# Patient Record
Sex: MALE | Race: WHITE | NOT HISPANIC OR LATINO | Employment: OTHER | URBAN - METROPOLITAN AREA
[De-identification: names, ages, dates, MRNs, and addresses within clinical notes are randomized per-mention and may not be internally consistent; named-entity substitution may affect disease eponyms.]

---

## 2017-01-03 ENCOUNTER — HOSPITAL ENCOUNTER (OUTPATIENT)
Dept: SLEEP MEDICINE | Facility: HOSPITAL | Age: 67
Discharge: HOME | End: 2017-01-03
Attending: PSYCHIATRY & NEUROLOGY | Admitting: PSYCHIATRY & NEUROLOGY

## 2017-01-03 PROCEDURE — G0463 HOSPITAL OUTPT CLINIC VISIT: HCPCS

## 2017-01-03 NOTE — PROGRESS NOTES
DATE OF SERVICE:  01/03/2017    HISTORY:  The patient is a 66-year-old  male who has moderately severe obstructive sleep apnea syndrome. His polysomnography in the past has revealed apnea-hypopnea index of 27 per sleep hour and minimum SpO2 of 90%. During supine position, he had severe obstructive sleep apnea with AHI of 49 per sleep hour. He was treated with CPAP at 10 cmH20. Compliance data indicates 100% compliance with average usage of 8 hours. AHI is 0.7 with CPAP therapy. [place] Sleepiness Scale score is 3. The patient is compliant and benefiting from it.     PAST MEDICAL HISTORY:  1.  Hypertension.   2.  Hypercholesterolemia.     CURRENT MEDICATIONS:  1.  Atorvastatin.    2.  Metoprolol.  3.  Lisinopril.     ALLERGIES: No known drug allergies.     PHYSICAL EXAMINATION:  VITAL SIGNS: Weight 206 pounds, height 69 inches, body mass index 31, blood pressure 130/65, heart rate 50.   HEENT: Normal.   NECK: Supple. No bruits.   CARDIAC: Normal.   LUNGS: Clear to auscultation.   EXTREMITIES: No edema.     IMPRESSION: Patient with obstructive sleep apnea syndrome successfully treated with CPAP therapy and is compliant and benefiting from it.     RECOMMENDATIONS: Continue present CPAP. Followup 1 year.       BUSHRA Bee:last  D:   01/03/2017 08:41:44  T:   01/03/2017 09:20:53  Job ID:   [id number]  Document ID:   [id number]  cc:

## 2017-01-06 RX ORDER — METOPROLOL SUCCINATE 50 MG/1
TABLET, EXTENDED RELEASE ORAL
Qty: 90 TABLET | Refills: 0 | Status: SHIPPED | OUTPATIENT
Start: 2017-01-06 | End: 2017-03-07 | Stop reason: SDUPTHER

## 2017-03-03 RX ORDER — ATORVASTATIN CALCIUM 40 MG/1
TABLET, FILM COATED ORAL
Qty: 30 TABLET | Refills: 4 | Status: SHIPPED | OUTPATIENT
Start: 2017-03-03 | End: 2017-09-20 | Stop reason: SDUPTHER

## 2017-03-07 ENCOUNTER — OFFICE VISIT (OUTPATIENT)
Dept: FAMILY MEDICINE CLINIC | Facility: CLINIC | Age: 67
End: 2017-03-07

## 2017-03-07 VITALS
SYSTOLIC BLOOD PRESSURE: 150 MMHG | TEMPERATURE: 97.8 F | WEIGHT: 211 LBS | DIASTOLIC BLOOD PRESSURE: 86 MMHG | HEIGHT: 69 IN | HEART RATE: 52 BPM | BODY MASS INDEX: 31.25 KG/M2 | OXYGEN SATURATION: 96 %

## 2017-03-07 DIAGNOSIS — R00.1 SINUS BRADYCARDIA: Primary | ICD-10-CM

## 2017-03-07 PROCEDURE — 99213 OFFICE O/P EST LOW 20 MIN: CPT | Performed by: FAMILY MEDICINE

## 2017-03-07 PROCEDURE — 93000 ELECTROCARDIOGRAM COMPLETE: CPT | Performed by: FAMILY MEDICINE

## 2017-03-07 RX ORDER — METOPROLOL SUCCINATE 25 MG/1
25 TABLET, EXTENDED RELEASE ORAL DAILY
Start: 2017-03-07 | End: 2018-02-27

## 2017-03-07 NOTE — PROGRESS NOTES
Chief Complaint   Patient presents with   • Hypertension     follow up ,pt had 1 a low blood pressure episode yesterday 100/60 ,after gym .feel fatighe and has not energy       Subjective.../HPI  Patient present today with htn. Was at gym and became weak and went home and took nap. Drank sports drinks. Daughter listened to his heart and may have had irregular beat.     I have reviewed the patient's medical history in detail and updated the computerized patient record.    Family History   Problem Relation Age of Onset   • Dementia Mother    • Migraines Mother    • Osteoporosis Mother    • Stroke Mother 85     early 80s   • Heart disease Mother      tachycardia and had ppm   • Alzheimer's disease Mother    • Alcohol abuse Father    • Depression Father    • Migraines Father    • Tuberculosis Father    • Cancer Paternal Uncle      mets but unsure of type of ca  BACK CANCER       Social History     Social History   • Marital status:      Spouse name: N/A   • Number of children: 2   • Years of education: N/A     Occupational History   • teacher2      Social History Main Topics   • Smoking status: Never Smoker   • Smokeless tobacco: Never Used   • Alcohol use No      Comment: none in 5 yrs   • Drug use: No   • Sexual activity: Yes     Partners: Female     Other Topics Concern   • Not on file     Social History Narrative       Review of Systems:   Review of Systems   Constitutional: Positive for fatigue.   HENT: Negative.    Eyes: Negative.    Respiratory: Negative.    Cardiovascular: Positive for palpitations.   Gastrointestinal: Negative.    Endocrine: Negative.    Genitourinary: Negative.    Musculoskeletal: Negative.    Skin: Negative.    Allergic/Immunologic: Negative.    Hematological: Negative.        Objective:  Vital Signs     Vitals:    03/07/17 1212   BP: 150/86   BP Location: Right arm   Patient Position: Sitting   Pulse: 52   Temp: 97.8 °F (36.6 °C)   TempSrc: Oral   SpO2: 96%   Weight: 211 lb (95.7  "kg)   Height: 69\" (175.3 cm)     Physical Exam   Constitutional: He is oriented to person, place, and time. He appears well-developed and well-nourished.   HENT:   Head: Normocephalic.   Eyes: Pupils are equal, round, and reactive to light.   Neck: Normal range of motion.   Cardiovascular: Normal rate, regular rhythm and normal heart sounds.    Pulmonary/Chest: Effort normal.   Abdominal: Soft.   Musculoskeletal: Normal range of motion.   Neurological: He is alert and oriented to person, place, and time.   Skin: Skin is warm and dry.        Results Review:      REVIEWED AND DISCUSSED CLINICAL RESULTS WITH PATIENT                          Current Outpatient Prescriptions:   •  aspirin 81 MG tablet, Take  by mouth daily., Disp: , Rfl:   •  atorvastatin (LIPITOR) 40 MG tablet, TAKE 1 TABLET BY MOUTH ONE TIME A DAY , Disp: 30 tablet, Rfl: 4  •  esomeprazole (NEXIUM) 40 MG capsule, Take  by mouth., Disp: , Rfl:   •  ibuprofen (ADVIL,MOTRIN) 800 MG tablet, Take 800 mg by mouth Daily., Disp: , Rfl:   •  lisinopril (PRINIVIL,ZESTRIL) 10 MG tablet, Take 1 tablet by mouth Daily., Disp: 90 tablet, Rfl: 1  •  metoprolol succinate XL (TOPROL-XL) 25 MG 24 hr tablet, Take 1 tablet by mouth Daily., Disp: , Rfl:   •  Multiple Vitamin tablet, Take  by mouth., Disp: , Rfl:       ECG 12 Lead  Date/Time: 3/7/2017 12:39 PM  Performed by: CHONG LI JR.  Authorized by: CHONG LI JR.   Comparison: compared with previous ECG from 11/12/2016  Rhythm: sinus bradycardia  Rate: bradycardic  QRS axis: left  Clinical impression: non-specific ECG            Assessment/Plan     Diagnoses and all orders for this visit:    Sinus bradycardia    Other orders  -     ECG 12 Lead  -     metoprolol succinate XL (TOPROL-XL) 25 MG 24 hr tablet; Take 1 tablet by mouth Daily.       decrease to metoprolol succinate er 25 mg daily but skip all together tomorrow    Chong Li Jr., MD  03/07/17  1:27 PM          "

## 2017-04-04 ENCOUNTER — OFFICE VISIT (OUTPATIENT)
Dept: FAMILY MEDICINE CLINIC | Facility: CLINIC | Age: 67
End: 2017-04-04

## 2017-04-04 VITALS
BODY MASS INDEX: 31.22 KG/M2 | HEIGHT: 69 IN | TEMPERATURE: 98.1 F | HEART RATE: 56 BPM | SYSTOLIC BLOOD PRESSURE: 140 MMHG | WEIGHT: 210.8 LBS | OXYGEN SATURATION: 98 % | DIASTOLIC BLOOD PRESSURE: 90 MMHG

## 2017-04-04 DIAGNOSIS — I10 ESSENTIAL HYPERTENSION: ICD-10-CM

## 2017-04-04 DIAGNOSIS — E78.01 FAMILIAL HYPERCHOLESTEROLEMIA: Primary | ICD-10-CM

## 2017-04-04 DIAGNOSIS — K21.00 GASTROESOPHAGEAL REFLUX DISEASE WITH ESOPHAGITIS: ICD-10-CM

## 2017-04-04 PROCEDURE — 99213 OFFICE O/P EST LOW 20 MIN: CPT | Performed by: FAMILY MEDICINE

## 2017-04-04 RX ORDER — LISINOPRIL 20 MG/1
20 TABLET ORAL DAILY
Qty: 90 TABLET | Refills: 1 | Status: SHIPPED | OUTPATIENT
Start: 2017-04-04 | End: 2017-08-30 | Stop reason: SDUPTHER

## 2017-04-04 NOTE — PROGRESS NOTES
"  Chief Complaint   Patient presents with   • Hypertension     follow up ,is doing quite well ,meds working nice       Subjective.../HPI  Patient present today with htn. doing well on meds.bp 157/87 range max  at home    I have reviewed the patient's medical history in detail and updated the computerized patient record.    Family History   Problem Relation Age of Onset   • Dementia Mother    • Migraines Mother    • Osteoporosis Mother    • Stroke Mother 85     early 80s   • Heart disease Mother      tachycardia and had ppm   • Alzheimer's disease Mother    • Alcohol abuse Father    • Depression Father    • Migraines Father    • Tuberculosis Father    • Cancer Paternal Uncle      mets but unsure of type of ca  BACK CANCER       Social History     Social History   • Marital status:      Spouse name: N/A   • Number of children: 2   • Years of education: N/A     Occupational History   • teacher2      Social History Main Topics   • Smoking status: Never Smoker   • Smokeless tobacco: Never Used   • Alcohol use No      Comment: none in 5 yrs   • Drug use: No   • Sexual activity: Yes     Partners: Female     Other Topics Concern   • Not on file     Social History Narrative       Review of Systems:   Review of Systems   Constitutional: Negative.    HENT: Negative.    Gastrointestinal: Negative.    Endocrine: Negative.    Genitourinary: Negative.    Musculoskeletal: Negative.    Skin: Negative.    Allergic/Immunologic: Negative.    Neurological: Negative.    Hematological: Negative.    Psychiatric/Behavioral: Negative.        Objective:  Vital Signs     Vitals:    04/04/17 1348 04/04/17 1406   BP: 136/82 140/90   BP Location: Left arm Left arm   Patient Position: Sitting Sitting   Cuff Size:  Adult   Pulse: 56    Temp: 98.1 °F (36.7 °C)    TempSrc: Oral    SpO2: 98%    Weight: 210 lb 12.8 oz (95.6 kg)    Height: 69\" (175.3 cm)      Physical Exam   Constitutional: He is oriented to person, place, and time. He appears " well-developed and well-nourished.   HENT:   Head: Normocephalic.   Eyes: Pupils are equal, round, and reactive to light.   Neck: Normal range of motion.   Cardiovascular: Normal rate, regular rhythm and normal heart sounds.    Pulmonary/Chest: Effort normal.   Abdominal: Soft.   Musculoskeletal: Normal range of motion.   Neurological: He is alert and oriented to person, place, and time.   Skin: Skin is warm and dry.        Results Review:      REVIEWED AND DISCUSSED CLINICAL RESULTS WITH PATIENT                          Current Outpatient Prescriptions:   •  aspirin 81 MG tablet, Take  by mouth daily., Disp: , Rfl:   •  atorvastatin (LIPITOR) 40 MG tablet, TAKE 1 TABLET BY MOUTH ONE TIME A DAY , Disp: 30 tablet, Rfl: 4  •  esomeprazole (NEXIUM) 40 MG capsule, Take  by mouth., Disp: , Rfl:   •  ibuprofen (ADVIL,MOTRIN) 800 MG tablet, Take 800 mg by mouth Daily., Disp: , Rfl:   •  lisinopril (PRINIVIL,ZESTRIL) 20 MG tablet, Take 1 tablet by mouth Daily., Disp: 90 tablet, Rfl: 1  •  metoprolol succinate XL (TOPROL-XL) 25 MG 24 hr tablet, Take 1 tablet by mouth Daily., Disp: , Rfl:   •  Multiple Vitamin tablet, Take  by mouth., Disp: , Rfl:     Procedures    Assessment/Plan     Diagnoses and all orders for this visit:    Familial hypercholesterolemia    Essential hypertension    Gastroesophageal reflux disease with esophagitis    Other orders  -     lisinopril (PRINIVIL,ZESTRIL) 20 MG tablet; Take 1 tablet by mouth Daily.         Chong Beckman Jr., MD  04/04/17  2:14 PM

## 2017-07-11 ENCOUNTER — OFFICE VISIT (OUTPATIENT)
Dept: FAMILY MEDICINE CLINIC | Facility: CLINIC | Age: 67
End: 2017-07-11

## 2017-07-11 VITALS
OXYGEN SATURATION: 98 % | WEIGHT: 203.2 LBS | HEART RATE: 57 BPM | HEIGHT: 69 IN | TEMPERATURE: 98.1 F | BODY MASS INDEX: 30.1 KG/M2 | SYSTOLIC BLOOD PRESSURE: 114 MMHG | DIASTOLIC BLOOD PRESSURE: 70 MMHG

## 2017-07-11 DIAGNOSIS — N40.0 ENLARGED PROSTATE: ICD-10-CM

## 2017-07-11 DIAGNOSIS — Z12.5 SCREENING PSA (PROSTATE SPECIFIC ANTIGEN): ICD-10-CM

## 2017-07-11 DIAGNOSIS — Z13.1 SCREENING FOR DIABETES MELLITUS (DM): ICD-10-CM

## 2017-07-11 DIAGNOSIS — Z00.00 ANNUAL PHYSICAL EXAM: Primary | ICD-10-CM

## 2017-07-11 DIAGNOSIS — K21.00 GASTROESOPHAGEAL REFLUX DISEASE WITH ESOPHAGITIS: ICD-10-CM

## 2017-07-11 DIAGNOSIS — Z79.899 ENCOUNTER FOR LONG-TERM (CURRENT) USE OF MEDICATIONS: ICD-10-CM

## 2017-07-11 DIAGNOSIS — I10 ESSENTIAL HYPERTENSION: Primary | ICD-10-CM

## 2017-07-11 DIAGNOSIS — E78.01 FAMILIAL HYPERCHOLESTEROLEMIA: ICD-10-CM

## 2017-07-11 PROCEDURE — G0439 PPPS, SUBSEQ VISIT: HCPCS | Performed by: FAMILY MEDICINE

## 2017-07-11 NOTE — PROGRESS NOTES
QUICK REFERENCE INFORMATION:  The ABCs of the Annual Wellness Visit    Subsequent Medicare Wellness Visit    HEALTH RISK ASSESSMENT    1950    Recent Hospitalizations:  No hospitalization(s) within the last year..        Current Medical Providers:  Patient Care Team:  Chong Beckman Jr., MD as PCP - General        Smoking Status:  History   Smoking Status   • Never Smoker   Smokeless Tobacco   • Never Used       Alcohol Consumption:  History   Alcohol Use No     Comment: none in 5 yrs       Depression Screen:   PHQ-9 Depression Screening 7/11/2017   Little interest or pleasure in doing things 0   Feeling down, depressed, or hopeless 0   Trouble falling or staying asleep, or sleeping too much -   Feeling tired or having little energy -   Poor appetite or overeating -   Feeling bad about yourself - or that you are a failure or have let yourself or your family down -   Trouble concentrating on things, such as reading the newspaper or watching television -   Moving or speaking so slowly that other people could have noticed. Or the opposite - being so fidgety or restless that you have been moving around a lot more than usual -   Thoughts that you would be better off dead, or of hurting yourself in some way -   PHQ-9 Total Score 0   If you checked off any problems, how difficult have these problems made it for you to do your work, take care of things at home, or get along with other people? -       Health Habits and Functional and Cognitive Screening:  Functional & Cognitive Status 7/11/2017   Do you have difficulty preparing food and eating? No   Do you have difficulty bathing yourself? No   Do you have difficulty getting dressed? No   Do you have difficulty using the toilet? No   Do you have difficulty moving around from place to place? No   In the past year have you fallen or experienced a near fall? No   Do you need help using the phone?  No   Are you deaf or do you have serious difficulty hearing?  No   Do  you need help with transportation? No   Do you need help shopping? No   Do you need help preparing meals?  No   Do you need help with housework?  No   Do you need help with laundry? No   Do you need help taking your medications? No   Do you need help managing money? No   Do you have difficulty concentrating, remembering or making decisions? No       Health Habits  Current Diet: Well Balanced Diet  Dental Exam: Up to date  Eye Exam: Up to date  Exercise (times per week): 5 times per week  Current Exercise Activities Include: Cardiovasular Workout on Exercise Equipment      Does the patient have evidence of cognitive impairment? No    Aspirin use counseling: Taking ASA appropriately as indicated      Recent Lab Results:  CMP:  Lab Results   Component Value Date    GLU 99 10/26/2016    BUN 18 10/26/2016    CREATININE 1.05 10/26/2016    EGFRIFNONA 70 10/26/2016    EGFRIFAFRI 85 10/26/2016    BCR 17.1 10/26/2016     10/26/2016    K 4.8 10/26/2016    CO2 26.6 10/26/2016    CALCIUM 9.6 10/26/2016    PROTENTOTREF 7.0 10/26/2016    ALBUMIN 4.50 10/26/2016    LABGLOBREF 2.5 10/26/2016    LABIL2 1.8 10/26/2016    BILITOT 0.6 10/26/2016    ALKPHOS 74 10/26/2016    AST 25 10/26/2016    ALT 27 10/26/2016     Lipid Panel:  Lab Results   Component Value Date    TRIG 76 10/26/2016    HDL 59 10/26/2016    VLDL 15.2 10/26/2016    LDLHDL 1.20 10/26/2016     HbA1c:  Lab Results   Component Value Date    HGBA1C 5.50 10/26/2016       Visual Acuity:  No exam data present    Age-appropriate Screening Schedule:  Refer to the list below for future screening recommendations based on patient's age, sex and/or medical conditions. Orders for these recommended tests are listed in the plan section. The patient has been provided with a written plan.    Health Maintenance   Topic Date Due   • PNEUMOCOCCAL VACCINES (65+ LOW/MEDIUM RISK) (2 of 2 - PPSV23) 10/30/2016   • INFLUENZA VACCINE  08/01/2017   • LIPID PANEL  10/26/2017   • TDAP/TD  VACCINES (3 - Td) 05/04/2019   • COLONOSCOPY  04/16/2025   • ZOSTER VACCINE  Completed        Subjective   History of Present Illness    Zheng Lopez is a 66 y.o. male who presents for an Subsequent Wellness Visit.    The following portions of the patient's history were reviewed and updated as appropriate: allergies, current medications, past family history, past medical history, past social history, past surgical history and problem list.    Outpatient Medications Prior to Visit   Medication Sig Dispense Refill   • aspirin 81 MG tablet Take  by mouth daily.     • atorvastatin (LIPITOR) 40 MG tablet TAKE 1 TABLET BY MOUTH ONE TIME A DAY  30 tablet 4   • esomeprazole (NEXIUM) 40 MG capsule Take  by mouth.     • lisinopril (PRINIVIL,ZESTRIL) 20 MG tablet Take 1 tablet by mouth Daily. 90 tablet 1   • metoprolol succinate XL (TOPROL-XL) 25 MG 24 hr tablet Take 1 tablet by mouth Daily.     • Multiple Vitamin tablet Take  by mouth.     • ibuprofen (ADVIL,MOTRIN) 800 MG tablet Take 800 mg by mouth Daily.       No facility-administered medications prior to visit.        Patient Active Problem List   Diagnosis   • Hyperlipidemia   • Essential hypertension   • Sleep apnea   • Osteoarthritis involving multiple joints on both sides of body   • GERD (gastroesophageal reflux disease)   • Cholecystitis   • Impingement of right ulnar nerve   • Cataract of both eyes   • Diverticula of colon       Advance Care Planning:  has an advance directive - a copy has been provided and is in file    Identification of Risk Factors:  Risk factors include: na.    Review of Systems    Compared to one year ago, the patient feels his physical health is better.  Compared to one year ago, the patient feels his mental health is the same.    Objective     Physical Exam    Vitals:    07/11/17 1345 07/11/17 1419   BP: 130/76 114/70   BP Location: Left arm Left arm   Patient Position: Sitting Sitting   Cuff Size: Adult Adult   Pulse: 57    Temp: 98.1  "°F (36.7 °C)    TempSrc: Oral    SpO2: 98%    Weight: 203 lb 3.2 oz (92.2 kg)    Height: 69\" (175.3 cm)    PainSc: 0-No pain        Body mass index is 30.01 kg/(m^2).  Discussed the patient's BMI with him. The BMI is in the acceptable range.    Assessment/Plan   Patient Self-Management and Personalized Health Advice  The patient has been provided with information about: diet, exercise and weight management and preventive services including:   · Exercise counseling provided.    Visit Diagnoses:    ICD-10-CM ICD-9-CM   1. Essential hypertension I10 401.9   2. Gastroesophageal reflux disease with esophagitis K21.0 530.11   3. Familial hypercholesterolemia E78.01 272.0       Orders Placed This Encounter   Procedures   • Lipid Panel     Standing Status:   Future     Number of Occurrences:   1     Standing Expiration Date:   7/11/2018   • Comprehensive Metabolic Panel     Standing Status:   Future     Number of Occurrences:   1     Standing Expiration Date:   7/11/2018       Outpatient Encounter Prescriptions as of 7/11/2017   Medication Sig Dispense Refill   • aspirin 81 MG tablet Take  by mouth daily.     • atorvastatin (LIPITOR) 40 MG tablet TAKE 1 TABLET BY MOUTH ONE TIME A DAY  30 tablet 4   • esomeprazole (NEXIUM) 40 MG capsule Take  by mouth.     • lisinopril (PRINIVIL,ZESTRIL) 20 MG tablet Take 1 tablet by mouth Daily. 90 tablet 1   • metoprolol succinate XL (TOPROL-XL) 25 MG 24 hr tablet Take 1 tablet by mouth Daily.     • Multiple Vitamin tablet Take  by mouth.     • ibuprofen (ADVIL,MOTRIN) 800 MG tablet Take 800 mg by mouth Daily.       No facility-administered encounter medications on file as of 7/11/2017.        Reviewed use of high risk medication in the elderly: yes  Reviewed for potential of harmful drug interactions in the elderly: yes    Follow Up:  Return in about 6 months (around 1/11/2018).     An After Visit Summary and PPPS with all of these plans were given to the patient.        "

## 2017-07-11 NOTE — PROGRESS NOTES
Chief Complaint   Patient presents with   • Hyperlipidemia     follow up pt doing well ,no complains   • Hypertension       Subjective.../HPI  Patient present today with bp check after increasing lisinopril from 10 mg to 20 mg  -hyperlipidemia  - forehead keratosis  -gerd and doing well    I have reviewed the patient's medical history in detail and updated the computerized patient record.    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    HENT: Negative.    Eyes: Negative.    Cardiovascular: Negative.    Gastrointestinal: Negative.    Endocrine: Negative.    Genitourinary: Negative.    Musculoskeletal: Negative.    Skin: Negative.    Allergic/Immunologic: Negative.    Neurological: Negative.    Hematological: Negative.    Psychiatric/Behavioral: Negative.        Objective:  Vital Signs     Vitals:    07/11/17 1345 07/11/17 1419   BP: 130/76 114/70   BP Location: Left arm Left arm   Patient Position: Sitting Sitting   Cuff Size: Adult Adult   Pulse: 57    Temp: 98.1 °F (36.7 °C)    TempSrc: Oral    SpO2: 98%   "  Weight: 203 lb 3.2 oz (92.2 kg)    Height: 69\" (175.3 cm)      Physical Exam   Constitutional: He is oriented to person, place, and time. He appears well-developed and well-nourished.   HENT:   Head: Normocephalic.   Eyes: Pupils are equal, round, and reactive to light.   Neck: Normal range of motion.   Cardiovascular: Normal rate, regular rhythm and normal heart sounds.    Pulmonary/Chest: Effort normal.   Abdominal: Soft.   Musculoskeletal: Normal range of motion.   Neurological: He is alert and oriented to person, place, and time.   Skin: Skin is warm and dry.   Forehead with scaling lesion on forehead- seb keratosis        Results Review:      REVIEWED AND DISCUSSED CLINICAL RESULTS WITH PATIENT                          Current Outpatient Prescriptions:   •  aspirin 81 MG tablet, Take  by mouth daily., Disp: , Rfl:   •  atorvastatin (LIPITOR) 40 MG tablet, TAKE 1 TABLET BY MOUTH ONE TIME A DAY , Disp: 30 tablet, Rfl: 4  •  esomeprazole (NEXIUM) 40 MG capsule, Take  by mouth., Disp: , Rfl:   •  lisinopril (PRINIVIL,ZESTRIL) 20 MG tablet, Take 1 tablet by mouth Daily., Disp: 90 tablet, Rfl: 1  •  metoprolol succinate XL (TOPROL-XL) 25 MG 24 hr tablet, Take 1 tablet by mouth Daily., Disp: , Rfl:   •  Multiple Vitamin tablet, Take  by mouth., Disp: , Rfl:   •  ibuprofen (ADVIL,MOTRIN) 800 MG tablet, Take 800 mg by mouth Daily., Disp: , Rfl:     Procedures    Assessment/Plan     Diagnoses and all orders for this visit:    Essential hypertension    Gastroesophageal reflux disease with esophagitis    Familial hypercholesterolemia  -     Lipid Panel; Future  -     Comprehensive Metabolic Panel; Future        Liq n2 to forehead lesion    Chong Beckman Jr., MD  07/14/17  2:14 PM          "

## 2017-07-16 ENCOUNTER — RESULTS ENCOUNTER (OUTPATIENT)
Dept: FAMILY MEDICINE CLINIC | Facility: CLINIC | Age: 67
End: 2017-07-16

## 2017-07-16 DIAGNOSIS — E78.01 FAMILIAL HYPERCHOLESTEROLEMIA: ICD-10-CM

## 2017-07-18 LAB
ALBUMIN SERPL-MCNC: 4.4 G/DL (ref 3.6–4.8)
ALBUMIN/GLOB SERPL: 1.6 {RATIO} (ref 1.2–2.2)
ALP SERPL-CCNC: 85 IU/L (ref 39–117)
ALT SERPL-CCNC: 24 IU/L (ref 0–44)
AST SERPL-CCNC: 24 IU/L (ref 0–40)
BILIRUB SERPL-MCNC: 0.6 MG/DL (ref 0–1.2)
BUN SERPL-MCNC: 14 MG/DL (ref 8–27)
BUN/CREAT SERPL: 12 (ref 10–24)
CALCIUM SERPL-MCNC: 9.3 MG/DL (ref 8.6–10.2)
CHLORIDE SERPL-SCNC: 100 MMOL/L (ref 96–106)
CHOLEST SERPL-MCNC: 150 MG/DL (ref 100–199)
CO2 SERPL-SCNC: 23 MMOL/L (ref 18–29)
CREAT SERPL-MCNC: 1.14 MG/DL (ref 0.76–1.27)
GLOBULIN SER CALC-MCNC: 2.8 G/DL (ref 1.5–4.5)
GLUCOSE SERPL-MCNC: 98 MG/DL (ref 65–99)
HDLC SERPL-MCNC: 63 MG/DL
LDLC SERPL CALC-MCNC: 66 MG/DL (ref 0–99)
POTASSIUM SERPL-SCNC: 4.9 MMOL/L (ref 3.5–5.2)
PROT SERPL-MCNC: 7.2 G/DL (ref 6–8.5)
SODIUM SERPL-SCNC: 138 MMOL/L (ref 134–144)
TRIGL SERPL-MCNC: 106 MG/DL (ref 0–149)
VLDLC SERPL CALC-MCNC: 21 MG/DL (ref 5–40)

## 2017-08-30 RX ORDER — LISINOPRIL 20 MG/1
TABLET ORAL
Qty: 90 TABLET | Refills: 1 | Status: SHIPPED | OUTPATIENT
Start: 2017-08-30 | End: 2018-02-27 | Stop reason: SDUPTHER

## 2017-09-05 RX ORDER — METOPROLOL SUCCINATE 50 MG/1
TABLET, EXTENDED RELEASE ORAL
Qty: 90 TABLET | Refills: 0 | Status: SHIPPED | OUTPATIENT
Start: 2017-09-05 | End: 2018-02-27 | Stop reason: SDUPTHER

## 2017-09-20 RX ORDER — ATORVASTATIN CALCIUM 40 MG/1
40 TABLET, FILM COATED ORAL DAILY
Qty: 30 TABLET | Refills: 4 | Status: SHIPPED | OUTPATIENT
Start: 2017-09-20 | End: 2018-11-20 | Stop reason: SDUPTHER

## 2017-11-06 ENCOUNTER — RESULTS ENCOUNTER (OUTPATIENT)
Dept: FAMILY MEDICINE CLINIC | Facility: CLINIC | Age: 67
End: 2017-11-06

## 2017-11-06 DIAGNOSIS — Z12.5 SCREENING PSA (PROSTATE SPECIFIC ANTIGEN): ICD-10-CM

## 2017-11-06 DIAGNOSIS — N40.0 ENLARGED PROSTATE: ICD-10-CM

## 2017-11-06 DIAGNOSIS — Z13.1 SCREENING FOR DIABETES MELLITUS (DM): ICD-10-CM

## 2017-11-06 DIAGNOSIS — Z00.00 ANNUAL PHYSICAL EXAM: ICD-10-CM

## 2017-11-06 DIAGNOSIS — Z79.899 ENCOUNTER FOR LONG-TERM (CURRENT) USE OF MEDICATIONS: ICD-10-CM

## 2017-11-13 ENCOUNTER — OFFICE VISIT (OUTPATIENT)
Dept: FAMILY MEDICINE CLINIC | Facility: CLINIC | Age: 67
End: 2017-11-13

## 2017-11-13 VITALS
HEART RATE: 53 BPM | WEIGHT: 207 LBS | DIASTOLIC BLOOD PRESSURE: 80 MMHG | BODY MASS INDEX: 30.66 KG/M2 | TEMPERATURE: 98.3 F | OXYGEN SATURATION: 98 % | HEIGHT: 69 IN | SYSTOLIC BLOOD PRESSURE: 132 MMHG

## 2017-11-13 DIAGNOSIS — Z23 NEED FOR PNEUMOCOCCAL VACCINATION: ICD-10-CM

## 2017-11-13 DIAGNOSIS — N52.9 ERECTILE DYSFUNCTION, UNSPECIFIED ERECTILE DYSFUNCTION TYPE: Primary | ICD-10-CM

## 2017-11-13 DIAGNOSIS — Z11.59 ENCOUNTER FOR HEPATITIS C SCREENING TEST FOR LOW RISK PATIENT: ICD-10-CM

## 2017-11-13 DIAGNOSIS — E66.9 OBESITY (BMI 30-39.9): ICD-10-CM

## 2017-11-13 PROBLEM — I47.29 NON-SUSTAINED VENTRICULAR TACHYCARDIA: Status: ACTIVE | Noted: 2017-11-13

## 2017-11-13 PROBLEM — I42.2 ASYMMETRIC SEPTAL HYPERTROPHY (HCC): Status: ACTIVE | Noted: 2017-11-13

## 2017-11-13 PROBLEM — I51.7 CARDIAC ENLARGEMENT: Status: ACTIVE | Noted: 2017-11-13

## 2017-11-13 PROBLEM — I51.7 ASYMMETRIC SEPTAL HYPERTROPHY: Status: ACTIVE | Noted: 2017-11-13

## 2017-11-13 PROCEDURE — G0009 ADMIN PNEUMOCOCCAL VACCINE: HCPCS | Performed by: FAMILY MEDICINE

## 2017-11-13 PROCEDURE — 99214 OFFICE O/P EST MOD 30 MIN: CPT | Performed by: FAMILY MEDICINE

## 2017-11-13 PROCEDURE — 90732 PPSV23 VACC 2 YRS+ SUBQ/IM: CPT | Performed by: FAMILY MEDICINE

## 2017-11-13 RX ORDER — SILDENAFIL 100 MG/1
100 TABLET, FILM COATED ORAL DAILY PRN
Qty: 10 TABLET | Refills: 2 | Status: SHIPPED | OUTPATIENT
Start: 2017-11-13 | End: 2018-02-27 | Stop reason: SDUPTHER

## 2017-11-13 NOTE — PROGRESS NOTES
Subjective   Zheng Lopez is a 67 y.o. male.     Chief Complaint   Patient presents with   • Nasal Congestion     pt is been almost 4 weeks with cough ,congestions and no fever       HPI     The following portions of the patient's history were reviewed:  [] Allergies   [] Current Medications  [] Past Family History   [] Past Medical History  [] Past Social History   [] Past Surgical History  [] Problem List    Review of Systems   Constitutional: Negative.    HENT: Positive for congestion.    Eyes: Negative.    Respiratory: Positive for cough.    Cardiovascular: Negative.    Gastrointestinal: Negative.    Endocrine: Negative.    Genitourinary: Negative.    Musculoskeletal: Negative.    Skin: Negative.    Allergic/Immunologic: Negative.    Neurological: Negative.    Psychiatric/Behavioral: Negative.        Objective  Vitals:    11/13/17 0825   BP: 132/80   Pulse: 53   Temp: 98.3 °F (36.8 °C)   SpO2: 98%        Physical Exam      Current Outpatient Prescriptions:   •  aspirin 81 MG tablet, Take  by mouth daily., Disp: , Rfl:   •  atorvastatin (LIPITOR) 40 MG tablet, Take 1 tablet by mouth Daily., Disp: 30 tablet, Rfl: 4  •  esomeprazole (NEXIUM) 40 MG capsule, Take  by mouth., Disp: , Rfl:   •  lisinopril (PRINIVIL,ZESTRIL) 20 MG tablet, TAKE 1 TABLET DAILY, Disp: 90 tablet, Rfl: 1  •  metoprolol succinate XL (TOPROL-XL) 25 MG 24 hr tablet, Take 1 tablet by mouth Daily., Disp: , Rfl:   •  metoprolol succinate XL (TOPROL-XL) 50 MG 24 hr tablet, TAKE 1 TABLET DAILY, Disp: 90 tablet, Rfl: 0  •  Multiple Vitamin tablet, Take  by mouth., Disp: , Rfl:   •  ibuprofen (ADVIL,MOTRIN) 800 MG tablet, Take 800 mg by mouth Daily., Disp: , Rfl:     Procedures    Lab Results (most recent)     None          Assessment/Plan   There are no diagnoses linked to this encounter.      No Follow-up on file.      Samantha Viera MA

## 2017-11-13 NOTE — PROGRESS NOTES
Subjective   Zheng Lopez is a 67 y.o. male.     Chief Complaint   Patient presents with   • Nasal Congestion     pt is been almost 4 weeks with cough ,congestions and no fever   CC: Annual wellness exam    HPI   Patient is a pleasant 67-year-old male here to establish care.  He is here for nasal congestion for 4 weeks, with cough, congestion, no fever.  He states that the symptoms are not improving, stable.  He has no shortness of breath, the cough is dry.    Past medical history of hypertension, hyperlipidemia, GERD, SVT controlled on metoprolol, fatigue, sleep apnea on CPAP, and osteoarthritis.  He is currently stable, no chest pain, sugars of breath, arrhythmia.  He typically exercises daily.      Obesity BMI 30: Decreased exercise as he has been feeling under the weather for the past 4 weeks.  He otherwise is doing cardio and weight training 4 days a week.  He has cut out alcohol, he used to have issues with not being able to stop drinking bourbon.  He would pass on the floor.  He has not had a drink in 4 years.    Erectile dysfunction: The patient takes Viagra as needed for ED, does not have issues with palpitations, CP, SOA.     The following portions of the patient's history were reviewed:  [x] Allergies   [x] Current Medications  [x] Past Family History   [x] Past Medical History  [x] Past Social History   [x] Past Surgical History  [x] Problem List    Review of Systems   Constitutional: Negative for fever and unexpected weight change.   HENT: Positive for congestion. Negative for dental problem.    Respiratory: Positive for cough. Negative for shortness of breath.    Cardiovascular: Negative for chest pain.   Gastrointestinal: Negative for blood in stool.   Genitourinary: Negative for dysuria.   Skin: Negative for rash.   Allergic/Immunologic: Negative for environmental allergies.   Neurological: Negative for syncope.   Psychiatric/Behavioral: The patient is not nervous/anxious.         Objective  Vitals:    11/13/17 0825   BP: 132/80   Pulse: 53   Temp: 98.3 °F (36.8 °C)   SpO2: 98%        Physical Exam   Constitutional: He is oriented to person, place, and time. He appears well-developed and well-nourished. No distress.   HENT:   Head: Normocephalic.   Right Ear: External ear normal.   Nose: Nose normal.   Eyes: EOM are normal.   Cardiovascular: Normal rate, regular rhythm, normal heart sounds and intact distal pulses.    No murmur heard.  Pulmonary/Chest: Effort normal and breath sounds normal. No respiratory distress. He has no wheezes. He has no rales.   Abdominal: Soft. There is no tenderness.   Musculoskeletal: Normal range of motion.   Neurological: He is alert and oriented to person, place, and time.   Skin: Skin is warm and dry. No rash noted.   Psychiatric: He has a normal mood and affect. His behavior is normal. Judgment and thought content normal.   Nursing note and vitals reviewed.        Current Outpatient Prescriptions:   •  aspirin 81 MG tablet, Take  by mouth daily., Disp: , Rfl:   •  atorvastatin (LIPITOR) 40 MG tablet, Take 1 tablet by mouth Daily., Disp: 30 tablet, Rfl: 4  •  esomeprazole (NEXIUM) 40 MG capsule, Take  by mouth., Disp: , Rfl:   •  lisinopril (PRINIVIL,ZESTRIL) 20 MG tablet, TAKE 1 TABLET DAILY, Disp: 90 tablet, Rfl: 1  •  metoprolol succinate XL (TOPROL-XL) 25 MG 24 hr tablet, Take 1 tablet by mouth Daily., Disp: , Rfl:   •  metoprolol succinate XL (TOPROL-XL) 50 MG 24 hr tablet, TAKE 1 TABLET DAILY, Disp: 90 tablet, Rfl: 0  •  Multiple Vitamin tablet, Take  by mouth., Disp: , Rfl:   •  ibuprofen (ADVIL,MOTRIN) 800 MG tablet, Take 800 mg by mouth Daily., Disp: , Rfl:   •  sildenafil (VIAGRA) 100 MG tablet, Take 1 tablet by mouth Daily As Needed for erectile dysfunction., Disp: 10 tablet, Rfl: 2    Procedures    Lab Results (most recent)     None          Assessment/Plan   Zheng was seen today for nasal congestion.    Diagnoses and all orders for this  visit:    Erectile dysfunction, unspecified erectile dysfunction type  -     sildenafil (VIAGRA) 100 MG tablet; Take 1 tablet by mouth Daily As Needed for erectile dysfunction.    Obesity (BMI 30-39.9)  -     Comprehensive Metabolic Panel  -     HCV Antibody Rfx To Qnt PCR  -     Lipid Panel    Encounter for hepatitis C screening test for low risk patient  -     HCV Antibody Rfx To Qnt PCR    Need for pneumococcal vaccination  -     Pneumococcal Polysaccharide Vaccine 23-Valent Greater Than or Equal To 1yo Subcutaneous / IM          Return for Medicare Wellness in July .      Zoya Newman MD

## 2017-11-14 LAB
ALBUMIN SERPL-MCNC: 4.4 G/DL (ref 3.5–5.2)
ALBUMIN/GLOB SERPL: 1.4 G/DL
ALP SERPL-CCNC: 86 U/L (ref 39–117)
ALT SERPL-CCNC: 35 U/L (ref 1–41)
AST SERPL-CCNC: 31 U/L (ref 1–40)
BILIRUB SERPL-MCNC: 0.3 MG/DL (ref 0.1–1.2)
BUN SERPL-MCNC: 19 MG/DL (ref 8–23)
BUN/CREAT SERPL: 15.6 (ref 7–25)
CALCIUM SERPL-MCNC: 9.3 MG/DL (ref 8.6–10.5)
CHLORIDE SERPL-SCNC: 97 MMOL/L (ref 98–107)
CHOLEST SERPL-MCNC: 159 MG/DL (ref 0–200)
CO2 SERPL-SCNC: 25.3 MMOL/L (ref 22–29)
CREAT SERPL-MCNC: 1.22 MG/DL (ref 0.76–1.27)
GFR SERPLBLD CREATININE-BSD FMLA CKD-EPI: 59 ML/MIN/1.73
GFR SERPLBLD CREATININE-BSD FMLA CKD-EPI: 71 ML/MIN/1.73
GLOBULIN SER CALC-MCNC: 3.1 GM/DL
GLUCOSE SERPL-MCNC: 104 MG/DL (ref 65–99)
HCV AB S/CO SERPL IA: <0.1 S/CO RATIO (ref 0–0.9)
HCV AB SERPL QL IA: NORMAL
HDLC SERPL-MCNC: 60 MG/DL (ref 40–60)
LDLC SERPL CALC-MCNC: 82 MG/DL (ref 0–100)
POTASSIUM SERPL-SCNC: 5 MMOL/L (ref 3.5–5.2)
PROT SERPL-MCNC: 7.5 G/DL (ref 6–8.5)
SODIUM SERPL-SCNC: 136 MMOL/L (ref 136–145)
TRIGL SERPL-MCNC: 84 MG/DL (ref 0–150)
VLDLC SERPL CALC-MCNC: 16.8 MG/DL (ref 5–40)

## 2017-11-21 ENCOUNTER — OFFICE VISIT (OUTPATIENT)
Dept: ORTHOPEDIC SURGERY | Facility: CLINIC | Age: 67
End: 2017-11-21

## 2017-11-21 VITALS — TEMPERATURE: 98 F | BODY MASS INDEX: 31.25 KG/M2 | HEIGHT: 69 IN | WEIGHT: 211 LBS

## 2017-11-21 DIAGNOSIS — Z96.651 HISTORY OF TOTAL KNEE ARTHROPLASTY, RIGHT: Primary | ICD-10-CM

## 2017-11-21 PROCEDURE — 73562 X-RAY EXAM OF KNEE 3: CPT | Performed by: ORTHOPAEDIC SURGERY

## 2017-11-21 PROCEDURE — 99212 OFFICE O/P EST SF 10 MIN: CPT | Performed by: ORTHOPAEDIC SURGERY

## 2017-11-21 NOTE — PROGRESS NOTES
"Patient:  Zheng Lopez is a 67 y.o. male    Chief Complaint/ Reason for Visit:    Chief Complaint   Patient presents with   • Right Knee - Follow-up       HPI:  Mr. Lopez is here for a scheduled 6 year postop check on his right total knee.  He says it is doing \"excellent\".  He has no trouble.  He still goes to the gym regularly to maintain health and fitness.  Occasionally, when he \"overdoes it\", he does have a mild brief anterior knee pain.  However, that can also occur in his left knee on occasion.  I have reassured him that this is normal, and urged him to work on hamstring stretching and to avoid \"overdoing it.\"          PMH:    Past Medical History:   Diagnosis Date   • Bronchitis     Bronchitis / URI   • Cataract of both eyes    • Chicken pox    • Cholecystitis    • Diverticula of colon    • Encounter for annual health examination 03/10/2015    Annual Health Assessment   • Fatigue    • Gallbladder disease    • GERD (gastroesophageal reflux disease)    • High blood pressure    • History of EKG     10/30/15, 09/19/13, 09/14/12, 10/18/10, 05/04/09   • History of TB skin testing     TB Skin Test: 01/09/14 Completed, 09/14/12 Patient Declines, 10/18/10 Completed, 03/05/08 Completed   • Hyperlipidemia    • Hypertension     still taking meds   • Impingement of right ulnar nerve    • Measles    • Osteoarthritis involving multiple joints on both sides of body    • Osteoarthritis involving multiple joints on both sides of body    • Persistent cough    • Pulse irregularity     Irregular pulse PER CARDIO   • Sexual problems     Sexual problems / enjoyment ED   • Sleep apnea     has c-pap   • Wellness examination 03/10/2015    Annual Wellness Visit       PSH:    Past Surgical History:   Procedure Laterality Date   • CATARACT EXTRACTION Bilateral    • CATARACT EXTRACTION Right 2013   • CATARACT EXTRACTION Left 2014   • CHOLECYSTECTOMY     • COLONOSCOPY  04/16/2015    Dr. Ritchie   • COLONOSCOPY  03/07/2007   • ELBOW " PROCEDURE Right     ulnar nerve impingement release   • FOOT SURGERY Right     right foot (twice)   • GALLBLADDER SURGERY  2009   • KNEE MENISCAL REPAIR Right 2010    torn right meniscus   • LASIK  2006   • RHINOPLASTY  1986   • ROTATOR CUFF REPAIR Left 2010   • SHOULDER ARTHROSCOPY W/ LABRAL REPAIR Right 2015    Dr. Sahil lay repair right shoulder   • SHOULDER LIGAMENT REPAIR Right    • TOTAL KNEE ARTHROPLASTY Right 2011       Social Hx:    Social History     Social History   • Marital status:      Spouse name: N/A   • Number of children: 2   • Years of education: N/A     Occupational History   • teacher2      Social History Main Topics   • Smoking status: Never Smoker   • Smokeless tobacco: Never Used   • Alcohol use No      Comment: none in 5 yrs, Nov 2011 - last drink   • Drug use: No   • Sexual activity: Yes     Partners: Female     Other Topics Concern   • Not on file     Social History Narrative       Family Hx:    Family History   Problem Relation Age of Onset   • Dementia Mother    • Migraines Mother    • Osteoporosis Mother    • Stroke Mother 85     early 80s   • Heart disease Mother      tachycardia and had ppm   • Alzheimer's disease Mother    • Supraventricular tachycardia Mother    • Alcohol abuse Father    • Depression Father    • Migraines Father    • Tuberculosis Father    • Cirrhosis Father      43 YO   • Cancer Paternal Uncle 67     mets but unsure of type of ca  BACK CANCER   • Stroke Brother    • Colon cancer Neg Hx    • Prostate cancer Neg Hx    • Heart attack Neg Hx        Meds:    Current Outpatient Prescriptions:   •  aspirin 81 MG tablet, Take  by mouth daily., Disp: , Rfl:   •  atorvastatin (LIPITOR) 40 MG tablet, Take 1 tablet by mouth Daily., Disp: 30 tablet, Rfl: 4  •  esomeprazole (NEXIUM) 40 MG capsule, Take  by mouth., Disp: , Rfl:   •  ibuprofen (ADVIL,MOTRIN) 800 MG tablet, Take 800 mg by mouth Daily., Disp: , Rfl:   •  lisinopril (PRINIVIL,ZESTRIL) 20 MG tablet, TAKE  "1 TABLET DAILY, Disp: 90 tablet, Rfl: 1  •  metoprolol succinate XL (TOPROL-XL) 25 MG 24 hr tablet, Take 1 tablet by mouth Daily., Disp: , Rfl:   •  metoprolol succinate XL (TOPROL-XL) 50 MG 24 hr tablet, TAKE 1 TABLET DAILY, Disp: 90 tablet, Rfl: 0  •  Multiple Vitamin tablet, Take  by mouth., Disp: , Rfl:   •  sildenafil (VIAGRA) 100 MG tablet, Take 1 tablet by mouth Daily As Needed for erectile dysfunction., Disp: 10 tablet, Rfl: 2    Allergies:  No Known Allergies    ROS:  Review of Systems    Vitals:    11/21/17 0840   Temp: 98 °F (36.7 °C)   Weight: 211 lb (95.7 kg)   Height: 69\" (175.3 cm)       Physical Exam    The patient is awake, alert, and oriented ×3.  The patient is in no acute distress.  Breathing is regular and unlabored with a respiratory rate of 12/m.  Extraocular movements and pupillary responses are symmetrically intact. Sclerae are anicteric.   Hearing is within normal limits.  Speech is within normal limits.  There is no jugular venous distention.    His gait is normal.  Range of motion of his right total knee is 0-127° of flexion.  He is stable in all planes.  His incision is beautifully healed and there is no sign of infection.  Neurovascular examination in the right lower extremity distally is normal.    Radiology: X-rays: AP, lateral, and sunrise views of the right total knee were ordered and reviewed today to assess the patient's status now 6 years postop.  We reviewed these and compare them to images going all the way back to the initial postop films.  In the interval, there is really been no concerning change.  There is no evidence of loosening, excessive wear, or any other concerning findings.        Assessment:  1. History of total knee arthroplasty, right, now 6 years postop with an excellent outcome    - XR Knee 3 View Right          Plan:  I discussed everything with the patient length.  I emphasized the importance of stretching and flexibility long-term.  I explained that annual " follow-up was no longer recommended for well functioning total joints.  He knows that I will be happy to see him back for any future orthopedic concerns.      Orders Placed This Encounter   Procedures   • XR Knee 3 View Right     Order Specific Question:   Reason for Exam:     Answer:   f/u rt. tka

## 2018-01-09 ENCOUNTER — OFFICE VISIT (OUTPATIENT)
Dept: SLEEP MEDICINE | Facility: HOSPITAL | Age: 68
End: 2018-01-09
Attending: PSYCHIATRY & NEUROLOGY

## 2018-01-09 VITALS
DIASTOLIC BLOOD PRESSURE: 74 MMHG | HEART RATE: 57 BPM | SYSTOLIC BLOOD PRESSURE: 142 MMHG | WEIGHT: 210 LBS | HEIGHT: 69 IN | BODY MASS INDEX: 31.1 KG/M2

## 2018-01-09 DIAGNOSIS — G47.33 OSA (OBSTRUCTIVE SLEEP APNEA): Primary | ICD-10-CM

## 2018-01-09 PROCEDURE — G0463 HOSPITAL OUTPT CLINIC VISIT: HCPCS

## 2018-02-27 ENCOUNTER — OFFICE VISIT (OUTPATIENT)
Dept: FAMILY MEDICINE CLINIC | Facility: CLINIC | Age: 68
End: 2018-02-27

## 2018-02-27 VITALS
DIASTOLIC BLOOD PRESSURE: 78 MMHG | OXYGEN SATURATION: 96 % | TEMPERATURE: 98.2 F | HEART RATE: 52 BPM | HEIGHT: 69 IN | WEIGHT: 205 LBS | BODY MASS INDEX: 30.36 KG/M2 | SYSTOLIC BLOOD PRESSURE: 112 MMHG

## 2018-02-27 DIAGNOSIS — N52.9 ERECTILE DYSFUNCTION, UNSPECIFIED ERECTILE DYSFUNCTION TYPE: ICD-10-CM

## 2018-02-27 DIAGNOSIS — R73.9 HYPERGLYCEMIA: Primary | ICD-10-CM

## 2018-02-27 DIAGNOSIS — I10 ESSENTIAL HYPERTENSION: ICD-10-CM

## 2018-02-27 LAB — HBA1C MFR BLD: 5.7 %

## 2018-02-27 PROCEDURE — 83036 HEMOGLOBIN GLYCOSYLATED A1C: CPT | Performed by: FAMILY MEDICINE

## 2018-02-27 PROCEDURE — 99214 OFFICE O/P EST MOD 30 MIN: CPT | Performed by: FAMILY MEDICINE

## 2018-02-27 RX ORDER — METOPROLOL SUCCINATE 50 MG/1
26 TABLET, EXTENDED RELEASE ORAL DAILY
Qty: 90 TABLET | Refills: 0 | Status: SHIPPED | OUTPATIENT
Start: 2018-02-27 | End: 2018-07-17 | Stop reason: SDUPTHER

## 2018-02-27 RX ORDER — LISINOPRIL 20 MG/1
20 TABLET ORAL DAILY
Qty: 90 TABLET | Refills: 1 | Status: SHIPPED | OUTPATIENT
Start: 2018-02-27 | End: 2018-07-17 | Stop reason: SDUPTHER

## 2018-02-27 RX ORDER — SILDENAFIL 100 MG/1
100 TABLET, FILM COATED ORAL DAILY PRN
Qty: 4 TABLET | Refills: 0 | COMMUNITY
Start: 2018-02-27 | End: 2020-06-05

## 2018-02-27 NOTE — PROGRESS NOTES
Subjective   Zheng Lopez is a 67 y.o. male.     Chief Complaint   Patient presents with   • Hypertension     3 month follow up pt is doing well no complains   • Hyperglycemia     pt last blood work sugar levels were higher than normal    Elevated blood sugar    HPI     Pt is a pleasant 67 YOM with a PMH of hypertension, hyperlipidemia, GERD, SVT controlled on metoprolol, fatigue, sleep apnea on CPAP, and osteoarthritis.  He is currently stable, no chest pain, sugars of breath, arrhythmia.  He typically exercises daily.      He presents today to follow-up hyperglycemia.  He was found to have elevated blood sugar in his fasting labs on November 2017.  He has not had a diagnosis of diabetes previously. Last A1c performed was 10/2016 at 5.5.  No polyuria or polydipsia.     HTN: well controlled, asymptomatic, no CP, SOA, palpitations. Controlled on Lisinopril and metoprolol 25mg daily.  He does get palpitations without BB.     ED: Improves with Viagra 50 mg, never had issues with prolonged erection.     The following portions of the patient's history were reviewed and updated as appropriate: allergies, current medications, past family history, past medical history, past social history, past surgical history and problem list.    Review of Systems   Constitutional: Negative for fever and unexpected weight change.   HENT: Negative for dental problem.    Respiratory: Negative for shortness of breath.    Cardiovascular: Negative for chest pain.   Gastrointestinal: Negative for blood in stool.   Genitourinary: Negative for dysuria.   Skin: Negative for rash.   Allergic/Immunologic: Negative for environmental allergies.   Neurological: Negative for syncope.   Psychiatric/Behavioral: The patient is not nervous/anxious.    All other systems reviewed and are negative.      Objective  Vitals:    02/27/18 0752   BP: 112/78   Pulse: 52   Temp: 98.2 °F (36.8 °C)   SpO2: 96%        Physical Exam   Constitutional: He is oriented to  person, place, and time. He appears well-developed and well-nourished. No distress.   HENT:   Head: Normocephalic.   Nose: Nose normal.   Eyes: EOM are normal.   Cardiovascular: Normal rate, regular rhythm, normal heart sounds and intact distal pulses.    No murmur heard.  Pulmonary/Chest: Effort normal and breath sounds normal. No respiratory distress.   Musculoskeletal: Normal range of motion.   Neurological: He is alert and oriented to person, place, and time.   Skin: Skin is warm and dry. No rash noted.   Psychiatric: He has a normal mood and affect. His behavior is normal. Judgment and thought content normal.   Nursing note and vitals reviewed.        Current Outpatient Prescriptions:   •  aspirin 81 MG tablet, Take  by mouth daily., Disp: , Rfl:   •  atorvastatin (LIPITOR) 40 MG tablet, Take 1 tablet by mouth Daily., Disp: 30 tablet, Rfl: 4  •  esomeprazole (NEXIUM) 40 MG capsule, Take  by mouth., Disp: , Rfl:   •  ibuprofen (ADVIL,MOTRIN) 800 MG tablet, Take 800 mg by mouth Daily., Disp: , Rfl:   •  sildenafil (VIAGRA) 100 MG tablet, Take 1 tablet by mouth Daily As Needed for erectile dysfunction., Disp: 4 tablet, Rfl: 0  •  lisinopril (PRINIVIL,ZESTRIL) 20 MG tablet, Take 1 tablet by mouth Daily., Disp: 90 tablet, Rfl: 1  •  metoprolol succinate XL (TOPROL-XL) 50 MG 24 hr tablet, Take 0.5 tablets by mouth Daily., Disp: 90 tablet, Rfl: 0  •  Multiple Vitamin tablet, Take  by mouth., Disp: , Rfl:     Procedures    Lab Results (most recent)     None        Office Visit on 02/27/2018   Component Date Value Ref Range Status   • Hemoglobin A1C 02/27/2018 5.7  % Final         Assessment/Plan   Zheng was seen today for hypertension and hyperglycemia.    Diagnoses and all orders for this visit:    Hyperglycemia  -     POC Glycosylated Hemoglobin (Hb A1C)    Erectile dysfunction, unspecified erectile dysfunction type  -     sildenafil (VIAGRA) 100 MG tablet; Take 1 tablet by mouth Daily As Needed for erectile  dysfunction.    Essential hypertension  -     metoprolol succinate XL (TOPROL-XL) 50 MG 24 hr tablet; Take 0.5 tablets by mouth Daily.  -     lisinopril (PRINIVIL,ZESTRIL) 20 MG tablet; Take 1 tablet by mouth Daily.      Patient with hyperglycemia, hemoglobin A1c at 5.7, discussed exercise and dietary recommendations for a lower glucose levels.  Overall okay.  Viagra for ED, he takes half a tablet, 50 mg.  Warnings TD given.  Central hypertension well controlled currently, continue metoprolol and lisinopril.  Patient has wrist cuff at home, he'll continue checking at home, if it starts to go above 140/90 he will notify the office.  Otherwise follow-up in November for annual maintenance.    Return in about 6 months (around 8/27/2018) for Medicare Wellness.      Zoya Newman MD

## 2018-03-22 ENCOUNTER — OFFICE VISIT (OUTPATIENT)
Dept: ORTHOPEDIC SURGERY | Facility: CLINIC | Age: 68
End: 2018-03-22

## 2018-03-22 VITALS — HEIGHT: 69 IN | BODY MASS INDEX: 29.77 KG/M2 | WEIGHT: 201 LBS | TEMPERATURE: 98.3 F

## 2018-03-22 DIAGNOSIS — G89.29 CHRONIC RIGHT SHOULDER PAIN: Primary | ICD-10-CM

## 2018-03-22 DIAGNOSIS — M25.511 CHRONIC RIGHT SHOULDER PAIN: Primary | ICD-10-CM

## 2018-03-22 PROCEDURE — 99213 OFFICE O/P EST LOW 20 MIN: CPT | Performed by: ORTHOPAEDIC SURGERY

## 2018-03-22 PROCEDURE — 20610 DRAIN/INJ JOINT/BURSA W/O US: CPT | Performed by: ORTHOPAEDIC SURGERY

## 2018-03-22 PROCEDURE — 73030 X-RAY EXAM OF SHOULDER: CPT | Performed by: ORTHOPAEDIC SURGERY

## 2018-03-22 RX ORDER — METHYLPREDNISOLONE ACETATE 80 MG/ML
80 INJECTION, SUSPENSION INTRA-ARTICULAR; INTRALESIONAL; INTRAMUSCULAR; SOFT TISSUE
Status: COMPLETED | OUTPATIENT
Start: 2018-03-22 | End: 2018-03-22

## 2018-03-22 RX ORDER — LIDOCAINE HYDROCHLORIDE 20 MG/ML
4 INJECTION, SOLUTION EPIDURAL; INFILTRATION; INTRACAUDAL; PERINEURAL
Status: COMPLETED | OUTPATIENT
Start: 2018-03-22 | End: 2018-03-22

## 2018-03-22 RX ADMIN — METHYLPREDNISOLONE ACETATE 80 MG: 80 INJECTION, SUSPENSION INTRA-ARTICULAR; INTRALESIONAL; INTRAMUSCULAR; SOFT TISSUE at 08:43

## 2018-03-22 RX ADMIN — LIDOCAINE HYDROCHLORIDE 4 ML: 20 INJECTION, SOLUTION EPIDURAL; INFILTRATION; INTRACAUDAL; PERINEURAL at 08:43

## 2018-03-22 NOTE — PROGRESS NOTES
New Right Shoulder      Patient: Zheng Lopez        YOB: 1950    Medical Record Number: [id number]        Chief Complaints: right shoulder pain  Chief Complaint   Patient presents with   • Right Shoulder - Establish Care           History of Present Illness: This is a  67 y.o. male who presents with Complaints of right shoulder pain states his been ongoing for about 3-4 months no history injury change in activity he is still able to do everything he still works out he had his shoulder arthroscopy in in 2015 where he had a biceps tenotomy and debridement of labrum.  He states he can take a certain he has no night pain symptoms are described as mild intermittent burning he cannot do any one thing to reproduce his symptoms he is retired his past medical history marked for sleep apnea depression anxiety          Allergies: No Known Allergies    Medications:   Home Medications:  Current Outpatient Prescriptions on File Prior to Visit   Medication Sig   • aspirin 81 MG tablet Take  by mouth daily.   • atorvastatin (LIPITOR) 40 MG tablet Take 1 tablet by mouth Daily.   • esomeprazole (NEXIUM) 40 MG capsule Take  by mouth.   • ibuprofen (ADVIL,MOTRIN) 800 MG tablet Take 800 mg by mouth Daily.   • lisinopril (PRINIVIL,ZESTRIL) 20 MG tablet Take 1 tablet by mouth Daily.   • metoprolol succinate XL (TOPROL-XL) 50 MG 24 hr tablet Take 0.5 tablets by mouth Daily.   • Multiple Vitamin tablet Take  by mouth.   • sildenafil (VIAGRA) 100 MG tablet Take 1 tablet by mouth Daily As Needed for erectile dysfunction.     No current facility-administered medications on file prior to visit.      Current Medications:  Scheduled Meds:  Continuous Infusions:  No current facility-administered medications for this visit.   PRN Meds:.    Past Medical History:   Diagnosis Date   • Bronchitis     Bronchitis / URI   • Cataract of both eyes    • Chicken pox    • Cholecystitis    • Diverticula of colon    • Encounter for annual  health examination 03/10/2015    Annual Health Assessment   • Fatigue    • Gallbladder disease    • GERD (gastroesophageal reflux disease)    • High blood pressure    • History of EKG     10/30/15, 09/19/13, 09/14/12, 10/18/10, 05/04/09   • History of TB skin testing     TB Skin Test: 01/09/14 Completed, 09/14/12 Patient Declines, 10/18/10 Completed, 03/05/08 Completed   • Hyperlipidemia    • Hypertension     still taking meds   • Impingement of right ulnar nerve    • Measles    • Osteoarthritis involving multiple joints on both sides of body    • Osteoarthritis involving multiple joints on both sides of body    • Persistent cough    • Pulse irregularity     Irregular pulse PER CARDIO   • Sexual problems     Sexual problems / enjoyment ED   • Sleep apnea     has c-pap   • Wellness examination 03/10/2015    Annual Wellness Visit        Past Surgical History:   Procedure Laterality Date   • CATARACT EXTRACTION Bilateral    • CATARACT EXTRACTION Right 2013   • CATARACT EXTRACTION Left 2014   • CHOLECYSTECTOMY     • COLONOSCOPY  04/16/2015    Dr. Ritchie   • COLONOSCOPY  03/07/2007   • ELBOW PROCEDURE Right     ulnar nerve impingement release   • FOOT SURGERY Right     right foot (twice)   • GALLBLADDER SURGERY  2009   • KNEE MENISCAL REPAIR Right 2010    torn right meniscus   • LASIK  2006   • RHINOPLASTY  1986   • ROTATOR CUFF REPAIR Left 2010   • SHOULDER ARTHROSCOPY W/ LABRAL REPAIR Right 2015    Dr. Sahil lay repair right shoulder   • SHOULDER LIGAMENT REPAIR Right    • TOTAL KNEE ARTHROPLASTY Right 2011        Social History     Occupational History   • teacher2      Social History Main Topics   • Smoking status: Never Smoker   • Smokeless tobacco: Never Used   • Alcohol use No      Comment: none in 5 yrs, Nov 2011 - last drink   • Drug use: No   • Sexual activity: Yes     Partners: Female    Social History     Social History Narrative   • No narrative on file        Family History   Problem Relation Age of  "Onset   • Dementia Mother    • Migraines Mother    • Osteoporosis Mother    • Stroke Mother 85     early 80s   • Heart disease Mother      tachycardia and had ppm   • Alzheimer's disease Mother    • Supraventricular tachycardia Mother    • Alcohol abuse Father    • Depression Father    • Migraines Father    • Tuberculosis Father    • Cirrhosis Father      41 YO   • Cancer Paternal Uncle 67     mets but unsure of type of ca  BACK CANCER   • Stroke Brother    • Colon cancer Neg Hx    • Prostate cancer Neg Hx    • Heart attack Neg Hx              Review of Systems: 14 point review of systems are remarkable for the pertinent positives listed in the chart by the patient the remainder are negative    Review of Systems      Physical Exam: 67 y.o. male  General Appearance:    Alert, cooperative, in no acute distress                 Vitals:    03/22/18 0817   Temp: 98.3 °F (36.8 °C)   Weight: 91.2 kg (201 lb)   Height: 175.3 cm (69\")      Patient is alert and read ×3 no acute distress appears her above-listed at height weight and age.  Affect is normal respiratory rate is normal unlabored. Heart rate regular rate rhythm, sclera, dentition and hearing are normal for the purpose of this exam.    Ortho Exam Physical exam of the right shoulder reveals no overlying skin changes no lymphedema no lymphadenopathy.  Patient has active flexion 180 with mild symptoms abduction is similar external rotation is to 50 and internal rotation to the upper lumbar spine with mild symptoms.  Patient has good rotator cuff strength 4+ over 5 with isometric strength testing with pain.  Patient has a positive impingement and a positive Henry sign.  Patient has good cervical range of motion which is full and asymptomatic no radicular symptoms.  Patient has a normal elbow exam.  Good distal pulses are present  Patient has pain with overhead activity and a positive Neer sign and a positive empty can sign , a positive drop arm and a definitive " painful arc    Large Joint Arthrocentesis  Date/Time: 3/22/2018 8:43 AM  Consent given by: patient  Site marked: site marked  Timeout: Immediately prior to procedure a time out was called to verify the correct patient, procedure, equipment, support staff and site/side marked as required   Supporting Documentation  Indications: pain   Procedure Details  Location: shoulder - R glenohumeral  Preparation: Patient was prepped and draped in the usual sterile fashion  Needle size: 25 G  Approach: posterior  Medications administered: 80 mg methylPREDNISolone acetate 80 MG/ML; 4 mL lidocaine PF 2% 2 %  Patient tolerance: patient tolerated the procedure well with no immediate complications              I injected half glenohumeral have subacromial  Radiology:   AP, Scapular Y and Axillary Lateral of the right shoulder were ordered/reviewed to evauate shoulder pain.  There are no films readily available for comparison these are normal I see no evidence of any acute bony pathology  No Radiology Exams Resulted Within Past 24 Hours    Assessment/Plan: Right shoulder pain think he has an element of impingement perhaps a little element of capsulitis plan is to proceed with an injection I'll do half glenohumeral have subacromial he will watch his symptoms if he fails improvement we will consider other means of testing  Cortisone Injection. See procedure note.  Cortisone Injection for DIAGNOSTIC and THERAPUTIC purposes.

## 2018-05-29 ENCOUNTER — OFFICE VISIT (OUTPATIENT)
Dept: ORTHOPEDIC SURGERY | Facility: CLINIC | Age: 68
End: 2018-05-29

## 2018-05-29 VITALS — BODY MASS INDEX: 30.6 KG/M2 | TEMPERATURE: 97.5 F | WEIGHT: 206.6 LBS | HEIGHT: 69 IN

## 2018-05-29 DIAGNOSIS — G89.29 CHRONIC PAIN OF LEFT KNEE: Primary | ICD-10-CM

## 2018-05-29 DIAGNOSIS — M17.12 PRIMARY OSTEOARTHRITIS OF LEFT KNEE: ICD-10-CM

## 2018-05-29 DIAGNOSIS — M25.562 CHRONIC PAIN OF LEFT KNEE: Primary | ICD-10-CM

## 2018-05-29 PROCEDURE — 73562 X-RAY EXAM OF KNEE 3: CPT | Performed by: ORTHOPAEDIC SURGERY

## 2018-05-29 PROCEDURE — 99213 OFFICE O/P EST LOW 20 MIN: CPT | Performed by: ORTHOPAEDIC SURGERY

## 2018-05-29 NOTE — PROGRESS NOTES
Patient:  Zheng Lopez is a 67 y.o. male    Chief Complaint/ Reason for Visit:    Chief Complaint   Patient presents with   • Left Knee - Establish Care, Pain       HPI:  This patient, who is well known to me, presents today complaining of a new problem involving his left knee.  He is status post a right total knee replacement from which he continues to enjoy an excellent result.  He says the pain in his left knee is moderate and intermittent, aching in character, exacerbated by walking and standing, and alleviated by episodic use of over-the-counter anti-inflammatories.  When he rests his knee pain gets better.  He says that he does not have pain on a daily basis.  He says that usually 2-3 days per week and that the pain definitely increases with increased weightbearing activities.  He has not used a brace nor has he had any physical therapy on his left knee.  He says that he does go to a gym on a regular basis for fitness.  He is not had any giving way.  He occasionally has a grinding sensation in the knee.      PMH:    Past Medical History:   Diagnosis Date   • Bronchitis     Bronchitis / URI   • Cataract of both eyes    • Chicken pox    • Cholecystitis    • Diverticula of colon    • Encounter for annual health examination 03/10/2015    Annual Health Assessment   • Fatigue    • Gallbladder disease    • GERD (gastroesophageal reflux disease)    • High blood pressure    • History of EKG     10/30/15, 09/19/13, 09/14/12, 10/18/10, 05/04/09   • History of TB skin testing     TB Skin Test: 01/09/14 Completed, 09/14/12 Patient Declines, 10/18/10 Completed, 03/05/08 Completed   • Hyperlipidemia    • Hypertension     still taking meds   • Impingement of right ulnar nerve    • Measles    • Osteoarthritis involving multiple joints on both sides of body    • Osteoarthritis involving multiple joints on both sides of body    • Persistent cough    • Pulse irregularity     Irregular pulse PER CARDIO   • Sexual problems      Sexual problems / enjoyment ED   • Sleep apnea     has c-pap   • Wellness examination 03/10/2015    Annual Wellness Visit       PSH:    Past Surgical History:   Procedure Laterality Date   • CATARACT EXTRACTION Bilateral    • CATARACT EXTRACTION Right 2013   • CATARACT EXTRACTION Left 2014   • CHOLECYSTECTOMY     • COLONOSCOPY  04/16/2015    Dr. Ritchie   • COLONOSCOPY  03/07/2007   • ELBOW PROCEDURE Right     ulnar nerve impingement release   • FOOT SURGERY Right     right foot (twice)   • GALLBLADDER SURGERY  2009   • KNEE MENISCAL REPAIR Right 2010    torn right meniscus   • LASIK  2006   • RHINOPLASTY  1986   • ROTATOR CUFF REPAIR Left 2010   • SHOULDER ARTHROSCOPY W/ LABRAL REPAIR Right 2015    Dr. Sahil lay repair right shoulder   • SHOULDER LIGAMENT REPAIR Right    • TOTAL KNEE ARTHROPLASTY Right 2011       Social Hx:    Social History     Social History   • Marital status:      Spouse name: N/A   • Number of children: 2   • Years of education: N/A     Occupational History   • teacher2      Social History Main Topics   • Smoking status: Never Smoker   • Smokeless tobacco: Never Used   • Alcohol use No      Comment: none in 5 yrs, Nov 2011 - last drink   • Drug use: No   • Sexual activity: Yes     Partners: Female     Other Topics Concern   • Not on file     Social History Narrative   • No narrative on file       Family Hx:    Family History   Problem Relation Age of Onset   • Dementia Mother    • Migraines Mother    • Osteoporosis Mother    • Stroke Mother 85        early 80s   • Heart disease Mother         tachycardia and had ppm   • Alzheimer's disease Mother    • Supraventricular tachycardia Mother    • Alcohol abuse Father    • Depression Father    • Migraines Father    • Tuberculosis Father    • Cirrhosis Father         43 YO   • Cancer Paternal Uncle 67        mets but unsure of type of ca  BACK CANCER   • Stroke Brother    • Colon cancer Neg Hx    • Prostate cancer Neg Hx    • Heart  "attack Neg Hx        Meds:    Current Outpatient Prescriptions:   •  atorvastatin (LIPITOR) 40 MG tablet, Take 1 tablet by mouth Daily., Disp: 30 tablet, Rfl: 4  •  lisinopril (PRINIVIL,ZESTRIL) 20 MG tablet, Take 1 tablet by mouth Daily., Disp: 90 tablet, Rfl: 1  •  metoprolol succinate XL (TOPROL-XL) 50 MG 24 hr tablet, Take 0.5 tablets by mouth Daily., Disp: 90 tablet, Rfl: 0  •  aspirin 81 MG tablet, Take  by mouth daily., Disp: , Rfl:   •  esomeprazole (NEXIUM) 40 MG capsule, Take  by mouth., Disp: , Rfl:   •  ibuprofen (ADVIL,MOTRIN) 800 MG tablet, Take 800 mg by mouth Daily., Disp: , Rfl:   •  Multiple Vitamin tablet, Take  by mouth., Disp: , Rfl:   •  sildenafil (VIAGRA) 100 MG tablet, Take 1 tablet by mouth Daily As Needed for erectile dysfunction., Disp: 4 tablet, Rfl: 0    Allergies:  No Known Allergies    ROS:  Review of Systems   Musculoskeletal: Positive for arthralgias and myalgias.   All other systems reviewed and are negative.      Vitals:    05/29/18 1013   Temp: 97.5 °F (36.4 °C)   TempSrc: Temporal Artery    Weight: 93.7 kg (206 lb 9.6 oz)   Height: 175.3 cm (69\")     Body mass index is 30.51 kg/m².    Physical Exam    The patient is awake, alert, and oriented ×3.  The patient is in no acute distress.  Breathing is regular and unlabored with a respiratory rate of 12/m.  Extraocular movements and pupillary responses are symmetrically intact. Sclerae are anicteric.   Hearing is within normal limits.  Speech is within normal limits.  There is no jugular venous distention.    He is presently walking very well with no limp.  His lower extremities are fairly symmetrical in appearance.    Left lower extremity: Left knee range of motion is 0-135° of flexion.  The knee feels fairly stable in all planes.  He has mild tenderness along the medial joint line.  There is mild crepitus on patella grind.  Bounce and Prakash's test are negative.  Apprehension test today.  He has a mild effusion but no redness, " bruising, or abnormal warmth.    His left calf is soft and nontender with no venous cord.  Pulses and sensory exam are intact and within normal limits distally in the left lower extremity otherwise.        Radiology:X-rays: 3 views the patient's left knee, with incidental views the right knee, were ordered and reviewed today to assess patient's complaints of aching pain in his left knee.  These images show some mild degenerative change perhaps moderate degenerative change in the patellofemoral joint in particular in the form of some joint line narrowing and minor osteophyte formation primarily around the medial and patellofemoral compartments.  I do not see any evidence of fracture or stress fracture.  In comparison to images of the patient's knees we have had from November 2014, the patient has had some advancement of the degenerative change in the left knee on the AP and sunrise views that were available from that time when compared care to today's images.  I don't see any precipitous decline, however.          Assessment:     Diagnosis Plan   1. Chronic pain of left knee  XR Knee 3 View Left    Ambulatory Referral to Physical Therapy Evaluate and treat, Ortho   2. Primary osteoarthritis of left knee  Ambulatory Referral to Physical Therapy Evaluate and treat, Ortho           Plan:  I discussed everything with the patient at length.  He has a soft brace and he's been a began to use it on his left knee as discussed.  I recommended physical therapy and he agreed.  I advised him to discuss his gym activities with the therapist such that he could incorporate the ongoing therapy for his left knee arthritis with his ongoing workout activities.  He voiced understanding and may follow-up as needed.      Orders Placed This Encounter   Procedures   • XR Knee 3 View Left     Order Specific Question:   Reason for Exam:     Answer:   opnc, left knee pain   • Ambulatory Referral to Physical Therapy Evaluate and treat, Ortho      Referral Priority:   Routine     Referral Type:   Therapy     Referral Reason:   Specialty Services Required     Requested Specialty:   Physical Therapy     Number of Visits Requested:   1

## 2018-06-01 ENCOUNTER — HOSPITAL ENCOUNTER (OUTPATIENT)
Dept: PHYSICAL THERAPY | Facility: HOSPITAL | Age: 68
Setting detail: THERAPIES SERIES
Discharge: HOME | End: 2018-06-01
Attending: ORTHOPAEDIC SURGERY

## 2018-06-01 DIAGNOSIS — M25.562 CHRONIC PAIN OF LEFT KNEE: Primary | ICD-10-CM

## 2018-06-01 DIAGNOSIS — G89.29 CHRONIC PAIN OF LEFT KNEE: Primary | ICD-10-CM

## 2018-06-01 PROCEDURE — 97140 MANUAL THERAPY 1/> REGIONS: CPT | Performed by: PHYSICAL THERAPIST

## 2018-06-01 PROCEDURE — 97161 PT EVAL LOW COMPLEX 20 MIN: CPT | Performed by: PHYSICAL THERAPIST

## 2018-06-01 PROCEDURE — G8980 MOBILITY D/C STATUS: HCPCS | Performed by: PHYSICAL THERAPIST

## 2018-06-01 PROCEDURE — G8979 MOBILITY GOAL STATUS: HCPCS | Performed by: PHYSICAL THERAPIST

## 2018-06-01 PROCEDURE — 97110 THERAPEUTIC EXERCISES: CPT | Performed by: PHYSICAL THERAPIST

## 2018-06-01 NOTE — THERAPY DISCHARGE NOTE
Outpatient Physical Therapy Ortho Initial Evaluation/Discharge   [facility]     Patient Name: Zheng Lopez  : 1950  MRN: [id number]  Today's Date: 2018      Visit Date: 2018    Patient Active Problem List   Diagnosis   • Hyperlipidemia   • Essential hypertension   • Sleep apnea   • Osteoarthritis involving multiple joints on both sides of body   • GERD (gastroesophageal reflux disease)   • Diverticula of colon   • Cardiac enlargement   • ED (erectile dysfunction) of organic origin   • Asymmetric septal hypertrophy   • Non-sustained ventricular tachycardia   • Chronic pain of left knee   • Primary osteoarthritis of left knee        Past Medical History:   Diagnosis Date   • Bronchitis     Bronchitis / URI   • Cataract of both eyes    • Chicken pox    • Cholecystitis    • Diverticula of colon    • Encounter for annual health examination 03/10/2015    Annual Health Assessment   • Fatigue    • Gallbladder disease    • GERD (gastroesophageal reflux disease)    • High blood pressure    • History of EKG     10/30/15, 13, 12, 10/18/10, 09   • History of TB skin testing     TB Skin Test: 14 Completed, 12 Patient Declines, 10/18/10 Completed, 08 Completed   • Hyperlipidemia    • Hypertension     still taking meds   • Impingement of right ulnar nerve    • Measles    • Osteoarthritis involving multiple joints on both sides of body    • Osteoarthritis involving multiple joints on both sides of body    • Persistent cough    • Pulse irregularity     Irregular pulse PER CARDIO   • Sexual problems     Sexual problems / enjoyment ED   • Sleep apnea     has c-pap   • Wellness examination 03/10/2015    Annual Wellness Visit        Past Surgical History:   Procedure Laterality Date   • CATARACT EXTRACTION Bilateral    • CATARACT EXTRACTION Right    • CATARACT EXTRACTION Left    • CHOLECYSTECTOMY     • COLONOSCOPY  2015    Dr. Ritchie   • COLONOSCOPY   03/07/2007   • ELBOW PROCEDURE Right     ulnar nerve impingement release   • FOOT SURGERY Right     right foot (twice)   • GALLBLADDER SURGERY  2009   • KNEE MENISCAL REPAIR Right 2010    torn right meniscus   • LASIK  2006   • RHINOPLASTY  1986   • ROTATOR CUFF REPAIR Left 2010   • SHOULDER ARTHROSCOPY W/ LABRAL REPAIR Right 2015    Dr. Sahil lay repair right shoulder   • SHOULDER LIGAMENT REPAIR Right    • TOTAL KNEE ARTHROPLASTY Right 2011         Visit Dx:     ICD-10-CM ICD-9-CM   1. Chronic pain of left knee M25.562 719.46    G89.29 338.29             Patient History     Row Name 06/01/18 0700             History    Chief Complaint Difficulty Walking;Difficulty with daily activities;Pain;Muscle weakness;Muscle tenderness;Joint swelling;Joint stiffness  -      Type of Pain Knee pain   LEFT  -      Date Current Problem(s) Began 03/01/18  -      Brief Description of Current Complaint Pt is a 67 y.o. male who presents to PT with insidious onset of LEFT knee pain originating 3 months ago. He has diagnosis of LEFT knee OA. He has previously had R TKR 7 years ago. Pt is very active and reports knee pain typically occurs when he has been sitting in one position for a long period of time. He is using compression sleeve during golf and other activities to alleviate pain. He reports no pain while working out at the gym.  -      Patient/Caregiver Goals Relieve pain;Return to prior level of function;Improve mobility;Improve strength;Know what to do to help the symptoms  -LC      Patient's Rating of General Health Very good  -LC      Occupation/sports/leisure activities golf, working out at gym  -      How has patient tried to help current problem? working out  -         Pain     Pain Location Knee   LEFT  -LC      Pain at Present 0  -LC      Pain at Best 0  -LC      Pain at Worst 4  -LC      Pain Frequency Intermittent  -LC      Pain Description Aching;Sore  -      What Performance Factors Make the  Current Problem(s) WORSE? prolonged sitting or keeping knee in same position  -LC      Is your sleep disturbed? No  -LC      Difficulties with ADL's? keeping knee in one position for a long period of time.  -LC         Fall Risk Assessment    Any falls in the past year: No  -LC         Daily Activities    Primary Language English  -LC      Pt Participated in POC and Goals Yes  -LC         Safety    Are you being hurt, hit, or frightened by anyone at home or in your life? No  -LC      Are you being neglected by a caregiver No  -LC        User Key  (r) = Recorded By, (t) = Taken By, (c) = Cosigned By    Initials Name Provider Type    LC Wili Serrano, PT DPT Physical Therapist                PT Ortho     Row Name 06/01/18 0700       Knee Special Tests    Bounce home test (meniscal lesion) Left:;Negative  -LC    Patellar grind test (chondromalacia patella) Left:;Negative  -LC       General ROM    RT Lower Ext Rt Knee Extension/Flexion  -LC    LT Lower Ext Lt Knee Extension/Flexion  -LC       Right Lower Ext    Rt Knee Extension/Flexion AROM 0 to 125  -LC       Left Lower Ext    Lt Knee Extension/Flexion AROM 0 to 125  -LC       Left Hip (Manual Muscle Testing)    Left Hip Manual Muscle Testing (MMT) flexion;abduction;adduction  -LC    MMT: Flexion, Left Hip flexion  -LC    MMT, Gross Movement: Left Hip Flexion (4/5) good  -LC    MMT: ABduction, Left Hip abduction  -LC    MMT, Gross Movement: Left Hip ABduction (4/5) good  -LC    MMT, Gross Movement: Left Hip ADduction (4/5) good  -LC       Right Hip (Manual Muscle Testing)    Right Hip Manual Muscle Testing (MMT) flexion;abduction;adduction  -LC    MMT: Flexion, Right Hip flexion  -LC    MMT, Gross Movement: Right Hip Flexion (4/5) good  -LC    MMT: ABduction, Right Hip abduction  -LC    MMT, Gross Movement: Right Hip ABduction (4/5) good  -LC    MMT, Gross Movement: Right Hip ADduction (4/5) good  -LC       Left Knee (Manual Muscle Testing)    Left Knee Manual Muscle  Testing (MMT) extension;flexion  -LC    MMT: Extension, Left Knee extension  -LC    MMT, Gross Movement: Left Knee Extension (4/5) good  -LC    MMT: Flexion, Left Knee flexion  -LC    MMT, Gross Movement: Left Knee Flexion (4/5) good  -LC       Right Knee (Manual Muscle Testing)    Right Knee Manual Muscle Testing (MMT) extension;flexion  -LC    MMT: Extension, Right Knee extension  -LC    MMT, Gross Movement: Right Knee Extension (4/5) good  -LC    MMT: Flexion, Right Knee flexion  -LC    MMT, Gross Movement: Right Knee Flexion (4/5) good  -LC      User Key  (r) = Recorded By, (t) = Taken By, (c) = Cosigned By    Initials Name Provider Type    MONSTER Serrano, PT DPT Physical Therapist                       Therapy Education  Given: HEP, Symptoms/condition management, Pain management  Program: New  How Provided: Verbal, Demonstration, Written  Provided to: Patient  Level of Understanding: Teach back education performed, Verbalized, Demonstrated          PT OP Goals     Row Name 06/01/18 0700          PT Short Term Goals    STG 1 Pt will be independent with HEP to improve L knee strength and stability to decrease pain and allow performance of ADL's with no limitations.  -     STG 1 Progress New;Met  -        Time Calculation    PT Goal Re-Cert Due Date 07/02/18  -       User Key  (r) = Recorded By, (t) = Taken By, (c) = Cosigned By    Initials Name Provider Type    MONSTER Serrano, PT DPT Physical Therapist                PT Assessment/Plan     Row Name 06/01/18 0842          PT Assessment    Functional Limitations Limitations in functional capacity and performance;Performance in leisure activities  -     Impairments Pain;Impaired flexibility  -     Assessment Comments Pt is a 67 y.o. male who presents to PT with insidious onset of LEFT knee pain originating 3 months ago. He has diagnosis of LEFT knee OA. He has previously had R TKR 7 years ago. Pt is very active and reports knee pain typically occurs  when he has been sitting in one position for a long period of time. He is using compression sleeve during golf and other activities to alleviate pain. He reports no pain while working out at the gym. Pt has L knee ARoM 0 to 125. He is pain free today 0/10. He has L knee MMT 4/5. Pt was educated on HEP and issued written copy for performance at gym. He reported no pain with therex.  -     Please refer to paper survey for additional self-reported information Yes  -     Rehab Potential Excellent  -     Patient would benefit from skilled therapy intervention Yes  -LC        PT Plan    PT Frequency One time visit  -     Planned CPT's? PT EVAL LOW COMPLEXITY: 43074;PT THER ACT EA 15 MIN: 16289;PT THER PROC EA 15 MIN: 64443;PT GAIT TRAINING EA 15 MIN: 08551;PT NEUROMUSC RE-EDUCATION EA 15 MIN: 49122;PT RE-EVAL: 43728;PT MANUAL THERAPY EA 15 MIN: 67717  -     Physical Therapy Interventions (Optional Details) gait training;strengthening;stretching;ROM (Range of Motion);manual therapy techniques;patient/family education;home exercise program  -     PT Plan Comments Pt will independently perform HEP at home.  -       User Key  (r) = Recorded By, (t) = Taken By, (c) = Cosigned By    Initials Name Provider Type    MONSTER Serrano, PT DPT Physical Therapist                Exercises     Row Name 06/01/18 0800             Exercise 1    Exercise Name 1 SLR  -LC         Exercise 2    Exercise Name 2 sidelying hip ABD  -LC         Exercise 3    Exercise Name 3 sidelying hip ADD  -LC         Exercise 4    Exercise Name 4 bridge on ball  -LC         Exercise 5    Exercise Name 5 HS curl on ball  -LC         Exercise 6    Exercise Name 6 HS stretch  -LC        User Key  (r) = Recorded By, (t) = Taken By, (c) = Cosigned By    Initials Name Provider Type    MONSTER Serrano, PT DPT Physical Therapist           Manual Rx (last 36 hours)      Manual Treatments     Row Name 06/01/18 0700             Manual Rx 1    Manual Rx 1  Location L knee  -LC      Manual Rx 1 Type HS stretch  -LC      Manual Rx 1 Duration 10 min  -LC        User Key  (r) = Recorded By, (t) = Taken By, (c) = Cosigned By    Initials Name Provider Type    MONSTER Serrano, PT DPT Physical Therapist                     Outcome Measure Options: Knee Outcome Score- ADL  Knee Outcome Score  Knee Outcome Score Comments: 70%      Time Calculation:   Start Time: 0750  Stop Time: 0828  Time Calculation (min): 38 min  Total Timed Code Minutes- PT: 38 minute(s)    Therapy Charges for Today     Code Description Service Date Service Provider Modifiers Josey     [place] MOBILITY CURRENT STATUS 6/1/2018 Wili Serrano, PT DPT GP, CJ 1    55161301351 HC PT MOBILITY DISCHARGE 6/1/2018 Wili ANA ROSA Zach, PT DPT GP,  1    94520664672 HC PT EVAL LOW COMPLEXITY 1 6/1/2018 Wili Serrano, PT DPT GP 1    66480906583 HC PT THER PROC EA 15 MIN 6/1/2018 Wili Serrano, PT DPT GP 1    81075626497 HC PT MANUAL THERAPY EA 15 MIN 6/1/2018 Wili Serrano, PT DPT GP 1    07839138077 HC PT MOBILITY PROJECTED 6/1/2018 Wili ANA ROSA Zach, PT DPT GP, CJ 1          PT G-Codes  PT Professional Judgement Used?: Yes  Outcome Measure Options: Knee Outcome Score- ADL  Score: 70%  Functional Limitation: Mobility: Walking and moving around  Mobility: Walking and Moving Around Current Status (): At least 20 percent but less than 40 percent impaired, limited or restricted  Mobility: Walking and Moving Around Goal Status (): At least 20 percent but less than 40 percent impaired, limited or restricted  Mobility: Walking and Moving Around Discharge Status (): At least 20 percent but less than 40 percent impaired, limited or restricted     OP PT Discharge Summary  Date of Discharge: 06/01/18  Reason for Discharge: All goals achieved, Maximum functional potential achieved, Independent  Outcomes Achieved: Able to achieve all goals within established timeline, Refer to plan of care for updates on goals  achieved  Discharge Destination: Home with home program  Discharge Instructions/Additional Comments: Pt instructed to contact PT if any change in status or questions regarding HEP.        Wili Serrano, PT DPT  6/1/2018

## 2018-07-17 ENCOUNTER — OFFICE VISIT (OUTPATIENT)
Dept: FAMILY MEDICINE CLINIC | Facility: CLINIC | Age: 68
End: 2018-07-17

## 2018-07-17 VITALS
HEIGHT: 69 IN | TEMPERATURE: 98.6 F | HEART RATE: 56 BPM | SYSTOLIC BLOOD PRESSURE: 114 MMHG | BODY MASS INDEX: 30.21 KG/M2 | WEIGHT: 204 LBS | DIASTOLIC BLOOD PRESSURE: 72 MMHG | OXYGEN SATURATION: 97 %

## 2018-07-17 DIAGNOSIS — I10 ESSENTIAL HYPERTENSION: ICD-10-CM

## 2018-07-17 DIAGNOSIS — R73.9 HYPERGLYCEMIA: ICD-10-CM

## 2018-07-17 DIAGNOSIS — R59.0 ANTERIOR CERVICAL LYMPHADENOPATHY: Primary | ICD-10-CM

## 2018-07-17 PROCEDURE — 99214 OFFICE O/P EST MOD 30 MIN: CPT | Performed by: FAMILY MEDICINE

## 2018-07-17 RX ORDER — LISINOPRIL 20 MG/1
20 TABLET ORAL DAILY
Qty: 90 TABLET | Refills: 4 | Status: SHIPPED | OUTPATIENT
Start: 2018-07-17 | End: 2019-06-07 | Stop reason: SDUPTHER

## 2018-07-17 RX ORDER — METOPROLOL SUCCINATE 25 MG/1
25 TABLET, EXTENDED RELEASE ORAL DAILY
Qty: 90 TABLET | Refills: 4 | Status: SHIPPED | OUTPATIENT
Start: 2018-07-17 | End: 2019-06-07 | Stop reason: SDUPTHER

## 2018-07-17 NOTE — PROGRESS NOTES
Zheng Lopez is a 67 y.o. male.     Chief Complaint   Patient presents with   • Hypertension     follow up no complains   • Neck Mass     right side of neck like a bump not painful can od hard when touch       HPI     Pt is a pleasant 67 y.o. YO male here for neck mass, hyperglycemia and htn.  PMH includes hypertension, hyperlipidemia, GERD, SVT controlled on metoprolol, fatigue, sleep apnea on CPAP, and osteoarthritis.    New problem of neck mass: She noticed it 2-3 weeks ago after his massage therapist pointed it out.  It is present along the right jawline and has been growing.  It is not tender, no ear pain, jaw pain or sore throat.  Otherwise feeling well, no diaphoresis, nausea, fatigue, B symptoms.  He has not had a lymph node enlarged in that area previously.    Hypertension well controlled, he is adherent with medication, he is only taking 25 mg of the metoprolol rather than 50, wondering if he can have a decreased dose.  He has no chest pain, palpitations shortness of breath or headaches.  He is working stay active and exercise and [employer].    For glycemia, stable.  His last hemoglobin A1c was 5.7, working to stay active.  He has not had a previous problem of Diabetes.    The following portions of the patient's history were reviewed and updated as appropriate: allergies, current medications, past family history, past medical history, past social history, past surgical history and problem list.    Review of Systems   Constitutional: Negative for fatigue.   HENT: Negative for ear discharge and postnasal drip.    Respiratory: Negative for chest tightness and shortness of breath.    Skin:        Pt has cyst in right side jaw   All other systems reviewed and are negative.      Objective  Vitals:    07/17/18 0757   BP: 114/72   Pulse: 56   Temp: 98.6 °F (37 °C)   SpO2: 97%        Physical Exam   Constitutional: He is oriented to person, place, and time. He appears well-developed and well-nourished. No  distress.   HENT:   Head: Normocephalic.   Nose: Nose normal.   Firm annular mass at the posterior right mandible near the angle of the jaw.  Difficult to assess if it is part of the tail of the parotid or a enlarged lymph node.  It does seem to be mobile, nontender.   Eyes: EOM are normal.   Cardiovascular: Normal rate, regular rhythm, normal heart sounds and intact distal pulses.    No murmur heard.  Pulmonary/Chest: Effort normal and breath sounds normal. No respiratory distress.   Musculoskeletal: Normal range of motion.   Neurological: He is alert and oriented to person, place, and time.   Skin: Skin is warm and dry. No rash noted.   Psychiatric: He has a normal mood and affect. His behavior is normal. Judgment and thought content normal.   Nursing note and vitals reviewed.        Current Outpatient Prescriptions:   •  aspirin 81 MG tablet, Take  by mouth daily., Disp: , Rfl:   •  atorvastatin (LIPITOR) 40 MG tablet, Take 1 tablet by mouth Daily., Disp: 30 tablet, Rfl: 4  •  esomeprazole (NEXIUM) 40 MG capsule, Take  by mouth., Disp: , Rfl:   •  ibuprofen (ADVIL,MOTRIN) 800 MG tablet, Take 800 mg by mouth Daily., Disp: , Rfl:   •  lisinopril (PRINIVIL,ZESTRIL) 20 MG tablet, Take 1 tablet by mouth Daily., Disp: 90 tablet, Rfl: 4  •  metoprolol succinate XL (TOPROL-XL) 25 MG 24 hr tablet, Take 1 tablet by mouth Daily., Disp: 90 tablet, Rfl: 4  •  Multiple Vitamin tablet, Take  by mouth., Disp: , Rfl:   •  sildenafil (VIAGRA) 100 MG tablet, Take 1 tablet by mouth Daily As Needed for erectile dysfunction., Disp: 4 tablet, Rfl: 0    Procedures    Lab Results (most recent)     None              Zheng was seen today for hypertension and neck mass.    Diagnoses and all orders for this visit:    Anterior cervical lymphadenopathy  -     Basic Metabolic Panel  -     CBC Auto Differential  -     Ambulatory Referral to ENT (Otolaryngology)    Hyperglycemia  -     Hemoglobin A1c    Essential hypertension  -     lisinopril  (PRINIVIL,ZESTRIL) 20 MG tablet; Take 1 tablet by mouth Daily.  -     metoprolol succinate XL (TOPROL-XL) 25 MG 24 hr tablet; Take 1 tablet by mouth Daily.    History with likely anterior cervical lymphadenopathy although possiblel for it to be a parotid mass. It is at the angle of the right mandible.  He is otherwise no symptoms/ B Symptoms.  Ordered blood counts today, follow-up with ENT for ultrasound and possible biopsy if indicated.    HTN well controlled, decreased dose of metoprolol to 25 mg tablet.    Hyperglycemia well-controlled.  His last A1c increased from prior 5.5  To 5.7, recheck today.      Return if symptoms worsen or fail to improve.      Zoya Newman MD

## 2018-07-18 LAB
BASOPHILS # BLD AUTO: 0.03 10*3/MM3 (ref 0–0.2)
BASOPHILS NFR BLD AUTO: 0.6 % (ref 0–1.5)
BUN SERPL-MCNC: 15 MG/DL (ref 8–23)
BUN/CREAT SERPL: 12.1 (ref 7–25)
CALCIUM SERPL-MCNC: 8.7 MG/DL (ref 8.6–10.5)
CHLORIDE SERPL-SCNC: 97 MMOL/L (ref 98–107)
CO2 SERPL-SCNC: 24.5 MMOL/L (ref 22–29)
CREAT SERPL-MCNC: 1.24 MG/DL (ref 0.76–1.27)
EOSINOPHIL # BLD AUTO: 0.33 10*3/MM3 (ref 0–0.7)
EOSINOPHIL NFR BLD AUTO: 6.7 % (ref 0.3–6.2)
ERYTHROCYTE [DISTWIDTH] IN BLOOD BY AUTOMATED COUNT: 12.6 % (ref 11.5–14.5)
GLUCOSE SERPL-MCNC: 104 MG/DL (ref 65–99)
HBA1C MFR BLD: 5.5 % (ref 4.8–5.6)
HCT VFR BLD AUTO: 44.5 % (ref 40.4–52.2)
HGB BLD-MCNC: 14.3 G/DL (ref 13.7–17.6)
IMM GRANULOCYTES # BLD: 0 10*3/MM3 (ref 0–0.03)
IMM GRANULOCYTES NFR BLD: 0 % (ref 0–0.5)
LYMPHOCYTES # BLD AUTO: 1.75 10*3/MM3 (ref 0.9–4.8)
LYMPHOCYTES NFR BLD AUTO: 35.4 % (ref 19.6–45.3)
MCH RBC QN AUTO: 33.3 PG (ref 27–32.7)
MCHC RBC AUTO-ENTMCNC: 32.1 G/DL (ref 32.6–36.4)
MCV RBC AUTO: 103.5 FL (ref 79.8–96.2)
MONOCYTES # BLD AUTO: 0.41 10*3/MM3 (ref 0.2–1.2)
MONOCYTES NFR BLD AUTO: 8.3 % (ref 5–12)
NEUTROPHILS # BLD AUTO: 2.43 10*3/MM3 (ref 1.9–8.1)
NEUTROPHILS NFR BLD AUTO: 49 % (ref 42.7–76)
PLATELET # BLD AUTO: 191 10*3/MM3 (ref 140–500)
POTASSIUM SERPL-SCNC: 5 MMOL/L (ref 3.5–5.2)
RBC # BLD AUTO: 4.3 10*6/MM3 (ref 4.6–6)
SODIUM SERPL-SCNC: 134 MMOL/L (ref 136–145)
WBC # BLD AUTO: 4.95 10*3/MM3 (ref 4.5–10.7)

## 2018-08-22 ENCOUNTER — LAB (OUTPATIENT)
Dept: LAB | Facility: HOSPITAL | Age: 68
End: 2018-08-22
Attending: OTOLARYNGOLOGY

## 2018-08-22 ENCOUNTER — HOSPITAL ENCOUNTER (OUTPATIENT)
Dept: GENERAL RADIOLOGY | Facility: HOSPITAL | Age: 68
Discharge: HOME | End: 2018-08-22
Attending: OTOLARYNGOLOGY

## 2018-08-22 ENCOUNTER — TRANSCRIBE ORDERS (OUTPATIENT)
Dept: LAB | Facility: HOSPITAL | Age: 68
End: 2018-08-22

## 2018-08-22 ENCOUNTER — HOSPITAL ENCOUNTER (OUTPATIENT)
Dept: CARDIOLOGY | Facility: HOSPITAL | Age: 68
Discharge: HOME | End: 2018-08-22
Attending: OTOLARYNGOLOGY | Admitting: OTOLARYNGOLOGY

## 2018-08-22 DIAGNOSIS — Z01.811 PRE-OP CHEST EXAM: ICD-10-CM

## 2018-08-22 DIAGNOSIS — D11.0 BENIGN TUMOR OF PAROTID GLAND: ICD-10-CM

## 2018-08-22 DIAGNOSIS — K11.8 BENIGN LYMPHOEPITHELIAL LESION OF SALIVARY GLAND: ICD-10-CM

## 2018-08-22 DIAGNOSIS — R68.89 MECHANICAL AND MOTOR PROBLEMS WITH INTERNAL ORGANS: ICD-10-CM

## 2018-08-22 DIAGNOSIS — R68.89 MECHANICAL AND MOTOR PROBLEMS WITH INTERNAL ORGANS: Primary | ICD-10-CM

## 2018-08-22 LAB
ANION GAP SERPL CALCULATED.3IONS-SCNC: 9.5 MMOL/L
BASOPHILS # BLD AUTO: 0.03 10*3/MM3 (ref 0–0.2)
BASOPHILS NFR BLD AUTO: 0.4 % (ref 0–1.5)
BUN BLD-MCNC: 14 MG/DL (ref 8–23)
BUN/CREAT SERPL: 11.4 (ref 7–25)
CALCIUM SPEC-SCNC: 8.6 MG/DL (ref 8.6–10.5)
CHLORIDE SERPL-SCNC: 102 MMOL/L (ref 98–107)
CO2 SERPL-SCNC: 25.5 MMOL/L (ref 22–29)
CREAT BLD-MCNC: 1.23 MG/DL (ref 0.76–1.27)
DEPRECATED RDW RBC AUTO: 42.4 FL (ref 37–54)
EOSINOPHIL # BLD AUTO: 0.29 10*3/MM3 (ref 0–0.7)
EOSINOPHIL NFR BLD AUTO: 4.3 % (ref 0.3–6.2)
ERYTHROCYTE [DISTWIDTH] IN BLOOD BY AUTOMATED COUNT: 12 % (ref 11.5–14.5)
GFR SERPL CREATININE-BSD FRML MDRD: 58 ML/MIN/1.73
GLUCOSE BLD-MCNC: 101 MG/DL (ref 65–99)
HCT VFR BLD AUTO: 41.7 % (ref 40.4–52.2)
HGB BLD-MCNC: 14.7 G/DL (ref 13.7–17.6)
IMM GRANULOCYTES # BLD: 0.01 10*3/MM3 (ref 0–0.03)
IMM GRANULOCYTES NFR BLD: 0.1 % (ref 0–0.5)
LYMPHOCYTES # BLD AUTO: 1.9 10*3/MM3 (ref 0.9–4.8)
LYMPHOCYTES NFR BLD AUTO: 28.1 % (ref 19.6–45.3)
MCH RBC QN AUTO: 34.2 PG (ref 27–32.7)
MCHC RBC AUTO-ENTMCNC: 35.3 G/DL (ref 32.6–36.4)
MCV RBC AUTO: 97 FL (ref 79.8–96.2)
MONOCYTES # BLD AUTO: 0.33 10*3/MM3 (ref 0.2–1.2)
MONOCYTES NFR BLD AUTO: 4.9 % (ref 5–12)
NEUTROPHILS # BLD AUTO: 4.22 10*3/MM3 (ref 1.9–8.1)
NEUTROPHILS NFR BLD AUTO: 62.3 % (ref 42.7–76)
PLATELET # BLD AUTO: 203 10*3/MM3 (ref 140–500)
PMV BLD AUTO: 10.1 FL (ref 6–12)
POTASSIUM BLD-SCNC: 4.4 MMOL/L (ref 3.5–5.2)
PTH-INTACT SERPL-MCNC: 66.1 PG/ML (ref 15–65)
RBC # BLD AUTO: 4.3 10*6/MM3 (ref 4.6–6)
SODIUM BLD-SCNC: 137 MMOL/L (ref 136–145)
WBC NRBC COR # BLD: 6.77 10*3/MM3 (ref 4.5–10.7)

## 2018-08-22 PROCEDURE — 36415 COLL VENOUS BLD VENIPUNCTURE: CPT

## 2018-08-22 PROCEDURE — 82652 VIT D 1 25-DIHYDROXY: CPT

## 2018-08-22 PROCEDURE — 80048 BASIC METABOLIC PNL TOTAL CA: CPT

## 2018-08-22 PROCEDURE — 85025 COMPLETE CBC W/AUTO DIFF WBC: CPT

## 2018-08-22 PROCEDURE — 93005 ELECTROCARDIOGRAM TRACING: CPT | Performed by: OTOLARYNGOLOGY

## 2018-08-22 PROCEDURE — 83970 ASSAY OF PARATHORMONE: CPT

## 2018-08-22 PROCEDURE — 71046 X-RAY EXAM CHEST 2 VIEWS: CPT

## 2018-08-22 PROCEDURE — 93010 ELECTROCARDIOGRAM REPORT: CPT | Performed by: INTERNAL MEDICINE

## 2018-08-27 LAB — 1,25(OH)2D3 SERPL-MCNC: 53.4 PG/ML (ref 19.9–79.3)

## 2018-11-08 ENCOUNTER — OFFICE VISIT (OUTPATIENT)
Dept: ORTHOPEDIC SURGERY | Facility: CLINIC | Age: 68
End: 2018-11-08

## 2018-11-08 VITALS — TEMPERATURE: 98.2 F | WEIGHT: 206.6 LBS | HEIGHT: 69 IN | BODY MASS INDEX: 30.6 KG/M2

## 2018-11-08 DIAGNOSIS — M25.562 CHRONIC PAIN OF LEFT KNEE: Primary | ICD-10-CM

## 2018-11-08 DIAGNOSIS — M79.652 LEFT THIGH PAIN: ICD-10-CM

## 2018-11-08 DIAGNOSIS — G89.29 CHRONIC PAIN OF LEFT KNEE: Primary | ICD-10-CM

## 2018-11-08 DIAGNOSIS — M54.16 LEFT LUMBAR RADICULOPATHY: ICD-10-CM

## 2018-11-08 PROCEDURE — 99213 OFFICE O/P EST LOW 20 MIN: CPT | Performed by: ORTHOPAEDIC SURGERY

## 2018-11-08 PROCEDURE — 73564 X-RAY EXAM KNEE 4 OR MORE: CPT | Performed by: ORTHOPAEDIC SURGERY

## 2018-11-08 NOTE — PROGRESS NOTES
Patient:  Zheng Lopez is a 68 y.o. male    Chief Complaint/ Reason for Visit:    Chief Complaint   Patient presents with   • Left Knee - Pain       HPI:  This pleasant gentleman for whom I have cared over the years presents today with a new problem involving his left knee.  I last saw him in May of this year for some left knee pain that I felt was due to early arthritis.  We treated him with bracing and physical therapy and he seemed to get better for a few months.  However, over the past couple of months, he is developed intermittent episodically severe apparently activity related pain in the anterior and medial aspects of his left knee.  He also has pain in the anterior aspect of his left thigh associated with this anterior knee pain.  He says that he has no pain now but this past weekend he was on a youth group activity and that a a lot of walking in the woods and began to develop severe pain in his left thigh and left knee.  He had no swelling nor does he have swelling associated with the symptoms.  He said the pain was so bad he just had to find a tree stump to sit down on for a while.  He says that after a day of vigorous activity, he will have aching and burning left thigh pain when he lays down to go to sleep.  It usually resolves by the next morning.  He does not have any weakness.  He does not have any numbness or tingling.  He is not having any problems with his right total knee.    He also notes that these days, when he plays golf, he always uses a cart as he does not feel like he would be able to walk the golf course without developing the severe pain in his left thigh and left knee.      PMH:    Past Medical History:   Diagnosis Date   • Bronchitis     Bronchitis / URI   • Cataract of both eyes    • Chicken pox    • Cholecystitis    • Diverticula of colon    • Encounter for annual health examination 03/10/2015    Annual Health Assessment   • Fatigue    • Gallbladder disease    • GERD  (gastroesophageal reflux disease)    • High blood pressure    • History of EKG     10/30/15, 09/19/13, 09/14/12, 10/18/10, 05/04/09   • History of TB skin testing     TB Skin Test: 01/09/14 Completed, 09/14/12 Patient Declines, 10/18/10 Completed, 03/05/08 Completed   • Hyperlipidemia    • Hypertension     still taking meds   • Impingement of right ulnar nerve    • Measles    • Osteoarthritis involving multiple joints on both sides of body    • Osteoarthritis involving multiple joints on both sides of body    • Persistent cough    • Pulse irregularity     Irregular pulse PER CARDIO   • Sexual problems     Sexual problems / enjoyment ED   • Sleep apnea     has c-pap   • Wellness examination 03/10/2015    Annual Wellness Visit       PSH:    Past Surgical History:   Procedure Laterality Date   • CATARACT EXTRACTION Bilateral    • CATARACT EXTRACTION Right 2013   • CATARACT EXTRACTION Left 2014   • CHOLECYSTECTOMY     • COLONOSCOPY  04/16/2015    Dr. Ritchie   • COLONOSCOPY  03/07/2007   • ELBOW PROCEDURE Right     ulnar nerve impingement release   • FOOT SURGERY Right     right foot (twice)   • GALLBLADDER SURGERY  2009   • KNEE MENISCAL REPAIR Right 2010    torn right meniscus   • LASIK  2006   • RHINOPLASTY  1986   • ROTATOR CUFF REPAIR Left 2010   • SHOULDER ARTHROSCOPY W/ LABRAL REPAIR Right 2015    Dr. Sahil lay repair right shoulder   • SHOULDER LIGAMENT REPAIR Right    • TOTAL KNEE ARTHROPLASTY Right 2011       Social Hx:    Social History     Social History   • Marital status:      Spouse name: N/A   • Number of children: 2   • Years of education: N/A     Occupational History   • teacher2      Social History Main Topics   • Smoking status: Never Smoker   • Smokeless tobacco: Never Used   • Alcohol use No      Comment: none in 5 yrs, Nov 2011 - last drink   • Drug use: No   • Sexual activity: Yes     Partners: Female     Other Topics Concern   • Not on file     Social History Narrative   • No  "narrative on file       Family Hx:    Family History   Problem Relation Age of Onset   • Dementia Mother    • Migraines Mother    • Osteoporosis Mother    • Stroke Mother 85        early 80s   • Heart disease Mother         tachycardia and had ppm   • Alzheimer's disease Mother    • Supraventricular tachycardia Mother    • Alcohol abuse Father    • Depression Father    • Migraines Father    • Tuberculosis Father    • Cirrhosis Father         41 YO   • Cancer Paternal Uncle 67        mets but unsure of type of ca  BACK CANCER   • Stroke Brother    • Colon cancer Neg Hx    • Prostate cancer Neg Hx    • Heart attack Neg Hx        Meds:    Current Outpatient Prescriptions:   •  aspirin 81 MG tablet, Take  by mouth daily., Disp: , Rfl:   •  atorvastatin (LIPITOR) 40 MG tablet, Take 1 tablet by mouth Daily., Disp: 30 tablet, Rfl: 4  •  esomeprazole (NEXIUM) 40 MG capsule, Take  by mouth., Disp: , Rfl:   •  ibuprofen (ADVIL,MOTRIN) 800 MG tablet, Take 800 mg by mouth Daily., Disp: , Rfl:   •  lisinopril (PRINIVIL,ZESTRIL) 20 MG tablet, Take 1 tablet by mouth Daily., Disp: 90 tablet, Rfl: 4  •  Magnesium 400 MG capsule, Take  by mouth., Disp: , Rfl:   •  metoprolol succinate XL (TOPROL-XL) 25 MG 24 hr tablet, Take 1 tablet by mouth Daily., Disp: 90 tablet, Rfl: 4  •  Multiple Vitamin tablet, Take  by mouth., Disp: , Rfl:   •  sildenafil (VIAGRA) 100 MG tablet, Take 1 tablet by mouth Daily As Needed for erectile dysfunction., Disp: 4 tablet, Rfl: 0    Allergies:  No Known Allergies    ROS:  Review of Systems   Musculoskeletal: Positive for arthralgias and myalgias.   All other systems reviewed and are negative.      Vitals:    11/08/18 0848   Temp: 98.2 °F (36.8 °C)   TempSrc: Temporal Artery    Weight: 93.7 kg (206 lb 9.6 oz)   Height: 175.3 cm (69\")     Body mass index is 30.51 kg/m².    Physical Exam    The patient is awake, alert, and oriented ×3.  The patient is in no acute distress.  Breathing is regular and unlabored " "with a respiratory rate of 12/m.  Extraocular movements and pupillary responses are symmetrically intact. Sclerae are anicteric.   Hearing is within normal limits.  Speech is within normal limits.  There is no jugular venous distention.    He is presently walking well with no limp.  He has no atrophy or asymmetry of the musculature lower extremities.    Left lower extremity: Range of motion left knee is 0-130° of flexion.  Bounce and Prakash's test are negative.  Patella grind is negative.  Apprehension test is negative.  Left knee is stable in all planes at this time.  There is no tenderness, no redness, no effusion or abnormal warmth.  He has no masses or nodules or other palpable abnormalities in the left thigh.  I could not elicit deep tendon reflexes in either the patellar or Achilles tendons on either side.  He had no spasticity, cogwheeling, or clonus in either lower extremity.  Straight leg raising was negative bilaterally.  Motor strength is 5 over 5 in both lower extremities symmetrically.    Radiology: X-rays: A 4 view arthritis series of the left knee, with incidental views the right knee, was ordered and reviewed today to assess the patient's presenting complain of \"left knee pain.\"  I did review these, and I did compare them to images we had here in the office from May.  These images reveal the patient has mild degenerative change in the left knee.  There is no evidence of acute fracture or stress fracture.  In comparison to the previous images from May, there is been no interval change.          Assessment:     Diagnosis Plan   1. Chronic pain of left knee  XR Knee 4+ View Left    Ambulatory Referral to Orthopedic Surgery    Ambulatory Referral to Physical Therapy Evaluate and treat, Ortho   2. Left thigh pain  Ambulatory Referral to Orthopedic Surgery    Ambulatory Referral to Physical Therapy Evaluate and treat, Ortho   3. Left lumbar radiculopathy  Ambulatory Referral to Orthopedic Surgery    " Ambulatory Referral to Physical Therapy Evaluate and treat, Ortho           Plan:  I discussed everything with the patient length.  I suspect that at least some of his pain is due to a lumbar radiculopathy based on his history.  He certainly does not have a history that is consistent with significant knee arthritis being the source of this episodically severe pain.  Given its occurrence and infrequency, as well as the activities with which she associates the symptoms, I think it's likely radicular.    For this reason, appropriate physical therapy was discussed and the patient agreed.  Activity recommendations and precautions reviewed.  We will send him for physical therapy and have him follow-up with Dr. Chadwick for an opinion regarding his left lower extremity pain in a few weeks.      Orders Placed This Encounter   Procedures   • XR Knee 4+ View Left     Order Specific Question:   Reason for Exam:     Answer:   OPSE LT. KNEE   • Ambulatory Referral to Orthopedic Surgery     Referral Priority:   Routine     Referral Type:   Consultation     Referral Reason:   Specialty Services Required     Referred to Provider:   Ibrahima Thornton MD     Requested Specialty:   Orthopedic Surgery     Number of Visits Requested:   1   • Ambulatory Referral to Physical Therapy Evaluate and treat, Ortho     Referral Priority:   Routine     Referral Type:   Therapy     Referral Reason:   Specialty Services Required     Requested Specialty:   Physical Therapy     Number of Visits Requested:   1

## 2018-11-13 ENCOUNTER — OFFICE VISIT (OUTPATIENT)
Dept: FAMILY MEDICINE CLINIC | Facility: CLINIC | Age: 68
End: 2018-11-13

## 2018-11-13 VITALS
OXYGEN SATURATION: 98 % | TEMPERATURE: 98.1 F | SYSTOLIC BLOOD PRESSURE: 118 MMHG | WEIGHT: 203 LBS | HEIGHT: 69 IN | BODY MASS INDEX: 30.07 KG/M2 | DIASTOLIC BLOOD PRESSURE: 82 MMHG | HEART RATE: 78 BPM

## 2018-11-13 DIAGNOSIS — I47.29 NON-SUSTAINED VENTRICULAR TACHYCARDIA (HCC): ICD-10-CM

## 2018-11-13 DIAGNOSIS — E55.9 HYPOVITAMINOSIS D: ICD-10-CM

## 2018-11-13 DIAGNOSIS — E78.01 FAMILIAL HYPERCHOLESTEROLEMIA: ICD-10-CM

## 2018-11-13 DIAGNOSIS — Z00.00 MEDICARE ANNUAL WELLNESS VISIT, SUBSEQUENT: Primary | ICD-10-CM

## 2018-11-13 DIAGNOSIS — Z12.5 SCREENING FOR PROSTATE CANCER: ICD-10-CM

## 2018-11-13 DIAGNOSIS — I10 ESSENTIAL HYPERTENSION: ICD-10-CM

## 2018-11-13 LAB
25(OH)D3+25(OH)D2 SERPL-MCNC: 34.8 NG/ML (ref 30–100)
ALBUMIN SERPL-MCNC: 4.4 G/DL (ref 3.5–5.2)
ALBUMIN/GLOB SERPL: 1.6 G/DL
ALP SERPL-CCNC: 85 U/L (ref 39–117)
ALT SERPL-CCNC: 28 U/L (ref 1–41)
AST SERPL-CCNC: 28 U/L (ref 1–40)
BILIRUB SERPL-MCNC: 0.6 MG/DL (ref 0.1–1.2)
BUN SERPL-MCNC: 15 MG/DL (ref 8–23)
BUN/CREAT SERPL: 13.3 (ref 7–25)
CALCIUM SERPL-MCNC: 9.4 MG/DL (ref 8.6–10.5)
CHLORIDE SERPL-SCNC: 99 MMOL/L (ref 98–107)
CHOLEST SERPL-MCNC: 137 MG/DL (ref 0–200)
CO2 SERPL-SCNC: 26.6 MMOL/L (ref 22–29)
CREAT SERPL-MCNC: 1.13 MG/DL (ref 0.76–1.27)
GLOBULIN SER CALC-MCNC: 2.8 GM/DL
GLUCOSE SERPL-MCNC: 103 MG/DL (ref 65–99)
HDLC SERPL-MCNC: 61 MG/DL (ref 40–60)
LDLC SERPL CALC-MCNC: 60 MG/DL (ref 0–100)
POTASSIUM SERPL-SCNC: 4.8 MMOL/L (ref 3.5–5.2)
PROT SERPL-MCNC: 7.2 G/DL (ref 6–8.5)
PSA SERPL-MCNC: 1.5 NG/ML (ref 0–4)
SODIUM SERPL-SCNC: 137 MMOL/L (ref 136–145)
TRIGL SERPL-MCNC: 82 MG/DL (ref 0–150)
VLDLC SERPL CALC-MCNC: 16.4 MG/DL (ref 5–40)

## 2018-11-13 PROCEDURE — G0439 PPPS, SUBSEQ VISIT: HCPCS | Performed by: FAMILY MEDICINE

## 2018-11-13 NOTE — PROGRESS NOTES
QUICK REFERENCE INFORMATION:  The ABCs of the Annual Wellness Visit    Subsequent Medicare Wellness Visit    HEALTH RISK ASSESSMENT    1950    Recent Hospitalizations:  No hospitalization(s) within the last year..        Current Medical Providers:  Patient Care Team:  Zoya Newman MD as PCP - General (Family Medicine)        Smoking Status:  Social History     Tobacco Use   Smoking Status Never Smoker   Smokeless Tobacco Never Used       Alcohol Consumption:  Social History     Substance and Sexual Activity   Alcohol Use No    Comment: none in 5 yrs, Nov 2011 - last drink       Depression Screen:   PHQ-2/PHQ-9 Depression Screening 11/13/2018   Little interest or pleasure in doing things 0   Feeling down, depressed, or hopeless 0   Trouble falling or staying asleep, or sleeping too much -   Feeling tired or having little energy -   Poor appetite or overeating -   Feeling bad about yourself - or that you are a failure or have let yourself or your family down -   Trouble concentrating on things, such as reading the newspaper or watching television -   Moving or speaking so slowly that other people could have noticed. Or the opposite - being so fidgety or restless that you have been moving around a lot more than usual -   Thoughts that you would be better off dead, or of hurting yourself in some way -   Total Score 0   If you checked off any problems, how difficult have these problems made it for you to do your work, take care of things at home, or get along with other people? -       Health Habits and Functional and Cognitive Screening:  Functional & Cognitive Status 11/13/2018   Do you have difficulty preparing food and eating? No   Do you have difficulty bathing yourself, getting dressed or grooming yourself? No   Do you have difficulty using the toilet? No   Do you have difficulty moving around from place to place? No   Do you have trouble with steps or getting out of a bed or a chair? No   In the past year  have you fallen or experienced a near fall? No   Current Diet Well Balanced Diet   Dental Exam Up to date   Eye Exam Up to date   Exercise (times per week) 5 times per week   Current Exercise Activities Include Cardiovasular Workout on Exercise Equipment   Do you need help using the phone?  No   Are you deaf or do you have serious difficulty hearing?  No   Do you need help with transportation? No   Do you need help shopping? No   Do you need help preparing meals?  No   Do you need help with housework?  No   Do you need help with laundry? No   Do you need help taking your medications? No   Do you need help managing money? No   Do you ever drive or ride in a car without wearing a seat belt? No   Have you felt unusual stress, anger or loneliness in the last month? No   Who do you live with? Spouse   If you need help, do you have trouble finding someone available to you? No   Have you been bothered in the last four weeks by sexual problems? No   Do you have difficulty concentrating, remembering or making decisions? No           Does the patient have evidence of cognitive impairment? No    Aspirin use counseling: Taking ASA appropriately as indicated      Recent Lab Results:  CMP:  Lab Results   Component Value Date     (H) 07/17/2018    BUN 14 08/22/2018    CREATININE 1.23 08/22/2018    EGFRIFNONA 58 (L) 08/22/2018    EGFRIFAFRI 70 07/17/2018    BCR 11.4 08/22/2018     08/22/2018    K 4.4 08/22/2018    CO2 25.5 08/22/2018    CALCIUM 8.6 08/22/2018    PROTENTOTREF 7.5 11/13/2017    ALBUMIN 4.40 11/13/2017    LABGLOBREF 3.1 11/13/2017    LABIL2 1.4 11/13/2017    BILITOT 0.3 11/13/2017    ALKPHOS 86 11/13/2017    AST 31 11/13/2017    ALT 35 11/13/2017     Lipid Panel:  Lab Results   Component Value Date    TRIG 84 11/13/2017    HDL 60 11/13/2017    VLDL 16.8 11/13/2017    LDLHDL 1.20 10/26/2016     HbA1c:  Lab Results   Component Value Date    HGBA1C 5.50 07/17/2018       Visual Acuity:  No exam data  present    Age-appropriate Screening Schedule:  Refer to the list below for future screening recommendations based on patient's age, sex and/or medical conditions. Orders for these recommended tests are listed in the plan section. The patient has been provided with a written plan.    Health Maintenance   Topic Date Due   • ZOSTER VACCINE (2 of 3) 09/09/2011   • LIPID PANEL  11/13/2018   • TDAP/TD VACCINES (3 - Td) 05/04/2019   • COLONOSCOPY  04/16/2025   • INFLUENZA VACCINE  Completed   • PNEUMOCOCCAL VACCINES (65+ LOW/MEDIUM RISK)  Completed        Subjective   History of Present Illness    Zheng Lopez is a 68 y.o. male who presents for an Subsequent Wellness Visit.    The following portions of the patient's history were reviewed and updated as appropriate: allergies, current medications, past family history, past medical history, past social history, past surgical history and problem list.    Outpatient Medications Prior to Visit   Medication Sig Dispense Refill   • aspirin 81 MG tablet Take  by mouth daily.     • atorvastatin (LIPITOR) 40 MG tablet Take 1 tablet by mouth Daily. 30 tablet 4   • esomeprazole (NEXIUM) 40 MG capsule Take  by mouth.     • ibuprofen (ADVIL,MOTRIN) 800 MG tablet Take 800 mg by mouth 2 (Two) Times a Day.     • lisinopril (PRINIVIL,ZESTRIL) 20 MG tablet Take 1 tablet by mouth Daily. 90 tablet 4   • Magnesium 400 MG capsule Take  by mouth.     • metoprolol succinate XL (TOPROL-XL) 25 MG 24 hr tablet Take 1 tablet by mouth Daily. 90 tablet 4   • Multiple Vitamin tablet Take  by mouth.     • sildenafil (VIAGRA) 100 MG tablet Take 1 tablet by mouth Daily As Needed for erectile dysfunction. 4 tablet 0     No facility-administered medications prior to visit.        Patient Active Problem List   Diagnosis   • Hyperlipidemia   • Essential hypertension   • Sleep apnea   • Osteoarthritis involving multiple joints on both sides of body   • GERD (gastroesophageal reflux disease)   • Diverticula  of colon   • Cardiac enlargement   • ED (erectile dysfunction) of organic origin   • Asymmetric septal hypertrophy (CMS/HCC)   • Non-sustained ventricular tachycardia (CMS/HCC)   • Chronic pain of left knee   • Primary osteoarthritis of left knee   • Left lumbar radiculopathy   • Left thigh pain       Advance Care Planning:  has an advance directive - a copy HAS NOT been provided. Have asked the patient to send this to us to add to record.    Identification of Risk Factors:  Risk factors include: none.    Review of Systems   Constitutional: Negative for fever and unexpected weight change.   HENT: Negative for dental problem.    Respiratory: Negative for shortness of breath.    Cardiovascular: Negative for chest pain, palpitations and leg swelling.   Gastrointestinal: Negative for blood in stool, constipation and diarrhea.   Genitourinary: Negative for dysuria.   Musculoskeletal: Positive for arthralgias and myalgias.        Left knee and upper leg pain   Skin: Negative for rash.   Allergic/Immunologic: Negative for environmental allergies.   Neurological: Negative for syncope.   Psychiatric/Behavioral: The patient is not nervous/anxious.        Compared to one year ago, the patient feels his physical health is the same.  Compared to one year ago, the patient feels his mental health is the same.    Objective     Physical Exam   Constitutional: He is oriented to person, place, and time. He appears well-developed and well-nourished. No distress.   HENT:   Head: Normocephalic.   Nose: Nose normal.   Eyes: EOM are normal.   Cardiovascular: Normal rate, regular rhythm, normal heart sounds and intact distal pulses.   No murmur heard.  Pulmonary/Chest: Effort normal and breath sounds normal. No respiratory distress.   Musculoskeletal: Normal range of motion.   Neurological: He is alert and oriented to person, place, and time.   Skin: Skin is warm and dry. No rash noted.   Psychiatric: He has a normal mood and affect. His  "behavior is normal. Judgment and thought content normal.   Nursing note and vitals reviewed.      Vitals:    11/13/18 0746   BP: 118/82   Pulse: 78   Temp: 98.1 °F (36.7 °C)   TempSrc: Oral   SpO2: 98%   Weight: 92.1 kg (203 lb)   Height: 175.3 cm (69\")   PainSc: 0-No pain       Patient's Body mass index is 29.98 kg/m². BMI is above normal parameters. Recommendations include: exercise counseling and nutrition counseling.      Assessment/Plan   Patient Self-Management and Personalized Health Advice  The patient has been provided with information about: diet and exercise and preventive services including:   · Exercise counseling provided, Nutrition counseling provided.    Visit Diagnoses:    ICD-10-CM ICD-9-CM   1. Medicare annual wellness visit, subsequent Z00.00 V70.0   2. Familial hypercholesterolemia E78.01 272.0   3. Essential hypertension I10 401.9   4. Non-sustained ventricular tachycardia (CMS/HCC) I47.2 427.1   5. Screening for prostate cancer Z12.5 V76.44   6. Hypovitaminosis D E55.9 268.9       Orders Placed This Encounter   Procedures   • Comprehensive Metabolic Panel   • Lipid Panel   • Vitamin D 25 Hydroxy   • PSA Screen       Outpatient Encounter Medications as of 11/13/2018   Medication Sig Dispense Refill   • aspirin 81 MG tablet Take  by mouth daily.     • atorvastatin (LIPITOR) 40 MG tablet Take 1 tablet by mouth Daily. 30 tablet 4   • esomeprazole (NEXIUM) 40 MG capsule Take  by mouth.     • ibuprofen (ADVIL,MOTRIN) 800 MG tablet Take 800 mg by mouth 2 (Two) Times a Day.     • lisinopril (PRINIVIL,ZESTRIL) 20 MG tablet Take 1 tablet by mouth Daily. 90 tablet 4   • Magnesium 400 MG capsule Take  by mouth.     • metoprolol succinate XL (TOPROL-XL) 25 MG 24 hr tablet Take 1 tablet by mouth Daily. 90 tablet 4   • Multiple Vitamin tablet Take  by mouth.     • sildenafil (VIAGRA) 100 MG tablet Take 1 tablet by mouth Daily As Needed for erectile dysfunction. 4 tablet 0     No facility-administered " encounter medications on file as of 11/13/2018.      Orthopedist for left knee pain and weakness of the left quadriceps.  Getting physical therapy and follow up with a spine doctor.  Discussed decrease quantity of food, decreased dessert.  Goal weight 195 pounds.  Exercise similar to current.    Reviewed use of high risk medication in the elderly: yes  Reviewed for potential of harmful drug interactions in the elderly: yes    Follow Up:  Return in about 6 months (around 5/13/2019), or if symptoms worsen or fail to improve, for Recheck HTN .     An After Visit Summary and PPPS with all of these plans were given to the patient.

## 2018-11-15 ENCOUNTER — HOSPITAL ENCOUNTER (OUTPATIENT)
Dept: PHYSICAL THERAPY | Facility: HOSPITAL | Age: 68
Setting detail: THERAPIES SERIES
Discharge: HOME | End: 2018-11-15
Attending: ORTHOPAEDIC SURGERY

## 2018-11-15 DIAGNOSIS — G89.29 CHRONIC PAIN OF LEFT KNEE: Primary | ICD-10-CM

## 2018-11-15 DIAGNOSIS — M25.562 CHRONIC PAIN OF LEFT KNEE: Primary | ICD-10-CM

## 2018-11-15 DIAGNOSIS — R68.89 DECREASED FUNCTIONAL ACTIVITY TOLERANCE: ICD-10-CM

## 2018-11-15 DIAGNOSIS — M79.652 LEFT THIGH PAIN: ICD-10-CM

## 2018-11-15 DIAGNOSIS — M17.12 OSTEOARTHRITIS OF LEFT KNEE, UNSPECIFIED OSTEOARTHRITIS TYPE: ICD-10-CM

## 2018-11-15 DIAGNOSIS — R29.898 WEAKNESS OF LEFT LEG: ICD-10-CM

## 2018-11-15 PROCEDURE — G8979 MOBILITY GOAL STATUS: HCPCS | Performed by: PHYSICAL THERAPIST

## 2018-11-15 PROCEDURE — 97140 MANUAL THERAPY 1/> REGIONS: CPT | Performed by: PHYSICAL THERAPIST

## 2018-11-15 PROCEDURE — 97162 PT EVAL MOD COMPLEX 30 MIN: CPT | Performed by: PHYSICAL THERAPIST

## 2018-11-15 PROCEDURE — 97110 THERAPEUTIC EXERCISES: CPT | Performed by: PHYSICAL THERAPIST

## 2018-11-15 PROCEDURE — G8978 MOBILITY CURRENT STATUS: HCPCS | Performed by: PHYSICAL THERAPIST

## 2018-11-15 NOTE — THERAPY EVALUATION
Outpatient Physical Therapy Ortho Initial Evaluation  [facility]     Patient Name: Zheng Lopez  : 1950  MRN: [id number]  Today's Date: 11/15/2018      Visit Date: 11/15/2018    Patient Active Problem List   Diagnosis   • Hyperlipidemia   • Essential hypertension   • Sleep apnea   • Osteoarthritis involving multiple joints on both sides of body   • GERD (gastroesophageal reflux disease)   • Diverticula of colon   • Cardiac enlargement   • ED (erectile dysfunction) of organic origin   • Asymmetric septal hypertrophy (CMS/HCC)   • Non-sustained ventricular tachycardia (CMS/HCC)   • Chronic pain of left knee   • Primary osteoarthritis of left knee   • Left lumbar radiculopathy   • Left thigh pain        Past Medical History:   Diagnosis Date   • Bronchitis     Bronchitis / URI   • Cataract of both eyes    • Chicken pox    • Cholecystitis    • Diverticula of colon    • Encounter for annual health examination 03/10/2015    Annual Health Assessment   • Fatigue    • Gallbladder disease    • GERD (gastroesophageal reflux disease)    • High blood pressure    • History of EKG     10/30/15, 13, 12, 10/18/10, 09   • History of TB skin testing     TB Skin Test: 14 Completed, 12 Patient Declines, 10/18/10 Completed, 08 Completed   • Hyperlipidemia    • Hypertension     still taking meds   • Impingement of right ulnar nerve    • Measles    • Osteoarthritis involving multiple joints on both sides of body    • Osteoarthritis involving multiple joints on both sides of body    • Persistent cough    • Pulse irregularity     Irregular pulse PER CARDIO   • Sexual problems     Sexual problems / enjoyment ED   • Sleep apnea     has c-pap   • Wellness examination 03/10/2015    Annual Wellness Visit        Past Surgical History:   Procedure Laterality Date   • CATARACT EXTRACTION Bilateral    • CATARACT EXTRACTION Right    • CATARACT EXTRACTION Left    • CHOLECYSTECTOMY     •  COLONOSCOPY  04/16/2015    Dr. Ritchie   • COLONOSCOPY  03/07/2007   • ELBOW PROCEDURE Right     ulnar nerve impingement release   • FOOT SURGERY Right     right foot (twice)   • GALLBLADDER SURGERY  2009   • KNEE MENISCAL REPAIR Right 2010    torn right meniscus   • LASIK  2006   • RHINOPLASTY  1986   • ROTATOR CUFF REPAIR Left 2010   • SHOULDER ARTHROSCOPY W/ LABRAL REPAIR Right 2015    Dr. Baxter  labhannah repair right shoulder   • SHOULDER LIGAMENT REPAIR Right    • TOTAL KNEE ARTHROPLASTY Right 2011       Visit Dx:     ICD-10-CM ICD-9-CM   1. Chronic pain of left knee M25.562 719.46    G89.29 338.29   2. Weakness of left leg R29.898 729.89   3. Decreased functional activity tolerance R68.89 780.99   4. Osteoarthritis of left knee, unspecified osteoarthritis type M17.12 715.96   5. Left thigh pain M79.652 729.5       Patient History     Row Name 11/15/18 1500             History    Chief Complaint  Difficulty Walking;Difficulty with daily activities;Pain;Muscle weakness;Muscle tenderness;Joint swelling;Joint stiffness  -LC      Type of Pain  Knee pain quad LEFT  -LC      Date Current Problem(s) Began  10/25/18  -      Brief Description of Current Complaint  Pt is familiar to clinic and was seen this past year for same condition. He has L knee OA with chronic L knee pain. He reports increased pain with prolonged sitting or remaining in one position for a long period of time. He reports that pain is in R quad more pronounced at this time. He states that the longer that he is up and moving around he has increased R quad pain. 3 weeks ago he was at [employer] climbing stairs and more active and had increased L quad pain that has persisted and remains the same. He reports L quad pain is intermittent, but very limiting when it does hurt. Pt's left knee is painful, but he reports that HEP has alleviated some of his symptoms. Pt reports that since onset of quad pain he has experienced a few episodes of L knee  buckling but has not experienced fall although he has had to catch himself.  -LC      Patient/Caregiver Goals  Relieve pain;Return to prior level of function;Improve mobility;Improve strength;Know what to do to help the symptoms  -LC      Patient's Rating of General Health  Very good  -LC      Occupation/sports/leisure activities  golf, working out at gym  -      How has patient tried to help current problem?  rest  -LC      What clinical tests have you had for this problem?  X-ray  -      Results of Clinical Tests  L knee  OA  -LC         Pain     Pain Location  Arm;Knee L quad  -LC      Pain at Present  1  -LC      Pain at Best  0  -LC      Pain at Worst  7  -LC      Pain Frequency  Intermittent  -LC      Pain Description  Sharp;Cramping;Sore  -LC      What Performance Factors Make the Current Problem(s) WORSE?  increased activity, ascending/descending stairs  -LC      Is your sleep disturbed?  Yes  -LC      Difficulties with ADL's?  ascending/descending stairs, prolonged standing and walking  -         Fall Risk Assessment    Any falls in the past year:  No  -LC         Daily Activities    Primary Language  English  -LC      Pt Participated in POC and Goals  Yes  -LC         Safety    Are you being hurt, hit, or frightened by anyone at home or in your life?  No  -LC      Are you being neglected by a caregiver  No  -LC        User Key  (r) = Recorded By, (t) = Taken By, (c) = Cosigned By    Initials Name Provider Type    Wili De Paz, PT DPT Physical Therapist          PT Ortho     Row Name 11/15/18 1600       Lumbar/SI Special Tests    Seated Flexion Test (SI Dysfunction)  Left:;Negative  -LC    Slump Test (Neural Tension)  Left:;Negative  -LC    SLR (Neural Tension)  Left:;Negative  -LC    Thigh Thrust/Posterior Shear (SI Dysfunction)  Left:;Negative  -LC    FAIR Test (Piriformis Syndrome)  Left:;Negative  -LC       Knee Special Tests    Bounce home test (meniscal lesion)  Left:;Negative  -LC     Patellar grind test (chondromalacia patella)  Left:;Negative  -LC    Prone knee flexion test (tight quads vs femoral neural tension)  Left:;Positive  -LC       Right Lower Ext    Rt Knee Extension/Flexion AROM  0 to 125  -LC       Left Lower Ext    Lt Knee Extension/Flexion AROM  0 to 125  -LC    LT Lower Extremity Comments  pain in quadriceps at EROM  -LC       MMT Right Lower Ext    Rt Hip Flexion MMT, Gross Movement  (4/5) good  -LC    Rt Hip Extension MMT, Gross Movement  (4/5) good  -LC    Rt Hip ABduction MMT, Gross Movement  (4/5) good  -LC    Rt Hip ADduction MMT, Gross Movement  (4/5) good  -LC    Rt Knee Extension MMT, Gross Movement  (4/5) good  -LC    Rt Knee Flexion MMT, Gross Movement  (4/5) good  -LC       MMT Left Lower Ext    Lt Hip Flexion MMT, Gross Movement  (3+/5) fair plus  -LC    Lt Hip Extension MMT, Gross Movement  (4/5) good  -LC    Lt Hip ABduction MMT, Gross Movement  (4/5) good  -LC    Lt Hip ADduction MMT, Gross Movement  (4/5) good  -LC    Lt Knee Extension MMT, Gross Movement  (3+/5) fair plus  -LC    Lt Knee Flexion MMT, Gross Movement  (4/5) good  -LC    Lt Lower Extremity Comments   hip flex and knee ext painful in quadriceps and at patellar tendon origin.  -LC       Lower Extremity Flexibility    Hamstrings  Left:;Moderately limited  -LC    Hip Flexors  Left:;Moderately limited  -LC    Quadriceps  Left:;Moderately limited  -LC    Hip External Rotators  Left:;Mildly limited  -LC    Hip Internal Rotators  Left:;Mildly limited  -LC    Row Name 11/15/18 1500       MMT (Manual Muscle Testing)    Rt Lower Ext  Rt Hip Flexion;Rt Hip Extension;Rt Hip ABduction;Rt Hip ADduction;Rt Knee Extension;Rt Knee Flexion  -LC    Lt Lower Ext  Lt Hip Flexion;Lt Hip Extension;Lt Hip ABduction;Lt Hip ADduction;Lt Knee Extension;Lt Knee Flexion  -LC      User Key  (r) = Recorded By, (t) = Taken By, (c) = Cosigned By    Initials Name Provider Type    Wili De Paz M, PT DPT Physical Therapist                       Therapy Education  Given: HEP, Symptoms/condition management, Pain management  How Provided: Verbal, Demonstration, Written  Provided to: Patient  Level of Understanding: Teach back education performed, Verbalized, Demonstrated     PT OP Goals     Row Name 11/15/18 1600          PT Short Term Goals    STG 1  Pt will be independent with HEP to reduce L quad pain and improve L knee strength and stability to decrease pain and allow performance of ADL's with no limitations.  -     STG 1 Progress  New  -     STG 2  Pt will report R quad pain 2/10 with ADL's.  -     STG 2 Progress  New  -     STG 3  Pt will have KOS >80%.  -     STG 3 Progress  New  -        Time Calculation    PT Goal Re-Cert Due Date  12/15/18  -       User Key  (r) = Recorded By, (t) = Taken By, (c) = Cosigned By    Initials Name Provider Type    Wili De Paz, PT DPT Physical Therapist          PT Assessment/Plan     Row Name 11/15/18 1632          PT Assessment    Functional Limitations  Limitations in functional capacity and performance;Performance in leisure activities  -     Impairments  Pain;Impaired flexibility;Endurance;Gait;Muscle strength  -     Assessment Comments  Pt is 67 y.o. male who reports to PT with L thigh and knee pain onset 3 weeks ago. 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 He has positive prone knee flexion for L knee indicating quadriceps strain. Pt reports intermittent pain in L quadriceps up to 7/10 radiating from top of anterior thigh to just above patellar tendon origin. Pt educated on changing equipment at gym to reduce quad strain, quad stretches, light quad strengthening through pain free ROM, and need to ice. Pt had pain with L knee ext and hip flexion MMT 3+/5. Pt issued written HEP.  He will return in 2 weeks for followup.  -     Please refer to paper survey for additional self-reported information  Yes  -LC     Rehab Potential  Good  -LC     Patient/caregiver participated in establishment of treatment plan and goals  Yes  -LC     Patient would benefit from skilled therapy intervention  Yes  -LC        PT Plan    PT Frequency  1x/week  -     Predicted Duration of Therapy Intervention (Therapy Eval)  4 weeks  -     Planned CPT's?  PT EVAL MOD COMPLELITY: 27405;PT THER PROC EA 15 MIN: 03675;PT THER ACT EA 15 MIN: 11601;PT MANUAL THERAPY EA 15 MIN: 68431;PT RE-EVAL: 12678;PT NEUROMUSC RE-EDUCATION EA 15 MIN: 60791;PT GAIT TRAINING EA 15 MIN: 44798;PT ULTRASOUND EA 15 MIN: 63636;PT ELECTRICAL STIM UNATTEND:   -     Physical Therapy Interventions (Optional Details)  gait training;modalities;manual therapy techniques;joint mobilization;home exercise program;stretching;strengthening;ROM (Range of Motion)  -     PT Plan Comments  Pt requires skilled therapy to improve L quad flexibility and alleviate pain symptoms in knee to allow performance of ADL's.  -       User Key  (r) = Recorded By, (t) = Taken By, (c) = Cosigned By    Initials Name Provider Type    LC Wili Serrano, PT DPT Physical Therapist            Exercises     Row Name 11/15/18 1600 11/15/18 1500          Total Minutes    69435 - PT Therapeutic Exercise Minutes  15  -LC  --     58705 - PT Manual Therapy Minutes  --  10  -LC        Exercise 1    Exercise Name 1  SKTC for quad stretch  -LC  --     Sets 1  2  -LC  --     Reps 1  5  -LC  --     Time 1  10  -LC  --        Exercise 2    Exercise Name 2  piriformis stretch figure 4   -LC  --     Sets 2  2  -LC  --     Reps 2  5  -LC  --     Time 2  10  -LC  --        Exercise 3    Exercise Name 3  HS stretch with strap  -LC  --     Sets 3  2  -LC  --     Reps 3  5  -LC  --     Time 3  10  -LC  --        Exercise 4    Exercise Name 4  seated LAQ  -LC  --     Sets 4  2  -LC  --      Reps 4  10  -LC  --       User Key  (r) = Recorded By, (t) = Taken By, (c) = Cosigned By    Initials Name Provider Type    MONSTER Serrano Wili OSEGUERA, PT DPT Physical Therapist           Manual Rx (last 36 hours)      Manual Treatments     Row Name 11/15/18 1500             Total Minutes    32307 - PT Manual Therapy Minutes  10  -LC         Manual Rx 1    Manual Rx 1 Location  L knee  -LC      Manual Rx 1 Type  quad stretch, long axis traction, hip ER/IR stretch, piriformis  -      Manual Rx 1 Duration  10 min  -        User Key  (r) = Recorded By, (t) = Taken By, (c) = Cosigned By    Initials Name Provider Type    MONSTER Zach Wili OSEGUERA, PT DPT Physical Therapist                      Outcome Measure Options: Knee Outcome Score- ADL  Knee Outcome Score  Knee Outcome Score Comments: 68%      Time Calculation:     Therapy Suggested Charges     Code   Minutes Charges    17923 (CPT®) Hc Pt Neuromusc Re Education Ea 15 Min      33544 (CPT®) Hc Pt Ther Proc Ea 15 Min 15 1    53493 (CPT®) Hc Gait Training Ea 15 Min      95652 (CPT®) Hc Pt Therapeutic Act Ea 15 Min      63822 (CPT®) Hc Pt Manual Therapy Ea 15 Min 10 1    28486 (CPT®) Hc Pt Ther Massage- Per 15 Min      33599 (CPT®) Hc Pt Iontophoresis Ea 15 Min      68292 (CPT®) Hc Pt Elec Stim Ea-Per 15 Min      53552 (CPT®) Hc Pt Ultrasound Ea 15 Min      96325 (CPT®) Hc Pt Self Care/Mgmt/Train Ea 15 Min      23138 (CPT®) Hc Pt Prosthetic (S) Train Initial Encounter, Each 15 Min      47442 (CPT®) Hc Orthotic(S) Mgmt/Train Initial Encounter, Each 15min      33757 (CPT®) Hc Pt Aquatic Therapy Ea 15 Min      12299 (CPT®) Hc Pt Orthotic(S)/Prosthetic(S) Encounter, Each 15 Min       (CPT®) Hc Pt Electrical Stim Unattended      Total  25 2          Start Time: 1550  Stop Time: 1630  Time Calculation (min): 40 min  Total Timed Code Minutes- PT: 40 minute(s)     Therapy Charges for Today     Code Description Service Date Service Provider Modifiers Qty    61896560338 HC PT  MOBILITY CURRENT 11/15/2018 Wili Serrano, PT DPT GP, CJ 1    73297855107 HC PT MOBILITY PROJECTED 11/15/2018 Wili Serrano, PT DPT GP, CI 1    01879186950 HC PT THER PROC EA 15 MIN 11/15/2018 Wili Serrano, PT DPT GP 1    61644751330 HC PT MANUAL THERAPY EA 15 MIN 11/15/2018 Wili Serrano, PT DPT GP 1    23366731021 HC PT EVAL MOD COMPLEXITY 1 11/15/2018 Wili Serrano, PT DPT GP 1          PT G-Codes  PT Professional Judgement Used?: Yes  Outcome Measure Options: Knee Outcome Score- ADL  Functional Limitation: Mobility: Walking and moving around  Mobility: Walking and Moving Around Current Status (): At least 20 percent but less than 40 percent impaired, limited or restricted  Mobility: Walking and Moving Around Goal Status (): At least 1 percent but less than 20 percent impaired, limited or restricted         Wili Serrano PT DPT  11/15/2018

## 2018-11-16 NOTE — PROGRESS NOTES
Please call patient with results. Kidney and liver functions look normal. Diabetes screen is negative. Cholesterol is normal on current meds. Remaining labs are normal.  I would recommend taking a Vit D supplement - 1000 units daily (normal but low limit of normal.  Remaining labs are normal.   We can repeat these next year.     Thank You,    Zoya Newman M.D.

## 2018-11-20 RX ORDER — ATORVASTATIN CALCIUM 40 MG/1
40 TABLET, FILM COATED ORAL DAILY
Qty: 90 TABLET | Refills: 4 | Status: SHIPPED | OUTPATIENT
Start: 2018-11-20 | End: 2020-01-13

## 2018-11-27 ENCOUNTER — HOSPITAL ENCOUNTER (OUTPATIENT)
Dept: PHYSICAL THERAPY | Facility: HOSPITAL | Age: 68
Setting detail: THERAPIES SERIES
Discharge: HOME | End: 2018-11-27
Attending: ORTHOPAEDIC SURGERY

## 2018-11-27 DIAGNOSIS — M17.12 OSTEOARTHRITIS OF LEFT KNEE, UNSPECIFIED OSTEOARTHRITIS TYPE: ICD-10-CM

## 2018-11-27 DIAGNOSIS — R29.898 WEAKNESS OF LEFT LEG: ICD-10-CM

## 2018-11-27 DIAGNOSIS — M79.652 LEFT THIGH PAIN: ICD-10-CM

## 2018-11-27 DIAGNOSIS — R68.89 DECREASED FUNCTIONAL ACTIVITY TOLERANCE: ICD-10-CM

## 2018-11-27 DIAGNOSIS — G89.29 CHRONIC PAIN OF LEFT KNEE: Primary | ICD-10-CM

## 2018-11-27 DIAGNOSIS — M25.562 CHRONIC PAIN OF LEFT KNEE: Primary | ICD-10-CM

## 2018-11-27 PROCEDURE — 97140 MANUAL THERAPY 1/> REGIONS: CPT | Performed by: PHYSICAL THERAPIST

## 2018-11-27 PROCEDURE — 97110 THERAPEUTIC EXERCISES: CPT | Performed by: PHYSICAL THERAPIST

## 2018-11-27 NOTE — THERAPY TREATMENT NOTE
Outpatient Physical Therapy Ortho Treatment Note  [facility]     Patient Name: Zheng Lopez  : 1950  MRN: [id number]  Today's Date: 2018      Visit Date: 2018    Visit Dx:    ICD-10-CM ICD-9-CM   1. Chronic pain of left knee M25.562 719.46    G89.29 338.29   2. Weakness of left leg R29.898 729.89   3. Decreased functional activity tolerance R68.89 780.99   4. Osteoarthritis of left knee, unspecified osteoarthritis type M17.12 715.96   5. Left thigh pain M79.652 729.5       Patient Active Problem List   Diagnosis   • Hyperlipidemia   • Essential hypertension   • Sleep apnea   • Osteoarthritis involving multiple joints on both sides of body   • GERD (gastroesophageal reflux disease)   • Diverticula of colon   • Cardiac enlargement   • ED (erectile dysfunction) of organic origin   • Asymmetric septal hypertrophy (CMS/HCC)   • Non-sustained ventricular tachycardia (CMS/HCC)   • Chronic pain of left knee   • Primary osteoarthritis of left knee   • Left lumbar radiculopathy   • Left thigh pain        Past Medical History:   Diagnosis Date   • Bronchitis     Bronchitis / URI   • Cataract of both eyes    • Chicken pox    • Cholecystitis    • Diverticula of colon    • Encounter for annual health examination 03/10/2015    Annual Health Assessment   • Fatigue    • Gallbladder disease    • GERD (gastroesophageal reflux disease)    • High blood pressure    • History of EKG     10/30/15, 13, 12, 10/18/10, 09   • History of TB skin testing     TB Skin Test: 14 Completed, 12 Patient Declines, 10/18/10 Completed, 08 Completed   • Hyperlipidemia    • Hypertension     still taking meds   • Impingement of right ulnar nerve    • Measles    • Osteoarthritis involving multiple joints on both sides of body    • Osteoarthritis involving multiple joints on both sides of body    • Persistent cough    • Pulse irregularity     Irregular pulse PER CARDIO   • Sexual problems      Sexual problems / enjoyment ED   • Sleep apnea     has c-pap   • Wellness examination 03/10/2015    Annual Wellness Visit        Past Surgical History:   Procedure Laterality Date   • CATARACT EXTRACTION Bilateral    • CATARACT EXTRACTION Right 2013   • CATARACT EXTRACTION Left 2014   • CHOLECYSTECTOMY     • COLONOSCOPY  04/16/2015    Dr. Ritchie   • COLONOSCOPY  03/07/2007   • ELBOW PROCEDURE Right     ulnar nerve impingement release   • FOOT SURGERY Right     right foot (twice)   • GALLBLADDER SURGERY  2009   • KNEE MENISCAL REPAIR Right 2010    torn right meniscus   • LASIK  2006   • RHINOPLASTY  1986   • ROTATOR CUFF REPAIR Left 2010   • SHOULDER ARTHROSCOPY W/ LABRAL REPAIR Right 2015    Dr. Baxter  labram repair right shoulder   • SHOULDER LIGAMENT REPAIR Right    • TOTAL KNEE ARTHROPLASTY Right 2011       PT Ortho     Row Name 11/27/18 1600       Subjective Comments    Subjective Comments  Pt reports that pain has largely resolved since last visit. He has some mild residual pain with quad set and hip flexion, but this is continuing to improve. He reports pain is largely just sore. Sleeping has also improved reporting no pain at night.  -LC       Subjective Pain    Able to rate subjective pain?  yes  -LC    Pre-Treatment Pain Level  2  -LC       Knee Special Tests    Prone knee flexion test (tight quads vs femoral neural tension)  Left:;Negative  -LC       Left Lower Ext    Lt Knee Extension/Flexion AROM  0 to 125  -LC    LT Lower Extremity Comments  very mild pain  -LC       MMT Left Lower Ext    Lt Hip Flexion MMT, Gross Movement  (4/5) good  -LC    Lt Knee Flexion MMT, Gross Movement  (4/5) good  -LC    Lt Lower Extremity Comments   very mild soreness  -LC      User Key  (r) = Recorded By, (t) = Taken By, (c) = Cosigned By    Initials Name Provider Type    LC Wili Serrano, PT DPT Physical Therapist                      PT Assessment/Plan     Row Name 11/27/18 8361          PT Assessment    Functional  Limitations  Limitations in functional capacity and performance;Performance in leisure activities  -     Impairments  Pain;Impaired flexibility;Endurance;Gait;Muscle strength  -     Assessment Comments  Pt reports no back pain and L quad pain at worst 2/10. He has L quad and hip flexor MMT 4/5. He has pain 0/10 on average. Reports no pain with ADL's and ascending/descending stairs. He will continue HEP and contact PT if he has any questions or any change in status.  -LC        PT Plan    PT Plan Comments  Pt will contact PT if he feels that he requires further skilled intervention.  -       User Key  (r) = Recorded By, (t) = Taken By, (c) = Cosigned By    Initials Name Provider Type    LC Wili Serrano, PT DPT Physical Therapist              Exercises     Row Name 11/27/18 1600 11/27/18 1500          Subjective Comments    Subjective Comments  Pt reports that pain has largely resolved since last visit. He has some mild residual pain with quad set and hip flexion, but this is continuing to improve. He reports pain is largely just sore. Sleeping has also improved reporting no pain at night.  -LC  --        Subjective Pain    Able to rate subjective pain?  yes  -LC  --     Pre-Treatment Pain Level  2  -LC  --        Total Minutes    10258 - PT Therapeutic Exercise Minutes  15  -LC  --     85163 - PT Manual Therapy Minutes  --  15  -LC        Exercise 1    Exercise Name 1  SKTC for quad stretch  -LC  --     Sets 1  2  -LC  --     Reps 1  5  -LC  --     Time 1  10  -LC  --        Exercise 2    Exercise Name 2  piriformis stretch figure 4   -LC  --     Sets 2  2  -LC  --     Reps 2  5  -LC  --     Time 2  10  -LC  --        Exercise 3    Exercise Name 3  HS stretch with strap  -LC  --     Sets 3  2  -LC  --     Reps 3  5  -LC  --     Time 3  10  -LC  --        Exercise 4    Exercise Name 4  seated LAQ  -LC  --     Sets 4  2  -LC  --     Reps 4  10  -LC  --        Exercise 5    Exercise Name 5  Nu STEp  -LC  --      Time 5  5 min  -  --       User Key  (r) = Recorded By, (t) = Taken By, (c) = Cosigned By    Initials Name Provider Type    Wili De Paz, PT DPT Physical Therapist                        Manual Rx (last 36 hours)      Manual Treatments     Row Name 11/27/18 1500             Total Minutes    29825 - PT Manual Therapy Minutes  15  -         Manual Rx 1    Manual Rx 1 Location  L knee  -      Manual Rx 1 Type  quad stretch, long axis traction, hip ER/IR stretch, piriformis  -      Manual Rx 1 Duration  15 min  -        User Key  (r) = Recorded By, (t) = Taken By, (c) = Cosigned By    Initials Name Provider Type    Wili De Paz, PT DPT Physical Therapist          PT OP Goals     Row Name 11/27/18 1600          PT Short Term Goals    STG 1  Pt will be independent with HEP to reduce L quad pain and improve L knee strength and stability to decrease pain and allow performance of ADL's with no limitations.  -     STG 1 Progress  Met  -     STG 2  Pt will report R quad pain 2/10 with ADL's.  -     STG 2 Progress  Met  -     STG 3  Pt will have KOS >80%.  -     STG 3 Progress  Progressing  -       User Key  (r) = Recorded By, (t) = Taken By, (c) = Cosigned By    Initials Name Provider Type    Wili De Paz, PT DPT Physical Therapist          Therapy Education  Given: HEP, Symptoms/condition management, Pain management  Program: Reinforced  How Provided: Verbal, Demonstration  Provided to: Patient  Level of Understanding: Teach back education performed, Verbalized, Demonstrated              Time Calculation:   Start Time: 1600  Stop Time: 1630  Time Calculation (min): 30 min  Total Timed Code Minutes- PT: 30 minute(s)  Therapy Suggested Charges     Code   Minutes Charges    41586 (CPT®)  Pt Neuromusc Re Education Ea 15 Min      76587 (CPT®) Hc Pt Ther Proc Ea 15 Min 15 1    69157 (CPT®) Hc Gait Training Ea 15 Min      84102 (CPT®) Hc Pt Therapeutic Act Ea 15 Min      73979 (CPT®) Hc Pt  Manual Therapy Ea 15 Min 15 1    13595 (CPT®) Hc Pt Ther Massage- Per 15 Min      85809 (CPT®) Hc Pt Iontophoresis Ea 15 Min      39877 (CPT®) Hc Pt Elec Stim Ea-Per 15 Min      32533 (CPT®) Hc Pt Ultrasound Ea 15 Min      97349 (CPT®) Hc Pt Self Care/Mgmt/Train Ea 15 Min      77826 (CPT®) Hc Pt Prosthetic (S) Train Initial Encounter, Each 15 Min      62553 (CPT®) Hc Orthotic(S) Mgmt/Train Initial Encounter, Each 15min      84826 (CPT®) Hc Pt Aquatic Therapy Ea 15 Min      77552 (CPT®) Hc Pt Orthotic(S)/Prosthetic(S) Encounter, Each 15 Min       (CPT®) Hc Pt Electrical Stim Unattended      Total  30 2        Therapy Charges for Today     Code Description Service Date Service Provider Modifiers Qty    56815726331 HC PT THER PROC EA 15 MIN 11/27/2018 Wili Serrano, PT DPT GP 1    56091786051 HC PT MANUAL THERAPY EA 15 MIN 11/27/2018 Wili Serrano, PT DPT GP 1                    Wili Serrano, PT DPT  11/27/2018

## 2019-01-08 ENCOUNTER — DOCUMENTATION (OUTPATIENT)
Dept: PHYSICAL THERAPY | Facility: HOSPITAL | Age: 69
End: 2019-01-08

## 2019-01-08 DIAGNOSIS — R68.89 DECREASED FUNCTIONAL ACTIVITY TOLERANCE: ICD-10-CM

## 2019-01-08 DIAGNOSIS — M79.652 LEFT THIGH PAIN: ICD-10-CM

## 2019-01-08 DIAGNOSIS — G89.29 CHRONIC PAIN OF LEFT KNEE: Primary | ICD-10-CM

## 2019-01-08 DIAGNOSIS — M25.562 CHRONIC PAIN OF LEFT KNEE: Primary | ICD-10-CM

## 2019-01-08 DIAGNOSIS — R29.898 WEAKNESS OF LEFT LEG: ICD-10-CM

## 2019-01-08 DIAGNOSIS — M17.12 OSTEOARTHRITIS OF LEFT KNEE, UNSPECIFIED OSTEOARTHRITIS TYPE: ICD-10-CM

## 2019-01-08 NOTE — THERAPY DISCHARGE NOTE
Outpatient Physical Therapy Ortho Progress Note/Discharge Summary       Patient Name: Zheng Lopez  : 1950  MRN: [id number]  Today's Date: 2019      Visit Date: 2019    Visit Dx:    ICD-10-CM ICD-9-CM   1. Chronic pain of left knee M25.562 719.46    G89.29 338.29   2. Weakness of left leg R29.898 729.89   3. Decreased functional activity tolerance R68.89 780.99   4. Osteoarthritis of left knee, unspecified osteoarthritis type M17.12 715.96   5. Left thigh pain M79.652 729.5       Patient Active Problem List   Diagnosis   • Hyperlipidemia   • Essential hypertension   • Sleep apnea   • Osteoarthritis involving multiple joints on both sides of body   • GERD (gastroesophageal reflux disease)   • Diverticula of colon   • Cardiac enlargement   • ED (erectile dysfunction) of organic origin   • Asymmetric septal hypertrophy (CMS/HCC)   • Non-sustained ventricular tachycardia (CMS/HCC)   • Chronic pain of left knee   • Primary osteoarthritis of left knee   • Left lumbar radiculopathy   • Left thigh pain        Past Medical History:   Diagnosis Date   • Bronchitis     Bronchitis / URI   • Cataract of both eyes    • Chicken pox    • Cholecystitis    • Diverticula of colon    • Encounter for annual health examination 03/10/2015    Annual Health Assessment   • Fatigue    • Gallbladder disease    • GERD (gastroesophageal reflux disease)    • High blood pressure    • History of EKG     10/30/15, 13, 12, 10/18/10, 09   • History of TB skin testing     TB Skin Test: 14 Completed, 12 Patient Declines, 10/18/10 Completed, 08 Completed   • Hyperlipidemia    • Hypertension     still taking meds   • Impingement of right ulnar nerve    • Measles    • Osteoarthritis involving multiple joints on both sides of body    • Osteoarthritis involving multiple joints on both sides of body    • Persistent cough    • Pulse irregularity     Irregular pulse PER CARDIO   • Sexual problems      Sexual problems / enjoyment ED   • Sleep apnea     has c-pap   • Wellness examination 03/10/2015    Annual Wellness Visit        Past Surgical History:   Procedure Laterality Date   • CATARACT EXTRACTION Bilateral    • CATARACT EXTRACTION Right 2013   • CATARACT EXTRACTION Left 2014   • CHOLECYSTECTOMY     • COLONOSCOPY  04/16/2015    Dr. Ritchie   • COLONOSCOPY  03/07/2007   • ELBOW PROCEDURE Right     ulnar nerve impingement release   • FOOT SURGERY Right     right foot (twice)   • GALLBLADDER SURGERY  2009   • KNEE MENISCAL REPAIR Right 2010    torn right meniscus   • LASIK  2006   • RHINOPLASTY  1986   • ROTATOR CUFF REPAIR Left 2010   • SHOULDER ARTHROSCOPY W/ LABRAL REPAIR Right 2015    Dr. Baxter  labram repair right shoulder   • SHOULDER LIGAMENT REPAIR Right    • TOTAL KNEE ARTHROPLASTY Right 2011                       PT Assessment/Plan     Row Name 01/08/19 1056          PT Assessment    Functional Limitations  Limitations in functional capacity and performance;Performance in leisure activities  -     Impairments  Pain;Impaired flexibility;Endurance;Gait;Muscle strength  -     Assessment Comments  Pt had met most of his goals and was performing HEP independently at home. He did not contact PT to schedule further skilled intervention. He was pleased with his progress and was ascending/descending stairs pain free.  -        PT Plan    PT Plan Comments  D/C to HEP.  -       User Key  (r) = Recorded By, (t) = Taken By, (c) = Cosigned By    Initials Name Provider Type    Wili De Paz, PT DPT Physical Therapist                                 PT OP Goals     Row Name 01/08/19 1000          PT Short Term Goals    STG 1  Pt will be independent with HEP to reduce L quad pain and improve L knee strength and stability to decrease pain and allow performance of ADL's with no limitations.  -     STG 1 Progress  Met  -     STG 2  Pt will report R quad pain 2/10 with ADL's.  -     STG 2 Progress   Met  -LC     STG 3  Pt will have KOS >80%.  -LC     STG 3 Progress  Not Met  -LC       User Key  (r) = Recorded By, (t) = Taken By, (c) = Cosigned By    Initials Name Provider Type    Wili De Paz, PT DPT Physical Therapist                         Time Calculation:      Therapy Suggested Charges     Code   Minutes Charges    None                     OP PT Discharge Summary  Date of Discharge: 01/08/19  Reason for Discharge: All goals achieved, Maximum functional potential achieved, Independent  Outcomes Achieved: Able to achieve all goals within established timeline, Unable to make functional progress toward goals at this time  Discharge Destination: Home with home program      Wili Serrano, PT DPT  1/8/2019

## 2019-01-15 ENCOUNTER — OFFICE VISIT (OUTPATIENT)
Dept: SLEEP MEDICINE | Facility: HOSPITAL | Age: 69
End: 2019-01-15
Attending: PSYCHIATRY & NEUROLOGY

## 2019-01-15 VITALS
DIASTOLIC BLOOD PRESSURE: 75 MMHG | BODY MASS INDEX: 30.63 KG/M2 | SYSTOLIC BLOOD PRESSURE: 142 MMHG | WEIGHT: 206.8 LBS | HEIGHT: 69 IN | HEART RATE: 99 BPM

## 2019-01-15 DIAGNOSIS — G47.33 OSA (OBSTRUCTIVE SLEEP APNEA): Primary | ICD-10-CM

## 2019-01-15 PROCEDURE — G0463 HOSPITAL OUTPT CLINIC VISIT: HCPCS

## 2019-01-15 NOTE — PROGRESS NOTES
"Zheng VALENTINO Lopez                                    Sleep medicine follow-up visit  1950  [id number]     DATE OF SERVICE: 1/15/2019     HISTORY:  The patient is a 68 y.o.  male has history of hypertension, hyperlipidemia, asymmetric septal hypertrophy and nonsustained ventilator tachycardia in the past, chronic lumbar blood clot in the left side and osteoarthritis of the left knee and obstructive sleep apnea syndrome.     The patient has moderately severe obstructive sleep apnea syndrome. His polysomnography in the past has revealed apnea-hypopnea index of 27 per sleep hour and minimum SpO2 of 90%. During supine position, he had severe obstructive sleep apnea with Supine AHI of 49 per sleep hour.     He is on CPAP at 12 cmH20. Compliance data indicates 100% compliance with average usage of 7 hours 43 minutes.  Residual AHI is 0.4 with CPAP therapy.  His sleep is sound and restorative with CPAP therapy.  Echo Sleepiness Scale score is 2. The patient is compliant and benefiting from it.     Sleep schedule: Bedtime 10 PM and gets out of bed at 6:30 AM without an alarm.  Sleep latency 15 minutes and occasionally wakes up once or twice to urinate.    Review of Systems - 10 system review negative.     PMH, PSH, Medications, allergies, FH, SH are reviewed and updated in the chart.     PHYSICAL EXAMINATION:  Vitals:    01/15/19 0805   BP: 142/75   Pulse: 99   Weight: 93.8 kg (206 lb 12.8 oz)   Height: 175.3 cm (69\")   BMI 30.79  HEENT: Normal.   NECK: Supple. No bruits.   CARDIAC: Normal.   LUNGS: Clear to auscultation.   EXTREMITIES: No edema.     IMPRESSION: Patient with obstructive sleep apnea syndrome successfully treated with CPAP therapy and is compliant and benefiting from it.     RECOMMENDATIONS: Continue present CPAP. Followup 1 year.     Jackeline Aguiar M.D.  1/15/2019            "

## 2019-03-14 ENCOUNTER — TELEPHONE (OUTPATIENT)
Dept: ORTHOPEDIC SURGERY | Facility: CLINIC | Age: 69
End: 2019-03-14

## 2019-03-22 ENCOUNTER — OFFICE VISIT (OUTPATIENT)
Dept: ORTHOPEDIC SURGERY | Facility: CLINIC | Age: 69
End: 2019-03-22

## 2019-03-22 VITALS — HEIGHT: 69 IN | TEMPERATURE: 98.3 F | WEIGHT: 206 LBS | BODY MASS INDEX: 30.51 KG/M2

## 2019-03-22 DIAGNOSIS — Z96.651 HISTORY OF TOTAL KNEE ARTHROPLASTY, RIGHT: ICD-10-CM

## 2019-03-22 DIAGNOSIS — M25.531 RIGHT WRIST PAIN: Primary | ICD-10-CM

## 2019-03-22 PROCEDURE — 73110 X-RAY EXAM OF WRIST: CPT | Performed by: ORTHOPAEDIC SURGERY

## 2019-03-22 PROCEDURE — 99214 OFFICE O/P EST MOD 30 MIN: CPT | Performed by: ORTHOPAEDIC SURGERY

## 2019-03-22 PROCEDURE — 73562 X-RAY EXAM OF KNEE 3: CPT | Performed by: ORTHOPAEDIC SURGERY

## 2019-03-22 RX ORDER — MELATONIN
1000 DAILY
COMMUNITY
End: 2021-06-15

## 2019-03-22 NOTE — PROGRESS NOTES
New right Wrist      Patient: Zhneg Lopez        YOB: 1950        Chief Complaints: right wrist pain, right knee pain  Chief Complaint   Patient presents with   • Right Wrist - Pain, Establish Care           History of Present Illness: This is a 68-year-old right-hand-dominant male who presents complaining of right wrist pain he states is been ongoing about 3 months but he fell recently injuring his right total knee and the right wrist.  His symptoms are right at the radial carpal joint no prior history of injury to his recollection his current symptoms are moderate burning his wrist is getting a little better knee is getting a little better to but is quite worried about his knee.  He has an appointment Dr. henderson next week to evaluate this.  The knee was done by Dr. Baker several years ago.  He has had no issues with the knee current symptoms are burning he is worse with activity somewhat better with rest as far as his wrist goes is worse with range of motion he is retired past medical history is more for sleep apnea high cholesterol hyperlipidemia hypertension irregular heartbeat    HPI      Allergies: No Known Allergies    Medications:   Home Medications:  Current Outpatient Medications on File Prior to Visit   Medication Sig   • aspirin 81 MG tablet Take  by mouth daily.   • atorvastatin (LIPITOR) 40 MG tablet Take 1 tablet by mouth Daily.   • cholecalciferol (VITAMIN D3) 1000 units tablet Take 1,000 Units by mouth Daily.   • esomeprazole (NEXIUM) 40 MG capsule Take  by mouth.   • ibuprofen (ADVIL,MOTRIN) 800 MG tablet Take 800 mg by mouth 2 (Two) Times a Day.   • lisinopril (PRINIVIL,ZESTRIL) 20 MG tablet Take 1 tablet by mouth Daily.   • Magnesium 400 MG capsule Take  by mouth.   • metoprolol succinate XL (TOPROL-XL) 25 MG 24 hr tablet Take 1 tablet by mouth Daily.   • Multiple Vitamin tablet Take  by mouth.   • sildenafil (VIAGRA) 100 MG tablet Take 1 tablet by mouth Daily As Needed for  erectile dysfunction.     No current facility-administered medications on file prior to visit.      Current Medications:  Scheduled Meds:  Continuous Infusions:  No current facility-administered medications for this visit.   PRN Meds:.    Past Medical History:   Diagnosis Date   • Asymmetric septal hypertrophy (CMS/HCC)    • Bronchitis     Bronchitis / URI   • Cataract of both eyes    • Chicken pox    • Cholecystitis    • Diverticula of colon    • Encounter for annual health examination 03/10/2015    Annual Health Assessment   • Fatigue    • Gallbladder disease    • GERD (gastroesophageal reflux disease)    • High blood pressure    • History of EKG     10/30/15, 09/19/13, 09/14/12, 10/18/10, 05/04/09   • History of TB skin testing     TB Skin Test: 01/09/14 Completed, 09/14/12 Patient Declines, 10/18/10 Completed, 03/05/08 Completed   • Hyperlipidemia    • Hypertension     still taking meds   • Impingement of right ulnar nerve    • Measles    • Osteoarthritis involving multiple joints on both sides of body    • Osteoarthritis involving multiple joints on both sides of body    • Persistent cough    • Pulse irregularity     Irregular pulse PER CARDIO   • Sexual problems     Sexual problems / enjoyment ED   • Sleep apnea     has c-pap   • Wellness examination 03/10/2015    Annual Wellness Visit        Past Surgical History:   Procedure Laterality Date   • CATARACT EXTRACTION Bilateral    • CATARACT EXTRACTION Right 2013   • CATARACT EXTRACTION Left 2014   • CHOLECYSTECTOMY     • COLONOSCOPY  04/16/2015    Dr. Ritchie   • COLONOSCOPY  03/07/2007   • ELBOW PROCEDURE Right     ulnar nerve impingement release   • FOOT SURGERY Right     right foot (twice)   • GALLBLADDER SURGERY  2009   • KNEE MENISCAL REPAIR Right 2010    torn right meniscus   • LASIK  2006   • RHINOPLASTY  1986   • ROTATOR CUFF REPAIR Left 2010   • SHOULDER ARTHROSCOPY W/ LABRAL REPAIR Right 2015    Dr. Sahil lay repair right shoulder   • SHOULDER  "LIGAMENT REPAIR Right    • TOTAL KNEE ARTHROPLASTY Right 2011        Social History     Occupational History   • Occupation: teacher2   Tobacco Use   • Smoking status: Never Smoker   • Smokeless tobacco: Never Used   Substance and Sexual Activity   • Alcohol use: No     Comment: none in 5 yrs, Nov 2011 - last drink   • Drug use: No   • Sexual activity: Yes     Partners: Female    Social History     Social History Narrative   • Not on file        Family History   Problem Relation Age of Onset   • Dementia Mother    • Migraines Mother    • Osteoporosis Mother    • Stroke Mother 85        early 80s   • Heart disease Mother         tachycardia and had ppm   • Alzheimer's disease Mother    • Supraventricular tachycardia Mother    • Alcohol abuse Father    • Depression Father    • Migraines Father    • Tuberculosis Father    • Cirrhosis Father         43 YO   • Cancer Paternal Uncle 67        mets but unsure of type of ca  BACK CANCER   • Stroke Brother    • Colon cancer Neg Hx    • Prostate cancer Neg Hx    • Heart attack Neg Hx              Review of Systems: 14 point review of systems are remarkable for the wrist and knee pain only the remainder negative per the patient    Review of Systems      Physical Exam: 68 y.o. male  General Appearance:    Alert, cooperative, in no acute distress                 Vitals:    03/22/19 0816   Temp: 98.3 °F (36.8 °C)   Weight: 93.4 kg (206 lb)   Height: 175.3 cm (69\")      Patient is alert and read ×3 no acute distress appears her above-listed at height weight and age.  Affect is normal respiratory rate is normal unlabored. Heart rate regular rate rhythm, sclera, dentition and hearing are normal for the purpose of this exam.        Ortho Exam this exam his right wrist he has some tenderness at the radial carpal joint is still some limitation in extension to about 40 degrees flexion to 40 no tenderness really over the scaphoid itself negative Tinel's reasonable thenar muscle " belly    Physical exam the right knee is healed surgical incision he has full extension full flexion is stable exam no swelling no redness extensor mechanism is intact           Radiology:   AP, Lateral of the right wrist were ordered/reviewed to evaluate pa I have no compared to films he does have a radial scaphoid advance collapse with near complete loss of the radius scaphoid joint he also has some widening of the scapholunate joint which I feel is probably old.  AP lateral merchant view of the right knee were also taken to evaluate his knee I have compared to previous films his total knee replacement looks great no acute pathology is seen        Assessment/Plan:      Right wrist pain I think this is a wristSLAC plan is to put him in a cock-up splint to let this rest for the next couple weeks he can then wean out of it if he still having symptoms I will have him see the  hand carter.

## 2019-05-28 ENCOUNTER — TELEPHONE (OUTPATIENT)
Dept: ORTHOPEDIC SURGERY | Facility: CLINIC | Age: 69
End: 2019-05-28

## 2019-05-28 DIAGNOSIS — M25.531 WRIST PAIN, RIGHT: Primary | ICD-10-CM

## 2019-05-28 NOTE — TELEPHONE ENCOUNTER
Patient saw JAZMYN for his right wrist in March and was told to call back if did not improve and JAZMYN would send to another MD out at . He is not better. Please advise.

## 2019-06-07 ENCOUNTER — OFFICE VISIT (OUTPATIENT)
Dept: FAMILY MEDICINE CLINIC | Facility: CLINIC | Age: 69
End: 2019-06-07

## 2019-06-07 VITALS
BODY MASS INDEX: 28.38 KG/M2 | TEMPERATURE: 97.9 F | HEART RATE: 53 BPM | RESPIRATION RATE: 14 BRPM | HEIGHT: 69 IN | SYSTOLIC BLOOD PRESSURE: 124 MMHG | OXYGEN SATURATION: 98 % | WEIGHT: 191.6 LBS | DIASTOLIC BLOOD PRESSURE: 60 MMHG

## 2019-06-07 DIAGNOSIS — I51.7 CARDIAC ENLARGEMENT: ICD-10-CM

## 2019-06-07 DIAGNOSIS — I10 ESSENTIAL HYPERTENSION: Primary | ICD-10-CM

## 2019-06-07 DIAGNOSIS — E78.01 FAMILIAL HYPERCHOLESTEROLEMIA: ICD-10-CM

## 2019-06-07 DIAGNOSIS — E55.9 HYPOVITAMINOSIS D: ICD-10-CM

## 2019-06-07 PROCEDURE — 99214 OFFICE O/P EST MOD 30 MIN: CPT | Performed by: FAMILY MEDICINE

## 2019-06-07 RX ORDER — LISINOPRIL 20 MG/1
20 TABLET ORAL DAILY
Qty: 90 TABLET | Refills: 4 | Status: SHIPPED | OUTPATIENT
Start: 2019-06-07 | End: 2020-06-05

## 2019-06-07 RX ORDER — METOPROLOL SUCCINATE 25 MG/1
25 TABLET, EXTENDED RELEASE ORAL DAILY
Qty: 90 TABLET | Refills: 4 | Status: SHIPPED | OUTPATIENT
Start: 2019-06-07 | End: 2020-07-30

## 2019-06-07 NOTE — PROGRESS NOTES
Zheng Lopez is a 68 y.o. male.     Chief Complaint   Patient presents with   • Hypertension     patient has 6 months follow up on HTN.        HPI     Pt is a pleasant 68 y.o. YO male here for HTN, lipidemia, and cardiac enlargement.      HTN: Well-controlled, asymptomatic, adherent with both metoprolol and lisinopril.    Obesity: decreased portion sizes, he has lost about 15 pounds since his last visit 6 months ago.  Feeling much better.    Hyperlipidemia, well controlled, adherent with statin    Patient with nonischemic cardiomyopathy without coronary artery disease.  We discussed use of aspirin 81.  He is wondering if he still needs to be on this medication.  He did have some stress test in 2015.  Records were reviewed including echocardiogram with normal ejection fraction and stress test showing some questionable ischemia but no CAD established.  As he does have cardiomyopathy with a questionable stress test I think it would be best to continue with aspirin 81.  He has had no complications of GI bleeds.    The following portions of the patient's history were reviewed and updated as appropriate: allergies, current medications, past family history, past medical history, past social history, past surgical history and problem list.    Review of Systems   Constitutional: Negative for activity change.   All other systems reviewed and are negative.      Objective  Vitals:    06/07/19 0745   BP: 124/60   Pulse: 53   Resp: 14   Temp: 97.9 °F (36.6 °C)   SpO2: 98%        Physical Exam   Constitutional: He is oriented to person, place, and time. He appears well-developed and well-nourished. No distress.   HENT:   Head: Normocephalic.   Nose: Nose normal.   Eyes: EOM are normal.   Cardiovascular: Normal rate, regular rhythm, normal heart sounds and intact distal pulses.   No murmur heard.  Pulmonary/Chest: Effort normal and breath sounds normal. No respiratory distress.   Musculoskeletal: Normal range of motion.    Neurological: He is alert and oriented to person, place, and time.   Skin: Skin is warm and dry. No rash noted.   Psychiatric: He has a normal mood and affect. His behavior is normal. Judgment and thought content normal.   Nursing note and vitals reviewed.        Current Outpatient Medications:   •  aspirin 81 MG tablet, Take  by mouth daily., Disp: , Rfl:   •  atorvastatin (LIPITOR) 40 MG tablet, Take 1 tablet by mouth Daily., Disp: 90 tablet, Rfl: 4  •  cholecalciferol (VITAMIN D3) 1000 units tablet, Take 1,000 Units by mouth Daily., Disp: , Rfl:   •  esomeprazole (NEXIUM) 40 MG capsule, Take  by mouth., Disp: , Rfl:   •  ibuprofen (ADVIL,MOTRIN) 800 MG tablet, Take 800 mg by mouth 2 (Two) Times a Day., Disp: , Rfl:   •  lisinopril (PRINIVIL,ZESTRIL) 20 MG tablet, Take 1 tablet by mouth Daily., Disp: 90 tablet, Rfl: 4  •  Magnesium 400 MG capsule, Take  by mouth., Disp: , Rfl:   •  metoprolol succinate XL (TOPROL-XL) 25 MG 24 hr tablet, Take 1 tablet by mouth Daily., Disp: 90 tablet, Rfl: 4  •  Multiple Vitamin tablet, Take  by mouth., Disp: , Rfl:   •  sildenafil (VIAGRA) 100 MG tablet, Take 1 tablet by mouth Daily As Needed for erectile dysfunction., Disp: 4 tablet, Rfl: 0    Procedures    Lab Results (most recent)     None              Zheng was seen today for hypertension.    Diagnoses and all orders for this visit:    Essential hypertension  -     Comprehensive Metabolic Panel  -     metoprolol succinate XL (TOPROL-XL) 25 MG 24 hr tablet; Take 1 tablet by mouth Daily.  -     lisinopril (PRINIVIL,ZESTRIL) 20 MG tablet; Take 1 tablet by mouth Daily.    Familial hypercholesterolemia  -     Comprehensive Metabolic Panel  -     Lipid Panel    Hypovitaminosis D  -     Vitamin D 25 Hydroxy    Cardiac enlargement  -     metoprolol succinate XL (TOPROL-XL) 25 MG 24 hr tablet; Take 1 tablet by mouth Daily.  -     lisinopril (PRINIVIL,ZESTRIL) 20 MG tablet; Take 1 tablet by mouth Daily.      Hypertension  well-controlled on current meds, refill today.    Hyperlipidemia well-controlled on atorvastatin 40 mg daily, continue high-dose therapy.    Patient with intraseptal hypertrophy and cardiomyopathy with ejection fraction preserved, last echocardiogram and stress test were performed in 2015, records reviewed as above.  He is high risk for cardiac event with his age and prior cardiac history.  I think it would be best to continue with aspirin 81 for now.  Okay to review with cardiologist.    Hypovitaminosis D, recheck levels today.    Return in about 6 months (around 12/7/2019), or if symptoms worsen or fail to improve, for Medicare Wellness.      Zoya Newman MD

## 2019-06-08 LAB
25(OH)D3+25(OH)D2 SERPL-MCNC: 77.4 NG/ML (ref 30–100)
ALBUMIN SERPL-MCNC: 4.7 G/DL (ref 3.5–5.2)
ALBUMIN/GLOB SERPL: 1.9 G/DL
ALP SERPL-CCNC: 80 U/L (ref 39–117)
ALT SERPL-CCNC: 29 U/L (ref 1–41)
AST SERPL-CCNC: 31 U/L (ref 1–40)
BILIRUB SERPL-MCNC: 0.7 MG/DL (ref 0.2–1.2)
BUN SERPL-MCNC: 10 MG/DL (ref 8–23)
BUN/CREAT SERPL: 9.2 (ref 7–25)
CALCIUM SERPL-MCNC: 9.8 MG/DL (ref 8.6–10.5)
CHLORIDE SERPL-SCNC: 98 MMOL/L (ref 98–107)
CHOLEST SERPL-MCNC: 148 MG/DL (ref 0–200)
CO2 SERPL-SCNC: 25.5 MMOL/L (ref 22–29)
CREAT SERPL-MCNC: 1.09 MG/DL (ref 0.76–1.27)
GLOBULIN SER CALC-MCNC: 2.5 GM/DL
GLUCOSE SERPL-MCNC: 99 MG/DL (ref 65–99)
HDLC SERPL-MCNC: 60 MG/DL (ref 40–60)
LDLC SERPL CALC-MCNC: 70 MG/DL (ref 0–100)
POTASSIUM SERPL-SCNC: 4.7 MMOL/L (ref 3.5–5.2)
PROT SERPL-MCNC: 7.2 G/DL (ref 6–8.5)
SODIUM SERPL-SCNC: 134 MMOL/L (ref 136–145)
TRIGL SERPL-MCNC: 90 MG/DL (ref 0–150)
VLDLC SERPL CALC-MCNC: 18 MG/DL

## 2019-06-11 NOTE — PROGRESS NOTES
Please call patient back with results.  The labs has resulted as normal.  Fu in 6 months     Thank you

## 2019-09-13 ENCOUNTER — OFFICE VISIT (OUTPATIENT)
Dept: ORTHOPEDIC SURGERY | Facility: CLINIC | Age: 69
End: 2019-09-13

## 2019-09-13 VITALS — WEIGHT: 200 LBS | HEIGHT: 69 IN | BODY MASS INDEX: 29.62 KG/M2 | TEMPERATURE: 98.3 F

## 2019-09-13 DIAGNOSIS — M17.12 ARTHRITIS OF LEFT KNEE: ICD-10-CM

## 2019-09-13 DIAGNOSIS — Z96.651 HISTORY OF TOTAL KNEE ARTHROPLASTY, RIGHT: Primary | ICD-10-CM

## 2019-09-13 DIAGNOSIS — G89.29 CHRONIC PAIN OF LEFT KNEE: ICD-10-CM

## 2019-09-13 DIAGNOSIS — M25.562 CHRONIC PAIN OF LEFT KNEE: ICD-10-CM

## 2019-09-13 PROCEDURE — 73562 X-RAY EXAM OF KNEE 3: CPT | Performed by: ORTHOPAEDIC SURGERY

## 2019-09-13 PROCEDURE — 99213 OFFICE O/P EST LOW 20 MIN: CPT | Performed by: ORTHOPAEDIC SURGERY

## 2019-09-13 NOTE — PROGRESS NOTES
Patient Name: Zheng Lopez   YOB: 1950  Referring Primary Care Physician: Zyoa Newman MD  BMI: Body mass index is 29.53 kg/m².    Chief Complaint:    Chief Complaint   Patient presents with   • Left Knee - Establish Care, Pain   • Right Knee - Establish Care, Pain        HPI:     Zheng Lopez is a 69 y.o. male who presents today for evaluation of   Chief Complaint   Patient presents with   • Left Knee - Establish Care, Pain   • Right Knee - Establish Care, Pain   .  Patient is status post a right total knee replacement by Dr. Baker in the remote past.  He had some aches and pains in his left knee intermittently.  He said that in March 2019 he fell hard on the right knee.  He had some pain laterally that was more at first and is gradually faded now bothers him a little bit but he want to get the knee checked to make sure it is all right.  He says he is able to walk without a limp but just a little sore    This problem is new to this examiner.     Subjective   Medications:   Home Medications:  Current Outpatient Medications on File Prior to Visit   Medication Sig   • aspirin 81 MG tablet Take  by mouth daily.   • atorvastatin (LIPITOR) 40 MG tablet Take 1 tablet by mouth Daily.   • cholecalciferol (VITAMIN D3) 1000 units tablet Take 1,000 Units by mouth Daily.   • esomeprazole (NEXIUM) 40 MG capsule Take  by mouth.   • ibuprofen (ADVIL,MOTRIN) 800 MG tablet Take 800 mg by mouth 2 (Two) Times a Day.   • lisinopril (PRINIVIL,ZESTRIL) 20 MG tablet Take 1 tablet by mouth Daily.   • Magnesium 400 MG capsule Take  by mouth.   • metoprolol succinate XL (TOPROL-XL) 25 MG 24 hr tablet Take 1 tablet by mouth Daily.   • Multiple Vitamin tablet Take  by mouth.   • sildenafil (VIAGRA) 100 MG tablet Take 1 tablet by mouth Daily As Needed for erectile dysfunction.     No current facility-administered medications on file prior to visit.      Current Medications:  Scheduled Meds:  Continuous Infusions:  No  current facility-administered medications for this visit.   PRN Meds:.    I have reviewed the patient's medical history in detail and updated the computerized patient record.  Review and summarization of old records includes:    Past Medical History:   Diagnosis Date   • Asymmetric septal hypertrophy (CMS/HCC)    • Bronchitis     Bronchitis / URI   • Cataract of both eyes    • Chicken pox    • Cholecystitis    • Diverticula of colon    • Encounter for annual health examination 03/10/2015    Annual Health Assessment   • Fatigue    • Gallbladder disease    • GERD (gastroesophageal reflux disease)    • High blood pressure    • History of EKG     10/30/15, 09/19/13, 09/14/12, 10/18/10, 05/04/09   • History of TB skin testing     TB Skin Test: 01/09/14 Completed, 09/14/12 Patient Declines, 10/18/10 Completed, 03/05/08 Completed   • Hyperlipidemia    • Hypertension     still taking meds   • Impingement of right ulnar nerve    • Measles    • Osteoarthritis involving multiple joints on both sides of body    • Osteoarthritis involving multiple joints on both sides of body    • Persistent cough    • Pulse irregularity     Irregular pulse PER CARDIO   • Sexual problems     Sexual problems / enjoyment ED   • Sleep apnea     has c-pap   • Wellness examination 03/10/2015    Annual Wellness Visit        Past Surgical History:   Procedure Laterality Date   • CATARACT EXTRACTION Bilateral    • CATARACT EXTRACTION Right 2013   • CATARACT EXTRACTION Left 2014   • CHOLECYSTECTOMY     • COLONOSCOPY  04/16/2015    Dr. Ritchie   • COLONOSCOPY  03/07/2007   • ELBOW PROCEDURE Right     ulnar nerve impingement release   • FOOT SURGERY Right     right foot (twice)   • GALLBLADDER SURGERY  2009   • KNEE MENISCAL REPAIR Right 2010    torn right meniscus   • LASIK  2006   • RHINOPLASTY  1986   • ROTATOR CUFF REPAIR Left 2010   • SHOULDER ARTHROSCOPY W/ LABRAL REPAIR Right 2015    Dr. Sahil lay repair right shoulder   • SHOULDER LIGAMENT  REPAIR Right    • TOTAL KNEE ARTHROPLASTY Right 2011        Social History     Occupational History   • Occupation: teacher2   Tobacco Use   • Smoking status: Never Smoker   • Smokeless tobacco: Never Used   Substance and Sexual Activity   • Alcohol use: No     Comment: none in 5 yrs, Nov 2011 - last drink   • Drug use: No   • Sexual activity: Yes     Partners: Female      Social History     Social History Narrative   • Not on file        Family History   Problem Relation Age of Onset   • Dementia Mother    • Migraines Mother    • Osteoporosis Mother    • Stroke Mother 85        early 80s   • Heart disease Mother         tachycardia and had ppm   • Alzheimer's disease Mother    • Supraventricular tachycardia Mother    • Alcohol abuse Father    • Depression Father    • Migraines Father    • Tuberculosis Father    • Cirrhosis Father         41 YO   • Cancer Paternal Uncle 67        mets but unsure of type of ca  BACK CANCER   • Stroke Brother    • Colon cancer Neg Hx    • Prostate cancer Neg Hx    • Heart attack Neg Hx        ROS: 14 point review of systems was performed and all other systems were reviewed and are negative except for documented findings in HPI and today's encounter.     Allergies: No Known Allergies  Constitutional:  Denies fever, shaking or chills   Eyes:  Denies change in visual acuity   HENT:  Denies nasal congestion or sore throat   Respiratory:  Denies cough or shortness of breath   Cardiovascular:  Denies chest pain or severe LE edema   GI:  Denies abdominal pain, nausea, vomiting, bloody stools or diarrhea   Musculoskeletal:  Numbness, tingling, pain, or loss of motor function only as noted above in history of present illness.  : Denies painful urination or hematuria  Integument:  Denies rash, lesion or ulceration   Neurologic:  Denies headache or focal weakness  Endocrine:  Denies lymphadenopathy  Psych:  Denies confusion or change in mental status   Hem:  Denies active  "bleeding    OBJECTIVE:  Physical Exam: 69 y.o. male  Wt Readings from Last 3 Encounters:   09/13/19 90.7 kg (200 lb)   06/07/19 86.9 kg (191 lb 9.6 oz)   03/22/19 93.4 kg (206 lb)     Ht Readings from Last 1 Encounters:   09/13/19 175.3 cm (69\")     Body mass index is 29.53 kg/m².  Vitals:    09/13/19 0834   Temp: 98.3 °F (36.8 °C)     Vital signs reviewed.     General Appearance:    Alert, cooperative, in no acute distress                  Eyes: conjunctiva clear  ENT: external ears and nose atraumatic  CV: no peripheral edema  Resp: normal respiratory effort  Skin: no rashes or wounds; normal turgor  Psych: mood and affect appropriate  Lymph: no nodes appreciated  Neuro: gross sensation intact  Vascular:  Palpable peripheral pulse in noted extremity  Musculoskeletal Extremities: Exam today shows he goes 0 to about 120 degrees he has good stability throughout range of motion he is very minimally tender about 10:00 on the right patella lateral condyle but the patella appears to be tracking well and he walks without limp left knee has little bit of crepitation synovitis since Prakash's is negative ligamentous exams within normal limits and is not terribly tender he walks without a limp.  Him to old x-rays    Radiology:   AP lateral right knee compared to old x-rays show well aligned well fixed total joint arthroplasty.  On the AP there is a suggestion of may be a small avulsion type fracture or \"chip fracture\" at about 10:00 on the patella.  Probably had a contusion.  This is compared to old x-rays    Assessment:     ICD-10-CM ICD-9-CM   1. History of total knee arthroplasty, right Z96.651 V43.65   2. Chronic pain of left knee M25.562 719.46    G89.29 338.29   3. Arthritis of left knee M17.12 716.96        Procedures       Plan: Biomechanics of pertinent body area discussed.  Risks, benefits, alternatives, comparisons, and complications of accepted medicines, injections, recommendations, surgical procedures, and " therapies explained and education provided in laymen's terms. Natural history and expected course of this patient's diagnosis discussed along with evaluation of therapies. Questions answered. When appropriate I also discussed proper use of cane, walker, trekking poles.   RICE: Rest, ice, compression, and elevation therapy, Cryotherapy/brachy therapy, and or OTC linaments as indicated with instructions.       9/13/2019    Much of this encounter note is an electronic transcription/translation of spoken language to printed text. The electronic translation of spoken language may permit erroneous, or at times, nonsensical words or phrases to be inadvertently transcribed; Although I have reviewed the note for such errors, some may still exist

## 2019-11-13 ENCOUNTER — OFFICE VISIT (OUTPATIENT)
Dept: FAMILY MEDICINE CLINIC | Facility: CLINIC | Age: 69
End: 2019-11-13

## 2019-11-13 VITALS
HEART RATE: 57 BPM | OXYGEN SATURATION: 98 % | DIASTOLIC BLOOD PRESSURE: 78 MMHG | WEIGHT: 202 LBS | HEIGHT: 69 IN | TEMPERATURE: 98 F | BODY MASS INDEX: 29.92 KG/M2 | SYSTOLIC BLOOD PRESSURE: 122 MMHG

## 2019-11-13 DIAGNOSIS — Z12.5 ENCOUNTER FOR SCREENING FOR MALIGNANT NEOPLASM OF PROSTATE: ICD-10-CM

## 2019-11-13 DIAGNOSIS — G47.33 OSA (OBSTRUCTIVE SLEEP APNEA): ICD-10-CM

## 2019-11-13 DIAGNOSIS — I42.2 ASYMMETRIC SEPTAL HYPERTROPHY (HCC): ICD-10-CM

## 2019-11-13 DIAGNOSIS — R30.0 DYSURIA: Primary | ICD-10-CM

## 2019-11-13 DIAGNOSIS — N40.1 BENIGN PROSTATIC HYPERPLASIA WITH URINARY HESITANCY: ICD-10-CM

## 2019-11-13 DIAGNOSIS — E78.01 FAMILIAL HYPERCHOLESTEROLEMIA: ICD-10-CM

## 2019-11-13 DIAGNOSIS — I47.29 NON-SUSTAINED VENTRICULAR TACHYCARDIA (HCC): ICD-10-CM

## 2019-11-13 DIAGNOSIS — I10 ESSENTIAL HYPERTENSION: ICD-10-CM

## 2019-11-13 DIAGNOSIS — J01.00 ACUTE NON-RECURRENT MAXILLARY SINUSITIS: ICD-10-CM

## 2019-11-13 DIAGNOSIS — R39.11 BENIGN PROSTATIC HYPERPLASIA WITH URINARY HESITANCY: ICD-10-CM

## 2019-11-13 LAB
BILIRUB BLD-MCNC: NEGATIVE MG/DL
CLARITY, POC: CLEAR
COLOR UR: YELLOW
GLUCOSE UR STRIP-MCNC: NEGATIVE MG/DL
KETONES UR QL: NEGATIVE
LEUKOCYTE EST, POC: NEGATIVE
NITRITE UR-MCNC: NEGATIVE MG/ML
PH UR: 6.5 [PH] (ref 5–8)
PROT UR STRIP-MCNC: NEGATIVE MG/DL
RBC # UR STRIP: NEGATIVE /UL
SP GR UR: 1.01 (ref 1–1.03)
UROBILINOGEN UR QL: NORMAL

## 2019-11-13 PROCEDURE — 99214 OFFICE O/P EST MOD 30 MIN: CPT | Performed by: FAMILY MEDICINE

## 2019-11-13 PROCEDURE — 81003 URINALYSIS AUTO W/O SCOPE: CPT | Performed by: FAMILY MEDICINE

## 2019-11-13 RX ORDER — AMOXICILLIN AND CLAVULANATE POTASSIUM 875; 125 MG/1; MG/1
1 TABLET, FILM COATED ORAL 2 TIMES DAILY
Qty: 20 TABLET | Refills: 0 | Status: SHIPPED | OUTPATIENT
Start: 2019-11-13 | End: 2019-11-23

## 2019-11-13 RX ORDER — TAMSULOSIN HYDROCHLORIDE 0.4 MG/1
1 CAPSULE ORAL DAILY
Qty: 90 CAPSULE | Refills: 3 | Status: SHIPPED | OUTPATIENT
Start: 2019-11-13 | End: 2020-08-31

## 2019-11-13 NOTE — PROGRESS NOTES
Zheng Lopez is a 69 y.o. male.     Chief Complaint   Patient presents with   • Difficulty Urinating     pt has problems when urinate getting worse for the past 3 weeks        HPI     Pt is a pleasant 69 y.o. YO male here for leg urination with delayed onset of urination, some post void dribbling and increased frequency with needing to urinate after he voids.  He has not had no dysuria, no blood, no history of stones.  PSAs in the past have all been normal.  He is not a smoker.  The symptoms have been present for the past 6 months to 1 year and have been gradually worsening with more hesitancy.    Pt with 2 weeks of maxillary congestion, sore throat and cough.  He has had significant postnasal drainage.  It was improving initially and now has worsened.  No fevers, no shortness of breath.  Some cough.    The following portions of the patient's history were reviewed and updated as appropriate: allergies, current medications, past family history, past medical history, past social history, past surgical history and problem list.    Review of Systems   Constitutional: Negative.    HENT: Negative.    Eyes: Negative.    Respiratory: Negative.    Cardiovascular: Negative.    Gastrointestinal: Negative.    Genitourinary: Positive for difficulty urinating, enuresis and frequency.   Musculoskeletal: Negative.    Neurological: Negative.    Hematological: Negative for adenopathy. Does not bruise/bleed easily.   Psychiatric/Behavioral: Negative.        Objective  Vitals:    11/13/19 1418   BP: 122/78   Pulse: 57   Temp: 98 °F (36.7 °C)   SpO2: 98%        Physical Exam   Constitutional: He is oriented to person, place, and time. He appears well-developed and well-nourished. No distress.   HENT:   Head: Normocephalic.   Nose: Nose normal.   No sinus pressure    Eyes: EOM are normal.   Cardiovascular: Normal rate, regular rhythm, normal heart sounds and intact distal pulses.   No murmur heard.  Pulmonary/Chest: Effort normal  and breath sounds normal. No respiratory distress.   Musculoskeletal: Normal range of motion.   Neurological: He is alert and oriented to person, place, and time.   Skin: Skin is warm and dry. No rash noted.   Psychiatric: He has a normal mood and affect. His behavior is normal. Judgment and thought content normal.   Nursing note and vitals reviewed.        Current Outpatient Medications:   •  aspirin 81 MG tablet, Take  by mouth daily., Disp: , Rfl:   •  atorvastatin (LIPITOR) 40 MG tablet, Take 1 tablet by mouth Daily., Disp: 90 tablet, Rfl: 4  •  cholecalciferol (VITAMIN D3) 1000 units tablet, Take 1,000 Units by mouth Daily., Disp: , Rfl:   •  esomeprazole (NEXIUM) 40 MG capsule, Take  by mouth., Disp: , Rfl:   •  ibuprofen (ADVIL,MOTRIN) 800 MG tablet, Take 800 mg by mouth 2 (Two) Times a Day., Disp: , Rfl:   •  lisinopril (PRINIVIL,ZESTRIL) 20 MG tablet, Take 1 tablet by mouth Daily., Disp: 90 tablet, Rfl: 4  •  Magnesium 400 MG capsule, Take  by mouth., Disp: , Rfl:   •  metoprolol succinate XL (TOPROL-XL) 25 MG 24 hr tablet, Take 1 tablet by mouth Daily., Disp: 90 tablet, Rfl: 4  •  Multiple Vitamin tablet, Take  by mouth., Disp: , Rfl:   •  sildenafil (VIAGRA) 100 MG tablet, Take 1 tablet by mouth Daily As Needed for erectile dysfunction., Disp: 4 tablet, Rfl: 0  •  amoxicillin-clavulanate (AUGMENTIN) 875-125 MG per tablet, Take 1 tablet by mouth 2 (Two) Times a Day for 10 days., Disp: 20 tablet, Rfl: 0  •  tamsulosin (FLOMAX) 0.4 MG capsule 24 hr capsule, Take 1 capsule by mouth Daily., Disp: 90 capsule, Rfl: 3    Procedures    Lab Results (most recent)     None        Office Visit on 11/13/2019   Component Date Value Ref Range Status   • Color 11/13/2019 Yellow  Yellow, Straw, Dark Yellow, Nafisa Final   • Clarity, UA 11/13/2019 Clear  Clear Final   • Specific Gravity  11/13/2019 1.010  1.005 - 1.030 Final   • pH, Urine 11/13/2019 6.5  5.0 - 8.0 Final   • Leukocytes 11/13/2019 Negative  Negative Final   •  Nitrite, UA 11/13/2019 Negative  Negative Final   • Protein, POC 11/13/2019 Negative  Negative mg/dL Final   • Glucose, UA 11/13/2019 Negative  Negative, 1000 mg/dL (3+) mg/dL Final   • Ketones, UA 11/13/2019 Negative  Negative Final   • Urobilinogen, UA 11/13/2019 Normal  Normal Final   • Bilirubin 11/13/2019 Negative  Negative Final   • Blood, UA 11/13/2019 Negative  Negative Final           Zheng was seen today for difficulty urinating.    Diagnoses and all orders for this visit:    Dysuria  -     POC Urinalysis Dipstick, Automated    Benign prostatic hyperplasia with urinary hesitancy  -     tamsulosin (FLOMAX) 0.4 MG capsule 24 hr capsule; Take 1 capsule by mouth Daily.  -     PSA Screen    Familial hypercholesterolemia  -     Lipid Panel    Essential hypertension  -     Comprehensive Metabolic Panel    SYLWIA (obstructive sleep apnea)  -     CBC Auto Differential    Encounter for screening for malignant neoplasm of prostate   -     PSA Screen    Acute non-recurrent maxillary sinusitis  -     amoxicillin-clavulanate (AUGMENTIN) 875-125 MG per tablet; Take 1 tablet by mouth 2 (Two) Times a Day for 10 days.    Asymmetric septal hypertrophy (CMS/HCC)    Non-sustained ventricular tachycardia (CMS/HCC)      Pleasant 69-year-old male here for new problem of increased hesitancy with urination, signs of BPH.  PSAs have been normal before, urine normal, not a smoker.  Initial prescription for tamsulosin prescribed to pharmacy.  Follow-up if not improving.      Patient with 2 weeks of sinusitis not improving and now worsening.  Augmentin prescribed as above.  Continue with Mucinex and Flonase.    Return if symptoms worsen or fail to improve, for Medicare Wellness.      Zoya Newman MD

## 2019-11-20 LAB
ALBUMIN SERPL-MCNC: 4.5 G/DL (ref 3.5–5.2)
ALBUMIN/GLOB SERPL: 1.8 G/DL
ALP SERPL-CCNC: 80 U/L (ref 39–117)
ALT SERPL-CCNC: 22 U/L (ref 1–41)
AST SERPL-CCNC: 27 U/L (ref 1–40)
BASOPHILS # BLD AUTO: 0.04 10*3/MM3 (ref 0–0.2)
BASOPHILS NFR BLD AUTO: 0.9 % (ref 0–1.5)
BILIRUB SERPL-MCNC: 0.5 MG/DL (ref 0.2–1.2)
BUN SERPL-MCNC: 13 MG/DL (ref 8–23)
BUN/CREAT SERPL: 13.5 (ref 7–25)
CALCIUM SERPL-MCNC: 9 MG/DL (ref 8.6–10.5)
CHLORIDE SERPL-SCNC: 98 MMOL/L (ref 98–107)
CHOLEST SERPL-MCNC: 128 MG/DL (ref 0–200)
CO2 SERPL-SCNC: 27.9 MMOL/L (ref 22–29)
CREAT SERPL-MCNC: 0.96 MG/DL (ref 0.76–1.27)
EOSINOPHIL # BLD AUTO: 0.28 10*3/MM3 (ref 0–0.4)
EOSINOPHIL NFR BLD AUTO: 6 % (ref 0.3–6.2)
ERYTHROCYTE [DISTWIDTH] IN BLOOD BY AUTOMATED COUNT: 11.9 % (ref 12.3–15.4)
GLOBULIN SER CALC-MCNC: 2.5 GM/DL
GLUCOSE SERPL-MCNC: 99 MG/DL (ref 65–99)
HCT VFR BLD AUTO: 40.6 % (ref 37.5–51)
HDLC SERPL-MCNC: 52 MG/DL (ref 40–60)
HGB BLD-MCNC: 13.9 G/DL (ref 13–17.7)
IMM GRANULOCYTES # BLD AUTO: 0.01 10*3/MM3 (ref 0–0.05)
IMM GRANULOCYTES NFR BLD AUTO: 0.2 % (ref 0–0.5)
LDLC SERPL CALC-MCNC: 62 MG/DL (ref 0–100)
LYMPHOCYTES # BLD AUTO: 1.7 10*3/MM3 (ref 0.7–3.1)
LYMPHOCYTES NFR BLD AUTO: 36.6 % (ref 19.6–45.3)
MCH RBC QN AUTO: 33.8 PG (ref 26.6–33)
MCHC RBC AUTO-ENTMCNC: 34.2 G/DL (ref 31.5–35.7)
MCV RBC AUTO: 98.8 FL (ref 79–97)
MONOCYTES # BLD AUTO: 0.45 10*3/MM3 (ref 0.1–0.9)
MONOCYTES NFR BLD AUTO: 9.7 % (ref 5–12)
NEUTROPHILS # BLD AUTO: 2.16 10*3/MM3 (ref 1.7–7)
NEUTROPHILS NFR BLD AUTO: 46.6 % (ref 42.7–76)
NRBC BLD AUTO-RTO: 0 /100 WBC (ref 0–0.2)
PLATELET # BLD AUTO: 113 10*3/MM3 (ref 140–450)
POTASSIUM SERPL-SCNC: 4.7 MMOL/L (ref 3.5–5.2)
PROT SERPL-MCNC: 7 G/DL (ref 6–8.5)
PSA SERPL-MCNC: 2.04 NG/ML (ref 0–4)
RBC # BLD AUTO: 4.11 10*6/MM3 (ref 4.14–5.8)
SODIUM SERPL-SCNC: 137 MMOL/L (ref 136–145)
TRIGL SERPL-MCNC: 69 MG/DL (ref 0–150)
VLDLC SERPL CALC-MCNC: 13.8 MG/DL
WBC # BLD AUTO: 4.64 10*3/MM3 (ref 3.4–10.8)

## 2019-11-27 NOTE — PROGRESS NOTES
Please call patient back with results.  The labs has resulted as over all normal, okay to continue with current plan.  Your platelet counts are slightly lower than previous but similar to what they were 3 years ago.  I would advise avoiding ibuprofen or other anti-inflammatories or aspirin at the moment.  We can repeat these in 3 months.  Thank you

## 2019-12-05 ENCOUNTER — TELEPHONE (OUTPATIENT)
Dept: FAMILY MEDICINE CLINIC | Facility: CLINIC | Age: 69
End: 2019-12-05

## 2019-12-05 ENCOUNTER — OFFICE VISIT (OUTPATIENT)
Dept: FAMILY MEDICINE CLINIC | Facility: CLINIC | Age: 69
End: 2019-12-05

## 2019-12-05 VITALS
OXYGEN SATURATION: 98 % | HEART RATE: 68 BPM | WEIGHT: 198 LBS | TEMPERATURE: 98.2 F | SYSTOLIC BLOOD PRESSURE: 130 MMHG | DIASTOLIC BLOOD PRESSURE: 78 MMHG | BODY MASS INDEX: 29.33 KG/M2 | HEIGHT: 69 IN

## 2019-12-05 DIAGNOSIS — D69.6 THROMBOCYTOPENIA (HCC): ICD-10-CM

## 2019-12-05 DIAGNOSIS — Z00.00 MEDICARE ANNUAL WELLNESS VISIT, SUBSEQUENT: Primary | ICD-10-CM

## 2019-12-05 PROCEDURE — G0439 PPPS, SUBSEQ VISIT: HCPCS | Performed by: FAMILY MEDICINE

## 2019-12-05 RX ORDER — ZOSTER VACCINE RECOMBINANT, ADJUVANTED 50 MCG/0.5
KIT INTRAMUSCULAR
Refills: 0 | COMMUNITY
Start: 2019-11-29 | End: 2020-05-01

## 2019-12-05 NOTE — TELEPHONE ENCOUNTER
Patient was seen today and discussed possibly being prescribed a cream (couldn't remember name). He has decided to move forward with this saying you could prescribe and the cream would be mailed to him. Will you prescribe?    Please advise.

## 2019-12-05 NOTE — PROGRESS NOTES
The ABCs of the Annual Wellness Visit  Subsequent Medicare Wellness Visit    Chief Complaint   Patient presents with   • Medicare Wellness-subsequent     no complains        Subjective   History of Present Illness:  Zheng Lopez is a 69 y.o. male who presents for a Subsequent Medicare Wellness Visit.    HEALTH RISK ASSESSMENT    Recent Hospitalizations:  No hospitalization(s) within the last year.    Current Medical Providers:  Patient Care Team:  Zoya Newman MD as PCP - General (Family Medicine)    Smoking Status:  Social History     Tobacco Use   Smoking Status Never Smoker   Smokeless Tobacco Never Used       Alcohol Consumption:  Social History     Substance and Sexual Activity   Alcohol Use No    Comment: none in 5 yrs, Nov 2011 - last drink       Depression Screen:   PHQ-2/PHQ-9 Depression Screening 12/5/2019   Little interest or pleasure in doing things 0   Feeling down, depressed, or hopeless 0   Trouble falling or staying asleep, or sleeping too much -   Feeling tired or having little energy -   Poor appetite or overeating -   Feeling bad about yourself - or that you are a failure or have let yourself or your family down -   Trouble concentrating on things, such as reading the newspaper or watching television -   Moving or speaking so slowly that other people could have noticed. Or the opposite - being so fidgety or restless that you have been moving around a lot more than usual -   Thoughts that you would be better off dead, or of hurting yourself in some way -   Total Score 0   If you checked off any problems, how difficult have these problems made it for you to do your work, take care of things at home, or get along with other people? -       Fall Risk Screen:  STEADI Fall Risk Assessment was completed, and patient is at LOW risk for falls.Assessment completed on:11/13/2019    Health Habits and Functional and Cognitive Screening:  Functional & Cognitive Status 12/5/2019   Do you have difficulty  preparing food and eating? No   Do you have difficulty bathing yourself, getting dressed or grooming yourself? No   Do you have difficulty using the toilet? No   Do you have difficulty moving around from place to place? No   Do you have trouble with steps or getting out of a bed or a chair? No   Current Diet Well Balanced Diet   Dental Exam Up to date   Eye Exam Up to date   Exercise (times per week) 3 times per week   Current Exercise Activities Include Cardiovasular Workout on Exercise Equipment   Do you need help using the phone?  No   Are you deaf or do you have serious difficulty hearing?  No   Do you need help with transportation? No   Do you need help shopping? No   Do you need help preparing meals?  No   Do you need help with housework?  No   Do you need help with laundry? No   Do you need help taking your medications? No   Do you need help managing money? No   Do you ever drive or ride in a car without wearing a seat belt? No   Have you felt unusual stress, anger or loneliness in the last month? No   Who do you live with? Spouse   If you need help, do you have trouble finding someone available to you? No   Have you been bothered in the last four weeks by sexual problems? No   Do you have difficulty concentrating, remembering or making decisions? No         Does the patient have evidence of cognitive impairment? No    Asprin use counseling:Taking ASA appropriately as indicated    Age-appropriate Screening Schedule:  Refer to the list below for future screening recommendations based on patient's age, sex and/or medical conditions. Orders for these recommended tests are listed in the plan section. The patient has been provided with a written plan.    Health Maintenance   Topic Date Due   • TDAP/TD VACCINES (3 - Td) 05/04/2019   • ZOSTER VACCINE (3 of 3) 11/25/2019   • LIPID PANEL  11/19/2020   • COLONOSCOPY  04/16/2025   • INFLUENZA VACCINE  Completed   • PNEUMOCOCCAL VACCINES (65+ LOW/MEDIUM RISK)  Completed           The following portions of the patient's history were reviewed and updated as appropriate: allergies, current medications, past family history, past medical history, past social history, past surgical history and problem list.    Outpatient Medications Prior to Visit   Medication Sig Dispense Refill   • atorvastatin (LIPITOR) 40 MG tablet Take 1 tablet by mouth Daily. 90 tablet 4   • cholecalciferol (VITAMIN D3) 1000 units tablet Take 1,000 Units by mouth Daily.     • esomeprazole (NEXIUM) 40 MG capsule Take  by mouth.     • lisinopril (PRINIVIL,ZESTRIL) 20 MG tablet Take 1 tablet by mouth Daily. 90 tablet 4   • Magnesium 400 MG capsule Take  by mouth.     • metoprolol succinate XL (TOPROL-XL) 25 MG 24 hr tablet Take 1 tablet by mouth Daily. 90 tablet 4   • Multiple Vitamin tablet Take  by mouth.     • sildenafil (VIAGRA) 100 MG tablet Take 1 tablet by mouth Daily As Needed for erectile dysfunction. 4 tablet 0   • tamsulosin (FLOMAX) 0.4 MG capsule 24 hr capsule Take 1 capsule by mouth Daily. 90 capsule 3   • aspirin 81 MG tablet Take  by mouth daily.     • SHINGRIX 50 MCG/0.5ML reconstituted suspension ADM 0.5ML IM UTD  0   • ibuprofen (ADVIL,MOTRIN) 800 MG tablet Take 800 mg by mouth 2 (Two) Times a Day.       No facility-administered medications prior to visit.        Patient Active Problem List   Diagnosis   • Hyperlipidemia   • Essential hypertension   • SYLWIA (obstructive sleep apnea)   • Osteoarthritis involving multiple joints on both sides of body   • GERD (gastroesophageal reflux disease)   • Diverticula of colon   • Cardiac enlargement   • ED (erectile dysfunction) of organic origin   • Asymmetric septal hypertrophy (CMS/HCC)   • Non-sustained ventricular tachycardia (CMS/HCC)   • Chronic pain of left knee   • Primary osteoarthritis of left knee   • Left lumbar radiculopathy   • Left thigh pain   • Benign prostatic hyperplasia with urinary hesitancy       Advanced Care Planning:  Patient has an  "advance directive - a copy has been provided and is visible in patient header    Review of Systems   Constitutional: Negative.    HENT: Negative.    Eyes: Negative.    Respiratory: Negative.    Cardiovascular: Negative.    Gastrointestinal: Negative.    Genitourinary: Negative.    Musculoskeletal: Positive for myalgias.   Skin: Negative.    Hematological: Negative.    Psychiatric/Behavioral: Negative.        Compared to one year ago, the patient feels his physical health is the same.  Compared to one year ago, the patient feels his mental health is the same.    Reviewed chart for potential of high risk medication in the elderly: yes  Reviewed chart for potential of harmful drug interactions in the elderly:yes    Objective         Vitals:    12/05/19 0929   BP: 130/78   Pulse: 68   Temp: 98.2 °F (36.8 °C)   TempSrc: Oral   SpO2: 98%   Weight: 89.8 kg (198 lb)   Height: 175.3 cm (69\")   PainSc:   2   PainLoc: Leg       Body mass index is 29.24 kg/m².  Discussed the patient's BMI with him. The BMI is above average; BMI management plan is completed.    Physical Exam   Constitutional: He is oriented to person, place, and time. He appears well-developed and well-nourished. No distress.   HENT:   Head: Normocephalic.   Nose: Nose normal.   Eyes: EOM are normal.   Cardiovascular: Normal rate, regular rhythm, normal heart sounds and intact distal pulses.   No murmur heard.  Pulmonary/Chest: Effort normal and breath sounds normal. No respiratory distress.   Musculoskeletal: Normal range of motion.   Neurological: He is alert and oriented to person, place, and time.   Skin: Skin is warm and dry. No rash noted.   Psychiatric: He has a normal mood and affect. His behavior is normal. Judgment and thought content normal.   Nursing note and vitals reviewed.      Lab Results   Component Value Date    GLU 99 11/19/2019    CHLPL 128 11/19/2019    TRIG 69 11/19/2019    HDL 52 11/19/2019    LDL 62 11/19/2019    VLDL 13.8 11/19/2019    "     Assessment/Plan   Medicare Risks and Personalized Health Plan  CMS Preventative Services Quick Reference  Cardiovascular risk  Dementia/Memory   Depression/Dysphoria  Immunizations Discussed/Encouraged (specific immunizations; Td )  Obesity/Overweight     The above risks/problems have been discussed with the patient.  Pertinent information has been shared with the patient in the After Visit Summary.  Follow up plans and orders are seen below in the Assessment/Plan Section.    Eats some veggies, exercises 3 times a week, mostly cardio.     Diagnoses and all orders for this visit:    1. Medicare annual wellness visit, subsequent (Primary)    2. Thrombocytopenia (CMS/[facility])  -     CBC Auto Differential    Medicare wellness visit today, reviewed [employer] screening.  Discussed labs, patient has discontinued using ibuprofen with low platelets.  Having some residual joint pain, declined topical NSAID currently but if he changes his mind okay to call for prescription to Rx alternatives.  Reviewed remaining fasting labs, encouraged him to increase vegetable intake.  Repeat CBC in 3 months and follow-up with me in 6 months.    Follow Up:  Return in about 6 months (around 6/5/2020), or if symptoms worsen or fail to improve, for Recheck HTN.     An After Visit Summary and PPPS were given to the patient.

## 2020-01-07 ENCOUNTER — TELEPHONE (OUTPATIENT)
Dept: FAMILY MEDICINE CLINIC | Facility: CLINIC | Age: 70
End: 2020-01-07

## 2020-01-07 NOTE — TELEPHONE ENCOUNTER
Non urgent question:    Pt was informed by his insurance that genetic testing is approved, to determine if the current prescriptions patient is taking, is necessary. Is this something  would like to order? Please advise.

## 2020-01-13 RX ORDER — ATORVASTATIN CALCIUM 40 MG/1
TABLET, FILM COATED ORAL
Qty: 90 TABLET | Refills: 3 | Status: SHIPPED | OUTPATIENT
Start: 2020-01-13 | End: 2020-04-07 | Stop reason: SDUPTHER

## 2020-01-21 ENCOUNTER — OFFICE VISIT (OUTPATIENT)
Dept: SLEEP MEDICINE | Facility: HOSPITAL | Age: 70
End: 2020-01-21
Attending: PSYCHIATRY & NEUROLOGY

## 2020-01-21 ENCOUNTER — TELEPHONE (OUTPATIENT)
Dept: SLEEP MEDICINE | Facility: HOSPITAL | Age: 70
End: 2020-01-21

## 2020-01-21 VITALS
HEIGHT: 69 IN | SYSTOLIC BLOOD PRESSURE: 134 MMHG | WEIGHT: 202 LBS | OXYGEN SATURATION: 97 % | BODY MASS INDEX: 29.92 KG/M2 | DIASTOLIC BLOOD PRESSURE: 68 MMHG | HEART RATE: 74 BPM

## 2020-01-21 DIAGNOSIS — G47.33 OSA (OBSTRUCTIVE SLEEP APNEA): Primary | ICD-10-CM

## 2020-01-21 NOTE — PROGRESS NOTES
"Zheng VALENTINO Lopez                                    Sleep medicine follow-up visit  1950  [id number]     DATE OF SERVICE: 1/21/2020     HISTORY:  The patient is a 69 y.o.  male has history of hypertension, hyperlipidemia, asymmetric septal hypertrophy and nonsustained ventilator tachycardia in the past, chronic lumbar blood clot in the left side and osteoarthritis of the left knee and obstructive sleep apnea syndrome.     The patient has moderately severe obstructive sleep apnea syndrome. His polysomnography in the past has revealed apnea-hypopnea index of 27 per sleep hour and minimum SpO2 of 90%. During supine position, he had severe obstructive sleep apnea with Supine AHI of 49 per sleep hour.     He is on CPAP at 12 cmH20. Compliance data indicates 100% compliance with average usage of 7 hours 43 minutes.  Residual AHI is 0.4 with CPAP therapy.  His sleep is sound and restorative with CPAP therapy.  [place] Sleepiness Scale score is 3. The patient is compliant and benefiting from it.     Sleep schedule: Bedtime 10 PM and gets out of bed at 6:30 AM without an alarm.  Sleep latency 15 minutes and occasionally wakes up once or twice to urinate.    Review of Systems - 10 system review negative.     PMH, PSH, Medications, allergies, FH, SH are reviewed and updated in the chart.     PHYSICAL EXAMINATION:  Vitals:    01/21/20 0754   BP: 134/68   Pulse: 74   SpO2: 97%   Weight: 91.6 kg (202 lb)   Height: 175.3 cm (69\")   BMI 30.79  HEENT: Normal.   NECK: Supple. No bruits.   CARDIAC: Normal.   LUNGS: Clear to auscultation.   EXTREMITIES: No edema.     IMPRESSION: Patient with obstructive sleep apnea syndrome successfully treated with CPAP therapy and is compliant and benefiting from it.     RECOMMENDATIONS: Continue present CPAP. Followup 1 year.     Jackeline Aguiar M.D.  1/21/2020            "

## 2020-01-27 ENCOUNTER — OFFICE VISIT (OUTPATIENT)
Dept: ORTHOPEDIC SURGERY | Facility: CLINIC | Age: 70
End: 2020-01-27

## 2020-01-27 VITALS — WEIGHT: 202 LBS | TEMPERATURE: 97.2 F | HEIGHT: 69 IN | BODY MASS INDEX: 29.92 KG/M2

## 2020-01-27 DIAGNOSIS — M25.552 HIP PAIN, LEFT: Primary | ICD-10-CM

## 2020-01-27 DIAGNOSIS — Z96.651 STATUS POST TOTAL RIGHT KNEE REPLACEMENT: ICD-10-CM

## 2020-01-27 DIAGNOSIS — M16.12 ARTHRITIS OF LEFT HIP: ICD-10-CM

## 2020-01-27 DIAGNOSIS — M17.12 ARTHRITIS OF LEFT KNEE: ICD-10-CM

## 2020-01-27 PROCEDURE — 73502 X-RAY EXAM HIP UNI 2-3 VIEWS: CPT | Performed by: ORTHOPAEDIC SURGERY

## 2020-01-27 PROCEDURE — 99213 OFFICE O/P EST LOW 20 MIN: CPT | Performed by: ORTHOPAEDIC SURGERY

## 2020-01-27 NOTE — PROGRESS NOTES
"Patient Name: Zheng Lopez   YOB: 1950  Referring Primary Care Physician: Zoya Newman MD  BMI: Body mass index is 29.83 kg/m².    Chief Complaint:    Chief Complaint   Patient presents with   • Left Knee - Follow-up, Pain        HPI:     Zheng Lopez is a 69 y.o. male who presents today for evaluation of   Chief Complaint   Patient presents with   • Left Knee - Follow-up, Pain   .  Patient is seen today he is a very active 69-year-old who exercises regularly.  Says that he is developed \"left knee\" pain.  Is atraumatic.  It is fine as long as he is on something straight line like a treadmill but if he tries to walk and even ground get out of bed to get out of a car he has pain around the medial aspect of his right knee.  Does take some Tylenol does not seem to help much.  This been slowly getting worse over the last several months.  Previous [facility] [employer] right total knee by Dr. Baker in the remote past    This problem is new to this examiner.     Subjective   Medications:   Home Medications:  Current Outpatient Medications on File Prior to Visit   Medication Sig   • aspirin 81 MG tablet Take  by mouth daily.   • atorvastatin (LIPITOR) 40 MG tablet TAKE 1 TABLET BY MOUTH ONE TIME A DAY    • cholecalciferol (VITAMIN D3) 1000 units tablet Take 1,000 Units by mouth Daily.   • esomeprazole (NEXIUM) 40 MG capsule Take  by mouth.   • lisinopril (PRINIVIL,ZESTRIL) 20 MG tablet Take 1 tablet by mouth Daily.   • Magnesium 400 MG capsule Take  by mouth.   • metoprolol succinate XL (TOPROL-XL) 25 MG 24 hr tablet Take 1 tablet by mouth Daily.   • Multiple Vitamin tablet Take  by mouth.   • SHINGRIX 50 MCG/0.5ML reconstituted suspension ADM 0.5ML IM UTD   • sildenafil (VIAGRA) 100 MG tablet Take 1 tablet by mouth Daily As Needed for erectile dysfunction.   • tamsulosin (FLOMAX) 0.4 MG capsule 24 hr capsule Take 1 capsule by mouth Daily.     No current facility-administered medications on file prior to " visit.      Current Medications:  Scheduled Meds:  Continuous Infusions:  No current facility-administered medications for this visit.   PRN Meds:.    I have reviewed the patient's medical history in detail and updated the computerized patient record.  Review and summarization of old records includes:    Past Medical History:   Diagnosis Date   • Asymmetric septal hypertrophy (CMS/HCC)    • Bronchitis     Bronchitis / URI   • Cataract of both eyes    • Chicken pox    • Cholecystitis    • Diverticula of colon    • Encounter for annual health examination 03/10/2015    Annual Health Assessment   • Fatigue    • Gallbladder disease    • GERD (gastroesophageal reflux disease)    • High blood pressure    • History of EKG     10/30/15, 09/19/13, 09/14/12, 10/18/10, 05/04/09   • History of TB skin testing     TB Skin Test: 01/09/14 Completed, 09/14/12 Patient Declines, 10/18/10 Completed, 03/05/08 Completed   • Hyperlipidemia    • Hypertension     still taking meds   • Impingement of right ulnar nerve    • Measles    • Osteoarthritis involving multiple joints on both sides of body    • Osteoarthritis involving multiple joints on both sides of body    • Persistent cough    • Pulse irregularity     Irregular pulse PER CARDIO   • Sexual problems     Sexual problems / enjoyment ED   • Sleep apnea     has c-pap   • Wellness examination 03/10/2015    Annual Wellness Visit        Past Surgical History:   Procedure Laterality Date   • CATARACT EXTRACTION Bilateral    • CATARACT EXTRACTION Right 2013   • CATARACT EXTRACTION Left 2014   • CHOLECYSTECTOMY     • COLONOSCOPY  04/16/2015    Dr. Ritchie   • COLONOSCOPY  03/07/2007   • ELBOW PROCEDURE Right     ulnar nerve impingement release   • FOOT SURGERY Right     right foot (twice)   • GALLBLADDER SURGERY  2009   • KNEE MENISCAL REPAIR Right 2010    torn right meniscus   • LASIK  2006   • RHINOPLASTY  1986   • ROTATOR CUFF REPAIR Left 2010   • SHOULDER ARTHROSCOPY W/ LABRAL REPAIR  Right 2015    Dr. Baxter  labram repair right shoulder   • SHOULDER LIGAMENT REPAIR Right    • TOTAL KNEE ARTHROPLASTY Right 2011        Social History     Occupational History   • Occupation: teacher2   Tobacco Use   • Smoking status: Never Smoker   • Smokeless tobacco: Never Used   Substance and Sexual Activity   • Alcohol use: No     Comment: none in 5 yrs, Nov 2011 - last drink   • Drug use: No   • Sexual activity: Yes     Partners: Female    Social History     Social History Narrative   • Not on file        Family History   Problem Relation Age of Onset   • Dementia Mother    • Migraines Mother    • Osteoporosis Mother    • Stroke Mother 85        early 80s   • Heart disease Mother         tachycardia and had ppm   • Alzheimer's disease Mother    • Supraventricular tachycardia Mother    • Alcohol abuse Father    • Depression Father    • Migraines Father    • Tuberculosis Father    • Cirrhosis Father         43 YO   • Cancer Paternal Uncle 67        mets but unsure of type of ca  BACK CANCER   • Stroke Brother    • Colon cancer Neg Hx    • Prostate cancer Neg Hx    • Heart attack Neg Hx        ROS: 14 point review of systems was performed and all other systems were reviewed and are negative except for documented findings in HPI and today's encounter.     Allergies: No Known Allergies  Constitutional:  Denies fever, shaking or chills   Eyes:  Denies change in visual acuity   HENT:  Denies nasal congestion or sore throat   Respiratory:  Denies cough or shortness of breath   Cardiovascular:  Denies chest pain or severe LE edema   GI:  Denies abdominal pain, nausea, vomiting, bloody stools or diarrhea   Musculoskeletal:  Numbness, tingling, pain, or loss of motor function only as noted above in history of present illness.  : Denies painful urination or hematuria  Integument:  Denies rash, lesion or ulceration   Neurologic:  Denies headache or focal weakness  Endocrine:  Denies lymphadenopathy  Psych:  Denies  "confusion or change in mental status   Hem:  Denies active bleeding    OBJECTIVE:  Physical Exam: 69 y.o. male  Wt Readings from Last 3 Encounters:   01/27/20 91.6 kg (202 lb)   01/21/20 91.6 kg (202 lb)   12/05/19 89.8 kg (198 lb)     Ht Readings from Last 1 Encounters:   01/27/20 175.3 cm (69\")     Body mass index is 29.83 kg/m².  Vitals:    01/27/20 0822   Temp: 97.2 °F (36.2 °C)     Vital signs reviewed.     General Appearance:    Alert, cooperative, in no acute distress                  Eyes: conjunctiva clear  ENT: external ears and nose atraumatic  CV: no peripheral edema  Resp: normal respiratory effort  Skin: no rashes or wounds; normal turgor  Psych: mood and affect appropriate  Lymph: no nodes appreciated  Neuro: gross sensation intact  Vascular:  Palpable peripheral pulse in noted extremity  Musculoskeletal Extremities: Exam today shows pleasant gentleman appears to be younger than his stated age his left knee goes about -3 to about 120 slight varus orientation is not a lot of swelling or effusion no optically tender to the touch nor does he have a Baker's cyst noticed.  Any attempt to internally rotate his hip re-create his pain and cause severe pain.    Radiology:   Initially with a complaint of knee pain he had AP lateral 4 degree PA left knee which does show at least moderate arthritic change perhaps little more.  Sent him back for AP of the hips lateral left hip and he has advanced end-stage \"bone-on-bone\" arthritis left hip    Assessment:     ICD-10-CM ICD-9-CM   1. Hip pain, left M25.552 719.45   2. Arthritis of left knee M17.12 716.96   3. Arthritis of left hip M16.12 716.95   4. Status post total right knee replacement Z96.651 V43.65        Procedures       Plan: Biomechanics of pertinent body area discussed.  Risks, benefits, alternatives, comparisons, and complications of accepted medicines, injections, recommendations, surgical procedures, and therapies explained and education provided in " "laymen's terms. Natural history and expected course of this patient's diagnosis discussed along with evaluation of therapies. Questions answered. When appropriate I also discussed proper use of cane, walker, trekking poles.   EXERCISES:  Advice on benefits of, and types of regular/moderate exercise including biomechanical forces involved as it pertains to this complaint.  MEDICATIONS:  Prescription, OTC and Monitoring of Medications per orders to address ortho complaints; Evaluation and discussion of safety, precautions, side effects, and warnings given especially of long term NSAID or steroid therapy.    REFER for fluoroscopy guided injection in radiology. Suggest we try a injection in the hip and went over the principles of diagnostic and therapeutic implications.  Let us have him do that and see us back a month or so after to see if the injection in his arthritic hip helped his \"knee pain\" he voiced a good understanding of the alternatives and his \"assignment\"      1/27/2020    Much of this encounter note is an electronic transcription/translation of spoken language to printed text. The electronic translation of spoken language may permit erroneous, or at times, nonsensical words or phrases to be inadvertently transcribed; Although I have reviewed the note for such errors, some may still exist    "

## 2020-02-04 ENCOUNTER — HOSPITAL ENCOUNTER (OUTPATIENT)
Dept: GENERAL RADIOLOGY | Facility: HOSPITAL | Age: 70
Discharge: HOME | End: 2020-02-04
Admitting: ORTHOPAEDIC SURGERY

## 2020-02-04 DIAGNOSIS — M16.12 ARTHRITIS OF LEFT HIP: ICD-10-CM

## 2020-02-04 PROCEDURE — 25010000003 LIDOCAINE 1 % SOLUTION: Performed by: RADIOLOGY

## 2020-02-04 PROCEDURE — 25010000002 METHYLPREDNISOLONE PER 125 MG: Performed by: RADIOLOGY

## 2020-02-04 PROCEDURE — 25010000002 IOPAMIDOL 61 % SOLUTION: Performed by: RADIOLOGY

## 2020-02-04 PROCEDURE — 77002 NEEDLE LOCALIZATION BY XRAY: CPT

## 2020-02-04 RX ORDER — LIDOCAINE HYDROCHLORIDE 10 MG/ML
10 INJECTION, SOLUTION INFILTRATION; PERINEURAL ONCE
Status: COMPLETED | OUTPATIENT
Start: 2020-02-04 | End: 2020-02-04

## 2020-02-04 RX ORDER — BUPIVACAINE HYDROCHLORIDE 2.5 MG/ML
10 INJECTION, SOLUTION EPIDURAL; INFILTRATION; INTRACAUDAL ONCE
Status: COMPLETED | OUTPATIENT
Start: 2020-02-04 | End: 2020-02-04

## 2020-02-04 RX ORDER — METHYLPREDNISOLONE SODIUM SUCCINATE 125 MG/2ML
80 INJECTION, POWDER, LYOPHILIZED, FOR SOLUTION INTRAMUSCULAR; INTRAVENOUS
Status: COMPLETED | OUTPATIENT
Start: 2020-02-04 | End: 2020-02-04

## 2020-02-04 RX ADMIN — METHYLPREDNISOLONE SODIUM SUCCINATE 80 MG: 125 INJECTION, POWDER, LYOPHILIZED, FOR SOLUTION INTRAMUSCULAR; INTRAVENOUS at 07:44

## 2020-02-04 RX ADMIN — LIDOCAINE HYDROCHLORIDE 4 ML: 10 INJECTION, SOLUTION INFILTRATION; PERINEURAL at 07:44

## 2020-02-04 RX ADMIN — BUPIVACAINE HYDROCHLORIDE 5 ML: 2.5 INJECTION, SOLUTION EPIDURAL; INFILTRATION; INTRACAUDAL; PERINEURAL at 07:44

## 2020-02-04 RX ADMIN — IOPAMIDOL 1 ML: 612 INJECTION, SOLUTION INTRAVENOUS at 07:44

## 2020-03-02 ENCOUNTER — OFFICE VISIT (OUTPATIENT)
Dept: ORTHOPEDIC SURGERY | Facility: CLINIC | Age: 70
End: 2020-03-02

## 2020-03-02 VITALS — TEMPERATURE: 98.1 F | WEIGHT: 205 LBS | BODY MASS INDEX: 30.36 KG/M2 | HEIGHT: 69 IN

## 2020-03-02 DIAGNOSIS — M16.12 ARTHRITIS OF LEFT HIP: Primary | ICD-10-CM

## 2020-03-02 PROCEDURE — 99214 OFFICE O/P EST MOD 30 MIN: CPT | Performed by: ORTHOPAEDIC SURGERY

## 2020-03-02 RX ORDER — CEFAZOLIN SODIUM 2 G/100ML
2 INJECTION, SOLUTION INTRAVENOUS ONCE
Status: CANCELLED | OUTPATIENT
Start: 2020-05-06 | End: 2020-03-02

## 2020-03-02 NOTE — PROGRESS NOTES
Patient Name: Zheng Lopez   YOB: 1950  Referring Primary Care Physician: Zoya Newman MD  BMI: Body mass index is 30.27 kg/m².    Chief Complaint:    Chief Complaint   Patient presents with   • Left Hip - Follow-up, Pain   • Left Knee - Follow-up, Pain        HPI:     Zheng Lopez is a 69 y.o. male who presents today for evaluation of   Chief Complaint   Patient presents with   • Left Hip - Follow-up, Pain   • Left Knee - Follow-up, Pain   .  Patient is seen back today on his left hip.  He tried an injection which helped maybe for 2 weeks and gave him pretty good relief but it came back does bother him as much is ever it is interfering with his activities of daily living is been trying to do therapy as well.  Reviewing his chart he has multiple cardiac diagnoses found in his chart however he says he was told by Dr. romero years ago that he did need to really see him back and that was about 5 years ago.  Said he is been following with his primary care physician.    This problem is not new to this examiner.     Subjective   Medications:   Home Medications:  Current Outpatient Medications on File Prior to Visit   Medication Sig   • aspirin 81 MG tablet Take  by mouth daily.   • atorvastatin (LIPITOR) 40 MG tablet TAKE 1 TABLET BY MOUTH ONE TIME A DAY    • cholecalciferol (VITAMIN D3) 1000 units tablet Take 1,000 Units by mouth Daily.   • esomeprazole (NEXIUM) 40 MG capsule Take  by mouth.   • lisinopril (PRINIVIL,ZESTRIL) 20 MG tablet Take 1 tablet by mouth Daily.   • Magnesium 400 MG capsule Take  by mouth.   • metoprolol succinate XL (TOPROL-XL) 25 MG 24 hr tablet Take 1 tablet by mouth Daily.   • Multiple Vitamin tablet Take  by mouth.   • SHINGRIX 50 MCG/0.5ML reconstituted suspension ADM 0.5ML IM UTD   • sildenafil (VIAGRA) 100 MG tablet Take 1 tablet by mouth Daily As Needed for erectile dysfunction.   • tamsulosin (FLOMAX) 0.4 MG capsule 24 hr capsule Take 1 capsule by mouth Daily.      No current facility-administered medications on file prior to visit.      Current Medications:  Scheduled Meds:  Continuous Infusions:  No current facility-administered medications for this visit.   PRN Meds:.    I have reviewed the patient's medical history in detail and updated the computerized patient record.  Review and summarization of old records includes:    Past Medical History:   Diagnosis Date   • Asymmetric septal hypertrophy (CMS/HCC)    • Bronchitis     Bronchitis / URI   • Cataract of both eyes    • Chicken pox    • Cholecystitis    • Diverticula of colon    • Encounter for annual health examination 03/10/2015    Annual Health Assessment   • Fatigue    • Gallbladder disease    • GERD (gastroesophageal reflux disease)    • High blood pressure    • History of EKG     10/30/15, 09/19/13, 09/14/12, 10/18/10, 05/04/09   • History of TB skin testing     TB Skin Test: 01/09/14 Completed, 09/14/12 Patient Declines, 10/18/10 Completed, 03/05/08 Completed   • Hyperlipidemia    • Hypertension     still taking meds   • Impingement of right ulnar nerve    • Measles    • Osteoarthritis involving multiple joints on both sides of body    • Osteoarthritis involving multiple joints on both sides of body    • Persistent cough    • Pulse irregularity     Irregular pulse PER CARDIO   • Sexual problems     Sexual problems / enjoyment ED   • Sleep apnea     has c-pap   • Wellness examination 03/10/2015    Annual Wellness Visit        Past Surgical History:   Procedure Laterality Date   • CATARACT EXTRACTION Bilateral    • CATARACT EXTRACTION Right 2013   • CATARACT EXTRACTION Left 2014   • CHOLECYSTECTOMY     • COLONOSCOPY  04/16/2015    Dr. Ritchie   • COLONOSCOPY  03/07/2007   • ELBOW PROCEDURE Right     ulnar nerve impingement release   • FOOT SURGERY Right     right foot (twice)   • GALLBLADDER SURGERY  2009   • KNEE MENISCAL REPAIR Right 2010    torn right meniscus   • LASIK  2006   • RHINOPLASTY  1986   • ROTATOR  CUFF REPAIR Left 2010   • SHOULDER ARTHROSCOPY W/ LABRAL REPAIR Right 2015    Dr. Sahil lay repair right shoulder   • SHOULDER LIGAMENT REPAIR Right    • TOTAL KNEE ARTHROPLASTY Right 2011        Social History     Occupational History   • Occupation: teacher2   Tobacco Use   • Smoking status: Never Smoker   • Smokeless tobacco: Never Used   Substance and Sexual Activity   • Alcohol use: No     Comment: none in 5 yrs, Nov 2011 - last drink   • Drug use: No   • Sexual activity: Yes     Partners: Female    Social History     Social History Narrative   • Not on file        Family History   Problem Relation Age of Onset   • Dementia Mother    • Migraines Mother    • Osteoporosis Mother    • Stroke Mother 85        early 80s   • Heart disease Mother         tachycardia and had ppm   • Alzheimer's disease Mother    • Supraventricular tachycardia Mother    • Alcohol abuse Father    • Depression Father    • Migraines Father    • Tuberculosis Father    • Cirrhosis Father         43 YO   • Cancer Paternal Uncle 67        mets but unsure of type of ca  BACK CANCER   • Stroke Brother    • Colon cancer Neg Hx    • Prostate cancer Neg Hx    • Heart attack Neg Hx        ROS: 14 point review of systems was performed and all other systems were reviewed and are negative except for documented findings in HPI and today's encounter.     Allergies: No Known Allergies  Constitutional:  Denies fever, shaking or chills   Eyes:  Denies change in visual acuity   HENT:  Denies nasal congestion or sore throat   Respiratory:  Denies cough or shortness of breath   Cardiovascular:  Denies chest pain or severe LE edema   GI:  Denies abdominal pain, nausea, vomiting, bloody stools or diarrhea   Musculoskeletal:  Numbness, tingling, pain, or loss of motor function only as noted above in history of present illness.  : Denies painful urination or hematuria  Integument:  Denies rash, lesion or ulceration   Neurologic:  Denies headache or focal  "weakness  Endocrine:  Denies lymphadenopathy  Psych:  Denies confusion or change in mental status   Hem:  Denies active bleeding    OBJECTIVE:  Physical Exam: 69 y.o. male  Wt Readings from Last 3 Encounters:   03/02/20 93 kg (205 lb)   01/27/20 91.6 kg (202 lb)   01/21/20 91.6 kg (202 lb)     Ht Readings from Last 1 Encounters:   03/02/20 175.3 cm (69\")     Body mass index is 30.27 kg/m².  Vitals:    03/02/20 0753   Temp: 98.1 °F (36.7 °C)     Vital signs reviewed.     General Appearance:    Alert, cooperative, in no acute distress                  Eyes: conjunctiva clear  ENT: external ears and nose atraumatic  CV: no peripheral edema  Resp: normal respiratory effort  Skin: no rashes or wounds; normal turgor  Psych: mood and affect appropriate  Lymph: no nodes appreciated  Neuro: gross sensation intact  Vascular:  Palpable peripheral pulse in noted extremity  Musculoskeletal Extremities: Exam today shows Stinchfield positive tender with internal rotation of the hip and he limps    Radiology:   AP of the hip lateral left hip taken 127 of 20 shows end-stage osteoarthritis of the hip    Assessment:     ICD-10-CM ICD-9-CM   1. Arthritis of left hip M16.12 716.95        Procedures       Plan: Biomechanics of pertinent body area discussed.  Risks, benefits, alternatives, comparisons, and complications of accepted medicines, injections, recommendations, surgical procedures, and therapies explained and education provided in laymen's terms. Natural history and expected course of this patient's diagnosis discussed along with evaluation of therapies. Questions answered. When appropriate I also discussed proper use of cane, walker, trekking poles.   LILLY: Obtain medical clearance for surgery. Continuation of conservative management vs. LILLY discussed.  I reviewed anatomy of a total hip arthroplasty in laymen's terms, as well as typical postoperative recovery and possibly 6-12 months for maximal recovery, and possible need for " rehabilitation stay after hospitalization. We also discussed risks, benefits, alternatives, and limitations of procedure with risks including but not limited to neurovascular damage, bleeding, infection, malalignment, chronic pian, failure of implants, periprosthetic fracture, osteolysis, loosening of implants, loss of motion, weakness, stiffness, instability, DVT, pulmonary embolus, death, stroke, complex regional pain syndrome, myocardial infarction, and need for additional procedures. Concept of substitution vs. replacement discussed.  No guarantees were given regarding results of surgery.  Patient verbalized understanding, and was given the opportunity to ask and have all questions answered today.       3/2/2020    Much of this encounter note is an electronic transcription/translation of spoken language to printed text. The electronic translation of spoken language may permit erroneous, or at times, nonsensical words or phrases to be inadvertently transcribed; Although I have reviewed the note for such errors, some may still exist

## 2020-03-06 LAB
BASOPHILS # BLD AUTO: 0.03 10*3/MM3 (ref 0–0.2)
BASOPHILS NFR BLD AUTO: 0.7 % (ref 0–1.5)
EOSINOPHIL # BLD AUTO: 0.21 10*3/MM3 (ref 0–0.4)
EOSINOPHIL NFR BLD AUTO: 5.2 % (ref 0.3–6.2)
ERYTHROCYTE [DISTWIDTH] IN BLOOD BY AUTOMATED COUNT: 12.7 % (ref 12.3–15.4)
HCT VFR BLD AUTO: 41.2 % (ref 37.5–51)
HGB BLD-MCNC: 14.4 G/DL (ref 13–17.7)
IMM GRANULOCYTES # BLD AUTO: 0 10*3/MM3 (ref 0–0.05)
IMM GRANULOCYTES NFR BLD AUTO: 0 % (ref 0–0.5)
LYMPHOCYTES # BLD AUTO: 1.45 10*3/MM3 (ref 0.7–3.1)
LYMPHOCYTES NFR BLD AUTO: 36.2 % (ref 19.6–45.3)
MCH RBC QN AUTO: 34.2 PG (ref 26.6–33)
MCHC RBC AUTO-ENTMCNC: 35 G/DL (ref 31.5–35.7)
MCV RBC AUTO: 97.9 FL (ref 79–97)
MONOCYTES # BLD AUTO: 0.43 10*3/MM3 (ref 0.1–0.9)
MONOCYTES NFR BLD AUTO: 10.7 % (ref 5–12)
NEUTROPHILS # BLD AUTO: 1.89 10*3/MM3 (ref 1.7–7)
NEUTROPHILS NFR BLD AUTO: 47.2 % (ref 42.7–76)
NRBC BLD AUTO-RTO: 0 /100 WBC (ref 0–0.2)
PLATELET # BLD AUTO: 164 10*3/MM3 (ref 140–450)
RBC # BLD AUTO: 4.21 10*6/MM3 (ref 4.14–5.8)
WBC # BLD AUTO: 4.01 10*3/MM3 (ref 3.4–10.8)

## 2020-04-03 ENCOUNTER — TELEPHONE (OUTPATIENT)
Dept: FAMILY MEDICINE CLINIC | Facility: CLINIC | Age: 70
End: 2020-04-03

## 2020-04-07 RX ORDER — ATORVASTATIN CALCIUM 40 MG/1
40 TABLET, FILM COATED ORAL DAILY
Qty: 90 TABLET | Refills: 3 | Status: SHIPPED | OUTPATIENT
Start: 2020-04-07 | End: 2021-02-08

## 2020-04-09 RX ORDER — ESOMEPRAZOLE MAGNESIUM 40 MG/1
40 CAPSULE, DELAYED RELEASE ORAL
Qty: 90 CAPSULE | Refills: 1 | Status: SHIPPED | OUTPATIENT
Start: 2020-04-09 | End: 2020-09-01

## 2020-04-24 ENCOUNTER — TELEPHONE (OUTPATIENT)
Dept: FAMILY MEDICINE CLINIC | Facility: CLINIC | Age: 70
End: 2020-04-24

## 2020-04-24 NOTE — TELEPHONE ENCOUNTER
Spoke with pt, he has up coming appointment on may 1st it is Pre OP exam he is going to have left hip replacement surgery on May 11th. Pt needs to know if he can have EKG done at Dr Newman office prior to his surgery, he said that he is going to [facility] some time next week to get blood work, advised pt to have EKG done at the same time to have less exposure this way and then have results fax to Dr Newman and have her review lab and EKG results all at once and asked pt to keep his appointment as May  1st but change it to either V or Tel visit, pt voiced understanding.

## 2020-04-24 NOTE — TELEPHONE ENCOUNTER
Pt going to [facility] for labs and EKG and they will fax results. Pt had appt changed to a video visit next Fri.

## 2020-04-24 NOTE — TELEPHONE ENCOUNTER
PATIENT CALLED IN REGARDS TO A APPOINTMENT HE IS SUPPOSE TO HAVE ON 05/01/2020 AND WOULD LIKE TO GET A CALL BACK IN REGARDS TO THIS MATTER AS HE STATES THAT HE HAS SURGERY ON 05/11/2020. PLEASE ADVISE AND CALL PT BACK [phone].

## 2020-05-01 ENCOUNTER — TELEMEDICINE (OUTPATIENT)
Dept: FAMILY MEDICINE CLINIC | Facility: CLINIC | Age: 70
End: 2020-05-01

## 2020-05-01 VITALS — HEIGHT: 69 IN | WEIGHT: 200 LBS | BODY MASS INDEX: 29.62 KG/M2

## 2020-05-01 DIAGNOSIS — R94.31 ABNORMAL EKG: ICD-10-CM

## 2020-05-01 DIAGNOSIS — D69.6 THROMBOCYTOPENIA (HCC): ICD-10-CM

## 2020-05-01 DIAGNOSIS — Z01.810 PREOPERATIVE CARDIOVASCULAR EXAMINATION: Primary | ICD-10-CM

## 2020-05-01 PROCEDURE — 99213 OFFICE O/P EST LOW 20 MIN: CPT | Performed by: FAMILY MEDICINE

## 2020-05-01 RX ORDER — DIMENHYDRINATE 50 MG
1 TABLET ORAL 2 TIMES DAILY
COMMUNITY

## 2020-05-01 NOTE — PROGRESS NOTES
Zheng Lopez is a 69 y.o. male.     Chief Complaint   Patient presents with   • Pre-op Exam     Patient is needing a surgery clearance for his left hip to replaced on May 11th        HPI     Pt is a pleasant 69 y.o. YO male here for Pre-operative evaluation.  He had an intial EKG and labs for preop eval for L total hip replacement.  Per review of records, EKG report shows L axis deviation and PACs.  Will compare to prior EKGs when I can look at the tracing of the EKG and compare to priors done in 2017.  He denies palpitations, SOA, CP, nausea or any cardiac events or spontaneous bleeding.  He is feeling well.       The following portions of the patient's history were reviewed and updated as appropriate: allergies, current medications, past family history, past medical history, past social history, past surgical history and problem list.    Review of Systems   Constitutional: Negative for activity change and appetite change.   HENT: Negative for congestion and dental problem.    Eyes: Negative for discharge and itching.   Respiratory: Negative for apnea and chest tightness.    Cardiovascular: Negative for chest pain and leg swelling.   Gastrointestinal: Negative for abdominal distention and abdominal pain.   Endocrine: Negative for cold intolerance and heat intolerance.   Genitourinary: Negative for difficulty urinating and dysuria.   Musculoskeletal:        Left hip pain    Skin: Negative for color change and pallor.   Allergic/Immunologic: Negative for environmental allergies and food allergies.   Neurological: Negative for dizziness and facial asymmetry.   Hematological: Negative for adenopathy. Does not bruise/bleed easily.   Psychiatric/Behavioral: Negative for agitation and behavioral problems.       Objective  There were no vitals filed for this visit.     Physical Exam   Constitutional: He appears well-developed and well-nourished. No distress.   Pulmonary/Chest: Effort normal. No respiratory  distress.   Psychiatric: He has a normal mood and affect. His behavior is normal. Judgment and thought content normal.         Current Outpatient Medications:   •  aspirin 81 MG tablet, Take  by mouth daily., Disp: , Rfl:   •  atorvastatin (LIPITOR) 40 MG tablet, Take 1 tablet by mouth Daily. 200001, Disp: 90 tablet, Rfl: 3  •  cholecalciferol (VITAMIN D3) 1000 units tablet, Take 1,000 Units by mouth Daily., Disp: , Rfl:   •  esomeprazole (nexIUM) 40 MG capsule, Take 1 capsule by mouth Every Morning Before Breakfast., Disp: 90 capsule, Rfl: 1  •  Flaxseed, Linseed, (FLAX SEED OIL) 1000 MG capsule, Take 1 capsule by mouth., Disp: , Rfl:   •  lisinopril (PRINIVIL,ZESTRIL) 20 MG tablet, Take 1 tablet by mouth Daily., Disp: 90 tablet, Rfl: 4  •  Magnesium 400 MG capsule, Take  by mouth., Disp: , Rfl:   •  metoprolol succinate XL (TOPROL-XL) 25 MG 24 hr tablet, Take 1 tablet by mouth Daily., Disp: 90 tablet, Rfl: 4  •  Multiple Vitamin tablet, Take  by mouth., Disp: , Rfl:   •  mupirocin (BACTROBAN) 2 % ointment, , Disp: , Rfl:   •  sildenafil (VIAGRA) 100 MG tablet, Take 1 tablet by mouth Daily As Needed for erectile dysfunction., Disp: 4 tablet, Rfl: 0  •  tamsulosin (FLOMAX) 0.4 MG capsule 24 hr capsule, Take 1 capsule by mouth Daily., Disp: 90 capsule, Rfl: 3    Procedures    Lab Results (most recent)     None              Zheng was seen today for pre-op exam.    Diagnoses and all orders for this visit:    Preoperative cardiovascular examination    Abnormal EKG    Thrombocytopenia (CMS/HCC)      Pt here for virtual preop eval.  I am working remotely today but will review his EKG next week and compare to prior for any new findings.  No sx.  Also platelets are at 119, they were normal at the last check 3/5 at 165 and low in November prior to that around 114.  If there is FFP and other measures for hemostasis I think it would be OK to proceed with surgery assuming the EKG is unchanged from prior.     Return if  symptoms worsen or fail to improve.      Zoya Newman MD    This was an audio and video enabled telemedicine encounter secondary to CDC recommendations and patient safety with COVID19 Pandemic.

## 2020-05-04 ENCOUNTER — TELEPHONE (OUTPATIENT)
Dept: FAMILY MEDICINE CLINIC | Facility: CLINIC | Age: 70
End: 2020-05-04

## 2020-05-04 NOTE — TELEPHONE ENCOUNTER
EKG compared to prior and is stable, additionally platelet count is above the requirement of 75.  I think that his risk is optimized prior to surgery.  If he develops any symptoms of chest pain, palpitations or shortness of breath please notify his surgeon immediately and follow-up with me.  Otherwise, patient is cleared for surgery.  I was looking through his chart, is there a specific document that he needs me to sign?  Thank you

## 2020-06-05 ENCOUNTER — OFFICE VISIT (OUTPATIENT)
Dept: FAMILY MEDICINE CLINIC | Facility: CLINIC | Age: 70
End: 2020-06-05

## 2020-06-05 VITALS
HEART RATE: 78 BPM | SYSTOLIC BLOOD PRESSURE: 110 MMHG | WEIGHT: 202 LBS | OXYGEN SATURATION: 98 % | DIASTOLIC BLOOD PRESSURE: 70 MMHG | TEMPERATURE: 98 F | HEIGHT: 69 IN | BODY MASS INDEX: 29.92 KG/M2

## 2020-06-05 DIAGNOSIS — N52.9 ED (ERECTILE DYSFUNCTION) OF ORGANIC ORIGIN: ICD-10-CM

## 2020-06-05 DIAGNOSIS — E78.01 FAMILIAL HYPERCHOLESTEROLEMIA: Primary | ICD-10-CM

## 2020-06-05 DIAGNOSIS — I10 ESSENTIAL HYPERTENSION: ICD-10-CM

## 2020-06-05 PROCEDURE — 99214 OFFICE O/P EST MOD 30 MIN: CPT | Performed by: FAMILY MEDICINE

## 2020-06-05 RX ORDER — SILDENAFIL CITRATE 20 MG/1
40 TABLET ORAL AS NEEDED
Qty: 10 TABLET | Refills: 3 | Status: SHIPPED | OUTPATIENT
Start: 2020-06-05 | End: 2021-06-15 | Stop reason: SDUPTHER

## 2020-06-05 RX ORDER — LISINOPRIL 20 MG/1
10 TABLET ORAL DAILY
Qty: 90 TABLET | Refills: 4 | Status: SHIPPED | OUTPATIENT
Start: 2020-06-05 | End: 2021-06-15 | Stop reason: SDUPTHER

## 2020-06-05 RX ORDER — SILDENAFIL CITRATE 20 MG/1
40 TABLET ORAL AS NEEDED
Qty: 10 TABLET | Refills: 3 | Status: SHIPPED | OUTPATIENT
Start: 2020-06-05 | End: 2020-06-05 | Stop reason: SDUPTHER

## 2020-06-05 RX ORDER — LISINOPRIL 20 MG/1
20 TABLET ORAL DAILY
Qty: 90 TABLET | Refills: 4 | Status: CANCELLED | OUTPATIENT
Start: 2020-06-05

## 2020-06-05 NOTE — PROGRESS NOTES
Zheng Lopez is a 69 y.o. male.     Chief Complaint   Patient presents with   • Hypertension     no complains    • Hyperlipidemia     no complains        HPI     Pt is a pleasant 69 y.o. YO male here for hypertension that is well controlled on lisinopril 20 mg and metoprolol 25 mg daily without symptoms, hyperlipidemia that is well controlled on atorvastatin 40, erectile dysfunction that improved with Viagra 50 mg-he takes 100 mg tablet and cuts it in half.    The following portions of the patient's history were reviewed and updated as appropriate: allergies, current medications, past family history, past medical history, past social history, past surgical history and problem list.    Review of Systems   Constitutional: Negative.    HENT: Negative.    Eyes: Negative.    Respiratory: Negative.    Cardiovascular: Negative.  Negative for chest pain, palpitations and leg swelling.   Gastrointestinal: Negative.    Endocrine: Negative.    Genitourinary: Negative.    Musculoskeletal: Positive for arthralgias and myalgias.   Skin: Negative.    Allergic/Immunologic: Negative.  Negative for environmental allergies, food allergies and immunocompromised state.   Neurological: Negative.    Hematological: Negative.  Negative for adenopathy. Does not bruise/bleed easily.   Psychiatric/Behavioral: Negative.        Objective  Vitals:    06/05/20 0813   BP: 110/70   Pulse: 78   Temp: 98 °F (36.7 °C)   SpO2: 98%        Physical Exam   Constitutional: He is oriented to person, place, and time. He appears well-developed and well-nourished. No distress.   HENT:   Head: Normocephalic.   Nose: Nose normal.   Eyes: EOM are normal.   Cardiovascular: Normal rate, regular rhythm, normal heart sounds and intact distal pulses.   No murmur heard.  Pulmonary/Chest: Effort normal and breath sounds normal. No respiratory distress.   Musculoskeletal: Normal range of motion.   Neurological: He is alert and oriented to person, place, and time.    Skin: Skin is warm and dry. No rash noted.   Psychiatric: He has a normal mood and affect. His behavior is normal. Judgment and thought content normal.   Nursing note and vitals reviewed.        Current Outpatient Medications:   •  aspirin 81 MG tablet, Take  by mouth daily., Disp: , Rfl:   •  atorvastatin (LIPITOR) 40 MG tablet, Take 1 tablet by mouth Daily. 200001, Disp: 90 tablet, Rfl: 3  •  cholecalciferol (VITAMIN D3) 1000 units tablet, Take 1,000 Units by mouth Daily., Disp: , Rfl:   •  esomeprazole (nexIUM) 40 MG capsule, Take 1 capsule by mouth Every Morning Before Breakfast., Disp: 90 capsule, Rfl: 1  •  Flaxseed, Linseed, (FLAX SEED OIL) 1000 MG capsule, Take 1 capsule by mouth., Disp: , Rfl:   •  lisinopril (PRINIVIL,ZESTRIL) 20 MG tablet, Take 0.5 tablets by mouth Daily., Disp: 90 tablet, Rfl: 4  •  Magnesium 400 MG capsule, Take  by mouth 2 (Two) Times a Day., Disp: , Rfl:   •  metoprolol succinate XL (TOPROL-XL) 25 MG 24 hr tablet, Take 1 tablet by mouth Daily., Disp: 90 tablet, Rfl: 4  •  Multiple Vitamin tablet, Take  by mouth., Disp: , Rfl:   •  tamsulosin (FLOMAX) 0.4 MG capsule 24 hr capsule, Take 1 capsule by mouth Daily., Disp: 90 capsule, Rfl: 3  •  mupirocin (BACTROBAN) 2 % ointment, , Disp: , Rfl:   •  sildenafil (REVATIO) 20 MG tablet, Take 2 tablets by mouth As Needed (erectile dysfunction)., Disp: 10 tablet, Rfl: 3    Procedures    Lab Results (most recent)     None              Zheng was seen today for hypertension and hyperlipidemia.    Diagnoses and all orders for this visit:    Familial hypercholesterolemia    Essential hypertension  -     lisinopril (PRINIVIL,ZESTRIL) 20 MG tablet; Take 0.5 tablets by mouth Daily.    ED (erectile dysfunction) of organic origin  -     Discontinue: sildenafil (REVATIO) 20 MG tablet; Take 2 tablets by mouth As Needed (erectile dysfunction).  -     sildenafil (REVATIO) 20 MG tablet; Take 2 tablets by mouth As Needed (erectile  dysfunction).      Hypertension well controlled, blood pressures on the lower end of normal.  Decrease lisinopril from 20 to 10 mg daily.  Continue metoprolol 25 mg daily.    Hyperlipidemia well-controlled on atorvastatin 40    Patient with erectile dysfunction, rarely uses Viagra but has improvement with 50 mg.  Issue with cost.  He does have a few tablets left.  Paper prescription for 20 mg tablets to take to as needed.  He has had no cardiac symptoms and is aware to go to the emergency room if these arise while using this medication.    Return in about 6 months (around 12/5/2020), or if symptoms worsen or fail to improve, for Medicare Wellness.      Zoya Newman MD

## 2020-07-30 DIAGNOSIS — I10 ESSENTIAL HYPERTENSION: ICD-10-CM

## 2020-07-30 DIAGNOSIS — I51.7 CARDIAC ENLARGEMENT: ICD-10-CM

## 2020-07-30 RX ORDER — METOPROLOL SUCCINATE 25 MG/1
TABLET, EXTENDED RELEASE ORAL
Qty: 90 TABLET | Refills: 3 | Status: SHIPPED | OUTPATIENT
Start: 2020-07-30 | End: 2021-06-15 | Stop reason: SDUPTHER

## 2020-08-19 ENCOUNTER — TRANSCRIBE ORDERS (OUTPATIENT)
Dept: ADMINISTRATIVE | Facility: HOSPITAL | Age: 70
End: 2020-08-19

## 2020-08-19 ENCOUNTER — LAB (OUTPATIENT)
Dept: LAB | Facility: HOSPITAL | Age: 70
End: 2020-08-19

## 2020-08-19 DIAGNOSIS — Z01.818 PRE-OP TESTING: Primary | ICD-10-CM

## 2020-08-19 DIAGNOSIS — Z01.818 PRE-OP TESTING: ICD-10-CM

## 2020-08-19 LAB
DEPRECATED RDW RBC AUTO: 40.4 FL (ref 37–54)
ERYTHROCYTE [DISTWIDTH] IN BLOOD BY AUTOMATED COUNT: 11.6 % (ref 12.3–15.4)
HCT VFR BLD AUTO: 41.1 % (ref 37.5–51)
HGB BLD-MCNC: 14.4 G/DL (ref 13–17.7)
MCH RBC QN AUTO: 33 PG (ref 26.6–33)
MCHC RBC AUTO-ENTMCNC: 35 G/DL (ref 31.5–35.7)
MCV RBC AUTO: 94.1 FL (ref 79–97)
PLATELET # BLD AUTO: 250 10*3/MM3 (ref 140–450)
PMV BLD AUTO: 9.2 FL (ref 6–12)
RBC # BLD AUTO: 4.37 10*6/MM3 (ref 4.14–5.8)
WBC # BLD AUTO: 6.07 10*3/MM3 (ref 3.4–10.8)

## 2020-08-19 PROCEDURE — 85027 COMPLETE CBC AUTOMATED: CPT

## 2020-08-19 PROCEDURE — 36415 COLL VENOUS BLD VENIPUNCTURE: CPT

## 2020-08-20 ENCOUNTER — LAB REQUISITION (OUTPATIENT)
Dept: LAB | Facility: HOSPITAL | Age: 70
End: 2020-08-20

## 2020-08-20 DIAGNOSIS — Z00.00 ENCOUNTER FOR GENERAL ADULT MEDICAL EXAMINATION WITHOUT ABNORMAL FINDINGS: ICD-10-CM

## 2020-08-22 PROCEDURE — U0004 COV-19 TEST NON-CDC HGH THRU: HCPCS | Performed by: PLASTIC SURGERY

## 2020-08-24 LAB
REF LAB TEST METHOD: NORMAL
SARS-COV-2 RNA RESP QL NAA+PROBE: NOT DETECTED

## 2020-08-25 ENCOUNTER — LAB REQUISITION (OUTPATIENT)
Dept: LAB | Facility: HOSPITAL | Age: 70
End: 2020-08-25

## 2020-08-25 DIAGNOSIS — M19.131 POST-TRAUMATIC OSTEOARTHRITIS, RIGHT WRIST: ICD-10-CM

## 2020-08-25 PROCEDURE — 88305 TISSUE EXAM BY PATHOLOGIST: CPT | Performed by: PLASTIC SURGERY

## 2020-08-26 LAB
CYTO UR: NORMAL
LAB AP CASE REPORT: NORMAL
LAB AP CLINICAL INFORMATION: NORMAL
LAB AP DIAGNOSIS COMMENT: NORMAL
PATH REPORT.FINAL DX SPEC: NORMAL
PATH REPORT.GROSS SPEC: NORMAL

## 2020-08-29 DIAGNOSIS — R39.11 BENIGN PROSTATIC HYPERPLASIA WITH URINARY HESITANCY: ICD-10-CM

## 2020-08-29 DIAGNOSIS — N40.1 BENIGN PROSTATIC HYPERPLASIA WITH URINARY HESITANCY: ICD-10-CM

## 2020-08-31 RX ORDER — TAMSULOSIN HYDROCHLORIDE 0.4 MG/1
CAPSULE ORAL
Qty: 90 CAPSULE | Refills: 3 | Status: SHIPPED | OUTPATIENT
Start: 2020-08-31 | End: 2021-06-15 | Stop reason: SDUPTHER

## 2020-09-01 RX ORDER — ESOMEPRAZOLE MAGNESIUM 40 MG/1
CAPSULE, DELAYED RELEASE ORAL
Qty: 90 CAPSULE | Refills: 3 | Status: SHIPPED | OUTPATIENT
Start: 2020-09-01 | End: 2021-06-15 | Stop reason: SDUPTHER

## 2020-12-08 ENCOUNTER — OFFICE VISIT (OUTPATIENT)
Dept: FAMILY MEDICINE CLINIC | Facility: CLINIC | Age: 70
End: 2020-12-08

## 2020-12-08 VITALS
BODY MASS INDEX: 30.07 KG/M2 | TEMPERATURE: 97.4 F | OXYGEN SATURATION: 99 % | DIASTOLIC BLOOD PRESSURE: 76 MMHG | HEIGHT: 69 IN | WEIGHT: 203 LBS | HEART RATE: 65 BPM | SYSTOLIC BLOOD PRESSURE: 110 MMHG

## 2020-12-08 DIAGNOSIS — Z12.5 PROSTATE CANCER SCREENING: ICD-10-CM

## 2020-12-08 DIAGNOSIS — I10 ESSENTIAL HYPERTENSION: ICD-10-CM

## 2020-12-08 DIAGNOSIS — Z00.00 MEDICARE ANNUAL WELLNESS VISIT, SUBSEQUENT: Primary | ICD-10-CM

## 2020-12-08 DIAGNOSIS — R73.9 HYPERGLYCEMIA: ICD-10-CM

## 2020-12-08 DIAGNOSIS — D69.6 THROMBOCYTOPENIA (HCC): ICD-10-CM

## 2020-12-08 DIAGNOSIS — E55.9 HYPOVITAMINOSIS D: ICD-10-CM

## 2020-12-08 DIAGNOSIS — E78.01 FAMILIAL HYPERCHOLESTEROLEMIA: ICD-10-CM

## 2020-12-08 PROBLEM — M16.12 PRIMARY LOCALIZED OSTEOARTHROSIS OF LEFT HIP: Status: ACTIVE | Noted: 2020-05-11

## 2020-12-08 PROCEDURE — G0439 PPPS, SUBSEQ VISIT: HCPCS | Performed by: FAMILY MEDICINE

## 2020-12-08 RX ORDER — TRIAMCINOLONE ACETONIDE 1 MG/G
CREAM TOPICAL
COMMUNITY
Start: 2020-10-27 | End: 2020-12-08

## 2020-12-08 NOTE — PROGRESS NOTES
+The ABCs of the Annual Wellness Visit  Subsequent Medicare Wellness Visit    Chief Complaint   Patient presents with   • Medicare Wellness-subsequent     no complains        Subjective   History of Present Illness:  Zheng Lopez is a 70 y.o. male who presents for a Subsequent Medicare Wellness Visit.    HEALTH RISK ASSESSMENT    Recent Hospitalizations:  Recently treated at the following:  [facility]    Current Medical Providers:  Patient Care Team:  Zoya Newman MD as PCP - General (Family Medicine)    Smoking Status:  Social History     Tobacco Use   Smoking Status Never Smoker   Smokeless Tobacco Never Used       Alcohol Consumption:  Social History     Substance and Sexual Activity   Alcohol Use No    Comment: none in 5 yrs, Nov 2011 - last drink       Depression Screen:   PHQ-2/PHQ-9 Depression Screening 12/8/2020   Little interest or pleasure in doing things 0   Feeling down, depressed, or hopeless 0   Trouble falling or staying asleep, or sleeping too much -   Feeling tired or having little energy -   Poor appetite or overeating -   Feeling bad about yourself - or that you are a failure or have let yourself or your family down -   Trouble concentrating on things, such as reading the newspaper or watching television -   Moving or speaking so slowly that other people could have noticed. Or the opposite - being so fidgety or restless that you have been moving around a lot more than usual -   Thoughts that you would be better off dead, or of hurting yourself in some way -   Total Score 0   If you checked off any problems, how difficult have these problems made it for you to do your work, take care of things at home, or get along with other people? -       Fall Risk Screen:  STEADI Fall Risk Assessment was completed, and patient is at LOW risk for falls.Assessment completed on:12/8/2020    Health Habits and Functional and Cognitive Screening:  Functional & Cognitive Status 12/8/2020   Do you  have difficulty preparing food and eating? No   Do you have difficulty bathing yourself, getting dressed or grooming yourself? No   Do you have difficulty using the toilet? No   Do you have difficulty moving around from place to place? No   Do you have trouble with steps or getting out of a bed or a chair? No   Current Diet Well Balanced Diet   Dental Exam Up to date   Eye Exam Up to date   Exercise (times per week) 5 times per week   Current Exercise Activities Include Walking   Do you need help using the phone?  No   Are you deaf or do you have serious difficulty hearing?  No   Do you need help with transportation? No   Do you need help shopping? No   Do you need help preparing meals?  -   Do you need help with housework?  No   Do you need help with laundry? No   Do you need help taking your medications? No   Do you need help managing money? No   Do you ever drive or ride in a car without wearing a seat belt? No   Have you felt unusual stress, anger or loneliness in the last month? No   Who do you live with? Spouse   If you need help, do you have trouble finding someone available to you? No   Have you been bothered in the last four weeks by sexual problems? No   Do you have difficulty concentrating, remembering or making decisions? No         Does the patient have evidence of cognitive impairment? No    Asprin use counseling:Taking ASA appropriately as indicated    Age-appropriate Screening Schedule:  Refer to the list below for future screening recommendations based on patient's age, sex and/or medical conditions. Orders for these recommended tests are listed in the plan section. The patient has been provided with a written plan.    Health Maintenance   Topic Date Due   • TDAP/TD VACCINES (3 - Td) 05/04/2019   • LIPID PANEL  11/19/2020   • COLONOSCOPY  04/16/2025   • INFLUENZA VACCINE  Completed   • ZOSTER VACCINE  Completed          The following portions of the patient's history were reviewed and updated as  appropriate: allergies, current medications, past family history, past medical history, past social history, past surgical history and problem list.    Outpatient Medications Prior to Visit   Medication Sig Dispense Refill   • aspirin 81 MG tablet Take  by mouth daily.     • atorvastatin (LIPITOR) 40 MG tablet Take 1 tablet by mouth Daily. 963268 90 tablet 3   • cholecalciferol (VITAMIN D3) 1000 units tablet Take 1,000 Units by mouth Daily.     • esomeprazole (nexIUM) 40 MG capsule TAKE 1 CAPSULE EVERY MORNING BEFORE BREAKFAST 90 capsule 3   • Flaxseed, Linseed, (FLAX SEED OIL) 1000 MG capsule Take 1 capsule by mouth.     • lisinopril (PRINIVIL,ZESTRIL) 20 MG tablet Take 0.5 tablets by mouth Daily. 90 tablet 4   • Magnesium 400 MG capsule Take  by mouth 2 (Two) Times a Day.     • metoprolol succinate XL (TOPROL-XL) 25 MG 24 hr tablet TAKE 1 TABLET DAILY 90 tablet 3   • Multiple Vitamin tablet Take  by mouth.     • tamsulosin (FLOMAX) 0.4 MG capsule 24 hr capsule TAKE 1 CAPSULE DAILY 90 capsule 3   • mupirocin (BACTROBAN) 2 % ointment      • sildenafil (REVATIO) 20 MG tablet Take 2 tablets by mouth As Needed (erectile dysfunction). 10 tablet 3   • triamcinolone (KENALOG) 0.1 % cream APPLY TO AFFECTED AREAS BID FOR 10-14 DAYS AS DIRECTED       No facility-administered medications prior to visit.        Patient Active Problem List   Diagnosis   • Hyperlipidemia   • Essential hypertension   • SYLWIA (obstructive sleep apnea)   • Osteoarthritis involving multiple joints on both sides of body   • GERD (gastroesophageal reflux disease)   • Diverticula of colon   • Cardiac enlargement   • ED (erectile dysfunction) of organic origin   • Asymmetric septal hypertrophy (CMS/HCC)   • Non-sustained ventricular tachycardia (CMS/HCC)   • Chronic pain of left knee   • Primary osteoarthritis of left knee   • Left lumbar radiculopathy   • Left thigh pain   • Benign prostatic hyperplasia with urinary hesitancy   • Arthritis of left hip  "  • Primary localized osteoarthrosis of left hip       Advanced Care Planning:  ACP discussion was held with the patient during this visit. Patient has an advance directive in EMR which is still valid.     Review of Systems   Constitutional: Negative for chills, fatigue, fever and unexpected weight change.   HENT: Negative for ear pain, hearing loss, sinus pressure, sore throat and tinnitus.    Eyes: Negative for pain, discharge and redness.   Respiratory: Negative for cough, shortness of breath and wheezing.    Cardiovascular: Negative for chest pain, palpitations and leg swelling.   Gastrointestinal: Negative for abdominal pain, constipation, diarrhea and nausea.   Endocrine: Negative for cold intolerance and heat intolerance.   Genitourinary: Negative for difficulty urinating, flank pain and urgency.   Musculoskeletal: Positive for arthralgias. Negative for back pain, joint swelling and myalgias.        Wrist    Skin: Negative for rash and wound.   Allergic/Immunologic: Negative for environmental allergies and food allergies.   Neurological: Negative for dizziness, seizures, numbness and headaches.   Hematological: Negative for adenopathy. Does not bruise/bleed easily.   Psychiatric/Behavioral: Negative for decreased concentration, dysphoric mood and sleep disturbance. The patient is not nervous/anxious.    All other systems reviewed and are negative.      Compared to one year ago, the patient feels his physical health is worse.  Compared to one year ago, the patient feels his mental health is the same.    Reviewed chart for potential of high risk medication in the elderly: yes  Reviewed chart for potential of harmful drug interactions in the elderly:yes    Objective         Vitals:    12/08/20 0929   BP: 110/76   Pulse: 65   Temp: 97.4 °F (36.3 °C)   SpO2: 99%   Weight: 92.1 kg (203 lb)   Height: 175.3 cm (69\")   PainSc:   2   PainLoc: Wrist       Body mass index is 29.98 kg/m².  Discussed the patient's BMI with " him. The BMI is above average; BMI management plan is completed.    Physical Exam  Vitals signs and nursing note reviewed.   Constitutional:       General: He is not in acute distress.     Appearance: He is well-developed.   HENT:      Head: Normocephalic.      Nose: Nose normal.   Cardiovascular:      Rate and Rhythm: Normal rate and regular rhythm.      Heart sounds: Normal heart sounds. No murmur.   Pulmonary:      Effort: Pulmonary effort is normal. No respiratory distress.      Breath sounds: Normal breath sounds.   Musculoskeletal: Normal range of motion.   Skin:     General: Skin is warm and dry.      Findings: No rash.   Neurological:      Mental Status: He is alert and oriented to person, place, and time.   Psychiatric:         Behavior: Behavior normal.         Thought Content: Thought content normal.         Judgment: Judgment normal.               Assessment/Plan   Medicare Risks and Personalized Health Plan  CMS Preventative Services Quick Reference  Advance Directive Discussion  Cardiovascular risk  Depression/Dysphoria  Inadequate Social Support, Isolation, Loneliness, Lack of Transportation, Financial Difficulties, or Caregiver Stress   Inactivity/Sedentary  Obesity/Overweight   Prostate Cancer Screening     The above risks/problems have been discussed with the patient.  Pertinent information has been shared with the patient in the After Visit Summary.  Follow up plans and orders are seen below in the Assessment/Plan Section.    Diagnoses and all orders for this visit:    1. Medicare annual wellness visit, subsequent (Primary)    2. Essential hypertension  -     Comprehensive Metabolic Panel  -     CBC Auto Differential  -     TSH    3. Thrombocytopenia (CMS/[facility])  -     CBC Auto Differential    4. Hypovitaminosis D  -     Vitamin D 25 Hydroxy    5. Familial hypercholesterolemia  -     Comprehensive Metabolic Panel  -     Lipid Panel    6. Hyperglycemia  -     Comprehensive Metabolic Panel  -      Hemoglobin A1c    7. Prostate cancer screening  -     PSA SCREENING    Medicare wellness visit today.  Fasting labs as above.  Immunizations up-to-date.  Colon cancer screening up-to-date.  He recently had a hip replacement on the left that has done well, right wrist surgery with significant decrease mobilization and subsequent atrophy of the forearm.  He is doing physical therapy now.  Recommended he continue with maintaining exercise, 150 minutes minimum weekly with his PT exercises.    Follow Up:  Return in about 6 months (around 6/8/2021), or if symptoms worsen or fail to improve, for Recheck hypertension with fasting labs prior.  .     An After Visit Summary and PPPS were given to the patient.     Zoya Newman MD    Mask and eyewear protection worn during entire encounter by myself and medical assistant.  Patient wore mask.

## 2020-12-09 LAB
25(OH)D3+25(OH)D2 SERPL-MCNC: 86.5 NG/ML (ref 30–100)
ALBUMIN SERPL-MCNC: 4.5 G/DL (ref 3.5–5.2)
ALBUMIN/GLOB SERPL: 1.7 G/DL
ALP SERPL-CCNC: 95 U/L (ref 39–117)
ALT SERPL-CCNC: 27 U/L (ref 1–41)
AST SERPL-CCNC: 28 U/L (ref 1–40)
BASOPHILS # BLD AUTO: NORMAL 10*3/UL
BASOPHILS # BLD MANUAL: 0.04 10*3/MM3 (ref 0–0.2)
BASOPHILS NFR BLD MANUAL: 1 % (ref 0–1.5)
BILIRUB SERPL-MCNC: 0.6 MG/DL (ref 0–1.2)
BUN SERPL-MCNC: 13 MG/DL (ref 8–23)
BUN/CREAT SERPL: 12.4 (ref 7–25)
CALCIUM SERPL-MCNC: 9.4 MG/DL (ref 8.6–10.5)
CHLORIDE SERPL-SCNC: 99 MMOL/L (ref 98–107)
CHOLEST SERPL-MCNC: 164 MG/DL (ref 0–200)
CO2 SERPL-SCNC: 27.4 MMOL/L (ref 22–29)
CREAT SERPL-MCNC: 1.05 MG/DL (ref 0.76–1.27)
DIFFERENTIAL COMMENT: ABNORMAL
EOSINOPHIL # BLD AUTO: NORMAL 10*3/UL
EOSINOPHIL # BLD MANUAL: 0.14 10*3/MM3 (ref 0–0.4)
EOSINOPHIL NFR BLD AUTO: NORMAL %
EOSINOPHIL NFR BLD MANUAL: 3.1 % (ref 0.3–6.2)
ERYTHROCYTE [DISTWIDTH] IN BLOOD BY AUTOMATED COUNT: 12.7 % (ref 12.3–15.4)
GLOBULIN SER CALC-MCNC: 2.6 GM/DL
GLUCOSE SERPL-MCNC: 104 MG/DL (ref 65–99)
HBA1C MFR BLD: 5.5 % (ref 4.8–5.6)
HCT VFR BLD AUTO: 44.3 % (ref 37.5–51)
HDLC SERPL-MCNC: 58 MG/DL (ref 40–60)
HGB BLD-MCNC: 15.4 G/DL (ref 13–17.7)
LDLC SERPL CALC-MCNC: 87 MG/DL (ref 0–100)
LYMPHOCYTES # BLD AUTO: NORMAL 10*3/UL
LYMPHOCYTES # BLD MANUAL: 2.2 10*3/MM3 (ref 0.7–3.1)
LYMPHOCYTES NFR BLD AUTO: NORMAL %
LYMPHOCYTES NFR BLD MANUAL: 49 % (ref 19.6–45.3)
MCH RBC QN AUTO: 32.6 PG (ref 26.6–33)
MCHC RBC AUTO-ENTMCNC: 34.8 G/DL (ref 31.5–35.7)
MCV RBC AUTO: 93.7 FL (ref 79–97)
MONOCYTES # BLD MANUAL: 0.37 10*3/MM3 (ref 0.1–0.9)
MONOCYTES NFR BLD AUTO: NORMAL %
MONOCYTES NFR BLD MANUAL: 8.2 % (ref 5–12)
NEUTROPHILS # BLD MANUAL: 1.74 10*3/MM3 (ref 1.7–7)
NEUTROPHILS NFR BLD AUTO: NORMAL %
NEUTROPHILS NFR BLD MANUAL: 38.8 % (ref 42.7–76)
PLATELET # BLD AUTO: NORMAL 10*3/MM3
PLATELET BLD QL SMEAR: ABNORMAL
POTASSIUM SERPL-SCNC: 5.2 MMOL/L (ref 3.5–5.2)
PROT SERPL-MCNC: 7.1 G/DL (ref 6–8.5)
PSA SERPL-MCNC: 1.5 NG/ML (ref 0–4)
RBC # BLD AUTO: 4.73 10*6/MM3 (ref 4.14–5.8)
RBC MORPH BLD: ABNORMAL
SODIUM SERPL-SCNC: 136 MMOL/L (ref 136–145)
TRIGL SERPL-MCNC: 106 MG/DL (ref 0–150)
TSH SERPL DL<=0.005 MIU/L-ACNC: 1.29 UIU/ML (ref 0.27–4.2)
VLDLC SERPL CALC-MCNC: 19 MG/DL (ref 5–40)
WBC # BLD AUTO: 4.49 10*3/MM3 (ref 3.4–10.8)

## 2020-12-17 ENCOUNTER — TELEPHONE (OUTPATIENT)
Dept: FAMILY MEDICINE CLINIC | Facility: CLINIC | Age: 70
End: 2020-12-17

## 2020-12-17 DIAGNOSIS — D69.6 THROMBOCYTOPENIA (HCC): Primary | ICD-10-CM

## 2020-12-17 NOTE — TELEPHONE ENCOUNTER
PATIENT IS CALLING NEEDING TO HAVE A  CALL BACK WITH THE RESULTS FROM HIS LABS    PLEASE CONTACT PATIENT @[phone]

## 2020-12-18 NOTE — PROGRESS NOTES
Please call patient with results. Kidney and liver functions look normal. Diabetes screen is negative with hemoglobin a1C of 5.5. Cholesterol is normal.  Vitamin D is borderline elevated.  I would stop vitamin D supplementation for right now.  Thyroid function and prostate cancer screen are negative.  The blood count was otherwise normal except that the platelets were canceled.  I am not sure as to why.  We can repeat this CBC at his availability.    Thank You,    Zoya Newman M.D.

## 2020-12-22 LAB
BASOPHILS # BLD AUTO: ABNORMAL 10*3/UL
DIFFERENTIAL COMMENT: NORMAL
EOSINOPHIL # BLD AUTO: ABNORMAL 10*3/UL
EOSINOPHIL # BLD MANUAL: 0.21 10*3/MM3 (ref 0–0.4)
EOSINOPHIL NFR BLD AUTO: ABNORMAL %
EOSINOPHIL NFR BLD MANUAL: 3.1 % (ref 0.3–6.2)
ERYTHROCYTE [DISTWIDTH] IN BLOOD BY AUTOMATED COUNT: 12.8 % (ref 12.3–15.4)
HCT VFR BLD AUTO: 42.5 % (ref 37.5–51)
HGB BLD-MCNC: 15 G/DL (ref 13–17.7)
LYMPHOCYTES # BLD AUTO: ABNORMAL 10*3/UL
LYMPHOCYTES # BLD MANUAL: 1.97 10*3/MM3 (ref 0.7–3.1)
LYMPHOCYTES NFR BLD AUTO: ABNORMAL %
LYMPHOCYTES NFR BLD MANUAL: 28.6 % (ref 19.6–45.3)
MCH RBC QN AUTO: 33.9 PG (ref 26.6–33)
MCHC RBC AUTO-ENTMCNC: 35.3 G/DL (ref 31.5–35.7)
MCV RBC AUTO: 96.2 FL (ref 79–97)
MONOCYTES # BLD MANUAL: 0.49 10*3/MM3 (ref 0.1–0.9)
MONOCYTES NFR BLD AUTO: ABNORMAL %
MONOCYTES NFR BLD MANUAL: 7.1 % (ref 5–12)
NEUTROPHILS # BLD MANUAL: 4.22 10*3/MM3 (ref 1.7–7)
NEUTROPHILS NFR BLD AUTO: ABNORMAL %
NEUTROPHILS NFR BLD MANUAL: 61.2 % (ref 42.7–76)
PLATELET # BLD AUTO: 218 10*3/MM3 (ref 140–450)
PLATELET BLD QL SMEAR: NORMAL
RBC # BLD AUTO: 4.42 10*6/MM3 (ref 4.14–5.8)
RBC MORPH BLD: NORMAL
WBC # BLD AUTO: 6.89 10*3/MM3 (ref 3.4–10.8)

## 2020-12-24 NOTE — TELEPHONE ENCOUNTER
Please call patient back with results.  The blood counts has resulted as have all resulted as normal.  No further follow-up needed at this time.    Thank you

## 2021-01-19 ENCOUNTER — OFFICE VISIT (OUTPATIENT)
Dept: SLEEP MEDICINE | Facility: HOSPITAL | Age: 71
End: 2021-01-19

## 2021-01-19 VITALS
OXYGEN SATURATION: 96 % | SYSTOLIC BLOOD PRESSURE: 153 MMHG | BODY MASS INDEX: 30.87 KG/M2 | HEIGHT: 69 IN | HEART RATE: 65 BPM | WEIGHT: 208.4 LBS | DIASTOLIC BLOOD PRESSURE: 82 MMHG

## 2021-01-19 DIAGNOSIS — G47.33 OSA (OBSTRUCTIVE SLEEP APNEA): Primary | ICD-10-CM

## 2021-01-19 PROCEDURE — G0463 HOSPITAL OUTPT CLINIC VISIT: HCPCS

## 2021-01-19 NOTE — PROGRESS NOTES
"Zheng VALENTINO Lopez                                    Sleep medicine follow-up visit  1950  [id number]     DATE OF SERVICE: 1/19/2021     HISTORY:  The patient is a 70 y.o.  male has history of hypertension, hyperlipidemia, asymmetric septal hypertrophy and nonsustained ventilator tachycardia in the past, chronic lumbar blood clot in the left side and osteoarthritis of the left knee and obstructive sleep apnea syndrome.     The patient has moderately severe obstructive sleep apnea syndrome. His polysomnography in the past has revealed apnea-hypopnea index of 27 per sleep hour and minimum SpO2 of 90%. During supine position, he had severe obstructive sleep apnea with Supine AHI of 49 per sleep hour.     He is on CPAP at 12 cmH20. Compliance data indicates 100% compliance with average usage of 7 hours 45 minutes.  Residual AHI is 0.5 with CPAP therapy.  His sleep is sound and restorative with CPAP therapy.  [place] Sleepiness Scale score is 3. The patient is compliant and benefiting from it.     Sleep schedule: Bedtime 10 PM and gets out of bed at 6:30 AM without an alarm.  Sleep latency 15 minutes and occasionally wakes up once or twice to urinate.    Review of Systems - 10 system review negative.     PMH, PSH, Medications, allergies, FH, SH are reviewed and updated in the chart.     PHYSICAL EXAMINATION:  Vitals:    01/19/21 0834   BP: 153/82   Pulse: 65   SpO2: 96%   Weight: 94.5 kg (208 lb 6.4 oz)   Height: 175.3 cm (69\")   BMI 30.79  HEENT: Normal.   NECK: Supple. No bruits.   CARDIAC: Normal.   LUNGS: Clear to auscultation.   EXTREMITIES: No edema.     IMPRESSION: Patient with obstructive sleep apnea syndrome successfully treated with CPAP therapy and is compliant and benefiting from it.     RECOMMENDATIONS: Continue present CPAP. Followup 1 year.     Jackeline Aguiar M.D.  1/19/2021            "

## 2021-02-08 RX ORDER — ATORVASTATIN CALCIUM 40 MG/1
TABLET, FILM COATED ORAL
Qty: 90 TABLET | Refills: 1 | Status: SHIPPED | OUTPATIENT
Start: 2021-02-08 | End: 2022-03-23

## 2021-03-09 DIAGNOSIS — Z23 IMMUNIZATION DUE: ICD-10-CM

## 2021-06-03 DIAGNOSIS — I51.7 CARDIAC ENLARGEMENT: ICD-10-CM

## 2021-06-03 DIAGNOSIS — R73.9 HYPERGLYCEMIA: ICD-10-CM

## 2021-06-03 DIAGNOSIS — I10 ESSENTIAL HYPERTENSION: Primary | ICD-10-CM

## 2021-06-09 LAB
ALBUMIN SERPL-MCNC: 4.6 G/DL (ref 3.5–5.2)
ALBUMIN/GLOB SERPL: 1.8 G/DL
ALP SERPL-CCNC: 95 U/L (ref 39–117)
ALT SERPL-CCNC: 25 U/L (ref 1–41)
AST SERPL-CCNC: 25 U/L (ref 1–40)
BASOPHILS # BLD AUTO: 0.04 10*3/MM3 (ref 0–0.2)
BASOPHILS NFR BLD AUTO: 0.8 % (ref 0–1.5)
BILIRUB SERPL-MCNC: 1 MG/DL (ref 0–1.2)
BUN SERPL-MCNC: 15 MG/DL (ref 8–23)
BUN/CREAT SERPL: 14.7 (ref 7–25)
CALCIUM SERPL-MCNC: 9.7 MG/DL (ref 8.6–10.5)
CHLORIDE SERPL-SCNC: 100 MMOL/L (ref 98–107)
CO2 SERPL-SCNC: 27.7 MMOL/L (ref 22–29)
CREAT SERPL-MCNC: 1.02 MG/DL (ref 0.76–1.27)
EOSINOPHIL # BLD AUTO: 0.2 10*3/MM3 (ref 0–0.4)
EOSINOPHIL NFR BLD AUTO: 4 % (ref 0.3–6.2)
ERYTHROCYTE [DISTWIDTH] IN BLOOD BY AUTOMATED COUNT: 12.1 % (ref 12.3–15.4)
GLOBULIN SER CALC-MCNC: 2.5 GM/DL
GLUCOSE SERPL-MCNC: 95 MG/DL (ref 65–99)
HCT VFR BLD AUTO: 47 % (ref 37.5–51)
HGB BLD-MCNC: 16.1 G/DL (ref 13–17.7)
IMM GRANULOCYTES # BLD AUTO: 0.01 10*3/MM3 (ref 0–0.05)
IMM GRANULOCYTES NFR BLD AUTO: 0.2 % (ref 0–0.5)
LYMPHOCYTES # BLD AUTO: 1.6 10*3/MM3 (ref 0.7–3.1)
LYMPHOCYTES NFR BLD AUTO: 32.3 % (ref 19.6–45.3)
MCH RBC QN AUTO: 33.8 PG (ref 26.6–33)
MCHC RBC AUTO-ENTMCNC: 34.3 G/DL (ref 31.5–35.7)
MCV RBC AUTO: 98.5 FL (ref 79–97)
MONOCYTES # BLD AUTO: 0.42 10*3/MM3 (ref 0.1–0.9)
MONOCYTES NFR BLD AUTO: 8.5 % (ref 5–12)
NEUTROPHILS # BLD AUTO: 2.69 10*3/MM3 (ref 1.7–7)
NEUTROPHILS NFR BLD AUTO: 54.2 % (ref 42.7–76)
NRBC BLD AUTO-RTO: 0 /100 WBC (ref 0–0.2)
PLATELET # BLD AUTO: 106 10*3/MM3 (ref 140–450)
POTASSIUM SERPL-SCNC: 4.8 MMOL/L (ref 3.5–5.2)
PROT SERPL-MCNC: 7.1 G/DL (ref 6–8.5)
RBC # BLD AUTO: 4.77 10*6/MM3 (ref 4.14–5.8)
SODIUM SERPL-SCNC: 138 MMOL/L (ref 136–145)
WBC # BLD AUTO: 4.96 10*3/MM3 (ref 3.4–10.8)

## 2021-06-15 ENCOUNTER — OFFICE VISIT (OUTPATIENT)
Dept: FAMILY MEDICINE CLINIC | Facility: CLINIC | Age: 71
End: 2021-06-15

## 2021-06-15 VITALS
WEIGHT: 200 LBS | DIASTOLIC BLOOD PRESSURE: 76 MMHG | BODY MASS INDEX: 29.62 KG/M2 | OXYGEN SATURATION: 98 % | SYSTOLIC BLOOD PRESSURE: 122 MMHG | HEIGHT: 69 IN | HEART RATE: 54 BPM | TEMPERATURE: 98 F

## 2021-06-15 DIAGNOSIS — N52.9 ED (ERECTILE DYSFUNCTION) OF ORGANIC ORIGIN: ICD-10-CM

## 2021-06-15 DIAGNOSIS — E55.9 HYPOVITAMINOSIS D: ICD-10-CM

## 2021-06-15 DIAGNOSIS — M19.031 ARTHRITIS OF RIGHT WRIST: ICD-10-CM

## 2021-06-15 DIAGNOSIS — D75.839 THROMBOCYTHEMIA: Primary | ICD-10-CM

## 2021-06-15 DIAGNOSIS — N40.1 BENIGN PROSTATIC HYPERPLASIA WITH URINARY HESITANCY: ICD-10-CM

## 2021-06-15 DIAGNOSIS — I10 ESSENTIAL HYPERTENSION: ICD-10-CM

## 2021-06-15 DIAGNOSIS — R73.9 HYPERGLYCEMIA: ICD-10-CM

## 2021-06-15 DIAGNOSIS — R39.11 BENIGN PROSTATIC HYPERPLASIA WITH URINARY HESITANCY: ICD-10-CM

## 2021-06-15 DIAGNOSIS — I51.7 CARDIAC ENLARGEMENT: ICD-10-CM

## 2021-06-15 DIAGNOSIS — Z12.5 SCREENING PSA (PROSTATE SPECIFIC ANTIGEN): ICD-10-CM

## 2021-06-15 DIAGNOSIS — E78.01 FAMILIAL HYPERCHOLESTEROLEMIA: ICD-10-CM

## 2021-06-15 PROCEDURE — 99214 OFFICE O/P EST MOD 30 MIN: CPT | Performed by: FAMILY MEDICINE

## 2021-06-15 RX ORDER — METOPROLOL SUCCINATE 25 MG/1
25 TABLET, EXTENDED RELEASE ORAL DAILY
Qty: 90 TABLET | Refills: 1 | Status: SHIPPED | OUTPATIENT
Start: 2021-06-15 | End: 2021-12-22

## 2021-06-15 RX ORDER — SILDENAFIL CITRATE 20 MG/1
40 TABLET ORAL AS NEEDED
Qty: 20 TABLET | Refills: 3 | Status: SHIPPED | OUTPATIENT
Start: 2021-06-15 | End: 2022-04-26 | Stop reason: SDUPTHER

## 2021-06-15 RX ORDER — ESOMEPRAZOLE MAGNESIUM 40 MG/1
40 CAPSULE, DELAYED RELEASE ORAL
Qty: 90 CAPSULE | Refills: 1 | Status: SHIPPED | OUTPATIENT
Start: 2021-06-15 | End: 2021-12-27 | Stop reason: SDUPTHER

## 2021-06-15 RX ORDER — TAMSULOSIN HYDROCHLORIDE 0.4 MG/1
1 CAPSULE ORAL DAILY
Qty: 90 CAPSULE | Refills: 1 | Status: SHIPPED | OUTPATIENT
Start: 2021-06-15 | End: 2021-12-22

## 2021-06-15 RX ORDER — LISINOPRIL 10 MG/1
10 TABLET ORAL DAILY
Qty: 90 TABLET | Refills: 1 | Status: SHIPPED | OUTPATIENT
Start: 2021-06-15 | End: 2021-12-22

## 2021-06-22 ENCOUNTER — HOSPITAL ENCOUNTER (EMERGENCY)
Facility: HOSPITAL | Age: 71
Discharge: HOME | End: 2021-06-22
Attending: EMERGENCY MEDICINE | Admitting: EMERGENCY MEDICINE

## 2021-06-22 ENCOUNTER — APPOINTMENT (OUTPATIENT)
Dept: CT IMAGING | Facility: HOSPITAL | Age: 71
End: 2021-06-22

## 2021-06-22 ENCOUNTER — TELEPHONE (OUTPATIENT)
Dept: ORTHOPEDIC SURGERY | Facility: CLINIC | Age: 71
End: 2021-06-22

## 2021-06-22 ENCOUNTER — APPOINTMENT (OUTPATIENT)
Dept: GENERAL RADIOLOGY | Facility: HOSPITAL | Age: 71
End: 2021-06-22

## 2021-06-22 VITALS
HEART RATE: 65 BPM | HEIGHT: 69 IN | TEMPERATURE: 96.3 F | OXYGEN SATURATION: 93 % | BODY MASS INDEX: 29.62 KG/M2 | RESPIRATION RATE: 16 BRPM | DIASTOLIC BLOOD PRESSURE: 95 MMHG | WEIGHT: 200 LBS | SYSTOLIC BLOOD PRESSURE: 148 MMHG

## 2021-06-22 DIAGNOSIS — S09.90XA MINOR HEAD INJURY, INITIAL ENCOUNTER: ICD-10-CM

## 2021-06-22 DIAGNOSIS — S60.312A ABRASION OF LEFT THUMB, INITIAL ENCOUNTER: ICD-10-CM

## 2021-06-22 DIAGNOSIS — S01.111A LACERATION OF RIGHT EYEBROW, INITIAL ENCOUNTER: ICD-10-CM

## 2021-06-22 DIAGNOSIS — S42.001A CLOSED DISPLACED FRACTURE OF RIGHT CLAVICLE, UNSPECIFIED PART OF CLAVICLE, INITIAL ENCOUNTER: Primary | ICD-10-CM

## 2021-06-22 PROCEDURE — 73030 X-RAY EXAM OF SHOULDER: CPT

## 2021-06-22 PROCEDURE — 99283 EMERGENCY DEPT VISIT LOW MDM: CPT

## 2021-06-22 PROCEDURE — 70450 CT HEAD/BRAIN W/O DYE: CPT

## 2021-06-22 PROCEDURE — 25010000002 TDAP 5-2.5-18.5 LF-MCG/0.5 SUSPENSION: Performed by: NURSE PRACTITIONER

## 2021-06-22 PROCEDURE — 90715 TDAP VACCINE 7 YRS/> IM: CPT | Performed by: NURSE PRACTITIONER

## 2021-06-22 PROCEDURE — 73000 X-RAY EXAM OF COLLAR BONE: CPT

## 2021-06-22 PROCEDURE — 90471 IMMUNIZATION ADMIN: CPT | Performed by: NURSE PRACTITIONER

## 2021-06-22 RX ORDER — LIDOCAINE HYDROCHLORIDE AND EPINEPHRINE 10; 10 MG/ML; UG/ML
10 INJECTION, SOLUTION INFILTRATION; PERINEURAL ONCE
Status: DISCONTINUED | OUTPATIENT
Start: 2021-06-22 | End: 2021-06-22 | Stop reason: HOSPADM

## 2021-06-22 RX ADMIN — TETANUS TOXOID, REDUCED DIPHTHERIA TOXOID AND ACELLULAR PERTUSSIS VACCINE, ADSORBED 0.5 ML: 5; 2.5; 8; 8; 2.5 SUSPENSION INTRAMUSCULAR at 15:28

## 2021-06-22 NOTE — ED NOTES
Wound on patient right eye brow and left thumb irrigated with normal saline     Chrissie Davis RN  06/22/21 9030

## 2021-06-22 NOTE — DISCHARGE INSTRUCTIONS
Clean abrasion and laceration daily with soap and water, do not apply antibiotic ointment to your right eyebrow  Home to rest, wear sling until follow-up with Dr. Sarah Baxter, call to get an appointment scheduled with her  Ice to painful areas for 20 minutes at a time, 4 times a day  Elevated to help reduce swelling  Tylenol or Motrin as needed for mild-moderate pain-take as instructed on package  Return to er for any worsening or new concerns including increased pain or swelling, headache, change in mental status, visual disturbances, vomiting, signs and symptoms of infection

## 2021-06-22 NOTE — ED PROVIDER NOTES
EMERGENCY DEPARTMENT ENCOUNTER    Room Number:  01/01  Date of encounter:  6/22/2021  PCP: Zoya Newman MD  Historian: Patient      PPE    Patient was placed in face mask in first look. Patient was wearing facemask when I entered the room and throughout our encounter. I wore full protective equipment throughout this patient encounter including a face mask, and gloves. Hand hygiene was performed before donning protective equipment and after removal when leaving the room.        HPI:  Chief Complaint: Fall  A complete HPI/ROS/PMH/PSH/SH/FH are unobtainable due to: Nothing    Context: Zheng Lopez is a 70 y.o. male who arrives to the ED via private vehicle.  Patient presents with c/o moderate, constant, achy right shoulder and clavicle pain status post a fall prior to arrival.  Patient states he was walking in his neighborhood when he tripped over an uneven part of the sidewalk.  He states he did fall and hit his head but denies LOC.  He states he was able to call his wife and she came to pick him up he was able to get up on his own.   Patient also complains of laceration to his right eyebrow, abrasion to his left thumb.  Patient denies headache, vomiting, neck pain, back pain, lower extremity injuries or any symptoms prior to the fall including chest pain, shortness of breath or dizziness.  Patient states that keeping his arm still makes the symptoms better and movement worsens symptoms.          PAST MEDICAL HISTORY  Active Ambulatory Problems     Diagnosis Date Noted   • Hyperlipidemia 06/28/2016   • Essential hypertension 06/28/2016   • SYLWIA (obstructive sleep apnea)    • Osteoarthritis involving multiple joints on both sides of body    • GERD (gastroesophageal reflux disease)    • Diverticula of colon    • Cardiac enlargement 11/13/2017   • ED (erectile dysfunction) of organic origin 11/13/2017   • Asymmetric septal hypertrophy (CMS/HCC) 11/13/2017   • Non-sustained ventricular tachycardia (CMS/HCC)  11/13/2017   • Chronic pain of left knee 05/29/2018   • Primary osteoarthritis of left knee 05/29/2018   • Left lumbar radiculopathy 11/08/2018   • Left thigh pain 11/08/2018   • Benign prostatic hyperplasia with urinary hesitancy 11/13/2019   • Arthritis of left hip 03/02/2020   • Primary localized osteoarthrosis of left hip 05/11/2020   • Arthritis of right wrist 06/15/2021   • Thrombocythemia (CMS/HCC) 06/15/2021     Resolved Ambulatory Problems     Diagnosis Date Noted   • Hypertension    • Cholecystitis    • Impingement of right ulnar nerve    • Cataract of both eyes      Past Medical History:   Diagnosis Date   • Bronchitis    • Chicken pox    • Encounter for annual health examination 03/10/2015   • Fatigue    • Gallbladder disease    • High blood pressure    • History of EKG    • History of TB skin testing    • Measles    • Persistent cough    • Pulse irregularity    • Sexual problems    • Sleep apnea    • Wellness examination 03/10/2015         PAST SURGICAL HISTORY  Past Surgical History:   Procedure Laterality Date   • CATARACT EXTRACTION Bilateral    • CATARACT EXTRACTION Right 2013   • CATARACT EXTRACTION Left 2014   • CHOLECYSTECTOMY     • COLONOSCOPY  04/16/2015    Dr. Ritchie   • COLONOSCOPY  03/07/2007   • ELBOW PROCEDURE Right     ulnar nerve impingement release   • FOOT SURGERY Right     right foot (twice)   • GALLBLADDER SURGERY  2009   • KNEE MENISCAL REPAIR Right 2010    torn right meniscus   • LASIK  2006   • RHINOPLASTY  1986   • ROTATOR CUFF REPAIR Left 2010   • SHOULDER ARTHROSCOPY W/ LABRAL REPAIR Right 2015    Dr. Sahil lay repair right shoulder   • SHOULDER LIGAMENT REPAIR Right    • TOTAL HIP ARTHROPLASTY REVISION Left 05/11/2020   • TOTAL KNEE ARTHROPLASTY Right 2011   • WRIST ARTHROPLASTY Right 08/25/2020         FAMILY HISTORY  Family History   Problem Relation Age of Onset   • Dementia Mother    • Migraines Mother    • Osteoporosis Mother    • Stroke Mother 85        early 80s    • Heart disease Mother         tachycardia and had ppm   • Alzheimer's disease Mother    • Supraventricular tachycardia Mother    • Alcohol abuse Father    • Depression Father    • Migraines Father    • Tuberculosis Father    • Cirrhosis Father         41 YO   • Cancer Paternal Uncle 67        mets but unsure of type of ca  BACK CANCER   • Stroke Brother    • Colon cancer Neg Hx    • Prostate cancer Neg Hx    • Heart attack Neg Hx          SOCIAL HISTORY  Social History     Socioeconomic History   • Marital status:      Spouse name: Not on file   • Number of children: 2   • Years of education: Not on file   • Highest education level: Not on file   Tobacco Use   • Smoking status: Never Smoker   • Smokeless tobacco: Never Used   Substance and Sexual Activity   • Alcohol use: No     Comment: none in 5 yrs, Nov 2011 - last drink   • Drug use: No   • Sexual activity: Yes     Partners: Female         ALLERGIES  Patient has no known allergies.        REVIEW OF SYSTEMS  Review of Systems     All systems reviewed and negative except for those discussed in HPI.        PHYSICAL EXAM    ED Triage Vitals   Temp Heart Rate Resp BP SpO2   06/22/21 1132 06/22/21 1132 06/22/21 1356 06/22/21 1133 06/22/21 1132   96.3 °F (35.7 °C) 65 16 118/72 93 %       Physical Exam  GENERAL: Well appearing, non-toxic appearing, not distressed  HENT: normocephalic, atraumatic  EYES: no scleral icterus, PERRL,EOMI  CV: regular rhythm, regular rate, no murmur  RESPIRATORY: normal effort, CTAB  ABDOMEN: soft   MUSCULOSKELETAL: no deformity  Tenderness to the right clavicle, no tenderness to the remaining right arm, soft compartments to the right forearm, 2+ radial pulse  No cervical, thoracic or lumbar tenderness  NEURO: alert, moves all extremities, follows commands, mental status normal/baseline  SKIN: warm, dry, no rash, 2 cm abrasion to the right eyebrow, abrasion to the left thumb  Psych: Appropriate mood and affect  Nursing notes and  vital signs reviewed      LAB RESULTS  No results found for this or any previous visit (from the past 24 hour(s)).    Ordered the above labs and independently reviewed the results.      RADIOLOGY  XR Shoulder 2+ View Right, XR Clavicle Right    Result Date: 6/22/2021  EXAMINATIONS: RIGHT CLAVICLE AND RIGHT SHOULDER RADIOGRAPHS  HISTORY: 70-year-old male status post fall with right shoulder and clavicle pain.  FINDINGS: AP internal and external rotation radiographs of the right shoulder were obtained in conjunction with AP and AP cephalad radiographs of the right clavicle. There is normal location of the humeral head with respect to the glenoid. There is a fracture of the distal right clavicle with approximately 6 mm of superior displacement. No other abnormalities appreciated.      1. There is a distal right clavicular fracture with superior displacement as described. 2. No abnormality of the right shoulder/glenohumeral joint is otherwise appreciated.  This report was finalized on 6/22/2021 4:04 PM by Dr. Zev Stuart M.D.      CT Head Without Contrast    Result Date: 6/22/2021  EMERGENCY NONCONTRAST HEAD CT 06/22/2021  CLINICAL HISTORY: Patient fell, laceration above right eye.  TECHNIQUE: Spiral CT images were obtained from the base of the skull to the vertex without intravenous contrast. Images were reformatted and submitted in 3 mm thick axial CT sections with brain algorithm and 2 mm thick axial CT sections with high-resolution bone algorithm and 2 mm thick sagittal and coronal reconstructions were performed and submitted in brain algorithm.  COMPARISON: This is correlated to prior noncontrast head CT 12/03/2011.  FINDINGS: There is some minimal patchy low-density in the frontal periventricular white matter, consistent with minimal small vessel disease. The remainder of the brain parenchyma is normal in attenuation. The ventricles are normal in size. I see no focal mass effect. There is no midline shift. No  extra-axial fluid collections are identified and there is no evidence of acute intracranial hemorrhage. No acute skull fracture is identified. The calvarium and skull base are normal in appearance. There is some mild mucosal thickening in the right ethmoid sinus, otherwise paranasal sinuses and mastoid air cells and middle ear cavities are clear. The orbits are unremarkable.      1. There is mild small vessel disease in the frontal periventricular white matter and mild right ethmoid sinus mucosal thickening. 2. There is a scalp hematoma and scalp laceration in the right supraorbital scalp from today's head trauma. The remainder of the head CT is within normal limits. Specifically, no acute skull fracture or intracranial hemorrhage is identified.  Radiation dose reduction techniques were utilized, including automated exposure control and exposure modulation based on body size.  This report was finalized on 6/22/2021 3:17 PM by Dr. Hebert Cochran M.D.        I ordered the above noted radiological studies and viewed the images on the PACS system.       MEDICAL RECORD REVIEW  Medical records reviewed in [facility], patient last saw PMD Miracle 15, 2021.      PROCEDURES    Laceration Repair    Date/Time: 6/22/2021 3:00 PM  Performed by: Ana María Santizo APRN  Authorized by: Juan Carlos Hwang MD     Consent:     Consent obtained:  Verbal    Consent given by:  Patient    Risks discussed:  Infection    Alternatives discussed:  No treatment  Anesthesia (see MAR for exact dosages):     Anesthesia method:  None  Laceration details:     Location:  Face    Face location:  R eyebrow    Length (cm):  2  Repair type:     Repair type:  Simple  Treatment:     Area cleansed with:  Saline and Hibiclens    Amount of cleaning:  Standard    Irrigation solution:  Sterile saline    Irrigation method:  Syringe  Skin repair:     Repair method:  Tissue adhesive  Post-procedure details:     Dressing:  Open (no dressing)    Patient tolerance of  procedure:  Tolerated well, no immediate complications            DIFFERENTIAL DIAGNOSIS  Differential Diagnosis for Upper Extremity Problem/Injury include but are not limited to the following:    Contusion of the shoulder/arm/elbow/forearm/wrist/hand/digits  Dislocation of the shoulder/elbow/wrist/digits  Sprains of the shoulder/elbow/wrist/hand/digits  Fractures (open/closed) of the shoulder, humerus, radius, ulna, hand or digits  Laceration or abrasions        PROGRESS, DATA ANALYSIS, CONSULTS, AND MEDICAL DECISION MAKING        ED Course as of Jun 22 1620   Tue Jun 22, 2021   1250  Discussed with Dr. Cohcran regarding the CT head results which revealed no fracture or hemorrhage seen  See dictation for official radiology interpretation.        [MS]   1512 Patient updated on unremarkable CT head which showed no bleed or fracture, did discuss with patient that he does have a right clavicle fracture.  Discussed with patient that we will clean and repair the laceration to his right eyebrow.  He will be placed in a sling with follow-up with his orthopedic doctor Dr. Sarah Baxter.  Patient will also have his tetanus shot up dated.  Patient denies need for pain medication at this time.    [MS]   1513 Reviewed pt's history and workup with Dr. Hwang.  After a bedside evaluation, they agree with the plan of care.          [MS]      ED Course User Index  [MS] Ana María Santizo APRN     Discussed plan for discharge, as there is no emergent indication for admission. Pt/family is agreeable and understands need for follow up and repeat testing.  Pt is aware that discharge does not mean that nothing is wrong but it indicates no emergency is present that requires admission and they must continue care with follow-up as given below or physician of their choice.   Patient/Family voiced understanding of above instructions.  Patient discharged in stable condition.    DIAGNOSIS  Final diagnoses:   Closed displaced fracture of right  clavicle, unspecified part of clavicle, initial encounter   Laceration of right eyebrow, initial encounter   Abrasion of left thumb, initial encounter   Minor head injury, initial encounter       FOLLOW UP   Latanya Baxter MD  [address] [place] [id number] [phone]    Call today        RX     Medication List      No changes were made to your prescriptions during this visit.           MEDICATIONS GIVEN IN ED    Medications   lidocaine 1% - EPINEPHrine 1:[id number] (XYLOCAINE W/EPI) 1 %-1:178735 injection 10 mL (has no administration in time range)   Tdap (BOOSTRIX) injection 0.5 mL (0.5 mL Intramuscular Given 6/22/21 1528)           COURSE & MEDICAL DECISION MAKING  Any/All labs and Any/All Imaging studies that were ordered were reviewed and are noted above.  Results were reviewed/discussed with the patient and they were also made aware of online access.    Pt also made aware that some labs, such as cultures, will not be resulted during ER visit and follow up with PMD is necessary.        Ana María Santizo, APRN  06/22/21 1620       Ana María Santizo, APRN  06/27/21 1252

## 2021-06-22 NOTE — ED PROVIDER NOTES
Brief history of present illness: 70-year-old male reports mechanical fall while exercising today with injuries to right shoulder and forehead.    Physical exam:   ED Triage Vitals   Temp Heart Rate Resp BP SpO2   06/22/21 1132 06/22/21 1132 06/22/21 1356 06/22/21 1133 06/22/21 1132   96.3 °F (35.7 °C) 65 16 118/72 93 %      Temp src Heart Rate Source Patient Position BP Location FiO2 (%)   -- 06/22/21 1132 06/22/21 1132 06/22/21 1132 --    Monitor Standing Left arm      Alert appropriate.  1.5 cm laceration in his right eyebrow with no other evidence of basilar skull fracture.  Painless range of motion the neck is noted.  Face symmetric.  Some discomfort with palpation around the distal clavicle and right shoulder with painful range of motion.  No tachypnea or increased work of breathing noted.  Pink warm well perfused with strong radial pulse.  Abdomen soft and moves bilateral lower extremities without difficulty.    MDM:  XR Shoulder 2+ View Right, XR Clavicle Right    Result Date: 6/22/2021  Narrative: EXAMINATIONS: RIGHT CLAVICLE AND RIGHT SHOULDER RADIOGRAPHS  HISTORY: 70-year-old male status post fall with right shoulder and clavicle pain.  FINDINGS: AP internal and external rotation radiographs of the right shoulder were obtained in conjunction with AP and AP cephalad radiographs of the right clavicle. There is normal location of the humeral head with respect to the glenoid. There is a fracture of the distal right clavicle with approximately 6 mm of superior displacement. No other abnormalities appreciated.      Impression: 1. There is a distal right clavicular fracture with superior displacement as described. 2. No abnormality of the right shoulder/glenohumeral joint is otherwise appreciated.      CT Head Without Contrast    Result Date: 6/22/2021  Narrative: EMERGENCY NONCONTRAST HEAD CT 06/22/2021  CLINICAL HISTORY: Patient fell, laceration above right eye.  TECHNIQUE: Spiral CT images were obtained from  the base of the skull to the vertex without intravenous contrast. Images were reformatted and submitted in 3 mm thick axial CT sections with brain algorithm and 2 mm thick axial CT sections with high-resolution bone algorithm and 2 mm thick sagittal and coronal reconstructions were performed and submitted in brain algorithm.  COMPARISON: This is correlated to prior noncontrast head CT 12/03/2011.  FINDINGS: There is some minimal patchy low-density in the frontal periventricular white matter, consistent with minimal small vessel disease. The remainder of the brain parenchyma is normal in attenuation. The ventricles are normal in size. I see no focal mass effect. There is no midline shift. No extra-axial fluid collections are identified and there is no evidence of acute intracranial hemorrhage. No acute skull fracture is identified. The calvarium and skull base are normal in appearance. There is some mild mucosal thickening in the right ethmoid sinus, otherwise paranasal sinuses and mastoid air cells and middle ear cavities are clear. The orbits are unremarkable.      Impression: 1. There is mild small vessel disease in the frontal periventricular white matter and mild right ethmoid sinus mucosal thickening. 2. There is a scalp hematoma and scalp laceration in the right supraorbital scalp from today's head trauma. The remainder of the head CT is within normal limits. Specifically, no acute skull fracture or intracranial hemorrhage is identified.  Radiation dose reduction techniques were utilized, including automated exposure control and exposure modulation based on body size.       Agree with plan for sling and outpatient orthopedic follow-up for right shoulder injury as well as wound cleansing and closure for eyebrow laceration.    I have seen and personally evaluated this patient, discussed the case with the treating advanced practice provider, and reviewed their note. I was involved in the medical decision making  during the evaluation, testing and disposition planning for this patient.     Juan Carlos Hwang MD  06/22/21 2940

## 2021-06-22 NOTE — ED TRIAGE NOTES
Pt wearing mask, RN wearing mask.    Pt reports he tripped on the sidewalk while walking. Pt landed on right shoulder and hit head. Pt c/o worsening right shoulder pain and lac on right eyebrow. Bleeding controlled at this time. Pt has minor abrasion to left thumb. Denies neck pain.    Pt on low dose aspirin, denies taking blood thinners.    Pt reports platelet levels low and is following up with hematologist.

## 2021-06-22 NOTE — TELEPHONE ENCOUNTER
I looked at the x-rays we will treat this in a sling I would like him to see Rustam next week in the meantime pennying and ice

## 2021-06-23 ENCOUNTER — TELEPHONE (OUTPATIENT)
Dept: ORTHOPEDIC SURGERY | Facility: CLINIC | Age: 71
End: 2021-06-23

## 2021-06-23 NOTE — TELEPHONE ENCOUNTER
I looked at his x-rays again, I think he can ice sling.  Remind him not to lift push or pull much with that arm and we can see him when he returns

## 2021-06-23 NOTE — TELEPHONE ENCOUNTER
As it turns out, he will be back late next week.  We scheduled him with Rustam and then she can make determination for follow up

## 2021-06-23 NOTE — TELEPHONE ENCOUNTER
PATIENT HAS FALLEN AND BROKEN CLAVICLE. HAS SEEN YOU FOR BOTH SHOULDERS IN THE PAST. I SCHEDULED HIM FOR July 8 AT  BUT PATIENT IS WONDERING IF YOU CAN SEE HIM SOONER.

## 2021-06-23 NOTE — TELEPHONE ENCOUNTER
Spoke with patient.  He will be on vacation all of next week.  So, my question is, he is in a sling and icing, is this something that can wait for when he gets back from vacation?  Or, do you think he needs to be seen prior to his vacation?  He was just in ER yesterday.

## 2021-07-01 ENCOUNTER — OFFICE VISIT (OUTPATIENT)
Dept: ORTHOPEDIC SURGERY | Facility: CLINIC | Age: 71
End: 2021-07-01

## 2021-07-01 VITALS — BODY MASS INDEX: 29.62 KG/M2 | HEIGHT: 69 IN | TEMPERATURE: 96.2 F | WEIGHT: 200 LBS

## 2021-07-01 DIAGNOSIS — S42.031D CLOSED DISPLACED FRACTURE OF ACROMIAL END OF RIGHT CLAVICLE WITH ROUTINE HEALING, SUBSEQUENT ENCOUNTER: Primary | ICD-10-CM

## 2021-07-01 DIAGNOSIS — M89.8X1 PAIN OF RIGHT CLAVICLE: ICD-10-CM

## 2021-07-01 PROCEDURE — 99213 OFFICE O/P EST LOW 20 MIN: CPT | Performed by: NURSE PRACTITIONER

## 2021-07-01 PROCEDURE — 73000 X-RAY EXAM OF COLLAR BONE: CPT | Performed by: NURSE PRACTITIONER

## 2021-07-01 NOTE — PROGRESS NOTES
[facility] [place] [facility]  New Patient      Patient Name: Zheng Lopez  : 1950  Primary Care Physician: Zoya Newman MD        Chief Complaint: Right shoulder pain.    HPI:   Zheng Lopez is a 70 y.o. year old who presents today for evaluation of right shoulder pain following a fall.  Patient was walking down the sidewalk where there was an irregularity in the walkway and he tripped and fell landing directly onto the right shoulder.     This occurred on 2021, patient was seen and evaluated at [facility] emergency department.  He sustained a laceration to his right eyebrow, imaging suggested a distal right clavicle fracture with superior displacement of a fragment.  Patient is doing well today, he is wearing his sling, he is not having any significant amount of pain.  The pain he is having is controlled with Tylenol.  She has seen both Dr. Baxter and Dr. España in the past for various orthopedic complaints.  Here today with new x-rays for further evaluation and treatment.    Pain is rated 2 out of 10, there is some bruising.  Overall he is doing well, patient is retired he is right-hand dominant.      Past Medical/Surgical, Social and Family History:  I have reviewed and/or updated pertinent history as noted in the medical record including:  Past Medical History:   Diagnosis Date   • Asymmetric septal hypertrophy (CMS/HCC)    • Bronchitis     Bronchitis / URI   • Cataract of both eyes    • Chicken pox    • Cholecystitis    • Diverticula of colon    • Encounter for annual health examination 03/10/2015    Annual Health Assessment   • Fatigue    • Gallbladder disease    • GERD (gastroesophageal reflux disease)    • High blood pressure    • History of EKG     10/30/15, 13, 12, 10/18/10, 09   • History of TB skin testing     TB Skin Test: 14 Completed, 12 Patient Declines, 10/18/10 Completed, 08 Completed   • Hyperlipidemia    • Hypertension     still taking meds    • Impingement of right ulnar nerve    • Measles    • Osteoarthritis involving multiple joints on both sides of body    • Osteoarthritis involving multiple joints on both sides of body    • Persistent cough    • Pulse irregularity     Irregular pulse PER CARDIO   • Sexual problems     Sexual problems / enjoyment ED   • Sleep apnea     has c-pap   • Wellness examination 03/10/2015    Annual Wellness Visit     Past Surgical History:   Procedure Laterality Date   • CATARACT EXTRACTION Bilateral    • CATARACT EXTRACTION Right 2013   • CATARACT EXTRACTION Left 2014   • CHOLECYSTECTOMY     • COLONOSCOPY  04/16/2015    Dr. Ritchie   • COLONOSCOPY  03/07/2007   • ELBOW PROCEDURE Right     ulnar nerve impingement release   • FOOT SURGERY Right     right foot (twice)   • GALLBLADDER SURGERY  2009   • KNEE MENISCAL REPAIR Right 2010    torn right meniscus   • LASIK  2006   • RHINOPLASTY  1986   • ROTATOR CUFF REPAIR Left 2010   • SHOULDER ARTHROSCOPY W/ LABRAL REPAIR Right 2015    Dr. Sahil lay repair right shoulder   • SHOULDER LIGAMENT REPAIR Right    • TOTAL HIP ARTHROPLASTY REVISION Left 05/11/2020   • TOTAL KNEE ARTHROPLASTY Right 2011   • WRIST ARTHROPLASTY Right 08/25/2020     Social History     Occupational History   • Occupation: teacher2   Tobacco Use   • Smoking status: Never Smoker   • Smokeless tobacco: Never Used   Vaping Use   • Vaping Use: Never used   Substance and Sexual Activity   • Alcohol use: No     Comment: none in 5 yrs, Nov 2011 - last drink   • Drug use: No   • Sexual activity: Yes     Partners: Female      Social History     Social History Narrative   • Not on file     Family History   Problem Relation Age of Onset   • Dementia Mother    • Migraines Mother    • Osteoporosis Mother    • Stroke Mother 85        early 80s   • Heart disease Mother         tachycardia and had ppm   • Alzheimer's disease Mother    • Supraventricular tachycardia Mother    • Alcohol abuse Father    • Depression Father     • Migraines Father    • Tuberculosis Father    • Cirrhosis Father         43 YO   • Cancer Paternal Uncle 67        mets but unsure of type of ca  BACK CANCER   • Stroke Brother    • Colon cancer Neg Hx    • Prostate cancer Neg Hx    • Heart attack Neg Hx        Allergies: No Known Allergies    Medications:   Home Medications:  Current Outpatient Medications on File Prior to Visit   Medication Sig   • aspirin 81 MG tablet Take  by mouth daily.   • atorvastatin (LIPITOR) 40 MG tablet TAKE 1 TABLET DAILY   • Cyanocobalamin (VITAMIN B-12 PO) Take  by mouth Daily.   • esomeprazole (nexIUM) 40 MG capsule Take 1 capsule by mouth Every Morning Before Breakfast.   • Flaxseed, Linseed, (FLAX SEED OIL) 1000 MG capsule Take 1 capsule by mouth.   • lisinopril (PRINIVIL,ZESTRIL) 10 MG tablet Take 1 tablet by mouth Daily.   • Magnesium 400 MG capsule Take  by mouth 2 (Two) Times a Day.   • metoprolol succinate XL (TOPROL-XL) 25 MG 24 hr tablet Take 1 tablet by mouth Daily.   • Multiple Vitamin tablet Take  by mouth.   • sildenafil (REVATIO) 20 MG tablet Take 2 tablets by mouth As Needed (erectile dysfunction).   • tamsulosin (FLOMAX) 0.4 MG capsule 24 hr capsule Take 1 capsule by mouth Daily.     No current facility-administered medications on file prior to visit.         ROS:  14 point review of systems was negative except as listed in the HPI and major were negative per the patient.    Physical Exam:   70 y.o. male  Body mass index is 29.53 kg/m²., 90.7 kg (200 lb)  Vitals:    07/01/21 0951   Temp: 96.2 °F (35.7 °C)     General: Alert, cooperative, appears well and in no observable distress. Appears stated age and BMI as listed above.  HEENT: Normocephalic, atraumatic on external visual inspection.  CV: No significant peripheral edema.  Respiratory: Normal respiratory effort.  Skin: Warm & well perfused; appropriate skin turgor.  Psych: Appropriate mood & affect.  Neuro: Gross sensation and motor intact in affected  extremity/extremities.  Vascular: Peripheral pulses palpable in affected extremity/extremities.     MSK Exam:  On the right shoulder there is mild bruising over the anterior chest wall correlating to the clavicle area.  No significant swelling, there is mild tenderness to palpation at the distal aspect of the clavicle.  Range of motion in the shoulder was not specifically assessed due to acute fracture, but he does have full range of motion with flexion extension pronation and supination of the elbow, he does have maintained range of motion in the wrist however there is significant history of arthritis in the right wrist.  Does not endorse any new or worsening pain there.     Radiology:    The following X-rays were ordered/reviewed today to evaluate the patient's symptoms: Clavicle: 2 Views of the right clavicle show There is an acute fracture of the distal aspect of the clavicle with superior displacement of a fragment.  On the alternate view there appears to be overall acceptable alignment of the clavicle, no specific trauma appreciated in the visualized portions of the shoulder joint.  Did compare the images today with images from the emergency room and appreciate no significant changes in the fracture pattern.    Imaging Results (Most Recent)     Procedure Component Value Units Date/Time    XR Clavicle Right [[id number]] Resulted: 07/01/21 0911     Updated: 07/01/21 0943    Impression:      Ordering physician's impression is located in the Encounter Note dated 07/01/21. X-ray performed in the DR room.            Procedure: Not applicable.      Misc. Data/Labs: N/A    Assessment:    Right clavicle fracture status post fall.      ICD-10-CM ICD-9-CM   1. Closed displaced fracture of acromial end of right clavicle with routine healing, subsequent encounter  S42.031D V54.11   2. Pain of right clavicle  M89.8X1 733.90        Plan:    Discussed the plan of care with the patient, I do not think there is anything surgical  here.  We have we can manage the fracture with conservative treatments.  Patient was instructed on gentle pendulum swings for the shoulder, he can move the elbow and wrist about full range of motion.  He was instructed not to lift push pull or carry any objects heavier than a cup of coffee at this time.  Can continue with the Tylenol as needed for pain, he can use ice as needed for the joint.  I explicitly explained no overhead lifting or motion and no motions away from the body at this time.  He should use the sling when out and about to protect himself otherwise he can wear it at home as needed.    He was scheduled to see Dr. Baxter in 1 more week but I do not think that we will offer anything additional at that visit so I think it is reasonable to push that out a couple more weeks and get new x-rays at the follow-up with her.    Orders Placed This Encounter   Procedures   • XR Clavicle Right     No orders of the defined types were placed in this encounter.    Return in about 3 weeks (around 7/22/2021).    Patient encouraged to call with questions or concerns prior to follow up. , Patient instructed on use of ICE therapy PRN for pain and swelling. and Will discuss with attending surgeon as needed.       Rustam Trujillo, APRN

## 2021-07-14 ENCOUNTER — CONSULT (OUTPATIENT)
Dept: ONCOLOGY | Facility: CLINIC | Age: 71
End: 2021-07-14

## 2021-07-14 ENCOUNTER — LAB (OUTPATIENT)
Dept: OTHER | Facility: HOSPITAL | Age: 71
End: 2021-07-14

## 2021-07-14 VITALS
RESPIRATION RATE: 16 BRPM | SYSTOLIC BLOOD PRESSURE: 160 MMHG | WEIGHT: 203.6 LBS | HEIGHT: 69 IN | HEART RATE: 57 BPM | OXYGEN SATURATION: 96 % | BODY MASS INDEX: 30.16 KG/M2 | TEMPERATURE: 98 F | DIASTOLIC BLOOD PRESSURE: 94 MMHG

## 2021-07-14 DIAGNOSIS — D69.6 DECREASED PLATELET COUNT (HCC): Primary | ICD-10-CM

## 2021-07-14 DIAGNOSIS — D69.6 THROMBOCYTOPENIA (HCC): Primary | ICD-10-CM

## 2021-07-14 DIAGNOSIS — Z11.59 ENCOUNTER FOR SCREENING FOR OTHER VIRAL DISEASES: ICD-10-CM

## 2021-07-14 DIAGNOSIS — D69.6 THROMBOCYTOPENIA (HCC): ICD-10-CM

## 2021-07-14 LAB
ALBUMIN SERPL-MCNC: 4.3 G/DL (ref 3.5–5.2)
ALBUMIN/GLOB SERPL: 1.5 G/DL
ALP SERPL-CCNC: 92 U/L (ref 39–117)
ALT SERPL W P-5'-P-CCNC: 23 U/L (ref 1–41)
ANION GAP SERPL CALCULATED.3IONS-SCNC: 11.3 MMOL/L (ref 5–15)
AST SERPL-CCNC: 28 U/L (ref 1–40)
BASOPHILS # BLD AUTO: 0.03 10*3/MM3 (ref 0–0.2)
BASOPHILS NFR BLD AUTO: 0.5 % (ref 0–1.5)
BILIRUB SERPL-MCNC: 0.8 MG/DL (ref 0–1.2)
BUN SERPL-MCNC: 19 MG/DL (ref 8–23)
BUN/CREAT SERPL: 18.4 (ref 7–25)
CALCIUM SPEC-SCNC: 9.3 MG/DL (ref 8.6–10.5)
CHLORIDE SERPL-SCNC: 98 MMOL/L (ref 98–107)
CLUMPED PLATELETS: PRESENT
CO2 SERPL-SCNC: 25.7 MMOL/L (ref 22–29)
CREAT SERPL-MCNC: 1.03 MG/DL (ref 0.76–1.27)
DEPRECATED RDW RBC AUTO: 40 FL (ref 37–54)
EOSINOPHIL # BLD AUTO: 0.25 10*3/MM3 (ref 0–0.4)
EOSINOPHIL NFR BLD AUTO: 4.2 % (ref 0.3–6.2)
ERYTHROCYTE [DISTWIDTH] IN BLOOD BY AUTOMATED COUNT: 11.7 % (ref 12.3–15.4)
GFR SERPL CREATININE-BSD FRML MDRD: 71 ML/MIN/1.73
GLOBULIN UR ELPH-MCNC: 2.8 GM/DL
GLUCOSE SERPL-MCNC: 99 MG/DL (ref 65–99)
HBV SURFACE AG SERPL QL IA: NORMAL
HCT VFR BLD AUTO: 43.6 % (ref 37.5–51)
HCV AB SER DONR QL: NORMAL
HGB BLD-MCNC: 15.4 G/DL (ref 13–17.7)
IMM GRANULOCYTES # BLD AUTO: 0.03 10*3/MM3 (ref 0–0.05)
IMM GRANULOCYTES NFR BLD AUTO: 0.5 % (ref 0–0.5)
LYMPHOCYTES # BLD AUTO: 1.89 10*3/MM3 (ref 0.7–3.1)
LYMPHOCYTES NFR BLD AUTO: 31.7 % (ref 19.6–45.3)
MCH RBC QN AUTO: 32.7 PG (ref 26.6–33)
MCHC RBC AUTO-ENTMCNC: 35.3 G/DL (ref 31.5–35.7)
MCV RBC AUTO: 92.6 FL (ref 79–97)
MONOCYTES # BLD AUTO: 0.54 10*3/MM3 (ref 0.1–0.9)
MONOCYTES NFR BLD AUTO: 9.1 % (ref 5–12)
NEUTROPHILS NFR BLD AUTO: 3.22 10*3/MM3 (ref 1.7–7)
NEUTROPHILS NFR BLD AUTO: 54 % (ref 42.7–76)
NRBC BLD AUTO-RTO: 0 /100 WBC (ref 0–0.2)
PLATELET # BLD AUTO: 96 10*3/MM3 (ref 140–450)
PMV BLD AUTO: 11.3 FL (ref 6–12)
POTASSIUM SERPL-SCNC: 4.4 MMOL/L (ref 3.5–5.2)
PROT SERPL-MCNC: 7.1 G/DL (ref 6–8.5)
RBC # BLD AUTO: 4.71 10*6/MM3 (ref 4.14–5.8)
RBC MORPH BLD: NORMAL
SODIUM SERPL-SCNC: 135 MMOL/L (ref 136–145)
WBC # BLD AUTO: 5.96 10*3/MM3 (ref 3.4–10.8)
WBC MORPH BLD: NORMAL

## 2021-07-14 PROCEDURE — 85007 BL SMEAR W/DIFF WBC COUNT: CPT | Performed by: INTERNAL MEDICINE

## 2021-07-14 PROCEDURE — 86704 HEP B CORE ANTIBODY TOTAL: CPT | Performed by: INTERNAL MEDICINE

## 2021-07-14 PROCEDURE — 36415 COLL VENOUS BLD VENIPUNCTURE: CPT

## 2021-07-14 PROCEDURE — 80053 COMPREHEN METABOLIC PANEL: CPT | Performed by: INTERNAL MEDICINE

## 2021-07-14 PROCEDURE — 99205 OFFICE O/P NEW HI 60 MIN: CPT | Performed by: INTERNAL MEDICINE

## 2021-07-14 PROCEDURE — 86038 ANTINUCLEAR ANTIBODIES: CPT | Performed by: INTERNAL MEDICINE

## 2021-07-14 PROCEDURE — 87340 HEPATITIS B SURFACE AG IA: CPT | Performed by: INTERNAL MEDICINE

## 2021-07-14 PROCEDURE — 85025 COMPLETE CBC W/AUTO DIFF WBC: CPT | Performed by: INTERNAL MEDICINE

## 2021-07-14 PROCEDURE — 86803 HEPATITIS C AB TEST: CPT | Performed by: INTERNAL MEDICINE

## 2021-07-14 RX ORDER — PHENOL 1.4 %
10 AEROSOL, SPRAY (ML) MUCOUS MEMBRANE NIGHTLY
COMMUNITY

## 2021-07-14 RX ORDER — PHENOL 1.4 %
600 AEROSOL, SPRAY (ML) MUCOUS MEMBRANE DAILY
COMMUNITY

## 2021-07-14 RX ORDER — DIPHENHYDRAMINE HCL 50 MG
50 CAPSULE ORAL EVERY 6 HOURS PRN
COMMUNITY

## 2021-07-15 LAB
ANA SER QL: NEGATIVE
HBV CORE AB SERPL QL IA: NEGATIVE

## 2021-07-18 NOTE — PROGRESS NOTES
REASON FOR CONSULTATION/CHIEF COMPLAINT:     Evaluation & management for thrombocytopenia                             REQUESTING PHYSICIAN: Zoya Newman MD  RECORDS OBTAINED:  Records of the patients history including those from the electronic medical record were reviewed and summarized in detail.    HISTORY OF PRESENT ILLNESS:    The patient is a 70 y.o. year old male who is here for evaluation for thrombocytopenia. On chart review, patient appears to have chronic thrombocytopenia since 2015 ranging between 90s - 200s.Patient denies any excessive bleeding or bruising.   The CBC from today show platelet count of 96k, Hb/Hct 15.4/43.6, wbc of 5.96 with normal differential. Patient denies any fever, chills, night sweats or weight loss. No lymphadenopathy. Denies any history of autoimmune condition.   Patient reports of significant alcohol abuse for > 15 years, but has not had much drinks in last 5-10 years. Denies ever being diagnosed with liver cirrhosis or any other liver/spleen disease. No family history of blood disorders.     Of note, patient recently tripped & fell injuring his right shoulder & right eyebrow. Right shoulder is still in sling.       Past Medical History:   Diagnosis Date   • Asymmetric septal hypertrophy (CMS/HCC)    • Bronchitis     Bronchitis / URI   • Cataract of both eyes    • Chicken pox    • Cholecystitis    • Diverticula of colon    • Encounter for annual health examination 03/10/2015    Annual Health Assessment   • Fatigue    • Gallbladder disease    • GERD (gastroesophageal reflux disease)    • High blood pressure    • History of EKG     10/30/15, 09/19/13, 09/14/12, 10/18/10, 05/04/09   • History of TB skin testing     TB Skin Test: 01/09/14 Completed, 09/14/12 Patient Declines, 10/18/10 Completed, 03/05/08 Completed   • Hyperlipidemia    • Hypertension     still taking meds   • Impingement of right ulnar nerve    • Measles    • Osteoarthritis involving multiple joints on both sides of  body    • Osteoarthritis involving multiple joints on both sides of body    • Persistent cough    • Pulse irregularity     Irregular pulse PER CARDIO   • Sexual problems     Sexual problems / enjoyment ED   • Sleep apnea     has c-pap   • Wellness examination 03/10/2015    Annual Wellness Visit     Past Surgical History:   Procedure Laterality Date   • CATARACT EXTRACTION Bilateral    • CATARACT EXTRACTION Right 2013   • CATARACT EXTRACTION Left 2014   • CHOLECYSTECTOMY     • COLONOSCOPY  04/16/2015    Dr. Ritchie   • COLONOSCOPY  03/07/2007   • ELBOW PROCEDURE Right     ulnar nerve impingement release   • FOOT SURGERY Right     right foot (twice)   • GALLBLADDER SURGERY  2009   • KNEE MENISCAL REPAIR Right 2010    torn right meniscus   • LASIK  2006   • RHINOPLASTY  1986   • ROTATOR CUFF REPAIR Left 2010   • SHOULDER ARTHROSCOPY W/ LABRAL REPAIR Right 2015    Dr. Baxter  labhannah repair right shoulder   • SHOULDER LIGAMENT REPAIR Right    • TOTAL HIP ARTHROPLASTY REVISION Left 05/11/2020   • TOTAL KNEE ARTHROPLASTY Right 2011   • WRIST ARTHROPLASTY Right 08/25/2020       MEDICATIONS    Current Outpatient Medications:   •  aspirin 81 MG tablet, Take  by mouth daily., Disp: , Rfl:   •  atorvastatin (LIPITOR) 40 MG tablet, TAKE 1 TABLET DAILY, Disp: 90 tablet, Rfl: 1  •  calcium carbonate (OS-TOMAS) 600 MG tablet, Take 600 mg by mouth Daily., Disp: , Rfl:   •  Cyanocobalamin (VITAMIN B-12 PO), Take  by mouth Daily., Disp: , Rfl:   •  diphenhydrAMINE (BENADRYL) 50 MG capsule, Take 50 mg by mouth Every 6 (Six) Hours As Needed for Itching., Disp: , Rfl:   •  esomeprazole (nexIUM) 40 MG capsule, Take 1 capsule by mouth Every Morning Before Breakfast., Disp: 90 capsule, Rfl: 1  •  Flaxseed, Linseed, (FLAX SEED OIL) 1000 MG capsule, Take 1 capsule by mouth., Disp: , Rfl:   •  lisinopril (PRINIVIL,ZESTRIL) 10 MG tablet, Take 1 tablet by mouth Daily., Disp: 90 tablet, Rfl: 1  •  Magnesium 400 MG capsule, Take  by mouth 2 (Two)  Times a Day., Disp: , Rfl:   •  Melatonin 10 MG tablet, Take  by mouth Every Night., Disp: , Rfl:   •  metoprolol succinate XL (TOPROL-XL) 25 MG 24 hr tablet, Take 1 tablet by mouth Daily., Disp: 90 tablet, Rfl: 1  •  Multiple Vitamin tablet, Take  by mouth., Disp: , Rfl:   •  sildenafil (REVATIO) 20 MG tablet, Take 2 tablets by mouth As Needed (erectile dysfunction)., Disp: 20 tablet, Rfl: 3  •  tamsulosin (FLOMAX) 0.4 MG capsule 24 hr capsule, Take 1 capsule by mouth Daily., Disp: 90 capsule, Rfl: 1    ALLERGIES:   No Known Allergies    SOCIAL HISTORY:       Social History     Socioeconomic History   • Marital status:      Spouse name: Not on file   • Number of children: 2   • Years of education: Not on file   • Highest education level: Not on file   Tobacco Use   • Smoking status: Never Smoker   • Smokeless tobacco: Never Used   Vaping Use   • Vaping Use: Never used   Substance and Sexual Activity   • Alcohol use: No     Comment: none in 5 yrs, Nov 2011 - last drink   • Drug use: No   • Sexual activity: Yes     Partners: Female         FAMILY HISTORY:  Family History   Problem Relation Age of Onset   • Dementia Mother    • Migraines Mother    • Osteoporosis Mother    • Stroke Mother 85        early 80s   • Heart disease Mother         tachycardia and had ppm   • Alzheimer's disease Mother    • Supraventricular tachycardia Mother    • Alcohol abuse Father    • Depression Father    • Migraines Father    • Tuberculosis Father    • Cirrhosis Father         41 YO   • Cancer Paternal Uncle 67        mets but unsure of type of ca  BACK CANCER   • Stroke Brother    • Colon cancer Neg Hx    • Prostate cancer Neg Hx    • Heart attack Neg Hx        REVIEW OF SYSTEMS:  Constitutional: [No fevers, chills, sweats]  Eye: [No recent visual problems]  ENMT: [No ear pain, nasal congestion, sore throat]  Respiratory: [No shortness of breath, cough]  Cardiovascular: [No Chest pain, palpitations, syncope]  Gastrointestinal:  "[No nausea, vomiting, diarrhea]  Genitourinary: [No hematuria]  Hema/Lymph: [Negative for bruising tendency, swollen lymph glands]  Endocrine: [Negative for excessive thirst, excessive hunger]  Musculoskeletal: [Right shoulder pain]  Integumentary: [No rash, pruritus, abrasions]  Neurologic: [ No weakness or numbness, Alert & oriented X 4]           Vitals:    07/14/21 1110   BP: 160/94   Pulse: 57   Resp: 16   Temp: 98 °F (36.7 °C)   TempSrc: Temporal   SpO2: 96%   Weight: 92.4 kg (203 lb 9.6 oz)   Height: 175 cm (68.9\")  Comment: New Ht   PainSc:   1   PainLoc: Arm  Comment: Rt clavical fracture     Current Status 7/14/2021   ECOG score 0      PHYSICAL EXAM:    CONSTITUTIONAL:  Vital signs reviewed.  No distress, looks comfortable.  EYES:  Conjunctiva and lids unremarkable.  PERRLA  EARS,NOSE,MOUTH,THROAT:  Ears and nose appear unremarkable.  Lips, teeth, gums appear unremarkable.  RESPIRATORY:  Normal respiratory effort.  Lungs clear to auscultation bilaterally.  CARDIOVASCULAR:  Normal S1, S2.  No murmurs rubs or gallops.  No significant lower extremity edema.  GASTROINTESTINAL: Abdomen appears unremarkable.  Nontender.  No hepatomegaly.  No splenomegaly.  LYMPHATIC:  No cervical, supraclavicular, axillary lymphadenopathy.  NEURO: cranial nerves 2-12 grossly intact.  No focal deficits.  Appears to have equal strength all 4 extremities.  MUSCULOSKELETAL:  Unremarkable digits/nails.  No cyanosis or clubbing. Right shoulder in a sling. Abrasion over right eyebrow.  SKIN:  Warm.  No rashes.  PSYCHIATRIC:  Normal judgment and insight.  Normal mood and affect.     RECENT LABS:        Consult on 07/14/2021   Component Date Value Ref Range Status   • Glucose 07/14/2021 99  65 - 99 mg/dL Final   • BUN 07/14/2021 19  8 - 23 mg/dL Final   • Creatinine 07/14/2021 1.03  0.76 - 1.27 mg/dL Final   • Sodium 07/14/2021 135* 136 - 145 mmol/L Final   • Potassium 07/14/2021 4.4  3.5 - 5.2 mmol/L Final   • Chloride 07/14/2021 98  98 " - 107 mmol/L Final   • CO2 07/14/2021 25.7  22.0 - 29.0 mmol/L Final   • Calcium 07/14/2021 9.3  8.6 - 10.5 mg/dL Final   • Total Protein 07/14/2021 7.1  6.0 - 8.5 g/dL Final   • Albumin 07/14/2021 4.30  3.50 - 5.20 g/dL Final   • ALT (SGPT) 07/14/2021 23  1 - 41 U/L Final   • AST (SGOT) 07/14/2021 28  1 - 40 U/L Final   • Alkaline Phosphatase 07/14/2021 92  39 - 117 U/L Final   • Total Bilirubin 07/14/2021 0.8  0.0 - 1.2 mg/dL Final   • eGFR Non  Amer 07/14/2021 71  >60 mL/min/1.73 Final   • Globulin 07/14/2021 2.8  gm/dL Final   • A/G Ratio 07/14/2021 1.5  g/dL Final   • BUN/Creatinine Ratio 07/14/2021 18.4  7.0 - 25.0 Final   • Anion Gap 07/14/2021 11.3  5.0 - 15.0 mmol/L Final   • Hepatitis B Surface Ag 07/14/2021 Non-Reactive  Non-Reactive Final   • Hep B Core Total Ab 07/14/2021 Negative  Negative Final   Lab on 07/14/2021   Component Date Value Ref Range Status   • WBC 07/14/2021 5.96  3.40 - 10.80 10*3/mm3 Final   • RBC 07/14/2021 4.71  4.14 - 5.80 10*6/mm3 Final   • Hemoglobin 07/14/2021 15.4  13.0 - 17.7 g/dL Final   • Hematocrit 07/14/2021 43.6  37.5 - 51.0 % Final   • MCV 07/14/2021 92.6  79.0 - 97.0 fL Final   • MCH 07/14/2021 32.7  26.6 - 33.0 pg Final   • MCHC 07/14/2021 35.3  31.5 - 35.7 g/dL Final   • RDW 07/14/2021 11.7* 12.3 - 15.4 % Final   • RDW-SD 07/14/2021 40.0  37.0 - 54.0 fl Final   • MPV 07/14/2021 11.3  6.0 - 12.0 fL Final   • Platelets 07/14/2021 96* 140 - 450 10*3/mm3 Final   • Neutrophil % 07/14/2021 54.0  42.7 - 76.0 % Final   • Lymphocyte % 07/14/2021 31.7  19.6 - 45.3 % Final   • Monocyte % 07/14/2021 9.1  5.0 - 12.0 % Final   • Eosinophil % 07/14/2021 4.2  0.3 - 6.2 % Final   • Basophil % 07/14/2021 0.5  0.0 - 1.5 % Final   • Immature Grans % 07/14/2021 0.5  0.0 - 0.5 % Final   • Neutrophils, Absolute 07/14/2021 3.22  1.70 - 7.00 10*3/mm3 Final   • Lymphocytes, Absolute 07/14/2021 1.89  0.70 - 3.10 10*3/mm3 Final   • Monocytes, Absolute 07/14/2021 0.54  0.10 - 0.90 10*3/mm3  Final   • Eosinophils, Absolute 07/14/2021 0.25  0.00 - 0.40 10*3/mm3 Final   • Basophils, Absolute 07/14/2021 0.03  0.00 - 0.20 10*3/mm3 Final   • Immature Grans, Absolute 07/14/2021 0.03  0.00 - 0.05 10*3/mm3 Final   • nRBC 07/14/2021 0.0  0.0 - 0.2 /100 WBC Final   • RBC Morphology 07/14/2021 Normal  Normal Final   • WBC Morphology 07/14/2021 Normal  Normal Final   • Clumped Platelets 07/14/2021 Present  None Seen Final   • Hepatitis C Ab 07/14/2021 Non-Reactive  Non-Reactive Final   • ANDRES Direct 07/14/2021 Negative  Negative Final         ASSESSMENT:   is a pleasant 71 y/o Wm who comes to this clinic for evaluation & management for thrombocytopenia.     # Thrombocytopenia:  I reviewed clinical & lab findings and discussed the natural history of the disease with the patient. Patient appears to have chronic thrombocytopenia, fluctuating between 90s-200s, at least since 2015. Hb/Hct & WBC count within normal limits. Smear review shows decreased but normal appearing platelets. No current medications suspected to cause thrombocytopenia.   Given his history of alcohol abuse in the past, suspect underlying liver/spleen disease as the cause of his isolated thrombocytopenia.  Will obtain US abdomen to evaluate that. Will also check for autoimmune conditions and screen for Hepatitis/HIV infections.     Otherwise, will continue to monitor. If platelets continue to decline, may need a bone marrow biopsy for further evaluation.     Patient is agreeable to the plan.    PLAN:       Orders Placed This Encounter   Procedures   • US Abdomen Complete     Standing Status:   Future     Standing Expiration Date:   7/14/2022     Order Specific Question:   Reason for Exam:     Answer:   EValuate for hepatosplenomegaly.     Order Specific Question:   Release to patient     Answer:   Immediate   • Comprehensive Metabolic Panel     Order Specific Question:   Release to patient     Answer:   Immediate   • Hepatitis B Surface  Antigen     Order Specific Question:   Release to patient     Answer:   Immediate   • Hepatitis B Core Antibody, Total     Order Specific Question:   Release to patient     Answer:   Immediate   • Hepatitis C Antibody     Standing Status:   Future     Number of Occurrences:   1     Standing Expiration Date:   7/14/2022     Order Specific Question:   Release to patient     Answer:   Immediate   • ANDRES Direct Reflex to 11 Biomarker     Standing Status:   Future     Number of Occurrences:   1     Standing Expiration Date:   7/14/2022     Order Specific Question:   Release to patient     Answer:   Immediate   • CBC & Differential     Standing Status:   Future     Standing Expiration Date:   7/14/2022     Order Specific Question:   Manual Differential     Answer:   No       Total time spent during this patient encounter is 65 minutes. The total time spent with the patient includes at least one or more of the following: preparing to see the patient by reviewing of tests, prior notes or other relevant information, performing appropriate independent examination & evaluation, counseling, ordering of medications, tests or procedures, communicating with other healthcare professionals, when appropriate to coordinate care, documenting clinic information in the electronic medical records or other health records, independently interpreting results of tests and communicating the results to the patient/family or caregiver.

## 2021-07-20 NOTE — PROGRESS NOTES
New Shoulder      Patient: Zheng Lopez        YOB: 1950    Medical Record Number: [id number]        Chief Complaints: Right shoulder pain      History of Present Illness: This is a 70-year-old right-hand-dominant male who fell June 22 he tripped over sidewalk landed on his right shoulder sustained injury to his shoulder and he cut his right eyebrow.  He was seen at [facility] ER was told he had a clavicle fracture placed in a sling and he presents as follow-up of that.  He states he is doing quite well he has very little symptoms he comes out of his sling when he is at home he is anxious to get back into his activities.  Symptoms are mild 2 out of 10 he did have some bruising which is resolved he states at this point he has no pain is not worsened by any activity his past medical history is well listed below significant for hypertension hyperlipidemia gallbladder disease reflux      Allergies: No Known Allergies    Medications:   Home Medications:  Current Outpatient Medications on File Prior to Visit   Medication Sig   • aspirin 81 MG tablet Take  by mouth daily.   • atorvastatin (LIPITOR) 40 MG tablet TAKE 1 TABLET DAILY   • calcium carbonate (OS-TOMAS) 600 MG tablet Take 600 mg by mouth Daily.   • Cyanocobalamin (VITAMIN B-12 PO) Take  by mouth Daily.   • diphenhydrAMINE (BENADRYL) 50 MG capsule Take 50 mg by mouth Every 6 (Six) Hours As Needed for Itching.   • esomeprazole (nexIUM) 40 MG capsule Take 1 capsule by mouth Every Morning Before Breakfast.   • Flaxseed, Linseed, (FLAX SEED OIL) 1000 MG capsule Take 1 capsule by mouth.   • lisinopril (PRINIVIL,ZESTRIL) 10 MG tablet Take 1 tablet by mouth Daily.   • Magnesium 400 MG capsule Take  by mouth 2 (Two) Times a Day.   • Melatonin 10 MG tablet Take  by mouth Every Night.   • metoprolol succinate XL (TOPROL-XL) 25 MG 24 hr tablet Take 1 tablet by mouth Daily.   • Multiple Vitamin tablet Take  by mouth.   • sildenafil (REVATIO) 20 MG tablet Take  2 tablets by mouth As Needed (erectile dysfunction).   • tamsulosin (FLOMAX) 0.4 MG capsule 24 hr capsule Take 1 capsule by mouth Daily.     No current facility-administered medications on file prior to visit.     Current Medications:  Scheduled Meds:  Continuous Infusions:No current facility-administered medications for this visit.    PRN Meds:.    Past Medical History:   Diagnosis Date   • Asymmetric septal hypertrophy (CMS/HCC)    • Bronchitis     Bronchitis / URI   • Cataract of both eyes    • Chicken pox    • Cholecystitis    • Diverticula of colon    • Encounter for annual health examination 03/10/2015    Annual Health Assessment   • Fatigue    • Gallbladder disease    • GERD (gastroesophageal reflux disease)    • High blood pressure    • History of EKG     10/30/15, 09/19/13, 09/14/12, 10/18/10, 05/04/09   • History of TB skin testing     TB Skin Test: 01/09/14 Completed, 09/14/12 Patient Declines, 10/18/10 Completed, 03/05/08 Completed   • Hyperlipidemia    • Hypertension     still taking meds   • Impingement of right ulnar nerve    • Measles    • Osteoarthritis involving multiple joints on both sides of body    • Osteoarthritis involving multiple joints on both sides of body    • Persistent cough    • Pulse irregularity     Irregular pulse PER CARDIO   • Sexual problems     Sexual problems / enjoyment ED   • Sleep apnea     has c-pap   • Wellness examination 03/10/2015    Annual Wellness Visit        Past Surgical History:   Procedure Laterality Date   • CATARACT EXTRACTION Bilateral    • CATARACT EXTRACTION Right 2013   • CATARACT EXTRACTION Left 2014   • CHOLECYSTECTOMY     • COLONOSCOPY  04/16/2015    Dr. Ritchie   • COLONOSCOPY  03/07/2007   • ELBOW PROCEDURE Right     ulnar nerve impingement release   • FOOT SURGERY Right     right foot (twice)   • GALLBLADDER SURGERY  2009   • KNEE MENISCAL REPAIR Right 2010    torn right meniscus   • LASIK  2006   • RHINOPLASTY  1986   • ROTATOR CUFF REPAIR Left 2010  "  • SHOULDER ARTHROSCOPY W/ LABRAL REPAIR Right 2015    Dr. Sahil lay repair right shoulder   • SHOULDER LIGAMENT REPAIR Right    • TOTAL HIP ARTHROPLASTY REVISION Left 05/11/2020   • TOTAL KNEE ARTHROPLASTY Right 2011   • WRIST ARTHROPLASTY Right 08/25/2020        Social History     Occupational History   • Occupation: teacher2   Tobacco Use   • Smoking status: Never Smoker   • Smokeless tobacco: Never Used   Vaping Use   • Vaping Use: Never used   Substance and Sexual Activity   • Alcohol use: No     Comment: none in 5 yrs, Nov 2011 - last drink   • Drug use: No   • Sexual activity: Yes     Partners: Female      Social History     Social History Narrative   • Not on file        Family History   Problem Relation Age of Onset   • Dementia Mother    • Migraines Mother    • Osteoporosis Mother    • Stroke Mother 85        early 80s   • Heart disease Mother         tachycardia and had ppm   • Alzheimer's disease Mother    • Supraventricular tachycardia Mother    • Alcohol abuse Father    • Depression Father    • Migraines Father    • Tuberculosis Father    • Cirrhosis Father         41 YO   • Cancer Paternal Uncle 67        mets but unsure of type of ca  BACK CANCER   • Stroke Brother    • Colon cancer Neg Hx    • Prostate cancer Neg Hx    • Heart attack Neg Hx              Review of Systems: 14 point review of systems are remarkable for the shoulder pain only remainder negative per the patient    Review of Systems      Physical Exam: 70 y.o. male  General Appearance:    Alert, cooperative, in no acute distress                   Vitals:    07/23/21 0820   Temp: 96.8 °F (36 °C)   Weight: 90.7 kg (200 lb)   Height: 175.3 cm (69\")   PainSc: 0-No pain      Patient is alert and read ×3 no acute distress appears her above-listed at height weight and age.  Affect is normal respiratory rate is normal unlabored. Heart rate regular rate rhythm, sclera, dentition and hearing are normal for the purpose of this " exam.    Ortho Exam exam of the right shoulder he has no tenderness over the lateral clavicle he has full range of motion all directions including horizontal adduction  Procedures          Radiology:   AP, of the right shoulder was taken to evaluate his clavicle and compared x-rays done in the ER in the ER they questioned a superior, lateral clavicle fracture with slightly high riding clavicle with respect to the acromium x-rays today really do not appreciate that fracture perhaps his clavicle is sitting about 2 to 3 mm higher than his acromium but no obvious acute pathology is appreciated  Imaging Results (Most Recent)     Procedure Component Value Units Date/Time    XR Clavicle Right [[id number]] Resulted: 07/23/21 0642     Updated: 07/23/21 0814    Impression:      Ordering physician's impression is located in the Encounter Note dated 07/23/21. X-ray performed in the DR room.          Assessment/Plan:  Questionable lateral clavicle fracture his exam is for the most part normal today I told him he can wean out of the sling he can start easing back into his activities his pain as his guide he has any issues and progression he will give me a call

## 2021-07-23 ENCOUNTER — OFFICE VISIT (OUTPATIENT)
Dept: ORTHOPEDIC SURGERY | Facility: CLINIC | Age: 71
End: 2021-07-23

## 2021-07-23 VITALS — HEIGHT: 69 IN | WEIGHT: 200 LBS | BODY MASS INDEX: 29.62 KG/M2 | TEMPERATURE: 96.8 F

## 2021-07-23 DIAGNOSIS — S42.031D CLOSED DISPLACED FRACTURE OF ACROMIAL END OF RIGHT CLAVICLE WITH ROUTINE HEALING, SUBSEQUENT ENCOUNTER: Primary | ICD-10-CM

## 2021-07-23 PROCEDURE — 73000 X-RAY EXAM OF COLLAR BONE: CPT | Performed by: ORTHOPAEDIC SURGERY

## 2021-07-23 PROCEDURE — 99213 OFFICE O/P EST LOW 20 MIN: CPT | Performed by: ORTHOPAEDIC SURGERY

## 2021-07-26 ENCOUNTER — HOSPITAL ENCOUNTER (OUTPATIENT)
Dept: ULTRASOUND IMAGING | Facility: HOSPITAL | Age: 71
Discharge: HOME | End: 2021-07-26
Admitting: INTERNAL MEDICINE

## 2021-07-26 DIAGNOSIS — D69.6 THROMBOCYTOPENIA (HCC): ICD-10-CM

## 2021-07-26 PROCEDURE — 76700 US EXAM ABDOM COMPLETE: CPT

## 2021-07-28 ENCOUNTER — LAB (OUTPATIENT)
Dept: OTHER | Facility: HOSPITAL | Age: 71
End: 2021-07-28

## 2021-07-28 ENCOUNTER — OFFICE VISIT (OUTPATIENT)
Dept: ONCOLOGY | Facility: CLINIC | Age: 71
End: 2021-07-28

## 2021-07-28 VITALS
BODY MASS INDEX: 30.59 KG/M2 | HEART RATE: 52 BPM | DIASTOLIC BLOOD PRESSURE: 83 MMHG | RESPIRATION RATE: 18 BRPM | TEMPERATURE: 97.3 F | SYSTOLIC BLOOD PRESSURE: 170 MMHG | WEIGHT: 206.5 LBS | OXYGEN SATURATION: 96 % | HEIGHT: 69 IN

## 2021-07-28 DIAGNOSIS — D69.59 THROMBOCYTOPENIA DUE TO COVID-19 VIRUS: Primary | ICD-10-CM

## 2021-07-28 DIAGNOSIS — D69.6 THROMBOCYTOPENIA (HCC): ICD-10-CM

## 2021-07-28 DIAGNOSIS — U07.1 THROMBOCYTOPENIA DUE TO COVID-19 VIRUS: Primary | ICD-10-CM

## 2021-07-28 LAB
BASOPHILS # BLD AUTO: 0.04 10*3/MM3 (ref 0–0.2)
BASOPHILS NFR BLD AUTO: 0.7 % (ref 0–1.5)
CLUMPED PLATELETS: PRESENT
DEPRECATED RDW RBC AUTO: 40.5 FL (ref 37–54)
EOSINOPHIL # BLD AUTO: 0.28 10*3/MM3 (ref 0–0.4)
EOSINOPHIL NFR BLD AUTO: 4.7 % (ref 0.3–6.2)
ERYTHROCYTE [DISTWIDTH] IN BLOOD BY AUTOMATED COUNT: 11.8 % (ref 12.3–15.4)
HCT VFR BLD AUTO: 43.1 % (ref 37.5–51)
HGB BLD-MCNC: 15.3 G/DL (ref 13–17.7)
IMM GRANULOCYTES # BLD AUTO: 0.02 10*3/MM3 (ref 0–0.05)
IMM GRANULOCYTES NFR BLD AUTO: 0.3 % (ref 0–0.5)
LYMPHOCYTES # BLD AUTO: 1.92 10*3/MM3 (ref 0.7–3.1)
LYMPHOCYTES NFR BLD AUTO: 32.3 % (ref 19.6–45.3)
MCH RBC QN AUTO: 33 PG (ref 26.6–33)
MCHC RBC AUTO-ENTMCNC: 35.5 G/DL (ref 31.5–35.7)
MCV RBC AUTO: 93.1 FL (ref 79–97)
MONOCYTES # BLD AUTO: 0.53 10*3/MM3 (ref 0.1–0.9)
MONOCYTES NFR BLD AUTO: 8.9 % (ref 5–12)
NEUTROPHILS NFR BLD AUTO: 3.16 10*3/MM3 (ref 1.7–7)
NEUTROPHILS NFR BLD AUTO: 53.1 % (ref 42.7–76)
NRBC BLD AUTO-RTO: 0 /100 WBC (ref 0–0.2)
PLATELET # BLD AUTO: 110 10*3/MM3 (ref 140–450)
PMV BLD AUTO: 10.8 FL (ref 6–12)
RBC # BLD AUTO: 4.63 10*6/MM3 (ref 4.14–5.8)
RBC MORPH BLD: NORMAL
WBC # BLD AUTO: 5.95 10*3/MM3 (ref 3.4–10.8)
WBC MORPH BLD: NORMAL

## 2021-07-28 PROCEDURE — 36415 COLL VENOUS BLD VENIPUNCTURE: CPT

## 2021-07-28 PROCEDURE — 85025 COMPLETE CBC W/AUTO DIFF WBC: CPT | Performed by: INTERNAL MEDICINE

## 2021-07-28 PROCEDURE — 85007 BL SMEAR W/DIFF WBC COUNT: CPT | Performed by: INTERNAL MEDICINE

## 2021-07-28 PROCEDURE — 99214 OFFICE O/P EST MOD 30 MIN: CPT | Performed by: INTERNAL MEDICINE

## 2021-07-28 NOTE — PROGRESS NOTES
REASON FOR CONSULTATION/CHIEF COMPLAINT:     Evaluation & management for thrombocytopenia                             REQUESTING PHYSICIAN: No ref. provider found  RECORDS OBTAINED:  Records of the patients history including those from the electronic medical record were reviewed and summarized in detail.    HISTORY OF PRESENT ILLNESS:    The patient is a 70 y.o. year old male who is here for evaluation for thrombocytopenia. On chart review, patient appears to have chronic thrombocytopenia since 2015 ranging between 90s - 200s.Patient denies any excessive bleeding or bruising.   The CBC from today show platelet count of 96k, Hb/Hct 15.4/43.6, wbc of 5.96 with normal differential. Patient denies any fever, chills, night sweats or weight loss. No lymphadenopathy. Denies any history of autoimmune condition.   Patient reports of significant alcohol abuse for > 15 years, but has not had much drinks in last 5-10 years. Denies ever being diagnosed with liver cirrhosis or any other liver/spleen disease. No family history of blood disorders.     Of note, patient recently tripped & fell injuring his right shoulder & right eyebrow. Right shoulder is still in sling.     INTERIM HISTORY:  Patient returns to the clinic for a f/u visit. The right shoulder sling has been removed. Right eyebrow laceration well healed now. Denies any bleeding or bruising.         Past Medical History:   Diagnosis Date   • Asymmetric septal hypertrophy (CMS/HCC)    • Bronchitis     Bronchitis / URI   • Cataract of both eyes    • Chicken pox    • Cholecystitis    • Diverticula of colon    • Encounter for annual health examination 03/10/2015    Annual Health Assessment   • Fatigue    • Gallbladder disease    • GERD (gastroesophageal reflux disease)    • High blood pressure    • History of EKG     10/30/15, 09/19/13, 09/14/12, 10/18/10, 05/04/09   • History of TB skin testing     TB Skin Test: 01/09/14 Completed, 09/14/12 Patient Declines, 10/18/10  Completed, 03/05/08 Completed   • Hyperlipidemia    • Hypertension     still taking meds   • Impingement of right ulnar nerve    • Measles    • Osteoarthritis involving multiple joints on both sides of body    • Osteoarthritis involving multiple joints on both sides of body    • Persistent cough    • Pulse irregularity     Irregular pulse PER CARDIO   • Sexual problems     Sexual problems / enjoyment ED   • Sleep apnea     has c-pap   • Wellness examination 03/10/2015    Annual Wellness Visit     Past Surgical History:   Procedure Laterality Date   • CATARACT EXTRACTION Bilateral    • CATARACT EXTRACTION Right 2013   • CATARACT EXTRACTION Left 2014   • CHOLECYSTECTOMY     • COLONOSCOPY  04/16/2015    Dr. Ritchie   • COLONOSCOPY  03/07/2007   • ELBOW PROCEDURE Right     ulnar nerve impingement release   • FOOT SURGERY Right     right foot (twice)   • GALLBLADDER SURGERY  2009   • KNEE MENISCAL REPAIR Right 2010    torn right meniscus   • LASIK  2006   • RHINOPLASTY  1986   • ROTATOR CUFF REPAIR Left 2010   • SHOULDER ARTHROSCOPY W/ LABRAL REPAIR Right 2015    Dr. Sahil lay repair right shoulder   • SHOULDER LIGAMENT REPAIR Right    • TOTAL HIP ARTHROPLASTY REVISION Left 05/11/2020   • TOTAL KNEE ARTHROPLASTY Right 2011   • WRIST ARTHROPLASTY Right 08/25/2020       MEDICATIONS    Current Outpatient Medications:   •  aspirin 81 MG tablet, Take  by mouth daily., Disp: , Rfl:   •  atorvastatin (LIPITOR) 40 MG tablet, TAKE 1 TABLET DAILY, Disp: 90 tablet, Rfl: 1  •  calcium carbonate (OS-TOMAS) 600 MG tablet, Take 600 mg by mouth Daily., Disp: , Rfl:   •  Cyanocobalamin (VITAMIN B-12 PO), Take  by mouth Daily., Disp: , Rfl:   •  diphenhydrAMINE (BENADRYL) 50 MG capsule, Take 50 mg by mouth Every 6 (Six) Hours As Needed for Itching., Disp: , Rfl:   •  esomeprazole (nexIUM) 40 MG capsule, Take 1 capsule by mouth Every Morning Before Breakfast., Disp: 90 capsule, Rfl: 1  •  Flaxseed, Linseed, (FLAX SEED OIL) 1000 MG  capsule, Take 1 capsule by mouth., Disp: , Rfl:   •  lisinopril (PRINIVIL,ZESTRIL) 10 MG tablet, Take 1 tablet by mouth Daily., Disp: 90 tablet, Rfl: 1  •  Magnesium 400 MG capsule, Take  by mouth 2 (Two) Times a Day., Disp: , Rfl:   •  Melatonin 10 MG tablet, Take  by mouth Every Night., Disp: , Rfl:   •  metoprolol succinate XL (TOPROL-XL) 25 MG 24 hr tablet, Take 1 tablet by mouth Daily., Disp: 90 tablet, Rfl: 1  •  Multiple Vitamin tablet, Take  by mouth., Disp: , Rfl:   •  sildenafil (REVATIO) 20 MG tablet, Take 2 tablets by mouth As Needed (erectile dysfunction)., Disp: 20 tablet, Rfl: 3  •  tamsulosin (FLOMAX) 0.4 MG capsule 24 hr capsule, Take 1 capsule by mouth Daily., Disp: 90 capsule, Rfl: 1    ALLERGIES:   No Known Allergies    SOCIAL HISTORY:       Social History     Socioeconomic History   • Marital status:      Spouse name: Not on file   • Number of children: 2   • Years of education: Not on file   • Highest education level: Not on file   Tobacco Use   • Smoking status: Never Smoker   • Smokeless tobacco: Never Used   Vaping Use   • Vaping Use: Never used   Substance and Sexual Activity   • Alcohol use: No     Comment: none in 5 yrs, Nov 2011 - last drink   • Drug use: No   • Sexual activity: Yes     Partners: Female         FAMILY HISTORY:  Family History   Problem Relation Age of Onset   • Dementia Mother    • Migraines Mother    • Osteoporosis Mother    • Stroke Mother 85        early 80s   • Heart disease Mother         tachycardia and had ppm   • Alzheimer's disease Mother    • Supraventricular tachycardia Mother    • Alcohol abuse Father    • Depression Father    • Migraines Father    • Tuberculosis Father    • Cirrhosis Father         41 YO   • Cancer Paternal Uncle 67        mets but unsure of type of ca  BACK CANCER   • Stroke Brother    • Colon cancer Neg Hx    • Prostate cancer Neg Hx    • Heart attack Neg Hx        REVIEW OF SYSTEMS:  Constitutional: [No fevers, chills,  "sweats]  Eye: [No recent visual problems]  ENMT: [No ear pain, nasal congestion, sore throat]  Respiratory: [No shortness of breath, cough]  Cardiovascular: [No Chest pain, palpitations, syncope]  Gastrointestinal: [No nausea, vomiting, diarrhea]  Genitourinary: [No hematuria]  Hema/Lymph: [Negative for bruising tendency, swollen lymph glands]  Endocrine: [Negative for excessive thirst, excessive hunger]  Musculoskeletal: [Right shoulder pain]  Integumentary: [No rash, pruritus, abrasions]  Neurologic: [ No weakness or numbness, Alert & oriented X 4]           Vitals:    07/28/21 0950   BP: 170/83   Pulse: 52   Resp: 18   Temp: 97.3 °F (36.3 °C)   TempSrc: Temporal   SpO2: 96%   Weight: 93.7 kg (206 lb 8 oz)   Height: 175.3 cm (69.02\")   PainSc: 0-No pain     Current Status 7/28/2021   ECOG score 0      PHYSICAL EXAM:    CONSTITUTIONAL:  Vital signs reviewed.  No distress, looks comfortable.  EYES:  Conjunctiva and lids unremarkable.  PERRLA  EARS,NOSE,MOUTH,THROAT:  Ears and nose appear unremarkable.  Lips, teeth, gums appear unremarkable.  RESPIRATORY:  Normal respiratory effort.  Lungs clear to auscultation bilaterally.  CARDIOVASCULAR:  Normal S1, S2.  No murmurs rubs or gallops.  No significant lower extremity edema.  GASTROINTESTINAL: Abdomen appears unremarkable.  Nontender.  No hepatomegaly.  No splenomegaly.  LYMPHATIC:  No cervical, supraclavicular, axillary lymphadenopathy.  NEURO: cranial nerves 2-12 grossly intact.  No focal deficits.  Appears to have equal strength all 4 extremities.  MUSCULOSKELETAL:  Unremarkable digits/nails.  No cyanosis or clubbing.   SKIN:  Warm.  No rashes.  PSYCHIATRIC:  Normal judgment and insight.  Normal mood and affect.     RECENT LABS:        Lab on 07/28/2021   Component Date Value Ref Range Status   • WBC 07/28/2021 5.95  3.40 - 10.80 10*3/mm3 Final   • RBC 07/28/2021 4.63  4.14 - 5.80 10*6/mm3 Final   • Hemoglobin 07/28/2021 15.3  13.0 - 17.7 g/dL Final   • Hematocrit " 07/28/2021 43.1  37.5 - 51.0 % Final   • MCV 07/28/2021 93.1  79.0 - 97.0 fL Final   • MCH 07/28/2021 33.0  26.6 - 33.0 pg Final   • MCHC 07/28/2021 35.5  31.5 - 35.7 g/dL Final   • RDW 07/28/2021 11.8* 12.3 - 15.4 % Final   • RDW-SD 07/28/2021 40.5  37.0 - 54.0 fl Final   • MPV 07/28/2021 10.8  6.0 - 12.0 fL Final   • Platelets 07/28/2021 110* 140 - 450 10*3/mm3 Final   • Neutrophil % 07/28/2021 53.1  42.7 - 76.0 % Final   • Lymphocyte % 07/28/2021 32.3  19.6 - 45.3 % Final   • Monocyte % 07/28/2021 8.9  5.0 - 12.0 % Final   • Eosinophil % 07/28/2021 4.7  0.3 - 6.2 % Final   • Basophil % 07/28/2021 0.7  0.0 - 1.5 % Final   • Immature Grans % 07/28/2021 0.3  0.0 - 0.5 % Final   • Neutrophils, Absolute 07/28/2021 3.16  1.70 - 7.00 10*3/mm3 Final   • Lymphocytes, Absolute 07/28/2021 1.92  0.70 - 3.10 10*3/mm3 Final   • Monocytes, Absolute 07/28/2021 0.53  0.10 - 0.90 10*3/mm3 Final   • Eosinophils, Absolute 07/28/2021 0.28  0.00 - 0.40 10*3/mm3 Final   • Basophils, Absolute 07/28/2021 0.04  0.00 - 0.20 10*3/mm3 Final   • Immature Grans, Absolute 07/28/2021 0.02  0.00 - 0.05 10*3/mm3 Final   • nRBC 07/28/2021 0.0  0.0 - 0.2 /100 WBC Final   • RBC Morphology 07/28/2021 Normal  Normal Final   • WBC Morphology 07/28/2021 Normal  Normal Final   • Clumped Platelets 07/28/2021 Present  None Seen Final         ASSESSMENT:   is a pleasant 69 y/o Wm who comes to this clinic for evaluation & management for thrombocytopenia.     # Thrombocytopenia:   Patient appears to have chronic thrombocytopenia, fluctuating between 90s-200s, at least since 2015. Hb/Hct & WBC count within normal limits. Smear review shows decreased but normal appearing platelets. No current medications suspected to cause thrombocytopenia.     Work up for autoimmune condition & viral infection came back negative. US abdomen is suggestive of fatty liver. No splenomegaly.   Given history of alcohol abuse in the past & US abdomen showing fatty liver,  underlying liver disease is likely the cause of his isolated thrombocytopenia.    The CBC from today show platelet count of 110k.     At this time, will continue to monitor & repeat labs in 3-4 months time. If platelets continue to decline, may need a bone marrow biopsy for further evaluation.     Patient is agreeable to the plan.    PLAN:       Orders Placed This Encounter   Procedures   • Comprehensive Metabolic Panel     Standing Status:   Future     Standing Expiration Date:   7/28/2022     Order Specific Question:   Release to patient     Answer:   Immediate   • CBC & Differential     Standing Status:   Future     Standing Expiration Date:   7/28/2022     Order Specific Question:   Manual Differential     Answer:   No       Total time spent during this patient encounter is 35 minutes. The total time spent with the patient includes at least one or more of the following: preparing to see the patient by reviewing of tests, prior notes or other relevant information, performing appropriate independent examination & evaluation, counseling, ordering of medications, tests or procedures, communicating with other healthcare professionals, when appropriate to coordinate care, documenting clinic information in the electronic medical records or other health records, independently interpreting results of tests and communicating the results to the patient/family or caregiver.

## 2021-11-10 ENCOUNTER — LAB (OUTPATIENT)
Dept: OTHER | Facility: HOSPITAL | Age: 71
End: 2021-11-10

## 2021-11-10 DIAGNOSIS — D69.59 THROMBOCYTOPENIA DUE TO COVID-19 VIRUS: ICD-10-CM

## 2021-11-10 DIAGNOSIS — U07.1 THROMBOCYTOPENIA DUE TO COVID-19 VIRUS: ICD-10-CM

## 2021-11-10 LAB
ALBUMIN SERPL-MCNC: 4.3 G/DL (ref 3.5–5.2)
ALBUMIN/GLOB SERPL: 1.4 G/DL
ALP SERPL-CCNC: 78 U/L (ref 39–117)
ALT SERPL W P-5'-P-CCNC: 24 U/L (ref 1–41)
ANION GAP SERPL CALCULATED.3IONS-SCNC: 8 MMOL/L (ref 5–15)
AST SERPL-CCNC: 29 U/L (ref 1–40)
BASOPHILS # BLD AUTO: 0.03 10*3/MM3 (ref 0–0.2)
BASOPHILS NFR BLD AUTO: 0.7 % (ref 0–1.5)
BILIRUB SERPL-MCNC: 0.6 MG/DL (ref 0–1.2)
BUN SERPL-MCNC: 12 MG/DL (ref 8–23)
BUN/CREAT SERPL: 11.7 (ref 7–25)
CALCIUM SPEC-SCNC: 9.2 MG/DL (ref 8.6–10.5)
CHLORIDE SERPL-SCNC: 103 MMOL/L (ref 98–107)
CO2 SERPL-SCNC: 28 MMOL/L (ref 22–29)
CREAT SERPL-MCNC: 1.03 MG/DL (ref 0.76–1.27)
DEPRECATED RDW RBC AUTO: 42.6 FL (ref 37–54)
EOSINOPHIL # BLD AUTO: 0.15 10*3/MM3 (ref 0–0.4)
EOSINOPHIL NFR BLD AUTO: 3.3 % (ref 0.3–6.2)
ERYTHROCYTE [DISTWIDTH] IN BLOOD BY AUTOMATED COUNT: 11.9 % (ref 12.3–15.4)
GFR SERPL CREATININE-BSD FRML MDRD: 71 ML/MIN/1.73
GLOBULIN UR ELPH-MCNC: 3 GM/DL
GLUCOSE SERPL-MCNC: 108 MG/DL (ref 65–99)
HCT VFR BLD AUTO: 43.8 % (ref 37.5–51)
HGB BLD-MCNC: 15.1 G/DL (ref 13–17.7)
IMM GRANULOCYTES # BLD AUTO: 0.01 10*3/MM3 (ref 0–0.05)
IMM GRANULOCYTES NFR BLD AUTO: 0.2 % (ref 0–0.5)
LYMPHOCYTES # BLD AUTO: 1.41 10*3/MM3 (ref 0.7–3.1)
LYMPHOCYTES NFR BLD AUTO: 31.5 % (ref 19.6–45.3)
MCH RBC QN AUTO: 33.4 PG (ref 26.6–33)
MCHC RBC AUTO-ENTMCNC: 34.5 G/DL (ref 31.5–35.7)
MCV RBC AUTO: 96.9 FL (ref 79–97)
MONOCYTES # BLD AUTO: 0.34 10*3/MM3 (ref 0.1–0.9)
MONOCYTES NFR BLD AUTO: 7.6 % (ref 5–12)
NEUTROPHILS NFR BLD AUTO: 2.54 10*3/MM3 (ref 1.7–7)
NEUTROPHILS NFR BLD AUTO: 56.7 % (ref 42.7–76)
NRBC BLD AUTO-RTO: 0 /100 WBC (ref 0–0.2)
PLATELET # BLD AUTO: 235 10*3/MM3 (ref 140–450)
PMV BLD AUTO: 9.3 FL (ref 6–12)
POTASSIUM SERPL-SCNC: 4.7 MMOL/L (ref 3.5–5.2)
PROT SERPL-MCNC: 7.3 G/DL (ref 6–8.5)
RBC # BLD AUTO: 4.52 10*6/MM3 (ref 4.14–5.8)
SODIUM SERPL-SCNC: 139 MMOL/L (ref 136–145)
WBC # BLD AUTO: 4.48 10*3/MM3 (ref 3.4–10.8)

## 2021-11-10 PROCEDURE — 36415 COLL VENOUS BLD VENIPUNCTURE: CPT

## 2021-11-10 PROCEDURE — 80053 COMPREHEN METABOLIC PANEL: CPT | Performed by: INTERNAL MEDICINE

## 2021-11-10 PROCEDURE — 85025 COMPLETE CBC W/AUTO DIFF WBC: CPT | Performed by: INTERNAL MEDICINE

## 2021-11-24 ENCOUNTER — OFFICE VISIT (OUTPATIENT)
Dept: ONCOLOGY | Facility: CLINIC | Age: 71
End: 2021-11-24

## 2021-11-24 ENCOUNTER — APPOINTMENT (OUTPATIENT)
Dept: OTHER | Facility: HOSPITAL | Age: 71
End: 2021-11-24

## 2021-11-24 VITALS
OXYGEN SATURATION: 95 % | RESPIRATION RATE: 18 BRPM | WEIGHT: 207.6 LBS | HEART RATE: 56 BPM | HEIGHT: 69 IN | TEMPERATURE: 98.2 F | SYSTOLIC BLOOD PRESSURE: 116 MMHG | DIASTOLIC BLOOD PRESSURE: 74 MMHG | BODY MASS INDEX: 30.75 KG/M2

## 2021-11-24 DIAGNOSIS — D69.6 THROMBOCYTOPENIA (HCC): Primary | ICD-10-CM

## 2021-11-24 PROCEDURE — 99214 OFFICE O/P EST MOD 30 MIN: CPT | Performed by: INTERNAL MEDICINE

## 2021-11-24 NOTE — PROGRESS NOTES
REASON FOR CONSULTATION/CHIEF COMPLAINT:     Evaluation & management for thrombocytopenia                             REQUESTING PHYSICIAN: No ref. provider found  RECORDS OBTAINED:  Records of the patients history including those from the electronic medical record were reviewed and summarized in detail.    HISTORY OF PRESENT ILLNESS:    The patient is a 71 y.o. year old male who is here for evaluation for thrombocytopenia. On chart review, patient appears to have chronic thrombocytopenia since 2015 ranging between 90s - 200s.Patient denies any excessive bleeding or bruising.   The CBC from today show platelet count of 96k, Hb/Hct 15.4/43.6, wbc of 5.96 with normal differential. Patient denies any fever, chills, night sweats or weight loss. No lymphadenopathy. Denies any history of autoimmune condition.   Patient reports of significant alcohol abuse for > 15 years, but has not had much drinks in last 5-10 years. Denies ever being diagnosed with liver cirrhosis or any other liver/spleen disease. No family history of blood disorders.     Of note, patient recently tripped & fell injuring his right shoulder & right eyebrow. Right shoulder is still in sling.     INTERIM HISTORY:  Patient returns to the clinic for a f/u visit. The right shoulder feels better now. Right eyebrow laceration well healed now. Denies any bleeding or bruising.     Past Medical History:   Diagnosis Date   • Asymmetric septal hypertrophy (HCC)    • Bronchitis     Bronchitis / URI   • Cataract of both eyes    • Chicken pox    • Cholecystitis    • Diverticula of colon    • Encounter for annual health examination 03/10/2015    Annual Health Assessment   • Fatigue    • Gallbladder disease    • GERD (gastroesophageal reflux disease)    • High blood pressure    • History of EKG     10/30/15, 09/19/13, 09/14/12, 10/18/10, 05/04/09   • History of TB skin testing     TB Skin Test: 01/09/14 Completed, 09/14/12 Patient Declines, 10/18/10 Completed, 03/05/08  Completed   • Hyperlipidemia    • Hypertension     still taking meds   • Impingement of right ulnar nerve    • Measles    • Osteoarthritis involving multiple joints on both sides of body    • Osteoarthritis involving multiple joints on both sides of body    • Persistent cough    • Pulse irregularity     Irregular pulse PER CARDIO   • Sexual problems     Sexual problems / enjoyment ED   • Sleep apnea     has c-pap   • Wellness examination 03/10/2015    Annual Wellness Visit     Past Surgical History:   Procedure Laterality Date   • CATARACT EXTRACTION Bilateral    • CATARACT EXTRACTION Right 2013   • CATARACT EXTRACTION Left 2014   • CHOLECYSTECTOMY     • COLONOSCOPY  04/16/2015    Dr. Ritchie   • COLONOSCOPY  03/07/2007   • ELBOW PROCEDURE Right     ulnar nerve impingement release   • FOOT SURGERY Right     right foot (twice)   • GALLBLADDER SURGERY  2009   • KNEE MENISCAL REPAIR Right 2010    torn right meniscus   • LASIK  2006   • RHINOPLASTY  1986   • ROTATOR CUFF REPAIR Left 2010   • SHOULDER ARTHROSCOPY W/ LABRAL REPAIR Right 2015    Dr. Sahil lay repair right shoulder   • SHOULDER LIGAMENT REPAIR Right    • TOTAL HIP ARTHROPLASTY REVISION Left 05/11/2020   • TOTAL KNEE ARTHROPLASTY Right 2011   • WRIST ARTHROPLASTY Right 08/25/2020       MEDICATIONS    Current Outpatient Medications:   •  aspirin 81 MG tablet, Take  by mouth daily., Disp: , Rfl:   •  atorvastatin (LIPITOR) 40 MG tablet, TAKE 1 TABLET DAILY, Disp: 90 tablet, Rfl: 1  •  calcium carbonate (OS-TOMAS) 600 MG tablet, Take 600 mg by mouth Daily., Disp: , Rfl:   •  Cyanocobalamin (VITAMIN B-12 PO), Take  by mouth Daily., Disp: , Rfl:   •  diphenhydrAMINE (BENADRYL) 50 MG capsule, Take 50 mg by mouth Every 6 (Six) Hours As Needed for Itching., Disp: , Rfl:   •  esomeprazole (nexIUM) 40 MG capsule, Take 1 capsule by mouth Every Morning Before Breakfast., Disp: 90 capsule, Rfl: 1  •  Flaxseed, Linseed, (FLAX SEED OIL) 1000 MG capsule, Take 1 capsule  by mouth., Disp: , Rfl:   •  lisinopril (PRINIVIL,ZESTRIL) 10 MG tablet, Take 1 tablet by mouth Daily., Disp: 90 tablet, Rfl: 1  •  Magnesium 400 MG capsule, Take  by mouth 2 (Two) Times a Day., Disp: , Rfl:   •  Melatonin 10 MG tablet, Take  by mouth Every Night., Disp: , Rfl:   •  metoprolol succinate XL (TOPROL-XL) 25 MG 24 hr tablet, Take 1 tablet by mouth Daily., Disp: 90 tablet, Rfl: 1  •  Multiple Vitamin tablet, Take  by mouth., Disp: , Rfl:   •  sildenafil (REVATIO) 20 MG tablet, Take 2 tablets by mouth As Needed (erectile dysfunction)., Disp: 20 tablet, Rfl: 3  •  tamsulosin (FLOMAX) 0.4 MG capsule 24 hr capsule, Take 1 capsule by mouth Daily., Disp: 90 capsule, Rfl: 1    ALLERGIES:   No Known Allergies    SOCIAL HISTORY:       Social History     Socioeconomic History   • Marital status:    • Number of children: 2   Tobacco Use   • Smoking status: Never Smoker   • Smokeless tobacco: Never Used   Vaping Use   • Vaping Use: Never used   Substance and Sexual Activity   • Alcohol use: No     Comment: none in 5 yrs, Nov 2011 - last drink   • Drug use: No   • Sexual activity: Yes     Partners: Female         FAMILY HISTORY:  Family History   Problem Relation Age of Onset   • Dementia Mother    • Migraines Mother    • Osteoporosis Mother    • Stroke Mother 85        early 80s   • Heart disease Mother         tachycardia and had ppm   • Alzheimer's disease Mother    • Supraventricular tachycardia Mother    • Alcohol abuse Father    • Depression Father    • Migraines Father    • Tuberculosis Father    • Cirrhosis Father         43 YO   • Cancer Paternal Uncle 67        mets but unsure of type of ca  BACK CANCER   • Stroke Brother    • Colon cancer Neg Hx    • Prostate cancer Neg Hx    • Heart attack Neg Hx        REVIEW OF SYSTEMS:  Constitutional: [No fevers, chills, sweats]  Eye: [No recent visual problems]  ENMT: [No ear pain, nasal congestion, sore throat]  Respiratory: [No shortness of breath,  "cough]  Cardiovascular: [No Chest pain, palpitations, syncope]  Gastrointestinal: [No nausea, vomiting, diarrhea]  Genitourinary: [No hematuria]  Hema/Lymph: [Negative for bruising tendency, swollen lymph glands]  Endocrine: [Negative for excessive thirst, excessive hunger]  Musculoskeletal: [Right shoulder pain]  Integumentary: [No rash, pruritus, abrasions]  Neurologic: [ No weakness or numbness, Alert & oriented X 4]           Vitals:    11/24/21 0944   BP: 116/74   Pulse: 56   Resp: 18   Temp: 98.2 °F (36.8 °C)   TempSrc: Temporal   SpO2: 95%   Weight: 94.2 kg (207 lb 9.6 oz)   Height: 175 cm (68.9\")   PainSc: 0-No pain     Current Status 11/24/2021   ECOG score 0      PHYSICAL EXAM:    CONSTITUTIONAL:  Vital signs reviewed.  No distress, looks comfortable.  EYES:  Conjunctiva and lids unremarkable.   EARS,NOSE,MOUTH,THROAT:  Ears and nose appear unremarkable.  Lips, teeth, gums appear unremarkable.  RESPIRATORY:  Normal respiratory effort.  Lungs clear to auscultation bilaterally.  CARDIOVASCULAR:  Normal S1, S2.  No murmurs rubs or gallops.  No significant lower extremity edema.  GASTROINTESTINAL: Abdomen appears unremarkable.  Nondistended  LYMPHATIC:  No cervical, supraclavicular lymphadenopathy.  NEURO: cranial nerves 2-12 grossly intact.  No focal deficits.  Appears to have equal strength all 4 extremities.  MUSCULOSKELETAL:  Unremarkable digits/nails.  No cyanosis or clubbing.   SKIN:  Warm.  No rashes.  PSYCHIATRIC:  Normal judgment and insight.  Normal mood and affect.     RECENT LABS:        No visits with results within 7 Day(s) from this visit.   Latest known visit with results is:   Lab on 11/10/2021   Component Date Value Ref Range Status   • Glucose 11/10/2021 108* 65 - 99 mg/dL Final   • BUN 11/10/2021 12  8 - 23 mg/dL Final   • Creatinine 11/10/2021 1.03  0.76 - 1.27 mg/dL Final   • Sodium 11/10/2021 139  136 - 145 mmol/L Final   • Potassium 11/10/2021 4.7  3.5 - 5.2 mmol/L Final   • Chloride " 11/10/2021 103  98 - 107 mmol/L Final   • CO2 11/10/2021 28.0  22.0 - 29.0 mmol/L Final   • Calcium 11/10/2021 9.2  8.6 - 10.5 mg/dL Final   • Total Protein 11/10/2021 7.3  6.0 - 8.5 g/dL Final   • Albumin 11/10/2021 4.30  3.50 - 5.20 g/dL Final   • ALT (SGPT) 11/10/2021 24  1 - 41 U/L Final   • AST (SGOT) 11/10/2021 29  1 - 40 U/L Final   • Alkaline Phosphatase 11/10/2021 78  39 - 117 U/L Final   • Total Bilirubin 11/10/2021 0.6  0.0 - 1.2 mg/dL Final   • eGFR Non  Amer 11/10/2021 71  >60 mL/min/1.73 Final   • Globulin 11/10/2021 3.0  gm/dL Final   • A/G Ratio 11/10/2021 1.4  g/dL Final   • BUN/Creatinine Ratio 11/10/2021 11.7  7.0 - 25.0 Final   • Anion Gap 11/10/2021 8.0  5.0 - 15.0 mmol/L Final   • WBC 11/10/2021 4.48  3.40 - 10.80 10*3/mm3 Final   • RBC 11/10/2021 4.52  4.14 - 5.80 10*6/mm3 Final   • Hemoglobin 11/10/2021 15.1  13.0 - 17.7 g/dL Final   • Hematocrit 11/10/2021 43.8  37.5 - 51.0 % Final   • MCV 11/10/2021 96.9  79.0 - 97.0 fL Final   • MCH 11/10/2021 33.4* 26.6 - 33.0 pg Final   • MCHC 11/10/2021 34.5  31.5 - 35.7 g/dL Final   • RDW 11/10/2021 11.9* 12.3 - 15.4 % Final   • RDW-SD 11/10/2021 42.6  37.0 - 54.0 fl Final   • MPV 11/10/2021 9.3  6.0 - 12.0 fL Final   • Platelets 11/10/2021 235  140 - 450 10*3/mm3 Final   • Neutrophil % 11/10/2021 56.7  42.7 - 76.0 % Final   • Lymphocyte % 11/10/2021 31.5  19.6 - 45.3 % Final   • Monocyte % 11/10/2021 7.6  5.0 - 12.0 % Final   • Eosinophil % 11/10/2021 3.3  0.3 - 6.2 % Final   • Basophil % 11/10/2021 0.7  0.0 - 1.5 % Final   • Immature Grans % 11/10/2021 0.2  0.0 - 0.5 % Final   • Neutrophils, Absolute 11/10/2021 2.54  1.70 - 7.00 10*3/mm3 Final   • Lymphocytes, Absolute 11/10/2021 1.41  0.70 - 3.10 10*3/mm3 Final   • Monocytes, Absolute 11/10/2021 0.34  0.10 - 0.90 10*3/mm3 Final   • Eosinophils, Absolute 11/10/2021 0.15  0.00 - 0.40 10*3/mm3 Final   • Basophils, Absolute 11/10/2021 0.03  0.00 - 0.20 10*3/mm3 Final   • Immature Grans, Absolute  11/10/2021 0.01  0.00 - 0.05 10*3/mm3 Final   • nRBC 11/10/2021 0.0  0.0 - 0.2 /100 WBC Final         ASSESSMENT:   is a pleasant 69 y/o WM who comes to this clinic for evaluation & management for thrombocytopenia.     # Thrombocytopenia:   Patient appears to have chronic thrombocytopenia, fluctuating between 90s-200s, at least since 2015. Hb/Hct & WBC count within normal limits. Smear review shows decreased but normal appearing platelets. No current medications suspected to cause thrombocytopenia.     Work up for autoimmune condition & viral infection came back negative. US abdomen is suggestive of fatty liver. No splenomegaly.   Given history of alcohol abuse in the past & US abdomen showing fatty liver, underlying liver disease is likely the cause of his isolated thrombocytopenia.    The CBC from 7/28/21 show platelet count of 110k. Plan made for surveillance  CBC from 11/24/21 show platelets 235k. No new symptoms. Did not start or stopped any meds. Quit alcohol > 10 yrs ago. Is a retired .     At this time, will continue to monitor & repeat labs in 4 months time. If platelets continue to decline, may need a bone marrow biopsy for further evaluation.     Patient is agreeable to the plan.    PLAN:   - Active surveillance  - repeat labs in 4 months time.    Orders Placed This Encounter   Procedures   • CBC & Differential     Standing Status:   Future     Standing Expiration Date:   11/24/2022     Order Specific Question:   Manual Differential     Answer:   No     Order Specific Question:   Release to patient     Answer:   Immediate       Total time spent during this patient encounter is 35 minutes. The total time spent with the patient includes at least one or more of the following: preparing to see the patient by reviewing of tests, prior notes or other relevant information, performing appropriate independent examination & evaluation, counseling, ordering of medications, tests or  procedures, communicating with other healthcare professionals, when appropriate to coordinate care, documenting clinic information in the electronic medical records or other health records, independently interpreting results of tests and communicating the results to the patient/family or caregiver.

## 2021-12-08 DIAGNOSIS — D69.6 THROMBOCYTOPENIA (HCC): ICD-10-CM

## 2021-12-21 ENCOUNTER — OFFICE VISIT (OUTPATIENT)
Dept: FAMILY MEDICINE CLINIC | Facility: CLINIC | Age: 71
End: 2021-12-21

## 2021-12-21 VITALS
OXYGEN SATURATION: 98 % | TEMPERATURE: 97.3 F | BODY MASS INDEX: 29.62 KG/M2 | DIASTOLIC BLOOD PRESSURE: 80 MMHG | RESPIRATION RATE: 16 BRPM | HEART RATE: 55 BPM | WEIGHT: 200 LBS | SYSTOLIC BLOOD PRESSURE: 106 MMHG

## 2021-12-21 DIAGNOSIS — I51.7 CARDIAC ENLARGEMENT: ICD-10-CM

## 2021-12-21 DIAGNOSIS — I10 ESSENTIAL HYPERTENSION: ICD-10-CM

## 2021-12-21 DIAGNOSIS — R73.03 PREDIABETES: ICD-10-CM

## 2021-12-21 DIAGNOSIS — Z00.00 MEDICARE ANNUAL WELLNESS VISIT, SUBSEQUENT: Primary | ICD-10-CM

## 2021-12-21 DIAGNOSIS — E78.01 FAMILIAL HYPERCHOLESTEROLEMIA: ICD-10-CM

## 2021-12-21 DIAGNOSIS — N40.1 BENIGN PROSTATIC HYPERPLASIA WITH URINARY HESITANCY: ICD-10-CM

## 2021-12-21 DIAGNOSIS — R39.11 BENIGN PROSTATIC HYPERPLASIA WITH URINARY HESITANCY: ICD-10-CM

## 2021-12-21 PROCEDURE — 99214 OFFICE O/P EST MOD 30 MIN: CPT

## 2021-12-21 PROCEDURE — 1170F FXNL STATUS ASSESSED: CPT

## 2021-12-21 PROCEDURE — G0439 PPPS, SUBSEQ VISIT: HCPCS

## 2021-12-21 PROCEDURE — 1160F RVW MEDS BY RX/DR IN RCRD: CPT

## 2021-12-21 PROCEDURE — 1126F AMNT PAIN NOTED NONE PRSNT: CPT

## 2021-12-21 RX ORDER — AMOXICILLIN 500 MG/1
4 CAPSULE ORAL AS NEEDED
COMMUNITY
Start: 2021-12-18

## 2021-12-21 NOTE — ASSESSMENT & PLAN NOTE
Colonoscopy up-to-date and complete in 2015.  Next due 2025.  All immunizations are up-to-date.  Advance care plan discussed and we do have an updated copy in the records that is still accurate.  Lab work reviewed with patient.  Patient counseled to eat a diet high in vegetables, lean meat, whole grains and to continue exercise at least 150 minutes a week.

## 2021-12-21 NOTE — PROGRESS NOTES
The ABCs of the Annual Wellness Visit  Subsequent Medicare Wellness Visit    Chief Complaint   Patient presents with   • Medicare Wellness-subsequent     No complaints,      Subjective    History of Present Illness:  Zheng Lopez is a 71 y.o. male who presents for a Subsequent Medicare Wellness Visit.    Patient also to follow-up on hypertension, hyperlipidemia, BPH, and lab work.    Hyperlipidemia-well-controlled on atorvastatin 40 mg.  Tolerating well with no side effects    Hypertension-well controlled with no chest pain, headache, dizziness and no shortness of breath.  No lower extremity swelling.  Well-controlled on lisinopril 10 mg and metoprolol 25 mg.  Patient also on aspirin 81 mg due to history of cardiac enlargement.    BPH-well controlled on Flomax 0.4 mg.  No urinary issues or incontinence.    Reviewed lab work with patient.  A1c slightly elevated at 5.8 which shows prediabetes.  Not diabetic at this point.  Counseled on lifestyle changes with diet and exercise.  Patient is requesting diabetes educator referral for nutrition counseling.    The following portions of the patient's history were reviewed and   updated as appropriate: allergies, current medications, past family history, past medical history, past social history, past surgical history and problem list.    Compared to one year ago, the patient feels his physical   health is better.    Compared to one year ago, the patient feels his mental   health is the same.    Recent Hospitalizations:  He was not admitted to the hospital during the last year.       Current Medical Providers:  Patient Care Team:  Zoya Newman MD as PCP - General (Family Medicine)  Zoya Newman MD as Referring Physician (Family Medicine)    Outpatient Medications Prior to Visit   Medication Sig Dispense Refill   • aspirin 81 MG tablet Take  by mouth daily.     • atorvastatin (LIPITOR) 40 MG tablet TAKE 1 TABLET DAILY 90 tablet 1   • calcium carbonate (OS-TOMAS) 600 MG  tablet Take 600 mg by mouth Daily.     • Cyanocobalamin (VITAMIN B-12 PO) Take  by mouth Daily.     • diphenhydrAMINE (BENADRYL) 50 MG capsule Take 50 mg by mouth Every 6 (Six) Hours As Needed for Itching.     • esomeprazole (nexIUM) 40 MG capsule Take 1 capsule by mouth Every Morning Before Breakfast. 90 capsule 1   • Flaxseed, Linseed, (FLAX SEED OIL) 1000 MG capsule Take 1 capsule by mouth.     • lisinopril (PRINIVIL,ZESTRIL) 10 MG tablet Take 1 tablet by mouth Daily. 90 tablet 1   • Magnesium 400 MG capsule Take  by mouth 2 (Two) Times a Day.     • Melatonin 10 MG tablet Take  by mouth Every Night.     • metoprolol succinate XL (TOPROL-XL) 25 MG 24 hr tablet Take 1 tablet by mouth Daily. 90 tablet 1   • Multiple Vitamin tablet Take  by mouth.     • sildenafil (REVATIO) 20 MG tablet Take 2 tablets by mouth As Needed (erectile dysfunction). 20 tablet 3   • tamsulosin (FLOMAX) 0.4 MG capsule 24 hr capsule Take 1 capsule by mouth Daily. 90 capsule 1   • amoxicillin (AMOXIL) 500 MG capsule Take 4 capsules by mouth As Needed.       No facility-administered medications prior to visit.       No opioid medication identified on active medication list. I have reviewed chart for other potential  high risk medication/s and harmful drug interactions in the elderly.          Aspirin is on active medication list. Aspirin use is indicated based on review of current medical condition/s. Pros and cons of this therapy have been discussed today. Benefits of this medication outweigh potential harm.  Patient has been encouraged to continue taking this medication.  .      Patient Active Problem List   Diagnosis   • Hyperlipidemia   • Essential hypertension   • SYLWIA (obstructive sleep apnea)   • Osteoarthritis involving multiple joints on both sides of body   • GERD (gastroesophageal reflux disease)   • Diverticula of colon   • Cardiac enlargement   • ED (erectile dysfunction) of organic origin   • Asymmetric septal hypertrophy (HCC)   •  Non-sustained ventricular tachycardia (HCC)   • Chronic pain of left knee   • Primary osteoarthritis of left knee   • Left lumbar radiculopathy   • Left thigh pain   • Benign prostatic hyperplasia with urinary hesitancy   • Arthritis of left hip   • Primary localized osteoarthrosis of left hip   • Arthritis of right wrist   • Thrombocythemia   • Prediabetes   • Medicare annual wellness visit, subsequent     Advance Care Planning  Advance Directive is on file.  ACP discussion was held with the patient during this visit. Patient has an advance directive in EMR which is still valid.           Objective    Vitals:    12/21/21 0801   BP: 106/80   Pulse: 55   Resp: 16   Temp: 97.3 °F (36.3 °C)   SpO2: 98%   Weight: 90.7 kg (200 lb)   PainSc: 0-No pain     BMI Readings from Last 1 Encounters:   12/21/21 29.62 kg/m²   BMI is above normal parameters. Recommendations include: exercise counseling and nutrition counseling    Does the patient have evidence of cognitive impairment? No    Physical Exam  Lab Results   Component Value Date    CHLPL 138 12/14/2021    TRIG 87 12/14/2021    HDL 54 12/14/2021    LDL 67 12/14/2021    VLDL 17 12/14/2021    HGBA1C 5.8 (H) 12/14/2021            HEALTH RISK ASSESSMENT    Smoking Status:  Social History     Tobacco Use   Smoking Status Never Smoker   Smokeless Tobacco Never Used     Alcohol Consumption:  Social History     Substance and Sexual Activity   Alcohol Use No    Comment: none in 5 yrs, Nov 2011 - last drink     Fall Risk Screen:    ALFREDO Fall Risk Assessment has not been completed.  Patient did fall this summer and go to the ER for broken clavicle.  No other falls since then.  Patient is very careful with walking but feels steady on his feet with no imbalances.  Depression Screening:  PHQ-2/PHQ-9 Depression Screening 7/14/2021   Little interest or pleasure in doing things 0   Feeling down, depressed, or hopeless 0   Trouble falling or staying asleep, or sleeping too much 0    Feeling tired or having little energy 0   Poor appetite or overeating 0   Feeling bad about yourself - or that you are a failure or have let yourself or your family down 0   Trouble concentrating on things, such as reading the newspaper or watching television 0   Moving or speaking so slowly that other people could have noticed. Or the opposite - being so fidgety or restless that you have been moving around a lot more than usual 0   Thoughts that you would be better off dead, or of hurting yourself in some way 0   Total Score 0   If you checked off any problems, how difficult have these problems made it for you to do your work, take care of things at home, or get along with other people? -       Health Habits and Functional and Cognitive Screening:  Functional & Cognitive Status 12/21/2021   Do you have difficulty preparing food and eating? No   Do you have difficulty bathing yourself, getting dressed or grooming yourself? No   Do you have difficulty using the toilet? No   Do you have difficulty moving around from place to place? No   Do you have trouble with steps or getting out of a bed or a chair? No   Current Diet Other   Dental Exam Up to date   Eye Exam Up to date   Exercise (times per week) 6 times per week   Current Exercises Include Walking        Exercise Comment Riding Bike at the gym   Current Exercise Activities Include -   Do you need help using the phone?  No   Are you deaf or do you have serious difficulty hearing?  No   Do you need help with transportation? No   Do you need help shopping? No   Do you need help preparing meals?  No   Do you need help with housework?  No   Do you need help with laundry? No   Do you need help taking your medications? No   Do you need help managing money? No   Do you ever drive or ride in a car without wearing a seat belt? No   Have you felt unusual stress, anger or loneliness in the last month? No   Who do you live with? Spouse   If you need help, do you have trouble  finding someone available to you? No   Have you been bothered in the last four weeks by sexual problems? No   Do you have difficulty concentrating, remembering or making decisions? No       Age-appropriate Screening Schedule:  Refer to the list below for future screening recommendations based on patient's age, sex and/or medical conditions. Orders for these recommended tests are listed in the plan section. The patient has been provided with a written plan.    Health Maintenance   Topic Date Due   • LIPID PANEL  12/14/2022   • TDAP/TD VACCINES (4 - Td or Tdap) 06/22/2031   • INFLUENZA VACCINE  Completed   • ZOSTER VACCINE  Completed              Assessment/Plan   CMS Preventative Services Quick Reference  Risk Factors Identified During Encounter  Cardiovascular Disease  The above risks/problems have been discussed with the patient.  Follow up actions/plans if indicated are seen below in the Assessment/Plan Section.  Pertinent information has been shared with the patient in the After Visit Summary.    Diagnoses and all orders for this visit:    1. Medicare annual wellness visit, subsequent (Primary)  Assessment & Plan:  Colonoscopy up-to-date and complete in 2015.  Next due 2025.  All immunizations are up-to-date.  Advance care plan discussed and we do have an updated copy in the records that is still accurate.  Lab work reviewed with patient.  Patient counseled to eat a diet high in vegetables, lean meat, whole grains and to continue exercise at least 150 minutes a week.      2. Prediabetes  Assessment & Plan:  Patient has previously had an elevated A1c of 5.7 in the past.  Labs from last week show A1c of 5.8.  Patient counseled on diet and exercise.  Patient was requesting diabetes educator for nutrition counseling.  Referral was placed.  Return in 3 months for recheck.    Orders:  -     Ambulatory Referral to Diabetic Education    3. Familial hypercholesterolemia  Assessment & Plan:  Stable.  Continue atorvastatin  40 mg.      4. Essential hypertension  Assessment & Plan:  Stable.  Continue lisinopril 10 mg metoprolol 25 mg.      5. Cardiac enlargement  Assessment & Plan:  Stable.  No shortness of breath or chest pain.  Continue metoprolol 25 mg and aspirin 81 mg.      6. Benign prostatic hyperplasia with urinary hesitancy  Assessment & Plan:  Controlled on Flomax 0.4 mg.  Continue current regimen.        Follow Up:   Return in about 3 months (around 3/21/2022) for Recheck Prediabetes / HTN .     An After Visit Summary and PPPS were made available to the patient.               Medical assistant and I wore mask and eyewear protection during entire encounter.  Patient wore mask.      Electronically signed by SHER Gill, 12/21/21, 8:31 AM EST.

## 2021-12-21 NOTE — ASSESSMENT & PLAN NOTE
Patient has previously had an elevated A1c of 5.7 in the past.  Labs from last week show A1c of 5.8.  Patient counseled on diet and exercise.  Patient was requesting diabetes educator for nutrition counseling.  Referral was placed.  Return in 3 months for recheck.

## 2021-12-22 DIAGNOSIS — I10 ESSENTIAL HYPERTENSION: ICD-10-CM

## 2021-12-22 DIAGNOSIS — R39.11 BENIGN PROSTATIC HYPERPLASIA WITH URINARY HESITANCY: ICD-10-CM

## 2021-12-22 DIAGNOSIS — N40.1 BENIGN PROSTATIC HYPERPLASIA WITH URINARY HESITANCY: ICD-10-CM

## 2021-12-22 DIAGNOSIS — I51.7 CARDIAC ENLARGEMENT: ICD-10-CM

## 2021-12-22 RX ORDER — METOPROLOL SUCCINATE 25 MG/1
TABLET, EXTENDED RELEASE ORAL
Qty: 90 TABLET | Refills: 3 | Status: SHIPPED | OUTPATIENT
Start: 2021-12-22 | End: 2022-01-13 | Stop reason: HOSPADM

## 2021-12-22 RX ORDER — TAMSULOSIN HYDROCHLORIDE 0.4 MG/1
CAPSULE ORAL
Qty: 90 CAPSULE | Refills: 3 | Status: SHIPPED | OUTPATIENT
Start: 2021-12-22

## 2021-12-22 RX ORDER — LISINOPRIL 10 MG/1
TABLET ORAL
Qty: 90 TABLET | Refills: 3 | Status: SHIPPED | OUTPATIENT
Start: 2021-12-22 | End: 2022-01-13 | Stop reason: HOSPADM

## 2021-12-27 RX ORDER — ESOMEPRAZOLE MAGNESIUM 40 MG/1
40 CAPSULE, DELAYED RELEASE ORAL
Qty: 90 CAPSULE | Refills: 1 | Status: SHIPPED | OUTPATIENT
Start: 2021-12-27 | End: 2022-06-02

## 2022-01-05 ENCOUNTER — HOSPITAL ENCOUNTER (OUTPATIENT)
Dept: DIABETES SERVICES | Facility: HOSPITAL | Age: 72
Discharge: HOME | End: 2022-01-05

## 2022-01-05 PROCEDURE — 97802 MEDICAL NUTRITION INDIV IN: CPT

## 2022-01-11 ENCOUNTER — OFFICE VISIT (OUTPATIENT)
Dept: SLEEP MEDICINE | Facility: HOSPITAL | Age: 72
End: 2022-01-11

## 2022-01-11 ENCOUNTER — APPOINTMENT (OUTPATIENT)
Dept: GENERAL RADIOLOGY | Facility: HOSPITAL | Age: 72
End: 2022-01-11

## 2022-01-11 VITALS
HEIGHT: 69 IN | DIASTOLIC BLOOD PRESSURE: 74 MMHG | SYSTOLIC BLOOD PRESSURE: 142 MMHG | BODY MASS INDEX: 29.47 KG/M2 | WEIGHT: 199 LBS | OXYGEN SATURATION: 97 % | HEART RATE: 57 BPM

## 2022-01-11 DIAGNOSIS — G47.33 OSA (OBSTRUCTIVE SLEEP APNEA): Primary | ICD-10-CM

## 2022-01-11 LAB
ALBUMIN SERPL-MCNC: 4.4 G/DL (ref 3.5–5.2)
ALBUMIN/GLOB SERPL: 1.6 G/DL
ALP SERPL-CCNC: 84 U/L (ref 39–117)
ALT SERPL W P-5'-P-CCNC: 30 U/L (ref 1–41)
ANION GAP SERPL CALCULATED.3IONS-SCNC: 12.4 MMOL/L (ref 5–15)
AST SERPL-CCNC: 44 U/L (ref 1–40)
BASOPHILS # BLD AUTO: 0.04 10*3/MM3 (ref 0–0.2)
BASOPHILS NFR BLD AUTO: 0.5 % (ref 0–1.5)
BILIRUB SERPL-MCNC: 0.6 MG/DL (ref 0–1.2)
BUN SERPL-MCNC: 20 MG/DL (ref 8–23)
BUN/CREAT SERPL: 18.9 (ref 7–25)
CALCIUM SPEC-SCNC: 9.1 MG/DL (ref 8.6–10.5)
CHLORIDE SERPL-SCNC: 97 MMOL/L (ref 98–107)
CO2 SERPL-SCNC: 25.6 MMOL/L (ref 22–29)
CREAT SERPL-MCNC: 1.06 MG/DL (ref 0.76–1.27)
DEPRECATED RDW RBC AUTO: 47.7 FL (ref 37–54)
EOSINOPHIL # BLD AUTO: 0.12 10*3/MM3 (ref 0–0.4)
EOSINOPHIL NFR BLD AUTO: 1.6 % (ref 0.3–6.2)
ERYTHROCYTE [DISTWIDTH] IN BLOOD BY AUTOMATED COUNT: 12.6 % (ref 12.3–15.4)
GFR SERPL CREATININE-BSD FRML MDRD: 68 ML/MIN/1.73
GLOBULIN UR ELPH-MCNC: 2.7 GM/DL
GLUCOSE SERPL-MCNC: 94 MG/DL (ref 65–99)
HCT VFR BLD AUTO: 47.4 % (ref 37.5–51)
HGB BLD-MCNC: 16 G/DL (ref 13–17.7)
HOLD SPECIMEN: NORMAL
HOLD SPECIMEN: NORMAL
IMM GRANULOCYTES # BLD AUTO: 0.01 10*3/MM3 (ref 0–0.05)
IMM GRANULOCYTES NFR BLD AUTO: 0.1 % (ref 0–0.5)
LYMPHOCYTES # BLD AUTO: 2.22 10*3/MM3 (ref 0.7–3.1)
LYMPHOCYTES NFR BLD AUTO: 30.4 % (ref 19.6–45.3)
MCH RBC QN AUTO: 34.2 PG (ref 26.6–33)
MCHC RBC AUTO-ENTMCNC: 33.8 G/DL (ref 31.5–35.7)
MCV RBC AUTO: 101.3 FL (ref 79–97)
MONOCYTES # BLD AUTO: 0.59 10*3/MM3 (ref 0.1–0.9)
MONOCYTES NFR BLD AUTO: 8.1 % (ref 5–12)
NEUTROPHILS NFR BLD AUTO: 4.32 10*3/MM3 (ref 1.7–7)
NEUTROPHILS NFR BLD AUTO: 59.3 % (ref 42.7–76)
NRBC BLD AUTO-RTO: 0 /100 WBC (ref 0–0.2)
PLATELET # BLD AUTO: 236 10*3/MM3 (ref 140–450)
PMV BLD AUTO: 9.5 FL (ref 6–12)
POTASSIUM SERPL-SCNC: 4.8 MMOL/L (ref 3.5–5.2)
PROT SERPL-MCNC: 7.1 G/DL (ref 6–8.5)
RBC # BLD AUTO: 4.68 10*6/MM3 (ref 4.14–5.8)
SODIUM SERPL-SCNC: 135 MMOL/L (ref 136–145)
TROPONIN T SERPL-MCNC: 0.12 NG/ML (ref 0–0.03)
WBC NRBC COR # BLD: 7.3 10*3/MM3 (ref 3.4–10.8)
WHOLE BLOOD HOLD SPECIMEN: NORMAL
WHOLE BLOOD HOLD SPECIMEN: NORMAL

## 2022-01-11 PROCEDURE — 85025 COMPLETE CBC W/AUTO DIFF WBC: CPT

## 2022-01-11 PROCEDURE — 80053 COMPREHEN METABOLIC PANEL: CPT

## 2022-01-11 PROCEDURE — 71046 X-RAY EXAM CHEST 2 VIEWS: CPT

## 2022-01-11 PROCEDURE — 93005 ELECTROCARDIOGRAM TRACING: CPT | Performed by: EMERGENCY MEDICINE

## 2022-01-11 PROCEDURE — 84484 ASSAY OF TROPONIN QUANT: CPT

## 2022-01-11 PROCEDURE — 93010 ELECTROCARDIOGRAM REPORT: CPT | Performed by: INTERNAL MEDICINE

## 2022-01-11 PROCEDURE — 93005 ELECTROCARDIOGRAM TRACING: CPT

## 2022-01-11 PROCEDURE — G0463 HOSPITAL OUTPT CLINIC VISIT: HCPCS

## 2022-01-11 PROCEDURE — 99284 EMERGENCY DEPT VISIT MOD MDM: CPT

## 2022-01-11 NOTE — PATIENT INSTRUCTIONS
The patient is [employer] dream station auto CPAP which is on recall by the  and I have given the instructions to the patient regarding the same to register his/her CPAP/BIPAP device on [employer] website and to contact the [employer] company.  The patient was also instructed not to use any form of ozone  such as so clean.    Phone number [phone] to call regarding registration of [employer] CPAP/BiPAP machine which is under recall.    Jackeline Aguiar MD

## 2022-01-11 NOTE — PROGRESS NOTES
"Zheng VALENTINO John                                    Sleep medicine follow-up visit  1950  [id number]     DATE OF SERVICE: 1/11/2022     HISTORY:  The patient is a 71 y.o.  male has history of hypertension, hyperlipidemia, asymmetric septal hypertrophy and nonsustained ventilator tachycardia in the past, chronic lumbar blood clot in the left side and osteoarthritis of the left knee and obstructive sleep apnea syndrome.     The patient has moderately severe obstructive sleep apnea syndrome. His polysomnography in the past has revealed apnea-hypopnea index of 27 per sleep hour and minimum SpO2 of 90%. During supine position, he had severe obstructive sleep apnea with Supine AHI of 49 per sleep hour.     He is on Respironics DreamStation auto CPAP at 12 cmH20. Compliance data indicates 100% compliance with average usage of 7 hours 25 minutes.  Residual AHI is 0.6 with CPAP therapy.  His sleep is sound and restorative with CPAP therapy.  Charlotte Sleepiness Scale score is 1 with CPAP therapy. The patient is compliant and benefiting from it.     Sleep schedule: Bedtime 10 PM and gets out of bed at 6:30 AM without an alarm.  Sleep latency 15 minutes and occasionally wakes up once or twice to urinate.    Review of Systems - 10 system review negative.     PMH, PSH, Medications, allergies, FH, SH are reviewed and updated in the chart.     PHYSICAL EXAMINATION:  Vitals:    01/11/22 0927   BP: 142/74   Pulse: 57   SpO2: 97%   Weight: 90.3 kg (199 lb)   Height: 175 cm (68.9\")   Body mass index is 29.47 kg/m².   Well-developed, well-nourished in no apparent distress.    IMPRESSION: Patient with obstructive sleep apnea syndrome successfully treated with CPAP therapy and is compliant and benefiting from it.      The patient is [employer] dream station auto CPAP which is on recall by the  and I have given the instructions to the patient regarding the same to register his/her CPAP/BIPAP device on " [employer] website and to contact the DME company.  The patient was also instructed not to use any form of ozone  such as so clean.    Phone number [phone] to call regarding registration of [employer] Respironics CPAP/BiPAP machine which is under recall.    Jackeline Aguiar MD     RECOMMENDATIONS: Continue present CPAP. Followup 1 year.     Jackeline Aguiar M.D.  1/11/2022

## 2022-01-12 ENCOUNTER — APPOINTMENT (OUTPATIENT)
Dept: CARDIOLOGY | Facility: HOSPITAL | Age: 72
End: 2022-01-12

## 2022-01-12 ENCOUNTER — HOSPITAL ENCOUNTER (OUTPATIENT)
Facility: HOSPITAL | Age: 72
Discharge: HOME | End: 2022-01-13
Attending: EMERGENCY MEDICINE | Admitting: INTERNAL MEDICINE

## 2022-01-12 DIAGNOSIS — I10 HYPERTENSION, UNSPECIFIED TYPE: ICD-10-CM

## 2022-01-12 DIAGNOSIS — I47.1 PAROXYSMAL SVT (SUPRAVENTRICULAR TACHYCARDIA): ICD-10-CM

## 2022-01-12 DIAGNOSIS — I21.4 NON-ST ELEVATED MYOCARDIAL INFARCTION (NON-STEMI): Primary | ICD-10-CM

## 2022-01-12 DIAGNOSIS — E78.5 HYPERLIPIDEMIA, UNSPECIFIED HYPERLIPIDEMIA TYPE: ICD-10-CM

## 2022-01-12 DIAGNOSIS — R07.2 PRECORDIAL CHEST PAIN: ICD-10-CM

## 2022-01-12 LAB
AORTIC DIMENSIONLESS INDEX: 0.9 (DI)
APTT PPP: 27.7 SECONDS (ref 22.7–35.4)
BASOPHILS # BLD AUTO: 0.02 10*3/MM3 (ref 0–0.2)
BASOPHILS NFR BLD AUTO: 0.4 % (ref 0–1.5)
BH CV ECHO MEAS - ACS: 1.8 CM
BH CV ECHO MEAS - AO MAX PG (FULL): 0.66 MMHG
BH CV ECHO MEAS - AO MAX PG: 4 MMHG
BH CV ECHO MEAS - AO MEAN PG (FULL): 0.39 MMHG
BH CV ECHO MEAS - AO MEAN PG: 2.3 MMHG
BH CV ECHO MEAS - AO V2 MAX: 99.9 CM/SEC
BH CV ECHO MEAS - AO V2 MEAN: 71.8 CM/SEC
BH CV ECHO MEAS - AO V2 VTI: 22 CM
BH CV ECHO MEAS - ASC AORTA: 3.8 CM
BH CV ECHO MEAS - AVA(I,A): 3 CM^2
BH CV ECHO MEAS - AVA(I,D): 3 CM^2
BH CV ECHO MEAS - AVA(V,A): 3 CM^2
BH CV ECHO MEAS - AVA(V,D): 3 CM^2
BH CV ECHO MEAS - BSA(HAYCOCK): 2.1 M^2
BH CV ECHO MEAS - BSA: 2.1 M^2
BH CV ECHO MEAS - BZI_BMI: 29.4 KILOGRAMS/M^2
BH CV ECHO MEAS - BZI_METRIC_HEIGHT: 175.3 CM
BH CV ECHO MEAS - BZI_METRIC_WEIGHT: 90.3 KG
BH CV ECHO MEAS - CONTRAST EF 4CH: 64 CM2
BH CV ECHO MEAS - EDV(MOD-SP2): 132 ML
BH CV ECHO MEAS - EDV(MOD-SP4): 137 ML
BH CV ECHO MEAS - EF(MOD-BP): 63.7 %
BH CV ECHO MEAS - EF(MOD-SP2): 68.2 %
BH CV ECHO MEAS - EF(MOD-SP4): 63.5 %
BH CV ECHO MEAS - ESV(MOD-SP2): 42 ML
BH CV ECHO MEAS - ESV(MOD-SP4): 50 ML
BH CV ECHO MEAS - LAT PEAK E' VEL: 10.2 CM/SEC
BH CV ECHO MEAS - LV DIASTOLIC VOL/BSA (35-75): 66.5 ML/M^2
BH CV ECHO MEAS - LV MAX PG: 3.3 MMHG
BH CV ECHO MEAS - LV MEAN PG: 1.9 MMHG
BH CV ECHO MEAS - LV SYSTOLIC VOL/BSA (12-30): 24.3 ML/M^2
BH CV ECHO MEAS - LV V1 MAX: 91.3 CM/SEC
BH CV ECHO MEAS - LV V1 MEAN: 64.3 CM/SEC
BH CV ECHO MEAS - LV V1 VTI: 20.1 CM
BH CV ECHO MEAS - LVLD AP2: 7.3 CM
BH CV ECHO MEAS - LVLD AP4: 7.6 CM
BH CV ECHO MEAS - LVLS AP2: 5.9 CM
BH CV ECHO MEAS - LVLS AP4: 7 CM
BH CV ECHO MEAS - LVOT AREA (M): 3.1 CM^2
BH CV ECHO MEAS - LVOT AREA: 3.3 CM^2
BH CV ECHO MEAS - LVOT DIAM: 2 CM
BH CV ECHO MEAS - MED PEAK E' VEL: 5.2 CM/SEC
BH CV ECHO MEAS - MV A DUR: 0.11 SEC
BH CV ECHO MEAS - MV A MAX VEL: 93.4 CM/SEC
BH CV ECHO MEAS - MV DEC SLOPE: 235.5 CM/SEC^2
BH CV ECHO MEAS - MV DEC TIME: 210 SEC
BH CV ECHO MEAS - MV E MAX VEL: 76.3 CM/SEC
BH CV ECHO MEAS - MV E/A: 0.82
BH CV ECHO MEAS - MV MAX PG: 3.5 MMHG
BH CV ECHO MEAS - MV MEAN PG: 0.87 MMHG
BH CV ECHO MEAS - MV P1/2T MAX VEL: 64.4 CM/SEC
BH CV ECHO MEAS - MV P1/2T: 80.1 MSEC
BH CV ECHO MEAS - MV V2 MAX: 93.1 CM/SEC
BH CV ECHO MEAS - MV V2 MEAN: 41.3 CM/SEC
BH CV ECHO MEAS - MV V2 VTI: 27 CM
BH CV ECHO MEAS - MVA P1/2T LCG: 3.4 CM^2
BH CV ECHO MEAS - MVA(P1/2T): 2.7 CM^2
BH CV ECHO MEAS - MVA(VTI): 2.4 CM^2
BH CV ECHO MEAS - PULM A REVS DUR: 0.13 SEC
BH CV ECHO MEAS - PULM A REVS VEL: 24.3 CM/SEC
BH CV ECHO MEAS - PULM DIAS VEL: 21.4 CM/SEC
BH CV ECHO MEAS - PULM S/D: 1.1
BH CV ECHO MEAS - PULM SYS VEL: 22.9 CM/SEC
BH CV ECHO MEAS - RAP SYSTOLE: 3 MMHG
BH CV ECHO MEAS - SI(LVOT): 32 ML/M^2
BH CV ECHO MEAS - SI(MOD-SP2): 43.7 ML/M^2
BH CV ECHO MEAS - SI(MOD-SP4): 42.2 ML/M^2
BH CV ECHO MEAS - SV(LVOT): 66 ML
BH CV ECHO MEAS - SV(MOD-SP2): 90 ML
BH CV ECHO MEAS - SV(MOD-SP4): 87 ML
BH CV ECHO MEAS - TAPSE (>1.6): 2.4 CM
BH CV ECHO MEASUREMENTS AVERAGE E/E' RATIO: 9.91
BH CV VAS BP RIGHT ARM: NORMAL MMHG
BH CV XLRA - RV BASE: 3.7 CM
BH CV XLRA - RV LENGTH: 8.3 CM
BH CV XLRA - RV MID: 2.8 CM
BH CV XLRA - TDI S': 7.1 CM/SEC
DEPRECATED RDW RBC AUTO: 44.5 FL (ref 37–54)
EOSINOPHIL # BLD AUTO: 0.18 10*3/MM3 (ref 0–0.4)
EOSINOPHIL NFR BLD AUTO: 3.5 % (ref 0.3–6.2)
ERYTHROCYTE [DISTWIDTH] IN BLOOD BY AUTOMATED COUNT: 12.4 % (ref 12.3–15.4)
GLUCOSE BLDC GLUCOMTR-MCNC: 100 MG/DL (ref 70–130)
HCT VFR BLD AUTO: 43.7 % (ref 37.5–51)
HGB BLD-MCNC: 15.1 G/DL (ref 13–17.7)
IMM GRANULOCYTES # BLD AUTO: 0.01 10*3/MM3 (ref 0–0.05)
IMM GRANULOCYTES NFR BLD AUTO: 0.2 % (ref 0–0.5)
INR PPP: 1.04 (ref 0.9–1.1)
LEFT ATRIUM VOLUME INDEX: 37 ML/M2
LV EF 2D ECHO EST: 64 %
LYMPHOCYTES # BLD AUTO: 1.75 10*3/MM3 (ref 0.7–3.1)
LYMPHOCYTES NFR BLD AUTO: 34.4 % (ref 19.6–45.3)
MAXIMAL PREDICTED HEART RATE: 147 BPM
MCH RBC QN AUTO: 33.9 PG (ref 26.6–33)
MCHC RBC AUTO-ENTMCNC: 34.6 G/DL (ref 31.5–35.7)
MCV RBC AUTO: 98 FL (ref 79–97)
MONOCYTES # BLD AUTO: 0.49 10*3/MM3 (ref 0.1–0.9)
MONOCYTES NFR BLD AUTO: 9.6 % (ref 5–12)
NEUTROPHILS NFR BLD AUTO: 2.63 10*3/MM3 (ref 1.7–7)
NEUTROPHILS NFR BLD AUTO: 51.9 % (ref 42.7–76)
NRBC BLD AUTO-RTO: 0 /100 WBC (ref 0–0.2)
PLATELET # BLD AUTO: 209 10*3/MM3 (ref 140–450)
PMV BLD AUTO: 9.3 FL (ref 6–12)
PROTHROMBIN TIME: 13.4 SECONDS (ref 11.7–14.2)
QT INTERVAL: 381 MS
QT INTERVAL: 393 MS
RBC # BLD AUTO: 4.46 10*6/MM3 (ref 4.14–5.8)
SARS-COV-2 RNA PNL SPEC NAA+PROBE: NOT DETECTED
STRESS TARGET HR: 125 BPM
TROPONIN T SERPL-MCNC: 0.23 NG/ML (ref 0–0.03)
WBC NRBC COR # BLD: 5.08 10*3/MM3 (ref 3.4–10.8)

## 2022-01-12 PROCEDURE — G0378 HOSPITAL OBSERVATION PER HR: HCPCS

## 2022-01-12 PROCEDURE — A9270 NON-COVERED ITEM OR SERVICE: HCPCS | Performed by: INTERNAL MEDICINE

## 2022-01-12 PROCEDURE — 93306 TTE W/DOPPLER COMPLETE: CPT | Performed by: INTERNAL MEDICINE

## 2022-01-12 PROCEDURE — 63710000001 PANTOPRAZOLE 40 MG TABLET DELAYED-RELEASE: Performed by: INTERNAL MEDICINE

## 2022-01-12 PROCEDURE — 82962 GLUCOSE BLOOD TEST: CPT

## 2022-01-12 PROCEDURE — 25010000002 FENTANYL CITRATE (PF) 50 MCG/ML SOLUTION: Performed by: INTERNAL MEDICINE

## 2022-01-12 PROCEDURE — 0 IOPAMIDOL PER 1 ML: Performed by: INTERNAL MEDICINE

## 2022-01-12 PROCEDURE — 25010000002 MIDAZOLAM PER 1 MG: Performed by: INTERNAL MEDICINE

## 2022-01-12 PROCEDURE — 25010000002 PERFLUTREN (DEFINITY) 8.476 MG IN SODIUM CHLORIDE (PF) 0.9 % 10 ML INJECTION: Performed by: INTERNAL MEDICINE

## 2022-01-12 PROCEDURE — 63710000001 ATORVASTATIN 20 MG TABLET: Performed by: INTERNAL MEDICINE

## 2022-01-12 PROCEDURE — 84484 ASSAY OF TROPONIN QUANT: CPT | Performed by: EMERGENCY MEDICINE

## 2022-01-12 PROCEDURE — 63710000001 METOPROLOL SUCCINATE XL 25 MG TABLET SUSTAINED-RELEASE 24 HOUR: Performed by: INTERNAL MEDICINE

## 2022-01-12 PROCEDURE — 85025 COMPLETE CBC W/AUTO DIFF WBC: CPT | Performed by: INTERNAL MEDICINE

## 2022-01-12 PROCEDURE — 93010 ELECTROCARDIOGRAM REPORT: CPT | Performed by: INTERNAL MEDICINE

## 2022-01-12 PROCEDURE — C1769 GUIDE WIRE: HCPCS | Performed by: INTERNAL MEDICINE

## 2022-01-12 PROCEDURE — 85730 THROMBOPLASTIN TIME PARTIAL: CPT | Performed by: INTERNAL MEDICINE

## 2022-01-12 PROCEDURE — 99220 PR INITIAL OBSERVATION CARE/DAY 70 MINUTES: CPT | Performed by: INTERNAL MEDICINE

## 2022-01-12 PROCEDURE — 63710000001 DIPHENHYDRAMINE PER 50 MG: Performed by: INTERNAL MEDICINE

## 2022-01-12 PROCEDURE — 93306 TTE W/DOPPLER COMPLETE: CPT

## 2022-01-12 PROCEDURE — C1894 INTRO/SHEATH, NON-LASER: HCPCS | Performed by: INTERNAL MEDICINE

## 2022-01-12 PROCEDURE — 93005 ELECTROCARDIOGRAM TRACING: CPT

## 2022-01-12 PROCEDURE — 87635 SARS-COV-2 COVID-19 AMP PRB: CPT | Performed by: EMERGENCY MEDICINE

## 2022-01-12 PROCEDURE — 63710000001 MELATONIN 5 MG TABLET: Performed by: INTERNAL MEDICINE

## 2022-01-12 PROCEDURE — 93458 L HRT ARTERY/VENTRICLE ANGIO: CPT | Performed by: INTERNAL MEDICINE

## 2022-01-12 PROCEDURE — 25010000002 HEPARIN (PORCINE) PER 1000 UNITS: Performed by: INTERNAL MEDICINE

## 2022-01-12 PROCEDURE — 85610 PROTHROMBIN TIME: CPT | Performed by: INTERNAL MEDICINE

## 2022-01-12 PROCEDURE — 93005 ELECTROCARDIOGRAM TRACING: CPT | Performed by: EMERGENCY MEDICINE

## 2022-01-12 PROCEDURE — 63710000001 TAMSULOSIN 0.4 MG CAPSULE: Performed by: INTERNAL MEDICINE

## 2022-01-12 RX ORDER — PANTOPRAZOLE SODIUM 40 MG/1
40 TABLET, DELAYED RELEASE ORAL EVERY MORNING
Refills: 1 | Status: DISCONTINUED | OUTPATIENT
Start: 2022-01-12 | End: 2022-01-13 | Stop reason: HOSPADM

## 2022-01-12 RX ORDER — SODIUM CHLORIDE 9 MG/ML
75 INJECTION, SOLUTION INTRAVENOUS CONTINUOUS
Status: ACTIVE | OUTPATIENT
Start: 2022-01-12 | End: 2022-01-12

## 2022-01-12 RX ORDER — MORPHINE SULFATE 2 MG/ML
1 INJECTION, SOLUTION INTRAMUSCULAR; INTRAVENOUS EVERY 4 HOURS PRN
Status: DISCONTINUED | OUTPATIENT
Start: 2022-01-12 | End: 2022-01-13 | Stop reason: HOSPADM

## 2022-01-12 RX ORDER — DIPHENHYDRAMINE HCL 25 MG
25 CAPSULE ORAL NIGHTLY PRN
Status: DISCONTINUED | OUTPATIENT
Start: 2022-01-12 | End: 2022-01-13 | Stop reason: HOSPADM

## 2022-01-12 RX ORDER — TAMSULOSIN HYDROCHLORIDE 0.4 MG/1
0.4 CAPSULE ORAL DAILY
Status: DISCONTINUED | OUTPATIENT
Start: 2022-01-12 | End: 2022-01-13 | Stop reason: HOSPADM

## 2022-01-12 RX ORDER — CHOLECALCIFEROL (VITAMIN D3) 125 MCG
10 CAPSULE ORAL NIGHTLY PRN
Status: DISCONTINUED | OUTPATIENT
Start: 2022-01-12 | End: 2022-01-12

## 2022-01-12 RX ORDER — SODIUM CHLORIDE 9 MG/ML
INJECTION, SOLUTION INTRAVENOUS CONTINUOUS PRN
Status: COMPLETED | OUTPATIENT
Start: 2022-01-12 | End: 2022-01-12

## 2022-01-12 RX ORDER — ASPIRIN 325 MG
325 TABLET ORAL ONCE
Status: COMPLETED | OUTPATIENT
Start: 2022-01-12 | End: 2022-01-12

## 2022-01-12 RX ORDER — METOPROLOL SUCCINATE 25 MG/1
25 TABLET, EXTENDED RELEASE ORAL EVERY 12 HOURS SCHEDULED
Status: DISCONTINUED | OUTPATIENT
Start: 2022-01-12 | End: 2022-01-13 | Stop reason: HOSPADM

## 2022-01-12 RX ORDER — ACETAMINOPHEN 325 MG/1
650 TABLET ORAL EVERY 4 HOURS PRN
Status: DISCONTINUED | OUTPATIENT
Start: 2022-01-12 | End: 2022-01-13 | Stop reason: HOSPADM

## 2022-01-12 RX ORDER — ATORVASTATIN CALCIUM 20 MG/1
40 TABLET, FILM COATED ORAL NIGHTLY
Status: DISCONTINUED | OUTPATIENT
Start: 2022-01-12 | End: 2022-01-13 | Stop reason: HOSPADM

## 2022-01-12 RX ORDER — MIDAZOLAM HYDROCHLORIDE 1 MG/ML
INJECTION INTRAMUSCULAR; INTRAVENOUS AS NEEDED
Status: DISCONTINUED | OUTPATIENT
Start: 2022-01-12 | End: 2022-01-12 | Stop reason: HOSPADM

## 2022-01-12 RX ORDER — HEPARIN SODIUM 10000 [USP'U]/100ML
12 INJECTION, SOLUTION INTRAVENOUS
Status: DISCONTINUED | OUTPATIENT
Start: 2022-01-12 | End: 2022-01-12

## 2022-01-12 RX ORDER — ONDANSETRON 2 MG/ML
4 INJECTION INTRAMUSCULAR; INTRAVENOUS EVERY 6 HOURS PRN
Status: DISCONTINUED | OUTPATIENT
Start: 2022-01-12 | End: 2022-01-13 | Stop reason: HOSPADM

## 2022-01-12 RX ORDER — CHOLECALCIFEROL (VITAMIN D3) 125 MCG
10 CAPSULE ORAL NIGHTLY
Status: DISCONTINUED | OUTPATIENT
Start: 2022-01-12 | End: 2022-01-13 | Stop reason: HOSPADM

## 2022-01-12 RX ORDER — ONDANSETRON 4 MG/1
4 TABLET, FILM COATED ORAL EVERY 6 HOURS PRN
Status: DISCONTINUED | OUTPATIENT
Start: 2022-01-12 | End: 2022-01-13 | Stop reason: HOSPADM

## 2022-01-12 RX ORDER — HYDROCODONE BITARTRATE AND ACETAMINOPHEN 5; 325 MG/1; MG/1
1 TABLET ORAL EVERY 4 HOURS PRN
Status: DISCONTINUED | OUTPATIENT
Start: 2022-01-12 | End: 2022-01-13 | Stop reason: HOSPADM

## 2022-01-12 RX ORDER — HEPARIN SODIUM 5000 [USP'U]/ML
44.3 INJECTION, SOLUTION INTRAVENOUS; SUBCUTANEOUS ONCE
Status: DISCONTINUED | OUTPATIENT
Start: 2022-01-12 | End: 2022-01-12

## 2022-01-12 RX ORDER — HEPARIN SODIUM 5000 [USP'U]/ML
30-44.3 INJECTION, SOLUTION INTRAVENOUS; SUBCUTANEOUS EVERY 6 HOURS PRN
Status: DISCONTINUED | OUTPATIENT
Start: 2022-01-12 | End: 2022-01-12

## 2022-01-12 RX ORDER — FENTANYL CITRATE 50 UG/ML
INJECTION, SOLUTION INTRAMUSCULAR; INTRAVENOUS AS NEEDED
Status: DISCONTINUED | OUTPATIENT
Start: 2022-01-12 | End: 2022-01-12 | Stop reason: HOSPADM

## 2022-01-12 RX ORDER — NALOXONE HCL 0.4 MG/ML
0.4 VIAL (ML) INJECTION
Status: DISCONTINUED | OUTPATIENT
Start: 2022-01-12 | End: 2022-01-13 | Stop reason: HOSPADM

## 2022-01-12 RX ORDER — LIDOCAINE HYDROCHLORIDE 20 MG/ML
INJECTION, SOLUTION INFILTRATION; PERINEURAL AS NEEDED
Status: DISCONTINUED | OUTPATIENT
Start: 2022-01-12 | End: 2022-01-12 | Stop reason: HOSPADM

## 2022-01-12 RX ADMIN — ASPIRIN 325 MG: 325 TABLET ORAL at 01:11

## 2022-01-12 RX ADMIN — ATORVASTATIN CALCIUM 40 MG: 20 TABLET, FILM COATED ORAL at 20:12

## 2022-01-12 RX ADMIN — SODIUM CHLORIDE 75 ML/HR: 9 INJECTION, SOLUTION INTRAVENOUS at 11:00

## 2022-01-12 RX ADMIN — Medication 10 MG: at 20:12

## 2022-01-12 RX ADMIN — PERFLUTREN 2 ML: 6.52 INJECTION, SUSPENSION INTRAVENOUS at 18:28

## 2022-01-12 RX ADMIN — TAMSULOSIN HYDROCHLORIDE 0.4 MG: 0.4 CAPSULE ORAL at 16:59

## 2022-01-12 RX ADMIN — METOPROLOL SUCCINATE 25 MG: 25 TABLET, EXTENDED RELEASE ORAL at 12:32

## 2022-01-12 RX ADMIN — PANTOPRAZOLE SODIUM 40 MG: 40 TABLET, DELAYED RELEASE ORAL at 16:59

## 2022-01-12 RX ADMIN — DIPHENHYDRAMINE HYDROCHLORIDE 25 MG: 25 CAPSULE ORAL at 20:12

## 2022-01-12 RX ADMIN — METOPROLOL SUCCINATE 25 MG: 25 TABLET, EXTENDED RELEASE ORAL at 20:12

## 2022-01-13 ENCOUNTER — APPOINTMENT (OUTPATIENT)
Dept: CARDIOLOGY | Facility: HOSPITAL | Age: 72
End: 2022-01-13

## 2022-01-13 ENCOUNTER — HOSPITAL ENCOUNTER (OUTPATIENT)
Dept: CARDIOLOGY | Facility: HOSPITAL | Age: 72
Discharge: HOME | End: 2022-01-13
Admitting: INTERNAL MEDICINE

## 2022-01-13 ENCOUNTER — READMISSION MANAGEMENT (OUTPATIENT)
Dept: CALL CENTER | Facility: HOSPITAL | Age: 72
End: 2022-01-13

## 2022-01-13 VITALS
WEIGHT: 199 LBS | SYSTOLIC BLOOD PRESSURE: 127 MMHG | RESPIRATION RATE: 20 BRPM | HEART RATE: 54 BPM | HEIGHT: 69 IN | BODY MASS INDEX: 29.47 KG/M2 | DIASTOLIC BLOOD PRESSURE: 76 MMHG | OXYGEN SATURATION: 93 % | TEMPERATURE: 98.6 F

## 2022-01-13 LAB
BASOPHILS # BLD AUTO: 0.04 10*3/MM3 (ref 0–0.2)
BASOPHILS NFR BLD AUTO: 0.7 % (ref 0–1.5)
DEPRECATED RDW RBC AUTO: 41 FL (ref 37–54)
EOSINOPHIL # BLD AUTO: 0.23 10*3/MM3 (ref 0–0.4)
EOSINOPHIL NFR BLD AUTO: 4 % (ref 0.3–6.2)
ERYTHROCYTE [DISTWIDTH] IN BLOOD BY AUTOMATED COUNT: 12 % (ref 12.3–15.4)
HCT VFR BLD AUTO: 41.6 % (ref 37.5–51)
HGB BLD-MCNC: 14.7 G/DL (ref 13–17.7)
IMM GRANULOCYTES # BLD AUTO: 0.01 10*3/MM3 (ref 0–0.05)
IMM GRANULOCYTES NFR BLD AUTO: 0.2 % (ref 0–0.5)
LYMPHOCYTES # BLD AUTO: 1.9 10*3/MM3 (ref 0.7–3.1)
LYMPHOCYTES NFR BLD AUTO: 33 % (ref 19.6–45.3)
MCH RBC QN AUTO: 33.5 PG (ref 26.6–33)
MCHC RBC AUTO-ENTMCNC: 35.3 G/DL (ref 31.5–35.7)
MCV RBC AUTO: 94.8 FL (ref 79–97)
MONOCYTES # BLD AUTO: 0.55 10*3/MM3 (ref 0.1–0.9)
MONOCYTES NFR BLD AUTO: 9.5 % (ref 5–12)
NEUTROPHILS NFR BLD AUTO: 3.03 10*3/MM3 (ref 1.7–7)
NEUTROPHILS NFR BLD AUTO: 52.6 % (ref 42.7–76)
NRBC BLD AUTO-RTO: 0 /100 WBC (ref 0–0.2)
PLATELET # BLD AUTO: 209 10*3/MM3 (ref 140–450)
PMV BLD AUTO: 9.5 FL (ref 6–12)
RBC # BLD AUTO: 4.39 10*6/MM3 (ref 4.14–5.8)
WBC NRBC COR # BLD: 5.76 10*3/MM3 (ref 3.4–10.8)

## 2022-01-13 PROCEDURE — 93246 EXT ECG>7D<15D RECORDING: CPT

## 2022-01-13 PROCEDURE — A9270 NON-COVERED ITEM OR SERVICE: HCPCS | Performed by: INTERNAL MEDICINE

## 2022-01-13 PROCEDURE — 85025 COMPLETE CBC W/AUTO DIFF WBC: CPT | Performed by: INTERNAL MEDICINE

## 2022-01-13 PROCEDURE — 63710000001 TAMSULOSIN 0.4 MG CAPSULE: Performed by: INTERNAL MEDICINE

## 2022-01-13 PROCEDURE — 63710000001 PANTOPRAZOLE 40 MG TABLET DELAYED-RELEASE: Performed by: INTERNAL MEDICINE

## 2022-01-13 PROCEDURE — G0378 HOSPITAL OBSERVATION PER HR: HCPCS

## 2022-01-13 PROCEDURE — 99217 PR OBSERVATION CARE DISCHARGE MANAGEMENT: CPT | Performed by: INTERNAL MEDICINE

## 2022-01-13 RX ORDER — METOPROLOL SUCCINATE 25 MG/1
25 TABLET, EXTENDED RELEASE ORAL EVERY 12 HOURS SCHEDULED
Qty: 180 TABLET | Refills: 3 | Status: SHIPPED | OUTPATIENT
Start: 2022-01-13 | End: 2022-02-02 | Stop reason: SDUPTHER

## 2022-01-13 RX ADMIN — TAMSULOSIN HYDROCHLORIDE 0.4 MG: 0.4 CAPSULE ORAL at 09:55

## 2022-01-13 RX ADMIN — PANTOPRAZOLE SODIUM 40 MG: 40 TABLET, DELAYED RELEASE ORAL at 06:14

## 2022-01-13 NOTE — OUTREACH NOTE
Prep Survey      Responses   [facility] patient discharged from? [place]   Is LACE score < 7 ? Yes   Emergency Room discharge w/ pulse ox? No   Eligibility [facility]   Date of Admission 01/12/22   Date of Discharge 01/13/22   Discharge Disposition Home or Self Care   Discharge diagnosis Non-ST elevated myocardial infarction (non-STEMI   Does the patient have one of the following disease processes/diagnoses(primary or secondary)? Acute MI (STEMI,NSTEMI)   Does the patient have Home health ordered? No   Is there a DME ordered? No   Prep survey completed? Yes          Grazyna Vazquez RN

## 2022-01-14 ENCOUNTER — TRANSITIONAL CARE MANAGEMENT TELEPHONE ENCOUNTER (OUTPATIENT)
Dept: CALL CENTER | Facility: HOSPITAL | Age: 72
End: 2022-01-14

## 2022-01-14 NOTE — OUTREACH NOTE
Call Center TCM Note      Responses   [facility] patient discharged from? [place]   Does the patient have one of the following disease processes/diagnoses(primary or secondary)? Acute MI (STEMI,NSTEMI)   TCM attempt successful? Yes   Call start time 1324   Call end time 1326   Discharge diagnosis Non-ST elevated myocardial infarction (non-STEMI   Meds reviewed with patient/caregiver? Yes   Is the patient having any side effects they believe may be caused by any medication additions or changes? No   Does the patient have all prescriptions related to this admission filled (includes statins,anticoagulants,HTN meds,anti-arrhythmia meds) Yes   Is the patient taking all medications as directed (includes completed medication regime)? Yes   Comments regarding appointments Cardiology on 2/2/2022   Does the patient have a primary care provider?  Yes   Does the patient have an appointment with their PCP,cardiologist,or clinic within 7 days of discharge? Yes   Comments regarding PCP Patient has a hospital followup with PCP office on 1/17/22   Has the patient kept scheduled appointments due by today? N/A   Psychosocial issues? No   Did the patient receive a copy of their discharge instructions? Yes   Nursing interventions Reviewed instructions with patient   What is the patient's perception of their health status since discharge? Improving   Nursing interventions Nurse provided patient education   Nursing interventions Nurse provided patient education   Is the patient/caregiver able to teach back lifestyle changes to help prevent MIs Reducing stress   Is the patient/caregiver able to teach back ways to prevent a second heart attack: Take medications,  Follow up with MD,  Manage risk factors   If the patient is a current smoker, are they able to teach back resources for cessation? Not a smoker   Is the patient/caregiver able to teach back the hierarchy of who to call/visit for symptoms/problems? PCP, Specialist, Home  health nurse, Urgent Care, ED, 911 Yes   TCM call completed? Yes          Jayden Granado RN    1/14/2022, 13:26 EST

## 2022-01-17 ENCOUNTER — OFFICE VISIT (OUTPATIENT)
Dept: FAMILY MEDICINE CLINIC | Facility: CLINIC | Age: 72
End: 2022-01-17

## 2022-01-17 VITALS
DIASTOLIC BLOOD PRESSURE: 76 MMHG | TEMPERATURE: 97.8 F | SYSTOLIC BLOOD PRESSURE: 132 MMHG | OXYGEN SATURATION: 99 % | WEIGHT: 196 LBS | HEART RATE: 54 BPM | HEIGHT: 69 IN | BODY MASS INDEX: 29.03 KG/M2

## 2022-01-17 DIAGNOSIS — Z09 HOSPITAL DISCHARGE FOLLOW-UP: Primary | ICD-10-CM

## 2022-01-17 DIAGNOSIS — I10 ESSENTIAL HYPERTENSION: ICD-10-CM

## 2022-01-17 DIAGNOSIS — R73.9 HYPERGLYCEMIA: ICD-10-CM

## 2022-01-17 DIAGNOSIS — I47.29 NON-SUSTAINED VENTRICULAR TACHYCARDIA: ICD-10-CM

## 2022-01-17 DIAGNOSIS — E78.01 FAMILIAL HYPERCHOLESTEROLEMIA: ICD-10-CM

## 2022-01-17 DIAGNOSIS — I21.4 NON-ST ELEVATED MYOCARDIAL INFARCTION (NON-STEMI): ICD-10-CM

## 2022-01-17 DIAGNOSIS — I42.2 ASYMMETRIC SEPTAL HYPERTROPHY: ICD-10-CM

## 2022-01-17 PROCEDURE — 99495 TRANSJ CARE MGMT MOD F2F 14D: CPT | Performed by: FAMILY MEDICINE

## 2022-01-17 PROCEDURE — 1111F DSCHRG MED/CURRENT MED MERGE: CPT | Performed by: FAMILY MEDICINE

## 2022-01-17 NOTE — PROGRESS NOTES
Transitional Care Follow Up Visit  Subjective     Zheng Lopez is a 71 y.o. male who presents for a transitional care management visit.    Within 48 business hours after discharge our office contacted him via telephone to coordinate his care and needs.      I reviewed and discussed the details of that call along with the discharge summary, hospital problems, inpatient lab results, inpatient diagnostic studies, and consultation reports with Zheng.     Current outpatient and discharge medications have been reconciled for the patient.  Reviewed by: Zoya Newman MD      Date of TCM Phone Call 1/13/2022   [facility]   Date of Admission 1/12/2022   Date of Discharge 1/13/2022   Discharge Disposition Home or Self Care     Risk for Readmission (LACE) Score: 4 (1/13/2022  6:01 AM)      History of Present Illness   Course During Hospital Stay: Pleasant 71-year-old male here for hospital follow-up.  He was admitted to [facility] January 12 and discharged January 13, 2022.  He was admitted for chest pain and found to have a non-STEMI, cardiac cath was performed showing bridging of the mid LAD and PAD arteries, echocardiogram showed preserved EF with some septal hypokinesis.  His medications were changed, cardiology increased his metoprolol from 25 daily to twice a day and had further follow-up for tachycardia at home to the 160s.  ZIO monitor was placed with follow-up with cardiology in 2 weeks. He has been resting, doing well. Thankfully asymptomatic with no residual chest pain. We did review his medications, discussed that exercise will be helpful but to start slowly after he has met his 5 days post cath. Continue follow-up with cards as scheduled.    He also was recently diagnosed with hyperglycemia with hemoglobin A1c at 5.8. He has already lost 8 lbs., working to decrease sweets and improve nutrition.     The following portions of the patient's history were reviewed and updated as  appropriate: allergies, current medications, past family history, past medical history, past social history, past surgical history and problem list.    Review of Systems    Objective   Physical Exam  Vitals and nursing note reviewed.   Constitutional:       General: He is not in acute distress.     Appearance: He is well-developed.   HENT:      Head: Normocephalic.      Nose: Nose normal.   Cardiovascular:      Rate and Rhythm: Normal rate and regular rhythm.      Heart sounds: Normal heart sounds. No murmur heard.      Pulmonary:      Effort: Pulmonary effort is normal. No respiratory distress.      Breath sounds: Normal breath sounds.   Musculoskeletal:         General: Normal range of motion.   Skin:     General: Skin is warm and dry.      Findings: No rash.   Neurological:      Mental Status: He is alert and oriented to person, place, and time.   Psychiatric:         Behavior: Behavior normal.         Thought Content: Thought content normal.         Judgment: Judgment normal.         Assessment/Plan   Diagnoses and all orders for this visit:    1. Hospital discharge follow-up (Primary)    2. Essential hypertension  -     Comprehensive Metabolic Panel; Future  -     CBC & Differential; Future    3. Familial hypercholesterolemia  -     Lipid Panel; Future    4. Hyperglycemia  -     Hemoglobin A1c; Future    5. Non-ST elevated myocardial infarction (non-STEMI) (HCC)    6. Asymmetric septal hypertrophy (HCC)    7. Non-sustained ventricular tachycardia (HCC)    Patient with possibly tachycardia induced NSTEMI, bridging veins found on cath. Goal to reduce tachycardia in order to prevent myocardial ischemia. He has complete resolution of symptoms and is feeling well. No hematoma in the wrist. We discussed continuing with the metoprolol 25 twice a day, stopping lisinopril dose was changed at his hospitalization and follow-up with cardiology with Zio patch. Labs reviewed which are reassuring. We will repeat in 3  months    Hyperglycemia, new problem. He has done really well since December, lost 8 lbs. Continue with avoiding sweets and other dietary changes. He already exercises 6 days a week. Plan to follow-up in 3 months with labs prior as scheduled.    Return in about 3 months (around 4/17/2022), or if symptoms worsen or fail to improve, for Recheck Hyperglycemia as scheduled.    Medical assistant and I wore mask and eyewear protection during entire encounter.  Patient wore mask.    Zoya Newman MD

## 2022-01-24 ENCOUNTER — READMISSION MANAGEMENT (OUTPATIENT)
Dept: CALL CENTER | Facility: HOSPITAL | Age: 72
End: 2022-01-24

## 2022-01-24 NOTE — OUTREACH NOTE
AMI Week 2 Survey      Responses   [facility] patient discharged from? [place]   Does the patient have one of the following disease processes/diagnoses(primary or secondary)? Acute MI (STEMI,NSTEMI)   Week 2 attempt successful? No   Unsuccessful attempts Attempt 1          Camila Nolan RN

## 2022-01-26 ENCOUNTER — READMISSION MANAGEMENT (OUTPATIENT)
Dept: CALL CENTER | Facility: HOSPITAL | Age: 72
End: 2022-01-26

## 2022-01-26 NOTE — OUTREACH NOTE
AMI Week 2 Survey      Responses   [facility] patient discharged from? [place]   Does the patient have one of the following disease processes/diagnoses(primary or secondary)? Acute MI (STEMI,NSTEMI)   Week 2 attempt successful? Yes   Call start time 1724   Call end time 1726   List who call center can speak with wife   Person spoke with today (if not patient) and relationship wife   Meds reviewed with patient/caregiver? Yes   Is the patient taking all medications as directed (includes completed medication regime)? Yes   Has the patient kept scheduled appointments due by today? Yes   Comments has seen PCP, Dr. Newman, and mails in Monitor tomorrow appt.  02/02 Cardiology   Comments Monitor to be mailed in 01/27/2022   What is the patient's perception of their health status since discharge? Improving   Week 2 call completed? Yes   Wrap up additional comments Wife and Pt. states, he is doing well, has seen PCP will see Cardiology next week, monitor to be mailed in tomorrow.           Rachele Jarvis RN

## 2022-01-27 ENCOUNTER — TELEPHONE (OUTPATIENT)
Dept: FAMILY MEDICINE CLINIC | Facility: CLINIC | Age: 72
End: 2022-01-27

## 2022-01-27 NOTE — TELEPHONE ENCOUNTER
Caller: Zheng Lopez    Relationship to patient: Self    Best call back number: [phone]    Date of positive COVID19 test: 1/24/22    COVID19 symptoms: FATIGUE, SLIGHT CONGESTION    Date of initial quarantine: 1/22/22    Additional information or concerns: PATIENT'S SYMPTOMS STARTED 1/22/22, HE STATES HE IS FEELING BETTER AND WANTS TO KNOW WHEN HE IS OKAY TO END HIS QUARANTINE. ONLY SYMPTOMS HE IS STILL HAVING ARE FATIGUE AND SLIGHT CONGESTION. PATIENT IS FULLY VACCINATED AND BOOSTED.    PLEASE ADVISE

## 2022-01-31 LAB
MAXIMAL PREDICTED HEART RATE: 147 BPM
STRESS TARGET HR: 125 BPM

## 2022-01-31 PROCEDURE — 93248 EXT ECG>7D<15D REV&INTERPJ: CPT | Performed by: INTERNAL MEDICINE

## 2022-02-02 ENCOUNTER — READMISSION MANAGEMENT (OUTPATIENT)
Dept: CALL CENTER | Facility: HOSPITAL | Age: 72
End: 2022-02-02

## 2022-02-02 ENCOUNTER — OFFICE VISIT (OUTPATIENT)
Dept: CARDIOLOGY | Facility: CLINIC | Age: 72
End: 2022-02-02

## 2022-02-02 VITALS
DIASTOLIC BLOOD PRESSURE: 72 MMHG | HEART RATE: 53 BPM | WEIGHT: 195.2 LBS | BODY MASS INDEX: 28.91 KG/M2 | SYSTOLIC BLOOD PRESSURE: 138 MMHG | HEIGHT: 69 IN

## 2022-02-02 DIAGNOSIS — Q24.5 CORONARY-MYOCARDIAL BRIDGE: ICD-10-CM

## 2022-02-02 DIAGNOSIS — I47.29 NON-SUSTAINED VENTRICULAR TACHYCARDIA: ICD-10-CM

## 2022-02-02 DIAGNOSIS — I10 ESSENTIAL HYPERTENSION: ICD-10-CM

## 2022-02-02 DIAGNOSIS — I48.0 PAROXYSMAL ATRIAL FIBRILLATION: Primary | ICD-10-CM

## 2022-02-02 DIAGNOSIS — E78.01 FAMILIAL HYPERCHOLESTEROLEMIA: ICD-10-CM

## 2022-02-02 PROBLEM — D75.839 THROMBOCYTHEMIA: Status: RESOLVED | Noted: 2021-06-15 | Resolved: 2022-02-02

## 2022-02-02 PROCEDURE — 99214 OFFICE O/P EST MOD 30 MIN: CPT | Performed by: INTERNAL MEDICINE

## 2022-02-02 PROCEDURE — 93000 ELECTROCARDIOGRAM COMPLETE: CPT | Performed by: INTERNAL MEDICINE

## 2022-02-02 RX ORDER — METOPROLOL SUCCINATE 25 MG/1
25 TABLET, EXTENDED RELEASE ORAL EVERY 12 HOURS SCHEDULED
Qty: 180 TABLET | Refills: 3 | Status: SHIPPED | OUTPATIENT
Start: 2022-02-02 | End: 2022-10-07 | Stop reason: SDUPTHER

## 2022-02-02 NOTE — TELEPHONE ENCOUNTER
Caller: Zheng Lopez    Relationship: Self    Best call back number: [phone] (H)    Requested Prescriptions:   Requested Prescriptions     Pending Prescriptions Disp Refills   • metoprolol succinate XL (TOPROL-XL) 25 MG 24 hr tablet 180 tablet 3     Sig: Take 1 tablet by mouth Every 12 (Twelve) Hours.        Pharmacy where request should be sent:  [facility] - [address] - [phone]  - [phone] [phone]    Additional details provided by patient: PATIENT NOW TAKES 1 TABLET TWO TIMES A DAY AND NEEDS A NEW WRITTEN PRESCRIPTION TO REFLECT THIS.      Does the patient have less than a 3 day supply:  [x] Yes  [] No    oD Tavarez, MARK   02/02/22 14:58 EST

## 2022-02-02 NOTE — PROGRESS NOTES
[facility] Follow Up Office Note     Encounter Date:22  Patient:Zheng Lopez  :1950  MRN:[id number]      Chief Complaint:   Chief Complaint   Patient presents with   • 2 week hospital f/u     History of Presenting Illness:      Mr. Lopez is a 71 y.o. gentleman with past medical history notable for myocardial bridging, moderate basal septal hypertrophy, hypertension, mixed hyperlipidemia, obstructive sleep apnea with CPAP use, and paroxysmal atrial fibrillation presents her office for scheduled follow-up after recent hospitalization for chest discomfort.  Patient was seen approximately 2 weeks ago in the emergency room after experiencing significant onset of chest discomfort after exercising.  He was noted to have tachycardia while at the gym that 160 bpm.  Upon arrival to the emergency room he was noted to have an elevated troponin and given his presentation we decided to proceed forward with cardiac catheterization where he was found to have significant myocardial bridging involving the mid to distal LAD as well as the distal RCA.  We decided to uptitrate his metoprolol from 25 mg daily to 25 mg twice daily in the hopes of optimizing management of his myocardial bridging.  Given his symptoms of palpitations and tachycardia prior to arrival we did send him home with a 2-week monitor.  Of note this monitor did demonstrate episodes of paroxysmal atrial fibrillation with rates in the 130s.  This could be a major contributor to his symptoms given his sensitivity to tachycardia given his underlying myocardial bridging and left ventricular hypertrophy.  Fortunately since leaving the hospital he is doing well.  Fairly bradycardic on exam but seems to tolerate this well.  Has been doing light exercise but has not been doing anything that gets his heart rate above 90.      Review of Systems:  Review of Systems   Constitutional: Negative.   HENT: Negative.    Eyes: Negative.    Cardiovascular:  Negative.    Respiratory: Negative.    Endocrine: Negative.    Hematologic/Lymphatic: Negative.    Skin: Negative.    Musculoskeletal: Negative.    Gastrointestinal: Negative.    Genitourinary: Negative.    Neurological: Negative.    Psychiatric/Behavioral: Negative.    Allergic/Immunologic: Negative.        Current Outpatient Medications on File Prior to Visit   Medication Sig Dispense Refill   • atorvastatin (LIPITOR) 40 MG tablet TAKE 1 TABLET DAILY 90 tablet 1   • calcium carbonate (OS-TOMAS) 600 MG tablet Take 600 mg by mouth Daily.     • Cyanocobalamin (VITAMIN B-12 PO) Take 1 tablet by mouth Daily.     • diphenhydrAMINE (BENADRYL) 50 MG capsule Take 50 mg by mouth Every 6 (Six) Hours As Needed for Itching.     • esomeprazole (nexIUM) 40 MG capsule Take 1 capsule by mouth Every Morning Before Breakfast. 90 capsule 1   • Flaxseed, Linseed, (FLAX SEED OIL) 1000 MG capsule Take 1 capsule by mouth 2 (Two) Times a Day.     • Magnesium 400 MG capsule Take 400 mg by mouth Daily.     • Melatonin 10 MG tablet Take 10 mg by mouth Every Night.     • metoprolol succinate XL (TOPROL-XL) 25 MG 24 hr tablet Take 1 tablet by mouth Every 12 (Twelve) Hours. 180 tablet 3   • Multiple Vitamin tablet Take 1 tablet by mouth Daily.     • sildenafil (REVATIO) 20 MG tablet Take 2 tablets by mouth As Needed (erectile dysfunction). 20 tablet 3   • tamsulosin (FLOMAX) 0.4 MG capsule 24 hr capsule TAKE 1 CAPSULE DAILY (Patient taking differently: Take 1 capsule by mouth Daily.) 90 capsule 3   • [DISCONTINUED] aspirin 81 MG tablet Take  by mouth daily.     • amoxicillin (AMOXIL) 500 MG capsule Take 4 capsules by mouth As Needed. With teeth cleaning       No current facility-administered medications on file prior to visit.       No Known Allergies    Past Medical History:   Diagnosis Date   • Asymmetric septal hypertrophy (HCC)    • Bronchitis     Bronchitis / URI   • Cataract of both eyes    • Chicken pox    • Cholecystitis    • Diverticula  of colon    • Encounter for annual health examination 03/10/2015    Annual Health Assessment   • Fatigue    • Gallbladder disease    • GERD (gastroesophageal reflux disease)    • High blood pressure    • History of EKG     10/30/15, 09/19/13, 09/14/12, 10/18/10, 05/04/09   • History of TB skin testing     TB Skin Test: 01/09/14 Completed, 09/14/12 Patient Declines, 10/18/10 Completed, 03/05/08 Completed   • Hyperlipidemia    • Hypertension     still taking meds   • Impingement of right ulnar nerve    • Measles    • Non-ST elevated myocardial infarction (non-STEMI) ([facility]) 1/12/2022   • Osteoarthritis involving multiple joints on both sides of body    • Osteoarthritis involving multiple joints on both sides of body    • Persistent cough    • Pulse irregularity     Irregular pulse PER CARDIO   • Sexual problems     Sexual problems / enjoyment ED   • Sleep apnea     has c-pap   • Wellness examination 03/10/2015    Annual Wellness Visit       Past Surgical History:   Procedure Laterality Date   • CARDIAC CATHETERIZATION N/A 1/12/2022    Procedure: Left Heart Cath;  Surgeon: Vi Orozco MD;  Location: [facility] CATH INVASIVE LOCATION;  Service: Cardiovascular;  Laterality: N/A;   • CARDIAC CATHETERIZATION N/A 1/12/2022    Procedure: Coronary angiography;  Surgeon: Vi Orozco MD;  Location:  [facility] CATH INVASIVE LOCATION;  Service: Cardiovascular;  Laterality: N/A;   • CARDIAC CATHETERIZATION N/A 1/12/2022    Procedure: Left ventriculography;  Surgeon: Vi Orozco MD;  Location:  [facility] CATH INVASIVE LOCATION;  Service: Cardiovascular;  Laterality: N/A;   • CATARACT EXTRACTION Bilateral    • CATARACT EXTRACTION Right 2013   • CATARACT EXTRACTION Left 2014   • CHOLECYSTECTOMY     • COLONOSCOPY  04/16/2015    Dr. Ritchie   • COLONOSCOPY  03/07/2007   • ELBOW PROCEDURE Right     ulnar nerve impingement release   • FOOT SURGERY Right     right foot (twice)   • GALLBLADDER SURGERY  2009   • KNEE MENISCAL REPAIR Right  "2010    torn right meniscus   • LASIK  2006   • RHINOPLASTY  1986   • ROTATOR CUFF REPAIR Left 2010   • SHOULDER ARTHROSCOPY W/ LABRAL REPAIR Right 2015    Dr. Sahil lay repair right shoulder   • SHOULDER LIGAMENT REPAIR Right    • TOTAL HIP ARTHROPLASTY REVISION Left 05/11/2020   • TOTAL KNEE ARTHROPLASTY Right 2011   • WRIST ARTHROPLASTY Right 08/25/2020       Social History     Socioeconomic History   • Marital status:    • Number of children: 2   Tobacco Use   • Smoking status: Never Smoker   • Smokeless tobacco: Never Used   Vaping Use   • Vaping Use: Never used   Substance and Sexual Activity   • Alcohol use: No     Comment: none in 5 yrs, Nov 2011 - last drink   • Drug use: No   • Sexual activity: Yes     Partners: Female       Family History   Problem Relation Age of Onset   • Dementia Mother    • Migraines Mother    • Osteoporosis Mother    • Stroke Mother 85        early 80s   • Heart disease Mother         tachycardia and had ppm   • Alzheimer's disease Mother    • Supraventricular tachycardia Mother    • Alcohol abuse Father    • Depression Father    • Migraines Father    • Tuberculosis Father    • Cirrhosis Father         41 YO   • Cancer Paternal Uncle 67        mets but unsure of type of ca  BACK CANCER   • Stroke Brother    • Colon cancer Neg Hx    • Prostate cancer Neg Hx    • Heart attack Neg Hx        The following portions of the patient's history were reviewed and updated as appropriate: allergies, current medications, past family history, past medical history, past social history, past surgical history and problem list.       Objective:       Vitals:    02/02/22 0813   BP: 138/72   BP Location: Left arm   Patient Position: Sitting   Pulse: 53   Weight: 88.5 kg (195 lb 3.2 oz)   Height: 175.3 cm (69\")     Body mass index is 28.83 kg/m².     Physical Exam:  Constitutional: Well appearing, well developed, no acute distress   HENT: Oropharynx clear and membrane moist  Eyes: Normal " conjunctiva, no sclera icterus.  Neck: Supple, no carotid bruit bilaterally.  Cardiovascular: Regular rate and rhythm, No Murmur, No bilateral lower extremity edema.  Pulmonary: Normal respiratory effort, normal lung sounds, no wheezing.  Abdominal: Soft, nontender, no hepatosplenomegaly, liver is non-pulsatile.  Neurological: Alert and orient x 3.   Skin: Warm, dry, no ecchymosis, no rash.  Psych: Appropriate mood and affect. Normal judgment and insight.         Lab Results   Component Value Date     (L) 01/11/2022     12/14/2021    K 4.8 01/11/2022    K 4.7 12/14/2021    CL 97 (L) 01/11/2022     12/14/2021    CO2 25.6 01/11/2022    CO2 23 12/14/2021    BUN 20 01/11/2022    BUN 13 12/14/2021    CREATININE 1.06 01/11/2022    CREATININE 1.08 12/14/2021    EGFRIFNONA 68 01/11/2022    EGFRIFNONA 68 12/14/2021    EGFRIFAFRI 78 12/14/2021    EGFRIFAFRI 87 06/08/2021    GLUCOSE 94 01/11/2022    GLUCOSE 99 12/14/2021    CALCIUM 9.1 01/11/2022    CALCIUM 9.1 12/14/2021    PROTENTOTREF 6.7 12/14/2021    PROTENTOTREF 7.1 06/08/2021    ALBUMIN 4.40 01/11/2022    ALBUMIN 4.3 12/14/2021    BILITOT 0.6 01/11/2022    BILITOT 0.9 12/14/2021    AST 44 (H) 01/11/2022    AST 33 12/14/2021    ALT 30 01/11/2022    ALT 25 12/14/2021     Lab Results   Component Value Date    WBC 5.76 01/13/2022    WBC 5.08 01/12/2022    HGB 14.7 01/13/2022    HGB 15.1 01/12/2022    HCT 41.6 01/13/2022    HCT 43.7 01/12/2022    MCV 94.8 01/13/2022    MCV 98.0 (H) 01/12/2022     01/13/2022     01/12/2022     Lab Results   Component Value Date    TRIG 87 12/14/2021    TRIG 106 12/08/2020    HDL 54 12/14/2021    HDL 58 12/08/2020    LDL 67 12/14/2021    LDL 87 12/08/2020     No results found for: PROBNP, BNP  Lab Results   Component Value Date    TROPONINT 0.226 (C) 01/12/2022     Lab Results   Component Value Date    TSH 1.290 12/08/2020    TSH 2.520 10/26/2016           ECG 12 Lead    Date/Time: 2/2/2022 9:22 AM  Performed  by: Ferdinand De Santiago MD  Authorized by: Ferdinand De Santiago MD   Comparison: compared with previous ECG from 1/12/2022  Similar to previous ECG  Rhythm: sinus rhythm  Conduction: 1st degree AV block  T inversion: V6, V5 and V4          Event Monitor 1/31/2022 with tracing reviewed by myself:  · No symptoms reported during the monitoring period. No complications noted.   · The predominant rhythm noted during the testing period was sinus rhythm.   · Evidence of atrial fibrillation was noted. Atrial fibrillation burden was 1%.  Rates in atrial fibrillation ranged from 76 to 193 bpm with an average rate of 132 bpm.  Longest episode of atrial fibrillation lasted 2 hours with an average rate of 123 bpm.   · There were 4 episodes of nonsustained ventricular tachycardia, longest lasting 8 beats and the fastest with a rate of 169 bpm.     Echocardiogram 1/12/2022 with images reviewed by myself:  · Calculated left ventricular EF = 63.7% Estimated left ventricular EF = 64% Estimated left ventricular EF was in agreement with the calculated left ventricular EF. Left ventricular systolic function is normal. Normal left ventricular cavity size noted. Left ventricular wall thickness is consistent with mild concentric hypertrophy. There are wall motion abnormalities as noted below Left ventricular diastolic function was normal.  · Left atrial volume is mildly increased.  · Trace mitral valve regurgitation is present.  · There is an echolucent. In the liver suggestive of hepatic cyst. Consider dedicated imaging to assess further if clinically indicated    Cardiac Catheterization 1/12/2022 with images reviewed by myself:  · Left main artery: Large-caliber vessel with 0% stenosis.  The vessel trifurcates into left anterior descending, ramus intermedius, and left circumflex arteries.  · Left anterior descending artery: Large caliber vessel with 0% proximal stenosis.  Proximal vessel gives rise to a large caliber first diagonal branch  with a high origin near the LAD ostium and no significant disease.  The mid LAD tapers to a moderate caliber vessel and has a long segment of a severe myocardial bridge with near obliteration of the vessel lumen in systole.  The distal LAD then tapers to a small caliber vessel and bifurcates into a small caliber diagonal branch and distal LAD.  · Ramus intermedius: Large-caliber vessel with 0% stenosis.  · Left circumflex artery: Large-caliber vessel with 0% proximal to distal stenosis.  The distal vessel gives rise to a small caliber first and second obtuse marginal branches with no significant disease.  · Right coronary artery.  This is a large, dominant vessel with 0% proximal to distal stenosis.  The distal vessel bifurcates into a large caliber posterior descending and posterolateral branch.  The posterior descending branch in addition to being large in caliber is a long vessel and appears to supply the apical wall.  There is a long segment with a severe myocardial bridge with near obliteration of the vessel lumen in systole.  Otherwise the posterolateral branch and the posterior descending branch has no significant disease.         Assessment:          Diagnosis Plan   1. Paroxysmal atrial fibrillation (HCC)  Ambulatory Referral to Cardiac Electrophysiology    ECG 12 Lead   2. Essential hypertension     3. Familial hypercholesterolemia     4. Coronary-myocardial bridge     5. Non-sustained ventricular tachycardia (HCC)            Plan:       Mr. Lopez is a 71 y.o. gentleman with past medical history notable for myocardial bridging, moderate basal septal hypertrophy, hypertension, mixed hyperlipidemia, obstructive sleep apnea with CPAP use, and paroxysmal atrial fibrillation presents her office for scheduled follow-up after recent hospitalization for chest discomfort.  I am pleased that overall patient is doing well.  He seems to be tolerating his current medical regimen and really denies any major complaints.   I have a little concerned that he is still having a 1% burden of atrial fibrillation with rapid rates despite his beta-blocker use.  I do not see any further options to uptitrate his beta-blocker given his underlying RI prolongation and bradycardia at rest.  This does have the potential to become more problematic given his underlying myocardial bridging which is fairly significant as well as his basal septal hypertrophy which probably makes him a little bit more sensitive to going into atrial fibrillation with rapid rates.  Fortunately he is doing well but I do think it would be good for him to see our electrophysiology colleagues to see if there is any other or antiarrhythmic options or even consideration of ablation.  He is pretty functional at age 71 and is well treated and currently using his CPAP machine.    Paroxysmal atrial fibrillation:  · Will initiate anticoagulation given elevated CHADVASC score of 2  · Continue metoprolol succinate 25 mg twice daily  · Referral to electrophysiology    Myocardial bridging:  · Continue beta-blocker use  · Advised to slow down her back off if heart rate gets above 90 bpm    Hypertension:  · Blood pressure well controlled continue with current medical therapy    Mixed hyperlipidemia:  · Continue high potency statin  · Lipid panel 12/2021 demonstrates good control of LDL and total cholesterol  · CMP 12/2021 demonstrates normal LFT and AST    Nonsustained ventricular tachycardia:  · Continue beta-blocker use  · No significant coronary artery disease however there is evidence of myocardial bridging on cardiac catheterization 12/2021  · No high risk features of hypertrophic cardiomyopathy although some basal septal hypertrophy and given age less likely to be a major pathologic issue.    Follow-up:  3 months      Ferdinand De Santiago MD  [facility]  02/02/22  09:26 EST

## 2022-02-02 NOTE — OUTREACH NOTE
AMI Week 3 Survey      Responses   [facility] patient discharged from? [place]   Does the patient have one of the following disease processes/diagnoses(primary or secondary)? Acute MI (STEMI,NSTEMI)   Week 3 attempt successful? Yes   Call start time [id number]   Call end time [id number]   Discharge diagnosis Non-ST elevated myocardial infarction (non-STEMI   Medication alerts for this patient Increased Metoprolol 25mg BID and Cardiology added Eliquis.   Meds reviewed with patient/caregiver? Yes   Is the patient taking all medications as directed (includes completed medication regime)? Yes   Does the patient have a primary care provider?  Yes   Has the patient kept scheduled appointments due by today? Yes   Psychosocial issues? No   Comments Pt reports that he has afib now and [facility] is treating him for this issue. He has no symptoms of being in afib.    What is the patient's perception of their health status since discharge? Improving   Nursing interventions Nurse provided patient education   Is the patient/caregiver able to teach back signs and symptoms of when to call for help immediately: Sudden chest discomfort,  Sudden discomfort in arms, back, neck or jaw   Nursing interventions Nurse provided patient education   Is the patient/caregiver able to teach back lifestyle changes to help prevent MIs Regular exercise as approved by provider,  Heart healthy diet   Is the patient/caregiver able to teach back ways to prevent a second heart attack: Take medications,  Follow up with MD   Is the patient/caregiver able to teach back the hierarchy of who to call/visit for symptoms/problems? PCP, Specialist, Home health nurse, Urgent Care, ED, 911 Yes   Week 3 call completed? Yes   Revoked No further contact(revokes)-requires comment   Is the patient interested in additional calls from an ambulatory ?  NOTE:  applies to high risk patients requiring additional follow-up. No   Graduated/Revoked comments doing well           Kelly Blackburn RN

## 2022-02-02 NOTE — PATIENT INSTRUCTIONS
1. Due to newly diagnosed atrial fibrillation will start anticoagulation (eliquis)  2. Will have you see Dr. Moreno again for possible treatments for atrial fibrillation given myocardial bridging

## 2022-03-18 DIAGNOSIS — D69.6 THROMBOCYTOPENIA: Primary | ICD-10-CM

## 2022-03-23 ENCOUNTER — OFFICE VISIT (OUTPATIENT)
Dept: ONCOLOGY | Facility: CLINIC | Age: 72
End: 2022-03-23

## 2022-03-23 ENCOUNTER — LAB (OUTPATIENT)
Dept: OTHER | Facility: HOSPITAL | Age: 72
End: 2022-03-23

## 2022-03-23 VITALS
TEMPERATURE: 97.1 F | WEIGHT: 191.1 LBS | HEART RATE: 51 BPM | HEIGHT: 69 IN | BODY MASS INDEX: 28.3 KG/M2 | RESPIRATION RATE: 16 BRPM | SYSTOLIC BLOOD PRESSURE: 129 MMHG | DIASTOLIC BLOOD PRESSURE: 76 MMHG | OXYGEN SATURATION: 95 %

## 2022-03-23 DIAGNOSIS — D69.6 THROMBOCYTOPENIA: ICD-10-CM

## 2022-03-23 DIAGNOSIS — D69.6 THROMBOCYTOPENIA: Primary | ICD-10-CM

## 2022-03-23 LAB
ALBUMIN SERPL-MCNC: 4 G/DL (ref 3.5–5.2)
ALBUMIN/GLOB SERPL: 1.4 G/DL
ALP SERPL-CCNC: 84 U/L (ref 39–117)
ALT SERPL W P-5'-P-CCNC: 24 U/L (ref 1–41)
ANION GAP SERPL CALCULATED.3IONS-SCNC: 8 MMOL/L (ref 5–15)
AST SERPL-CCNC: 31 U/L (ref 1–40)
BASOPHILS # BLD AUTO: 0.03 10*3/MM3 (ref 0–0.2)
BASOPHILS NFR BLD AUTO: 0.6 % (ref 0–1.5)
BILIRUB SERPL-MCNC: 0.6 MG/DL (ref 0–1.2)
BUN SERPL-MCNC: 14 MG/DL (ref 8–23)
BUN/CREAT SERPL: 13.6 (ref 7–25)
CALCIUM SPEC-SCNC: 9.2 MG/DL (ref 8.6–10.5)
CHLORIDE SERPL-SCNC: 99 MMOL/L (ref 98–107)
CO2 SERPL-SCNC: 27 MMOL/L (ref 22–29)
CREAT SERPL-MCNC: 1.03 MG/DL (ref 0.76–1.27)
DEPRECATED RDW RBC AUTO: 41.2 FL (ref 37–54)
EGFRCR SERPLBLD CKD-EPI 2021: 76.7 ML/MIN/1.73
EOSINOPHIL # BLD AUTO: 0.18 10*3/MM3 (ref 0–0.4)
EOSINOPHIL NFR BLD AUTO: 3.3 % (ref 0.3–6.2)
ERYTHROCYTE [DISTWIDTH] IN BLOOD BY AUTOMATED COUNT: 11.8 % (ref 12.3–15.4)
GLOBULIN UR ELPH-MCNC: 2.8 GM/DL
GLUCOSE SERPL-MCNC: 106 MG/DL (ref 65–99)
HCT VFR BLD AUTO: 41.5 % (ref 37.5–51)
HGB BLD-MCNC: 14.4 G/DL (ref 13–17.7)
IMM GRANULOCYTES # BLD AUTO: 0.01 10*3/MM3 (ref 0–0.05)
IMM GRANULOCYTES NFR BLD AUTO: 0.2 % (ref 0–0.5)
LYMPHOCYTES # BLD AUTO: 1.8 10*3/MM3 (ref 0.7–3.1)
LYMPHOCYTES NFR BLD AUTO: 33.1 % (ref 19.6–45.3)
MCH RBC QN AUTO: 33.2 PG (ref 26.6–33)
MCHC RBC AUTO-ENTMCNC: 34.7 G/DL (ref 31.5–35.7)
MCV RBC AUTO: 95.6 FL (ref 79–97)
MONOCYTES # BLD AUTO: 0.52 10*3/MM3 (ref 0.1–0.9)
MONOCYTES NFR BLD AUTO: 9.6 % (ref 5–12)
NEUTROPHILS NFR BLD AUTO: 2.9 10*3/MM3 (ref 1.7–7)
NEUTROPHILS NFR BLD AUTO: 53.2 % (ref 42.7–76)
NRBC BLD AUTO-RTO: 0 /100 WBC (ref 0–0.2)
PLATELET # BLD AUTO: 171 10*3/MM3 (ref 140–450)
PMV BLD AUTO: 10.2 FL (ref 6–12)
POTASSIUM SERPL-SCNC: 4.5 MMOL/L (ref 3.5–5.2)
PROT SERPL-MCNC: 6.8 G/DL (ref 6–8.5)
RBC # BLD AUTO: 4.34 10*6/MM3 (ref 4.14–5.8)
SODIUM SERPL-SCNC: 134 MMOL/L (ref 136–145)
WBC NRBC COR # BLD: 5.44 10*3/MM3 (ref 3.4–10.8)

## 2022-03-23 PROCEDURE — 85025 COMPLETE CBC W/AUTO DIFF WBC: CPT | Performed by: INTERNAL MEDICINE

## 2022-03-23 PROCEDURE — 99214 OFFICE O/P EST MOD 30 MIN: CPT | Performed by: INTERNAL MEDICINE

## 2022-03-23 PROCEDURE — 36415 COLL VENOUS BLD VENIPUNCTURE: CPT

## 2022-03-23 PROCEDURE — 80053 COMPREHEN METABOLIC PANEL: CPT | Performed by: INTERNAL MEDICINE

## 2022-03-23 RX ORDER — ATORVASTATIN CALCIUM 40 MG/1
TABLET, FILM COATED ORAL
Qty: 90 TABLET | Refills: 3 | Status: SHIPPED | OUTPATIENT
Start: 2022-03-23 | End: 2022-09-12

## 2022-03-23 NOTE — PROGRESS NOTES
REASON FOR CONSULTATION/CHIEF COMPLAINT:     Evaluation & management for thrombocytopenia                             REQUESTING PHYSICIAN: No ref. provider found  RECORDS OBTAINED:  Records of the patients history including those from the electronic medical record were reviewed and summarized in detail.    HISTORY OF PRESENT ILLNESS:    The patient is a 71 y.o. year old male who is here for evaluation for thrombocytopenia. On chart review, patient appears to have chronic thrombocytopenia since 2015 ranging between 90s - 200s.Patient denies any excessive bleeding or bruising.   The CBC from today show platelet count of 96k, Hb/Hct 15.4/43.6, wbc of 5.96 with normal differential. Patient denies any fever, chills, night sweats or weight loss. No lymphadenopathy. Denies any history of autoimmune condition.   Patient reports of significant alcohol abuse for > 15 years, but has not had much drinks in last 5-10 years. Denies ever being diagnosed with liver cirrhosis or any other liver/spleen disease. No family history of blood disorders.     Of note, patienthad tripped & fell injuring his right shoulder & right eyebrow in July 2021. Right shoulder is still in sling.   Improved now.    INTERIM HISTORY:  Patient returns to the clinic for a f/u visit.  Denies any bleeding or bruising. Says he is trying to eat better and has lost some weight. Feels better. Was recently diagnosed with afib and started on eliquis.     Past Medical History:   Diagnosis Date   • Asymmetric septal hypertrophy (HCC)    • Bronchitis     Bronchitis / URI   • Cataract of both eyes    • Chicken pox    • Cholecystitis    • Diverticula of colon    • Encounter for annual health examination 03/10/2015    Annual Health Assessment   • Fatigue    • Gallbladder disease    • GERD (gastroesophageal reflux disease)    • High blood pressure    • History of EKG     10/30/15, 09/19/13, 09/14/12, 10/18/10, 05/04/09   • History of TB skin testing     TB Skin Test:  01/09/14 Completed, 09/14/12 Patient Declines, 10/18/10 Completed, 03/05/08 Completed   • Hyperlipidemia    • Hypertension     still taking meds   • Impingement of right ulnar nerve    • Measles    • Non-ST elevated myocardial infarction (non-STEMI) ([facility]) 1/12/2022   • Osteoarthritis involving multiple joints on both sides of body    • Osteoarthritis involving multiple joints on both sides of body    • Persistent cough    • Pulse irregularity     Irregular pulse PER CARDIO   • Sexual problems     Sexual problems / enjoyment ED   • Sleep apnea     has c-pap   • Thrombocythemia 6/15/2021   • Wellness examination 03/10/2015    Annual Wellness Visit     Past Surgical History:   Procedure Laterality Date   • CARDIAC CATHETERIZATION N/A 1/12/2022    Procedure: Left Heart Cath;  Surgeon: Vi Orozco MD;  Location:  [facility];  Service: Cardiovascular;  Laterality: N/A;   • CARDIAC CATHETERIZATION N/A 1/12/2022    Procedure: Coronary angiography;  Surgeon: Vi Orozco MD;  Location:  [facility];  Service: Cardiovascular;  Laterality: N/A;   • CARDIAC CATHETERIZATION N/A 1/12/2022    Procedure: Left ventriculography;  Surgeon: Vi Orozco MD;  Location:  [facility];  Service: Cardiovascular;  Laterality: N/A;   • CATARACT EXTRACTION Bilateral    • CATARACT EXTRACTION Right 2013   • CATARACT EXTRACTION Left 2014   • CHOLECYSTECTOMY     • COLONOSCOPY  04/16/2015    Dr. Ritchie   • COLONOSCOPY  03/07/2007   • ELBOW PROCEDURE Right     ulnar nerve impingement release   • FOOT SURGERY Right     right foot (twice)   • GALLBLADDER SURGERY  2009   • KNEE MENISCAL REPAIR Right 2010    torn right meniscus   • LASIK  2006   • RHINOPLASTY  1986   • ROTATOR CUFF REPAIR Left 2010   • SHOULDER ARTHROSCOPY W/ LABRAL REPAIR Right 2015    Dr. Sahil lay repair right shoulder   • SHOULDER LIGAMENT REPAIR Right    • TOTAL HIP ARTHROPLASTY REVISION Left 05/11/2020   •  TOTAL KNEE ARTHROPLASTY Right 2011   • WRIST ARTHROPLASTY Right 08/25/2020       MEDICATIONS    Current Outpatient Medications:   •  amoxicillin (AMOXIL) 500 MG capsule, Take 4 capsules by mouth As Needed. With teeth cleaning, Disp: , Rfl:   •  apixaban (ELIQUIS) 5 MG tablet tablet, Take 1 tablet by mouth Every 12 (Twelve) Hours., Disp: 180 tablet, Rfl: 3  •  atorvastatin (LIPITOR) 40 MG tablet, TAKE 1 TABLET DAILY, Disp: 90 tablet, Rfl: 3  •  calcium carbonate (OS-TOMAS) 600 MG tablet, Take 600 mg by mouth Daily., Disp: , Rfl:   •  Cyanocobalamin (VITAMIN B-12 PO), Take 1 tablet by mouth Daily., Disp: , Rfl:   •  diphenhydrAMINE (BENADRYL) 50 MG capsule, Take 50 mg by mouth Every 6 (Six) Hours As Needed for Itching., Disp: , Rfl:   •  esomeprazole (nexIUM) 40 MG capsule, Take 1 capsule by mouth Every Morning Before Breakfast., Disp: 90 capsule, Rfl: 1  •  Flaxseed, Linseed, (FLAX SEED OIL) 1000 MG capsule, Take 1 capsule by mouth 2 (Two) Times a Day., Disp: , Rfl:   •  Magnesium 400 MG capsule, Take 400 mg by mouth Daily., Disp: , Rfl:   •  Melatonin 10 MG tablet, Take 10 mg by mouth Every Night., Disp: , Rfl:   •  metoprolol succinate XL (TOPROL-XL) 25 MG 24 hr tablet, Take 1 tablet by mouth Every 12 (Twelve) Hours., Disp: 180 tablet, Rfl: 3  •  Multiple Vitamin tablet, Take 1 tablet by mouth Daily., Disp: , Rfl:   •  sildenafil (REVATIO) 20 MG tablet, Take 2 tablets by mouth As Needed (erectile dysfunction)., Disp: 20 tablet, Rfl: 3  •  tamsulosin (FLOMAX) 0.4 MG capsule 24 hr capsule, TAKE 1 CAPSULE DAILY (Patient taking differently: Take 1 capsule by mouth Daily.), Disp: 90 capsule, Rfl: 3    ALLERGIES:   No Known Allergies    SOCIAL HISTORY:       Social History     Socioeconomic History   • Marital status:    • Number of children: 2   Tobacco Use   • Smoking status: Never Smoker   • Smokeless tobacco: Never Used   Vaping Use   • Vaping Use: Never used   Substance and Sexual Activity   • Alcohol use: No  "    Comment: none in 5 yrs, Nov 2011 - last drink   • Drug use: No   • Sexual activity: Yes     Partners: Female         FAMILY HISTORY:  Family History   Problem Relation Age of Onset   • Dementia Mother    • Migraines Mother    • Osteoporosis Mother    • Stroke Mother 85        early 80s   • Heart disease Mother         tachycardia and had ppm   • Alzheimer's disease Mother    • Supraventricular tachycardia Mother    • Alcohol abuse Father    • Depression Father    • Migraines Father    • Tuberculosis Father    • Cirrhosis Father         43 YO   • Cancer Paternal Uncle 67        mets but unsure of type of ca  BACK CANCER   • Stroke Brother    • Colon cancer Neg Hx    • Prostate cancer Neg Hx    • Heart attack Neg Hx        REVIEW OF SYSTEMS:  Constitutional: [No fevers, chills, sweats]  Eye: [No recent visual problems]  ENMT: [No ear pain, nasal congestion, sore throat]  Respiratory: [No shortness of breath, cough]  Cardiovascular: [No Chest pain, palpitations, syncope]  Gastrointestinal: [No nausea, vomiting, diarrhea]  Genitourinary: [No hematuria]  Hema/Lymph: [Negative for bruising tendency, swollen lymph glands]  Endocrine: [Negative for excessive thirst, excessive hunger]  Musculoskeletal: [Right shoulder pain - resolved]  Integumentary: [No rash, pruritus, abrasions]  Neurologic: [ No weakness or numbness, Alert & oriented X 4]       Vitals:    03/23/22 1145   BP: 129/76   Pulse: 51   Resp: 16   Temp: 97.1 °F (36.2 °C)   TempSrc: Temporal   SpO2: 95%   Weight: 86.7 kg (191 lb 1.6 oz)   Height: 175.3 cm (69.02\")   PainSc: 0-No pain     Current Status 3/23/2022   ECOG score 0      PHYSICAL EXAM:    CONSTITUTIONAL:  Vital signs reviewed.  No distress, looks comfortable.  EYES:  Conjunctiva and lids unremarkable.   EARS,NOSE,MOUTH,THROAT:  Ears and nose appear unremarkable.  Lips, teeth, gums appear unremarkable.  RESPIRATORY:  Normal respiratory effort.  Lungs clear to auscultation " bilaterally.  CARDIOVASCULAR:  Normal S1, S2.  No murmurs rubs or gallops.  No significant lower extremity edema.  GASTROINTESTINAL: Abdomen appears unremarkable.  Nondistended  LYMPHATIC:  No cervical, supraclavicular lymphadenopathy.  NEURO: AO x 3,  No focal deficits.  Appears to have equal strength all 4 extremities.  MUSCULOSKELETAL:  Unremarkable digits/nails.  No cyanosis or clubbing.   SKIN:  Warm.  No rashes.  PSYCHIATRIC:  Normal judgment and insight.  Normal mood and affect.     RECENT LABS:        Lab on 03/23/2022   Component Date Value Ref Range Status   • Glucose 03/23/2022 106 (A) 65 - 99 mg/dL Final   • BUN 03/23/2022 14  8 - 23 mg/dL Final   • Creatinine 03/23/2022 1.03  0.76 - 1.27 mg/dL Final   • Sodium 03/23/2022 134 (A) 136 - 145 mmol/L Final   • Potassium 03/23/2022 4.5  3.5 - 5.2 mmol/L Final   • Chloride 03/23/2022 99  98 - 107 mmol/L Final   • CO2 03/23/2022 27.0  22.0 - 29.0 mmol/L Final   • Calcium 03/23/2022 9.2  8.6 - 10.5 mg/dL Final   • Total Protein 03/23/2022 6.8  6.0 - 8.5 g/dL Final   • Albumin 03/23/2022 4.00  3.50 - 5.20 g/dL Final   • ALT (SGPT) 03/23/2022 24  1 - 41 U/L Final   • AST (SGOT) 03/23/2022 31  1 - 40 U/L Final   • Alkaline Phosphatase 03/23/2022 84  39 - 117 U/L Final   • Total Bilirubin 03/23/2022 0.6  0.0 - 1.2 mg/dL Final   • Globulin 03/23/2022 2.8  gm/dL Final   • A/G Ratio 03/23/2022 1.4  g/dL Final   • BUN/Creatinine Ratio 03/23/2022 13.6  7.0 - 25.0 Final   • Anion Gap 03/23/2022 8.0  5.0 - 15.0 mmol/L Final   • eGFR 03/23/2022 76.7  >60.0 mL/min/1.73 Final    National Kidney Foundation and American Society of Nephrology (ASN) Task Force recommended calculation based on the Chronic Kidney Disease Epidemiology Collaboration (CKD-EPI) equation refit without adjustment for race.   • WBC 03/23/2022 5.44  3.40 - 10.80 10*3/mm3 Final   • RBC 03/23/2022 4.34  4.14 - 5.80 10*6/mm3 Final   • Hemoglobin 03/23/2022 14.4  13.0 - 17.7 g/dL Final   • Hematocrit  03/23/2022 41.5  37.5 - 51.0 % Final   • MCV 03/23/2022 95.6  79.0 - 97.0 fL Final   • MCH 03/23/2022 33.2 (A) 26.6 - 33.0 pg Final   • MCHC 03/23/2022 34.7  31.5 - 35.7 g/dL Final   • RDW 03/23/2022 11.8 (A) 12.3 - 15.4 % Final   • RDW-SD 03/23/2022 41.2  37.0 - 54.0 fl Final   • MPV 03/23/2022 10.2  6.0 - 12.0 fL Final   • Platelets 03/23/2022 171  140 - 450 10*3/mm3 Final   • Neutrophil % 03/23/2022 53.2  42.7 - 76.0 % Final   • Lymphocyte % 03/23/2022 33.1  19.6 - 45.3 % Final   • Monocyte % 03/23/2022 9.6  5.0 - 12.0 % Final   • Eosinophil % 03/23/2022 3.3  0.3 - 6.2 % Final   • Basophil % 03/23/2022 0.6  0.0 - 1.5 % Final   • Immature Grans % 03/23/2022 0.2  0.0 - 0.5 % Final   • Neutrophils, Absolute 03/23/2022 2.90  1.70 - 7.00 10*3/mm3 Final   • Lymphocytes, Absolute 03/23/2022 1.80  0.70 - 3.10 10*3/mm3 Final   • Monocytes, Absolute 03/23/2022 0.52  0.10 - 0.90 10*3/mm3 Final   • Eosinophils, Absolute 03/23/2022 0.18  0.00 - 0.40 10*3/mm3 Final   • Basophils, Absolute 03/23/2022 0.03  0.00 - 0.20 10*3/mm3 Final   • Immature Grans, Absolute 03/23/2022 0.01  0.00 - 0.05 10*3/mm3 Final   • nRBC 03/23/2022 0.0  0.0 - 0.2 /100 WBC Final         ASSESSMENT:   is a pleasant 71 y/o WM who comes to this clinic for evaluation & management for thrombocytopenia.     # Thrombocytopenia:  · Patient appears to have chronic thrombocytopenia, fluctuating between 90s-200s, at least since 2015. Hb/Hct & WBC count within normal limits. Smear review shows decreased but normal appearing platelets. No current medications suspected to cause thrombocytopenia.   · Work up for autoimmune condition & viral infection came back negative. US abdomen is suggestive of fatty liver. No splenomegaly.   · Given history of alcohol abuse in the past & US abdomen showing fatty liver, underlying liver disease is likely the cause of his isolated thrombocytopenia.  · The CBC from 7/28/21 show platelet count of 110k. Plan made for  surveillance  · CBC from 11/24/21 show platelets 235k. No new symptoms. Did not start or stopped any meds. Quit alcohol > 10 yrs ago. Is a retired .   · CBC from 3/23/22 shows platelet count of 171,000. Fluctuating. Was 119,000 just a week ago.   · At this time, will continue to monitor & repeat labs in 6 months time. If platelets continue to decline, may need a bone marrow biopsy for further evaluation.   · Patient is agreeable to the plan.    # Afib: Started on Metoptolol & eliquis. Advised to f/u cardiology    PLAN:   - Active surveillance  - repeat labs in 6 months time.    Orders Placed This Encounter   Procedures   • Comprehensive Metabolic Panel     Standing Status:   Future     Standing Expiration Date:   3/24/2023     Order Specific Question:   Release to patient     Answer:   Immediate   • CBC & Differential     Standing Status:   Future     Standing Expiration Date:   3/23/2023     Order Specific Question:   Manual Differential     Answer:   No

## 2022-03-24 ENCOUNTER — OFFICE VISIT (OUTPATIENT)
Dept: FAMILY MEDICINE CLINIC | Facility: CLINIC | Age: 72
End: 2022-03-24

## 2022-03-24 VITALS
BODY MASS INDEX: 27.55 KG/M2 | HEIGHT: 69 IN | HEART RATE: 78 BPM | DIASTOLIC BLOOD PRESSURE: 78 MMHG | OXYGEN SATURATION: 99 % | TEMPERATURE: 97.8 F | SYSTOLIC BLOOD PRESSURE: 110 MMHG | WEIGHT: 186 LBS

## 2022-03-24 DIAGNOSIS — R73.9 HYPERGLYCEMIA: ICD-10-CM

## 2022-03-24 DIAGNOSIS — I10 ESSENTIAL HYPERTENSION: Primary | ICD-10-CM

## 2022-03-24 PROCEDURE — 99214 OFFICE O/P EST MOD 30 MIN: CPT | Performed by: FAMILY MEDICINE

## 2022-03-24 NOTE — PROGRESS NOTES
"Chief Complaint  Hypertension (No complains ,doing well no complains ) and Hyperglycemia (Doing well with diet ,had some labs )    Subjective          Zheng Lopez presents to [facility]  History of Present Illness  Pleasant 71-year-old male here to follow-up hypertension that is well controlled and hyperglycemia that is well controlled.  No adverse effects to medication.  Tolerating well.  He has lost 10 pounds since his last appointment.    He does have thrombocytopenia, seen by hematology oncology.  Very liabel;es    He has been seeing a nutritionist - was diagnosed with prio diabetes,  He is much more conscious of dietary carbs.  His goal is to eat <45 g carbs/day.      R wrist pain, SYLWIA after R carpectomy - he now has OA, he can exercise, drive amd eat.  And has issues with opening a can.       Objective   Vital Signs:   /78   Pulse 78   Temp 97.8 °F (36.6 °C)   Ht 175.3 cm (69\")   Wt 84.4 kg (186 lb)   SpO2 99%   BMI 27.47 kg/m²     BMI is above normal parameters. Recommendations: exercise counseling/recommendations, nutrition counseling/recommendations and referral to a nutritionist       Physical Exam  Vitals and nursing note reviewed.   Constitutional:       General: He is not in acute distress.     Appearance: He is well-developed.   HENT:      Head: Normocephalic.      Nose: Nose normal.   Cardiovascular:      Rate and Rhythm: Normal rate and regular rhythm.      Heart sounds: Normal heart sounds. No murmur heard.  Pulmonary:      Effort: Pulmonary effort is normal. No respiratory distress.      Breath sounds: Normal breath sounds.   Musculoskeletal:         General: Normal range of motion.   Skin:     General: Skin is warm and dry.      Findings: No rash.   Neurological:      Mental Status: He is alert and oriented to person, place, and time.   Psychiatric:         Behavior: Behavior normal.         Thought Content: Thought content normal.         Judgment: " Judgment normal.        Result Review :   The following data was reviewed by: Zoya Newman MD on 03/24/2022:  CMP    CMP 1/11/22 3/17/22 3/23/22   Glucose 94 97 106 (A)   BUN 20 11 14   Creatinine 1.06 1.09 1.03   eGFR Non African Am 68     Sodium 135 (A) 141 134 (A)   Potassium 4.8 4.4 4.5   Chloride 97 (A) 101 99   Calcium 9.1 9.1 9.2   Total Protein  6.9    Albumin 4.40 4.4 4.00   Globulin  2.5    Total Bilirubin 0.6 0.7 0.6   Alkaline Phosphatase 84 85 84   AST (SGOT) 44 (A) 32 31   ALT (SGPT) 30 26 24   (A) Abnormal value            CBC    CBC 1/13/22 3/17/22 3/23/22   WBC 5.76 4.0 5.44   RBC 4.39 4.54 4.34   Hemoglobin 14.7 15.1 14.4   Hematocrit 41.6 44.3 41.5   MCV 94.8 98 (A) 95.6   MCH 33.5 (A) 33.3 (A) 33.2 (A)   MCHC 35.3 34.1 34.7   RDW 12.0 (A) 12.2 11.8 (A)   Platelets 209 119 (A) 171   (A) Abnormal value            Lipid Panel    Lipid Panel 12/14/21 3/17/22   Total Cholesterol 138 137   Triglycerides 87 65   HDL Cholesterol 54 56   VLDL Cholesterol 17 13   LDL Cholesterol  67 68           A1C Last 3 Results    HGBA1C Last 3 Results 12/14/21 3/17/22   Hemoglobin A1C 5.8 (A) 5.6   (A) Abnormal value       Comments are available for some flowsheets but are not being displayed.                     Assessment and Plan    Diagnoses and all orders for this visit:    1. Essential hypertension (Primary)  -     Comprehensive Metabolic Panel; Future  -     CBC & Differential; Future  -     Lipid Panel; Future    2. Hyperglycemia  -     Hemoglobin A1c; Future    Pleasant 71-year-old male doing quite well.  Hypertension well-controlled on current meds.  A. fib stable followed by Dr. Bird at with cardiology.  Anticoagulant Eliquis with no adverse effects.    Hyperglycemia that has improved substantially, he follow the guidelines with nutritionist.  He had an appointment with her this winter.  He has been working to eat less than 45 g of carbs per meal, total 150 g of carbs per day.  Exercising regularly.  He has  lost about 10 pounds, hemoglobin A1c now within normal limits.  Plan to continue with lifestyle management.    Also with some right hand pain.  Will follow up with Ortho about this.  He is being recommended for surgery but will think on this.  He has had a right carpectomy in 2020.    Follow Up   Return in about 6 months (around 9/24/2022), or if symptoms worsen or fail to improve, for Recheck HTN with labs prior .  Patient was given instructions and counseling regarding his condition or for health maintenance advice. Please see specific information pulled into the AVS if appropriate. Medical assistant and I wore mask and eyewear protection during entire encounter.  Patient wore mask.    Zoya Newman MD

## 2022-04-05 ENCOUNTER — IMMUNIZATION (OUTPATIENT)
Dept: VACCINE CLINIC | Facility: HOSPITAL | Age: 72
End: 2022-04-05

## 2022-04-05 DIAGNOSIS — Z23 NEED FOR VACCINATION: Primary | ICD-10-CM

## 2022-04-05 PROCEDURE — 0054A HC ADM SARSCV2 30MCG TRS-SUCR BOOSTER: CPT | Performed by: INTERNAL MEDICINE

## 2022-04-05 PROCEDURE — 91305 HC SARSCOV2 VAC 30 MCG TRS-SUCR PFIZER: CPT | Performed by: INTERNAL MEDICINE

## 2022-04-11 ENCOUNTER — OFFICE VISIT (OUTPATIENT)
Dept: CARDIOLOGY | Facility: CLINIC | Age: 72
End: 2022-04-11

## 2022-04-11 VITALS
DIASTOLIC BLOOD PRESSURE: 88 MMHG | BODY MASS INDEX: 27.99 KG/M2 | SYSTOLIC BLOOD PRESSURE: 146 MMHG | HEIGHT: 69 IN | WEIGHT: 189 LBS | HEART RATE: 49 BPM

## 2022-04-11 DIAGNOSIS — I48.0 PAROXYSMAL ATRIAL FIBRILLATION: ICD-10-CM

## 2022-04-11 DIAGNOSIS — I42.2 ASYMMETRIC SEPTAL HYPERTROPHY: Primary | ICD-10-CM

## 2022-04-11 DIAGNOSIS — I47.29 NON-SUSTAINED VENTRICULAR TACHYCARDIA: ICD-10-CM

## 2022-04-11 PROCEDURE — 99214 OFFICE O/P EST MOD 30 MIN: CPT | Performed by: INTERNAL MEDICINE

## 2022-04-11 PROCEDURE — 93000 ELECTROCARDIOGRAM COMPLETE: CPT | Performed by: INTERNAL MEDICINE

## 2022-04-11 NOTE — PROGRESS NOTES
Date of Office Visit: 2022  Encounter Provider: Mk Mroeno MD  Place of Service: [facility] C[facility]  Patient Name: Zheng Lopez  : 1950    Subjective:     Encounter Date:2022      Patient ID: Zheng Lopez is a 71 y.o. male who has a cc of PAF and HCM and he had episodes of AF in  -- short duration but fast.     Much improved on twice daily bb.     He's also changed his diet and lost 18 lbs.      No anginal chest pain,   No sig bryant,   No soa,   No fainting,  No orthostasis.   No edema.   Exercise tolerance: good.     I feel better now then I did for years.     He's much improved his diet.     There have been no hospital admission since the last visit.     There have been no bleeding events.       Past Medical History:   Diagnosis Date   • Asymmetric septal hypertrophy (HCC)    • Bronchitis     Bronchitis / URI   • Cataract of both eyes    • Chicken pox    • Cholecystitis    • Diverticula of colon    • Encounter for annual health examination 03/10/2015    Annual Health Assessment   • Fatigue    • Gallbladder disease    • GERD (gastroesophageal reflux disease)    • High blood pressure    • History of EKG     10/30/15, 13, 12, 10/18/10, 09   • History of TB skin testing     TB Skin Test: 14 Completed, 12 Patient Declines, 10/18/10 Completed, 08 Completed   • Hyperlipidemia    • Hypertension     still taking meds   • Impingement of right ulnar nerve    • Measles    • Non-ST elevated myocardial infarction (non-STEMI) ([facility]) 2022   • Osteoarthritis involving multiple joints on both sides of body    • Osteoarthritis involving multiple joints on both sides of body    • Persistent cough    • Pulse irregularity     Irregular pulse PER CARDIO   • Sexual problems     Sexual problems / enjoyment ED   • Sleep apnea     has c-pap   • Thrombocythemia 6/15/2021   • Wellness examination 03/10/2015    Annual Wellness Visit       Social  "History     Socioeconomic History   • Marital status:    • Number of children: 2   Tobacco Use   • Smoking status: Never Smoker   • Smokeless tobacco: Never Used   Vaping Use   • Vaping Use: Never used   Substance and Sexual Activity   • Alcohol use: No     Comment: none in 5 yrs, Nov 2011 - last drink   • Drug use: No   • Sexual activity: Yes     Partners: Female       Review of Systems   Constitutional: Negative for fever and night sweats.   HENT: Negative for ear pain and stridor.    Eyes: Negative for discharge and visual halos.   Cardiovascular: Negative for cyanosis.   Respiratory: Negative for hemoptysis and sputum production.    Hematologic/Lymphatic: Negative for adenopathy.   Skin: Negative for nail changes and unusual hair distribution.   Musculoskeletal: Negative for gout and joint swelling.   Gastrointestinal: Negative for bowel incontinence and flatus.   Genitourinary: Negative for dysuria and flank pain.   Neurological: Negative for seizures and tremors.   Psychiatric/Behavioral: Negative for altered mental status. The patient is not nervous/anxious.             Objective:     Vitals:    04/11/22 1005   BP: 146/88   Pulse: (!) 49   Weight: 85.7 kg (189 lb)   Height: 175.3 cm (69\")         Eyes:      General:         Right eye: No discharge.         Left eye: No discharge.   HENT:      Head: Normocephalic and atraumatic.   Neck:      Thyroid: No thyromegaly.      Vascular: No JVD.   Pulmonary:      Effort: Pulmonary effort is normal.      Breath sounds: Normal breath sounds. No rales.   Cardiovascular:      Normal rate. Regular rhythm.      No gallop.   Edema:     Peripheral edema absent.   Abdominal:      General: Bowel sounds are normal.      Palpations: Abdomen is soft.      Tenderness: There is no abdominal tenderness.   Musculoskeletal: Normal range of motion.         General: No deformity. Skin:     General: Skin is warm and dry.      Findings: No erythema.   Neurological:      Mental " Status: Alert and oriented to person, place, and time.      Motor: Normal muscle tone.   Psychiatric:         Behavior: Behavior normal.         Thought Content: Thought content normal.           ECG 12 Lead    Date/Time: 4/11/2022 10:37 AM  Performed by: Mk Moreno MD  Authorized by: Mk Moreno MD   Comparison: compared with previous ECG   Similar to previous ECG  Rhythm: sinus rhythm  Other findings: left ventricular hypertrophy            Lab Review:       Assessment:          Diagnosis Plan   1. Asymmetric septal hypertrophy (HCC)     2. Non-sustained ventricular tachycardia (HCC)     3. Paroxysmal atrial fibrillation (HCC)            Plan:       AF -- has massively decreased since weight loss.     He feels great.     I think he will do great if he stays lean like he is.    No need for ablation or AADs at this time.

## 2022-04-26 ENCOUNTER — TELEPHONE (OUTPATIENT)
Dept: FAMILY MEDICINE CLINIC | Facility: CLINIC | Age: 72
End: 2022-04-26

## 2022-04-26 DIAGNOSIS — N52.9 ED (ERECTILE DYSFUNCTION) OF ORGANIC ORIGIN: ICD-10-CM

## 2022-04-26 RX ORDER — SILDENAFIL CITRATE 20 MG/1
40 TABLET ORAL AS NEEDED
Qty: 20 TABLET | Refills: 3 | Status: SHIPPED | OUTPATIENT
Start: 2022-04-26

## 2022-04-26 NOTE — TELEPHONE ENCOUNTER
On the phone for 15 minutes for them to tell me their system is down and will need to try back in 1 to 2 hours.

## 2022-04-26 NOTE — TELEPHONE ENCOUNTER
Caller: Zheng Lopez    Relationship: Self    Best call back number: [phone]    What was the call regarding: PATIENT STATED THAT HE RECEIVED A [employer] MESSAGE STATING THAT HIS MEDICATION HAD BEEN APPROVED. PATIENT STATED THAT HE DOES NOT NEED THE MEDICATION REFILLED AND DOES NOT NEED THAT TO GO TO [facility]. PATIENT STATED THAT [facility] WILL AUTOMATICALLY TAKE THE MONEY OUT OF HIS ACCOUNT AND HE IS UNSURE OF HOW MUCH IT WOULD BE WITH THEM. PATIENT WOULD LIKE TO CANCEL THE PRESCRIPTION. PLEASE ADVISE.     Do you require a callback: YES

## 2022-04-28 NOTE — TELEPHONE ENCOUNTER
Samantha spoke to patient he wants to wait now and see how much the medication is before it canceled

## 2022-05-12 ENCOUNTER — OFFICE VISIT (OUTPATIENT)
Dept: CARDIOLOGY | Facility: CLINIC | Age: 72
End: 2022-05-12

## 2022-05-12 VITALS
BODY MASS INDEX: 27.25 KG/M2 | DIASTOLIC BLOOD PRESSURE: 68 MMHG | WEIGHT: 184 LBS | HEART RATE: 52 BPM | SYSTOLIC BLOOD PRESSURE: 108 MMHG | HEIGHT: 69 IN

## 2022-05-12 DIAGNOSIS — I10 ESSENTIAL HYPERTENSION: ICD-10-CM

## 2022-05-12 DIAGNOSIS — I48.0 PAROXYSMAL ATRIAL FIBRILLATION: Primary | ICD-10-CM

## 2022-05-12 DIAGNOSIS — Q24.5 CORONARY-MYOCARDIAL BRIDGE: ICD-10-CM

## 2022-05-12 PROCEDURE — 99214 OFFICE O/P EST MOD 30 MIN: CPT | Performed by: NURSE PRACTITIONER

## 2022-05-12 PROCEDURE — 93000 ELECTROCARDIOGRAM COMPLETE: CPT | Performed by: NURSE PRACTITIONER

## 2022-05-12 NOTE — PROGRESS NOTES
Date of Office Visit: 2022  Encounter Provider: SHER Chandra  Place of Service: [facility] CARDIOLOGY  Patient Name: Zheng Lopez  :1950    Chief Complaint   Patient presents with   • Atrial Fibrillation     3 month f/u    :     HPI: Zheng Lopez is a 71 y.o. male.  He is a patient who presented in January with a non-STEMI.  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  Medical management was recommended.  The lisinopril was discontinued and the metoprolol was increased.  Echocardiogram demonstrated normal LV systolic function and no significant valvular abnormalities.  Given reports of tachycardia, he was discharged with a 2-week ZIO.  This demonstrated normal sinus rhythm with evidence of atrial fibrillation occurring approximately 1% of the time.  Subsequently, he was started on Eliquis due to an elevated [id number] score.  Given the presence of his myocardial bridge, he was referred to EP.  He was seen by Dr. Moreno on .  Dr. Moreno did not recommend an ablation or antiarrhythmic therapy.  He is here today for follow-up.   He is doing well.  He denies any chest pain, shortness of breath, palpitations, edema, dizziness, or syncope.  He denies any bleeding difficulties or melena.  Evidently in December he was diagnosed with prediabetes.  Since then, he has made a lot of dietary changes and has subsequently lost about 20 pounds.  He participates in rather strenuous exercise about 6 days a week.    Past Medical History:   Diagnosis Date   • Asymmetric septal hypertrophy (HCC)    • Bronchitis     Bronchitis / URI   • Cataract of both eyes    • Chicken pox    • Cholecystitis    • Diverticula of colon    • Encounter for annual health examination 03/10/2015    Annual Health Assessment   • Fatigue    • Gallbladder disease    • GERD (gastroesophageal reflux disease)    •  High blood pressure    • History of EKG     10/30/15, 09/19/13, 09/14/12, 10/18/10, 05/04/09   • History of TB skin testing     TB Skin Test: 01/09/14 Completed, 09/14/12 Patient Declines, 10/18/10 Completed, 03/05/08 Completed   • Hyperlipidemia    • Hypertension     still taking meds   • Impingement of right ulnar nerve    • Measles    • Non-ST elevated myocardial infarction (non-STEMI) (Formerly [facility]) 1/12/2022   • Osteoarthritis involving multiple joints on both sides of body    • Osteoarthritis involving multiple joints on both sides of body    • Persistent cough    • Pulse irregularity     Irregular pulse PER CARDIO   • Sexual problems     Sexual problems / enjoyment ED   • Sleep apnea     has c-pap   • Thrombocythemia 6/15/2021   • Wellness examination 03/10/2015    Annual Wellness Visit       Past Surgical History:   Procedure Laterality Date   • CARDIAC CATHETERIZATION N/A 1/12/2022    Procedure: Left Heart Cath;  Surgeon: Vi Orozco MD;  Location:  [facility];  Service: Cardiovascular;  Laterality: N/A;   • CARDIAC CATHETERIZATION N/A 1/12/2022    Procedure: Coronary angiography;  Surgeon: Vi Orozco MD;  Location:  [facility];  Service: Cardiovascular;  Laterality: N/A;   • CARDIAC CATHETERIZATION N/A 1/12/2022    Procedure: Left ventriculography;  Surgeon: Vi Orozco MD;  Location:  [facility] INVAS[facility] LOC[facility];  Service: Cardiovascular;  Laterality: N/A;   • CATARACT EXTRACTION Bilateral    • CATARACT EXTRACTION Right 2013   • CATARACT EXTRACTION Left 2014   • CHOLECYSTECTOMY     • COLONOSCOPY  04/16/2015    Dr. Ritchie   • COLONOSCOPY  03/07/2007   • ELBOW PROCEDURE Right     ulnar nerve impingement release   • FOOT SURGERY Right     right foot (twice)   • GALLBLADDER SURGERY  2009   • KNEE MENISCAL REPAIR Right 2010    torn right meniscus   • LASIK  2006   • RHINOPLASTY  1986   • ROTATOR CUFF REPAIR Left 2010   • SHOULDER ARTHROSCOPY W/ LABRAL REPAIR Right  2015    Dr. Baxter  labram repair right shoulder   • SHOULDER LIGAMENT REPAIR Right    • TOTAL HIP ARTHROPLASTY REVISION Left 05/11/2020   • TOTAL KNEE ARTHROPLASTY Right 2011   • WRIST ARTHROPLASTY Right 08/25/2020       Social History     Socioeconomic History   • Marital status:    • Number of children: 2   Tobacco Use   • Smoking status: Never Smoker   • Smokeless tobacco: Never Used   Vaping Use   • Vaping Use: Never used   Substance and Sexual Activity   • Alcohol use: No     Comment: none in 5 yrs, Nov 2011 - last drink   • Drug use: No   • Sexual activity: Yes     Partners: Female       Family History   Problem Relation Age of Onset   • Dementia Mother    • Migraines Mother    • Osteoporosis Mother    • Stroke Mother 85        early 80s   • Heart disease Mother         tachycardia and had ppm   • Alzheimer's disease Mother    • Supraventricular tachycardia Mother    • Alcohol abuse Father    • Depression Father    • Migraines Father    • Tuberculosis Father    • Cirrhosis Father         41 YO   • Cancer Paternal Uncle 67        mets but unsure of type of ca  BACK CANCER   • Stroke Brother    • Colon cancer Neg Hx    • Prostate cancer Neg Hx    • Heart attack Neg Hx        Review of Systems   Constitutional: Negative.   Cardiovascular: Negative.  Negative for chest pain, dyspnea on exertion, leg swelling, orthopnea, paroxysmal nocturnal dyspnea and syncope.   Respiratory: Negative.    Hematologic/Lymphatic: Negative for bleeding problem.   Musculoskeletal: Negative for falls.   Gastrointestinal: Negative for melena.   Neurological: Negative for dizziness and light-headedness.       No Known Allergies      Current Outpatient Medications:   •  amoxicillin (AMOXIL) 500 MG capsule, Take 4 capsules by mouth As Needed. With teeth cleaning, Disp: , Rfl:   •  apixaban (ELIQUIS) 5 MG tablet tablet, Take 1 tablet by mouth Every 12 (Twelve) Hours., Disp: 180 tablet, Rfl: 3  •  atorvastatin (LIPITOR) 40 MG  "tablet, TAKE 1 TABLET DAILY, Disp: 90 tablet, Rfl: 3  •  calcium carbonate (OS-TOMAS) 600 MG tablet, Take 600 mg by mouth Daily., Disp: , Rfl:   •  Cyanocobalamin (VITAMIN B-12 PO), Take 1 tablet by mouth Daily., Disp: , Rfl:   •  diphenhydrAMINE (BENADRYL) 50 MG capsule, Take 50 mg by mouth Every 6 (Six) Hours As Needed for Itching., Disp: , Rfl:   •  esomeprazole (nexIUM) 40 MG capsule, Take 1 capsule by mouth Every Morning Before Breakfast., Disp: 90 capsule, Rfl: 1  •  Flaxseed, Linseed, (FLAX SEED OIL) 1000 MG capsule, Take 1 capsule by mouth 2 (Two) Times a Day., Disp: , Rfl:   •  Magnesium 400 MG capsule, Take 400 mg by mouth Daily., Disp: , Rfl:   •  Melatonin 10 MG tablet, Take 10 mg by mouth Every Night., Disp: , Rfl:   •  metoprolol succinate XL (TOPROL-XL) 25 MG 24 hr tablet, Take 1 tablet by mouth Every 12 (Twelve) Hours., Disp: 180 tablet, Rfl: 3  •  Multiple Vitamin tablet, Take 1 tablet by mouth Daily., Disp: , Rfl:   •  sildenafil (REVATIO) 20 MG tablet, Take 2 tablets by mouth As Needed (erectile dysfunction)., Disp: 20 tablet, Rfl: 3  •  tamsulosin (FLOMAX) 0.4 MG capsule 24 hr capsule, TAKE 1 CAPSULE DAILY (Patient taking differently: Take 1 capsule by mouth Daily.), Disp: 90 capsule, Rfl: 3      Objective:     Vitals:    05/12/22 0826   BP: 108/68   Pulse: 52   Weight: 83.5 kg (184 lb)   Height: 175.3 cm (69\")     Body mass index is 27.17 kg/m².    PHYSICAL EXAM:    Neck:      Vascular: No JVD.   Pulmonary:      Effort: Pulmonary effort is normal.      Breath sounds: Normal breath sounds.   Cardiovascular:      Normal rate. Regular rhythm.      Murmurs: There is no murmur.      No gallop. No click. No rub.   Pulses:     Intact distal pulses.           ECG 12 Lead    Date/Time: 5/12/2022 8:40 AM  Performed by: Krystle Mcneil APRN  Authorized by: Krystle Mcneil APRN   Comparison: compared with previous ECG from 4/11/2022  Similar to previous ECG  Rhythm: sinus bradycardia  Rate: " bradycardic  BPM: 52  T inversion: V4, V5 and V6  Comments: Indication: Paroxysmal atrial fibrillation              Assessment:       Diagnosis Plan   1. Paroxysmal atrial fibrillation (HCC)  ECG 12 Lead   2. Coronary-myocardial bridge     3. Essential hypertension       Orders Placed This Encounter   Procedures   • ECG 12 Lead     This order was created via procedure documentation     Order Specific Question:   Release to patient     Answer:   Immediate          Plan:       1.  Paroxysmal atrial fibrillation.  He is in sinus rhythm today.  He is anticoagulated on Eliquis and rate controlled on metoprolol.      2.  Myocardial bridge.  Continue metoprolol.      3.  Hypertension.  His blood pressure looks great.      I think is doing fantastic.  He denies any symptoms and he has excellent risk factor modification.  I am not making any changes today, and he will follow-up with Dr. De Santiago in 6 months.      As always, it has been a pleasure to participate in your patient's care.      Sincerely,         SHER [place]

## 2022-06-02 RX ORDER — ESOMEPRAZOLE MAGNESIUM 40 MG/1
CAPSULE, DELAYED RELEASE ORAL
Qty: 90 CAPSULE | Refills: 3 | Status: SHIPPED | OUTPATIENT
Start: 2022-06-02

## 2022-08-10 ENCOUNTER — TELEPHONE (OUTPATIENT)
Dept: CARDIOLOGY | Facility: CLINIC | Age: 72
End: 2022-08-10

## 2022-08-10 NOTE — TELEPHONE ENCOUNTER
Dr.Robert Gamboa called stating that pt is needing a tooth extraction. He is needing cardiac clearance. Pt is taking Eliquis 5 mg BID. How many days prior can he hold his Eliquis?

## 2022-08-10 NOTE — TELEPHONE ENCOUNTER
Fine to proceed forward with tooth extraction.  Able to hold his Eliquis as needed.  I typically do 2 days but if it needs to be longer that would be fine.

## 2022-09-12 ENCOUNTER — OFFICE VISIT (OUTPATIENT)
Dept: ONCOLOGY | Facility: CLINIC | Age: 72
End: 2022-09-12

## 2022-09-12 ENCOUNTER — TELEPHONE (OUTPATIENT)
Dept: ONCOLOGY | Facility: CLINIC | Age: 72
End: 2022-09-12

## 2022-09-12 ENCOUNTER — LAB (OUTPATIENT)
Dept: OTHER | Facility: HOSPITAL | Age: 72
End: 2022-09-12

## 2022-09-12 VITALS
DIASTOLIC BLOOD PRESSURE: 75 MMHG | TEMPERATURE: 97.3 F | WEIGHT: 184.6 LBS | SYSTOLIC BLOOD PRESSURE: 119 MMHG | OXYGEN SATURATION: 96 % | HEIGHT: 69 IN | BODY MASS INDEX: 27.34 KG/M2 | HEART RATE: 59 BPM | RESPIRATION RATE: 16 BRPM

## 2022-09-12 DIAGNOSIS — D69.6 THROMBOCYTOPENIA: ICD-10-CM

## 2022-09-12 DIAGNOSIS — D69.6 THROMBOCYTOPENIA: Primary | ICD-10-CM

## 2022-09-12 LAB
ALBUMIN SERPL-MCNC: 4.2 G/DL (ref 3.5–5.2)
ALBUMIN/GLOB SERPL: 1.6 G/DL
ALP SERPL-CCNC: 90 U/L (ref 39–117)
ALT SERPL W P-5'-P-CCNC: 24 U/L (ref 1–41)
ANION GAP SERPL CALCULATED.3IONS-SCNC: 8.6 MMOL/L (ref 5–15)
AST SERPL-CCNC: 28 U/L (ref 1–40)
BASOPHILS # BLD AUTO: 0.03 10*3/MM3 (ref 0–0.2)
BASOPHILS NFR BLD AUTO: 0.6 % (ref 0–1.5)
BILIRUB SERPL-MCNC: 0.6 MG/DL (ref 0–1.2)
BUN SERPL-MCNC: 19 MG/DL (ref 8–23)
BUN/CREAT SERPL: 19.8 (ref 7–25)
CALCIUM SPEC-SCNC: 9 MG/DL (ref 8.6–10.5)
CHLORIDE SERPL-SCNC: 98 MMOL/L (ref 98–107)
CO2 SERPL-SCNC: 25.4 MMOL/L (ref 22–29)
CREAT SERPL-MCNC: 0.96 MG/DL (ref 0.76–1.27)
DEPRECATED RDW RBC AUTO: 41.9 FL (ref 37–54)
EGFRCR SERPLBLD CKD-EPI 2021: 82.9 ML/MIN/1.73
EOSINOPHIL # BLD AUTO: 0.16 10*3/MM3 (ref 0–0.4)
EOSINOPHIL NFR BLD AUTO: 3.2 % (ref 0.3–6.2)
ERYTHROCYTE [DISTWIDTH] IN BLOOD BY AUTOMATED COUNT: 11.9 % (ref 12.3–15.4)
GLOBULIN UR ELPH-MCNC: 2.7 GM/DL
GLUCOSE SERPL-MCNC: 109 MG/DL (ref 65–99)
HCT VFR BLD AUTO: 40.9 % (ref 37.5–51)
HGB BLD-MCNC: 14.6 G/DL (ref 13–17.7)
IMM GRANULOCYTES # BLD AUTO: 0.01 10*3/MM3 (ref 0–0.05)
IMM GRANULOCYTES NFR BLD AUTO: 0.2 % (ref 0–0.5)
LYMPHOCYTES # BLD AUTO: 1.33 10*3/MM3 (ref 0.7–3.1)
LYMPHOCYTES NFR BLD AUTO: 26.6 % (ref 19.6–45.3)
MCH RBC QN AUTO: 34.4 PG (ref 26.6–33)
MCHC RBC AUTO-ENTMCNC: 35.7 G/DL (ref 31.5–35.7)
MCV RBC AUTO: 96.5 FL (ref 79–97)
MONOCYTES # BLD AUTO: 0.43 10*3/MM3 (ref 0.1–0.9)
MONOCYTES NFR BLD AUTO: 8.6 % (ref 5–12)
NEUTROPHILS NFR BLD AUTO: 3.04 10*3/MM3 (ref 1.7–7)
NEUTROPHILS NFR BLD AUTO: 60.8 % (ref 42.7–76)
NRBC BLD AUTO-RTO: 0 /100 WBC (ref 0–0.2)
PLATELET # BLD AUTO: 217 10*3/MM3 (ref 140–450)
PMV BLD AUTO: 9.2 FL (ref 6–12)
POTASSIUM SERPL-SCNC: 4.9 MMOL/L (ref 3.5–5.2)
PROT SERPL-MCNC: 6.9 G/DL (ref 6–8.5)
RBC # BLD AUTO: 4.24 10*6/MM3 (ref 4.14–5.8)
SODIUM SERPL-SCNC: 132 MMOL/L (ref 136–145)
WBC NRBC COR # BLD: 5 10*3/MM3 (ref 3.4–10.8)

## 2022-09-12 PROCEDURE — 85025 COMPLETE CBC W/AUTO DIFF WBC: CPT | Performed by: INTERNAL MEDICINE

## 2022-09-12 PROCEDURE — 36415 COLL VENOUS BLD VENIPUNCTURE: CPT

## 2022-09-12 PROCEDURE — 80053 COMPREHEN METABOLIC PANEL: CPT | Performed by: INTERNAL MEDICINE

## 2022-09-12 PROCEDURE — 99214 OFFICE O/P EST MOD 30 MIN: CPT | Performed by: INTERNAL MEDICINE

## 2022-09-12 NOTE — PROGRESS NOTES
REASON FOR CONSULTATION/CHIEF COMPLAINT:     Evaluation & management for thrombocytopenia                             REQUESTING PHYSICIAN: No ref. provider found  RECORDS OBTAINED:  Records of the patients history including those from the electronic medical record were reviewed and summarized in detail.    HISTORY OF PRESENT ILLNESS:    The patient is a 72 y.o. year old male who is here for evaluation for thrombocytopenia. On chart review, patient appears to have chronic thrombocytopenia since 2015 ranging between 90s - 200s.Patient denies any excessive bleeding or bruising.   The CBC from today show platelet count of 96k, Hb/Hct 15.4/43.6, wbc of 5.96 with normal differential. Patient denies any fever, chills, night sweats or weight loss. No lymphadenopathy. Denies any history of autoimmune condition.   Patient reports of significant alcohol abuse for > 15 years, but has not had much drinks in last 5-10 years. Denies ever being diagnosed with liver cirrhosis or any other liver/spleen disease. No family history of blood disorders.     Of note, patienthad tripped & fell injuring his right shoulder & right eyebrow in July 2021. Right shoulder is still in sling.   Improved now.    July 2021: Ultrasound abdomen showed normal liver and spleen size.  Suspected fatty liver.  Hepatic cyst within the left lobe.  Cholecystectomy.    Plan made for active surveillance for his mild and fluctuating thrombocytopenia.    March 2022: Platelets 171,000.  September 2022: Platelets 217,000.  Rest of the CBC within normal range    INTERIM HISTORY:  Patient returns to the clinic for a f/u visit.  Denies any bleeding or bruising. Says he is trying to eat better and has lost some weight. Feels better.  Continues to be on Eliquis for A. Fib.  Says he has been eating healthy and working out regularly.  He has lost around 20 pounds in last 6 months.    Past Medical History:   Diagnosis Date   • Asymmetric septal hypertrophy (HCC)    •  Bronchitis     Bronchitis / URI   • Cataract of both eyes    • Chicken pox    • Cholecystitis    • Diverticula of colon    • Encounter for annual health examination 03/10/2015    Annual Health Assessment   • Fatigue    • Gallbladder disease    • GERD (gastroesophageal reflux disease)    • High blood pressure    • History of EKG     10/30/15, 09/19/13, 09/14/12, 10/18/10, 05/04/09   • History of TB skin testing     TB Skin Test: 01/09/14 Completed, 09/14/12 Patient Declines, 10/18/10 Completed, 03/05/08 Completed   • Hyperlipidemia    • Hypertension     still taking meds   • Impingement of right ulnar nerve    • Measles    • Non-ST elevated myocardial infarction (non-STEMI) (formerly [facility]) 1/12/2022   • Osteoarthritis involving multiple joints on both sides of body    • Osteoarthritis involving multiple joints on both sides of body    • Persistent cough    • Pulse irregularity     Irregular pulse PER CARDIO   • Sexual problems     Sexual problems / enjoyment ED   • Sleep apnea     has c-pap   • Thrombocythemia 6/15/2021   • Wellness examination 03/10/2015    Annual Wellness Visit     Past Surgical History:   Procedure Laterality Date   • CARDIAC CATHETERIZATION N/A 1/12/2022    Procedure: Left Heart Cath;  Surgeon: Vi Orozco MD;  Location:  [facility];  Service: Cardiovascular;  Laterality: N/A;   • CARDIAC CATHETERIZATION N/A 1/12/2022    Procedure: Coronary angiography;  Surgeon: Vi Orozco MD;  Location:  [facility];  Service: Cardiovascular;  Laterality: N/A;   • CARDIAC CATHETERIZATION N/A 1/12/2022    Procedure: Left ventriculography;  Surgeon: Vi Orozco MD;  Location:  [facility];  Service: Cardiovascular;  Laterality: N/A;   • CATARACT EXTRACTION Bilateral    • CATARACT EXTRACTION Right 2013   • CATARACT EXTRACTION Left 2014   • CHOLECYSTECTOMY     • COLONOSCOPY  04/16/2015    Dr. Ritchie   • COLONOSCOPY  03/07/2007   • ELBOW PROCEDURE Right      ulnar nerve impingement release   • FOOT SURGERY Right     right foot (twice)   • GALLBLADDER SURGERY  2009   • KNEE MENISCAL REPAIR Right 2010    torn right meniscus   • LASIK  2006   • RHINOPLASTY  1986   • ROTATOR CUFF REPAIR Left 2010   • SHOULDER ARTHROSCOPY W/ LABRAL REPAIR Right 2015    Dr. Baxter  labhannah repair right shoulder   • SHOULDER LIGAMENT REPAIR Right    • TOTAL HIP ARTHROPLASTY REVISION Left 05/11/2020   • TOTAL KNEE ARTHROPLASTY Right 2011   • WRIST ARTHROPLASTY Right 08/25/2020       MEDICATIONS    Current Outpatient Medications:   •  amoxicillin (AMOXIL) 500 MG capsule, Take 4 capsules by mouth As Needed. With teeth cleaning, Disp: , Rfl:   •  apixaban (ELIQUIS) 5 MG tablet tablet, Take 1 tablet by mouth Every 12 (Twelve) Hours., Disp: 180 tablet, Rfl: 3  •  calcium carbonate (OS-TOMAS) 600 MG tablet, Take 600 mg by mouth Daily., Disp: , Rfl:   •  Cyanocobalamin (VITAMIN B-12 PO), Take 1 tablet by mouth Daily., Disp: , Rfl:   •  diphenhydrAMINE (BENADRYL) 50 MG capsule, Take 50 mg by mouth Every 6 (Six) Hours As Needed for Itching., Disp: , Rfl:   •  esomeprazole (nexIUM) 40 MG capsule, TAKE 1 CAPSULE EVERY MORNING BEFORE BREAKFAST, Disp: 90 capsule, Rfl: 3  •  Flaxseed, Linseed, (FLAX SEED OIL) 1000 MG capsule, Take 1 capsule by mouth 2 (Two) Times a Day., Disp: , Rfl:   •  Magnesium 400 MG capsule, Take 400 mg by mouth Daily., Disp: , Rfl:   •  Melatonin 10 MG tablet, Take 10 mg by mouth Every Night., Disp: , Rfl:   •  metoprolol succinate XL (TOPROL-XL) 25 MG 24 hr tablet, Take 1 tablet by mouth Every 12 (Twelve) Hours., Disp: 180 tablet, Rfl: 3  •  Multiple Vitamin tablet, Take 1 tablet by mouth Daily., Disp: , Rfl:   •  sildenafil (REVATIO) 20 MG tablet, Take 2 tablets by mouth As Needed (erectile dysfunction)., Disp: 20 tablet, Rfl: 3  •  tamsulosin (FLOMAX) 0.4 MG capsule 24 hr capsule, TAKE 1 CAPSULE DAILY (Patient taking differently: Take 1 capsule by mouth Daily.), Disp: 90 capsule,  Rfl: 3  •  atorvastatin (LIPITOR) 40 MG tablet, TAKE 1 TABLET DAILY, Disp: 90 tablet, Rfl: 3    ALLERGIES:   No Known Allergies    SOCIAL HISTORY:       Social History     Socioeconomic History   • Marital status:    • Number of children: 2   Tobacco Use   • Smoking status: Never Smoker   • Smokeless tobacco: Never Used   Vaping Use   • Vaping Use: Never used   Substance and Sexual Activity   • Alcohol use: No     Comment: none in 5 yrs, Nov 2011 - last drink   • Drug use: No   • Sexual activity: Yes     Partners: Female         FAMILY HISTORY:  Family History   Problem Relation Age of Onset   • Dementia Mother    • Migraines Mother    • Osteoporosis Mother    • Stroke Mother 85        early 80s   • Heart disease Mother         tachycardia and had ppm   • Alzheimer's disease Mother    • Supraventricular tachycardia Mother    • Alcohol abuse Father    • Depression Father    • Migraines Father    • Tuberculosis Father    • Cirrhosis Father         41 YO   • Cancer Paternal Uncle 67        mets but unsure of type of ca  BACK CANCER   • Stroke Brother    • Colon cancer Neg Hx    • Prostate cancer Neg Hx    • Heart attack Neg Hx        REVIEW OF SYSTEMS:  Constitutional: [No fevers, chills, sweats]  Eye: [No recent visual problems]  ENMT: [No ear pain, nasal congestion, sore throat]  Respiratory: [No shortness of breath, cough]  Cardiovascular: [No Chest pain, palpitations, syncope]  Gastrointestinal: [No nausea, vomiting, diarrhea]  Genitourinary: [No hematuria]  Hema/Lymph: [Negative for bruising tendency, swollen lymph glands]  Endocrine: [Negative for excessive thirst, excessive hunger]  Musculoskeletal: [Right shoulder pain - resolved]  Integumentary: [No rash, pruritus, abrasions]  Neurologic: [ No weakness or numbness, Alert & oriented X 4]       Vitals:    09/12/22 1111   BP: 119/75   Pulse: 59   Resp: 16   Temp: 97.3 °F (36.3 °C)   TempSrc: Temporal   SpO2: 96%   Weight: 83.7 kg (184 lb 9.6 oz)   Height:  "175 cm (68.9\")   PainSc: 0-No pain     Current Status 9/12/2022   ECOG score 0      PHYSICAL EXAM:    CONSTITUTIONAL:  Vital signs reviewed.  No distress, looks comfortable.  EYES:  Conjunctiva and lids unremarkable.   EARS,NOSE,MOUTH,THROAT:  Ears and nose appear unremarkable.  Lips, teeth, gums appear unremarkable.  RESPIRATORY:  Normal respiratory effort.  Lungs clear to auscultation bilaterally.  CARDIOVASCULAR:  Normal S1, S2.  No murmurs rubs or gallops.  No significant lower extremity edema  GASTROINTESTINAL: Abdomen appears unremarkable.  Nondistended  LYMPHATIC:  No cervical, supraclavicular lymphadenopathy.  NEURO: AO x 3,  No focal deficits.  Appears to have equal strength all 4 extremities.  MUSCULOSKELETAL:  Unremarkable digits/nails.  No cyanosis or clubbing.   SKIN:  Warm.  No rashes.  PSYCHIATRIC:  Normal judgment and insight.  Normal mood and affect.     RECENT LABS:        Lab on 09/12/2022   Component Date Value Ref Range Status   • WBC 09/12/2022 5.00  3.40 - 10.80 10*3/mm3 Final   • RBC 09/12/2022 4.24  4.14 - 5.80 10*6/mm3 Final   • Hemoglobin 09/12/2022 14.6  13.0 - 17.7 g/dL Final   • Hematocrit 09/12/2022 40.9  37.5 - 51.0 % Final   • MCV 09/12/2022 96.5  79.0 - 97.0 fL Final   • MCH 09/12/2022 34.4 (A) 26.6 - 33.0 pg Final   • MCHC 09/12/2022 35.7  31.5 - 35.7 g/dL Final   • RDW 09/12/2022 11.9 (A) 12.3 - 15.4 % Final   • RDW-SD 09/12/2022 41.9  37.0 - 54.0 fl Final   • MPV 09/12/2022 9.2  6.0 - 12.0 fL Final   • Platelets 09/12/2022 217  140 - 450 10*3/mm3 Final   • Neutrophil % 09/12/2022 60.8  42.7 - 76.0 % Final   • Lymphocyte % 09/12/2022 26.6  19.6 - 45.3 % Final   • Monocyte % 09/12/2022 8.6  5.0 - 12.0 % Final   • Eosinophil % 09/12/2022 3.2  0.3 - 6.2 % Final   • Basophil % 09/12/2022 0.6  0.0 - 1.5 % Final   • Immature Grans % 09/12/2022 0.2  0.0 - 0.5 % Final   • Neutrophils, Absolute 09/12/2022 3.04  1.70 - 7.00 10*3/mm3 Final   • Lymphocytes, Absolute 09/12/2022 1.33  0.70 - " 3.10 10*3/mm3 Final   • Monocytes, Absolute 09/12/2022 0.43  0.10 - 0.90 10*3/mm3 Final   • Eosinophils, Absolute 09/12/2022 0.16  0.00 - 0.40 10*3/mm3 Final   • Basophils, Absolute 09/12/2022 0.03  0.00 - 0.20 10*3/mm3 Final   • Immature Grans, Absolute 09/12/2022 0.01  0.00 - 0.05 10*3/mm3 Final   • nRBC 09/12/2022 0.0  0.0 - 0.2 /100 WBC Final       ASSESSMENT:   is a pleasant 71 y/o WM who comes to this clinic for evaluation & management for thrombocytopenia.     # Thrombocytopenia:  · Patient appears to have chronic thrombocytopenia, fluctuating between 90s-200s, at least since 2015. Hb/Hct & WBC count within normal limits. Smear review shows decreased but normal appearing platelets. No current medications suspected to cause thrombocytopenia.   · Work up for autoimmune condition & viral infection came back negative. US abdomen is suggestive of fatty liver. No splenomegaly.   · Given history of alcohol abuse in the past & US abdomen showing fatty liver, underlying liver disease is likely the cause of his isolated thrombocytopenia.  · The CBC from 7/28/21 show platelet count of 110k. Plan made for surveillance  · CBC from 11/24/21 show platelets 235k. No new symptoms. Did not start or stopped any meds. Quit alcohol > 10 yrs ago. Is a retired .   · CBC from 3/23/22 shows platelet count of 171,000. Fluctuating.  Platelets 217,000 in September 2022.  · At this time, will continue to monitor & repeat labs in 12 months time.  · Patient is agreeable to the plan.     # Afib: Started on Metoptolol & eliquis. Advised to f/u cardiology    #CAD: Had non-STEMI in January 2022.  Follows up with cardiology.    PLAN:   - Active surveillance  - repeat labs in 12 months time.    Orders Placed This Encounter   Procedures   • CBC & Differential     Standing Status:   Future     Standing Expiration Date:   11/16/2023     Order Specific Question:   Manual Differential     Answer:   No

## 2022-09-12 NOTE — TELEPHONE ENCOUNTER
Caller: Zheng Lopez    Relationship: Self    Best call back number: [phone]      What was the call regarding: PATIENT CALLED, STATED HE GAVE INCORRECT INFORMATION TODAY FOR HIS MEDICATIONS. PATIENT IS STILL TAKING LIPITOR, HE STATED HE SAID NO TODAY AT HIS APPOINTMENT BUT HE IS TAKING IT STILL     Do you require a callback: YES IF ANY QUESTIONS

## 2022-09-13 RX ORDER — ATORVASTATIN CALCIUM 40 MG/1
40 TABLET, FILM COATED ORAL DAILY
Qty: 90 TABLET | Refills: 3
Start: 2022-09-13 | End: 2023-02-23

## 2022-09-22 ENCOUNTER — OFFICE VISIT (OUTPATIENT)
Dept: FAMILY MEDICINE CLINIC | Facility: CLINIC | Age: 72
End: 2022-09-22

## 2022-09-22 VITALS
WEIGHT: 182 LBS | SYSTOLIC BLOOD PRESSURE: 110 MMHG | HEIGHT: 69 IN | BODY MASS INDEX: 26.96 KG/M2 | DIASTOLIC BLOOD PRESSURE: 68 MMHG | TEMPERATURE: 97.8 F | HEART RATE: 51 BPM | OXYGEN SATURATION: 98 %

## 2022-09-22 DIAGNOSIS — I10 ESSENTIAL HYPERTENSION: ICD-10-CM

## 2022-09-22 DIAGNOSIS — R39.11 BENIGN PROSTATIC HYPERPLASIA WITH URINARY HESITANCY: ICD-10-CM

## 2022-09-22 DIAGNOSIS — E78.01 FAMILIAL HYPERCHOLESTEROLEMIA: Primary | ICD-10-CM

## 2022-09-22 DIAGNOSIS — N40.1 BENIGN PROSTATIC HYPERPLASIA WITH URINARY HESITANCY: ICD-10-CM

## 2022-09-22 PROCEDURE — 99214 OFFICE O/P EST MOD 30 MIN: CPT | Performed by: FAMILY MEDICINE

## 2022-09-22 NOTE — PROGRESS NOTES
"Chief Complaint  Hypertension (Follow up  no complains /)    Subjective        Zheng Lopez presents to [facility]  History of Present Illness    Pleasant 72-year-old male here to follow-up for hyperlipidemia and hypertension which is well controlled on current meds.  Reviewed recent labs with patient.  Hyperglycemia normal.  He does have concerns for benign proximal hyperplasia, requesting aqua ablation.    Objective   Vital Signs:  /68   Pulse 51   Temp 97.8 °F (36.6 °C)   Ht 175 cm (68.9\")   Wt 82.6 kg (182 lb)   SpO2 98%   BMI 26.95 kg/m²   Estimated body mass index is 26.95 kg/m² as calculated from the following:    Height as of this encounter: 175 cm (68.9\").    Weight as of this encounter: 82.6 kg (182 lb).          Physical Exam  Vitals and nursing note reviewed.   Constitutional:       General: He is not in acute distress.     Appearance: He is well-developed.   HENT:      Head: Normocephalic.      Nose: Nose normal.   Cardiovascular:      Rate and Rhythm: Normal rate and regular rhythm.      Heart sounds: Normal heart sounds. No murmur heard.  Pulmonary:      Effort: Pulmonary effort is normal. No respiratory distress.      Breath sounds: Normal breath sounds.   Musculoskeletal:         General: Normal range of motion.   Skin:     General: Skin is warm and dry.      Findings: No rash.   Neurological:      Mental Status: He is alert and oriented to person, place, and time.   Psychiatric:         Behavior: Behavior normal.         Thought Content: Thought content normal.         Judgment: Judgment normal.        Result Review :  The following data was reviewed by: Zoya Newman MD on 09/22/2022:  CMP    CMP 3/23/22 9/12/22 9/19/22   Glucose 106 (A) 109 (A) 98   BUN 14 19 14   Creatinine 1.03 0.96 0.94   Sodium 134 (A) 132 (A) 136   Potassium 4.5 4.9 5.0   Chloride 99 98 99   Calcium 9.2 9.0 9.4   Total Protein   7.0   Albumin 4.00 4.20 4.30   Globulin   2.7 "   Total Bilirubin 0.6 0.6 0.7   Alkaline Phosphatase 84 90 80   AST (SGOT) 31 28 31   ALT (SGPT) 24 24 21   (A) Abnormal value            CBC    CBC 3/23/22 9/12/22 9/19/22   WBC 5.44 5.00 4.10   RBC 4.34 4.24 4.32   Hemoglobin 14.4 14.6 14.6   Hematocrit 41.5 40.9 42.8   MCV 95.6 96.5 99.1 (A)   MCH 33.2 (A) 34.4 (A) 33.8 (A)   MCHC 34.7 35.7 34.1   RDW 11.8 (A) 11.9 (A) 12.2 (A)   Platelets 171 217 105 (A)   (A) Abnormal value            Lipid Panel    Lipid Panel 12/14/21 3/17/22 9/19/22   Total Cholesterol 138 137 134   Triglycerides 87 65 48   HDL Cholesterol 54 56 66 (A)   VLDL Cholesterol 17 13 11   LDL Cholesterol  67 68 57   (A) Abnormal value       Comments are available for some flowsheets but are not being displayed.           A1C Last 3 Results    HGBA1C Last 3 Results 12/14/21 3/17/22 9/19/22   Hemoglobin A1C 5.8 (A) 5.6 5.60   (A) Abnormal value       Comments are available for some flowsheets but are not being displayed.                     Assessment and Plan   Diagnoses and all orders for this visit:    1. Familial hypercholesterolemia (Primary)    2. Essential hypertension    3. Benign prostatic hyperplasia with urinary hesitancy  -     Ambulatory Referral to Urology    Hypertension, hyperlipidemia both well controlled, BPH is currently well controlled on tamsulosin 0.4 mg daily but he does have some trouble with ejaculation.  He would like to have a referral to urologist to discuss this.  He read about aqua ablation and the improvement to symptoms.  Referral placed as above.         Follow Up   Return in about 6 months (around 3/22/2023), or if symptoms worsen or fail to improve, for Annual Exam with fasting labs prior.  Patient was given instructions and counseling regarding his condition or for health maintenance advice. Please see specific information pulled into the AVS if appropriate. Medical assistant and I wore mask and eyewear protection during entire encounter.  Patient wore  mask.    Zoya Newman MD

## 2022-10-07 ENCOUNTER — APPOINTMENT (OUTPATIENT)
Dept: GENERAL RADIOLOGY | Facility: HOSPITAL | Age: 72
End: 2022-10-07

## 2022-10-07 ENCOUNTER — HOSPITAL ENCOUNTER (EMERGENCY)
Facility: HOSPITAL | Age: 72
Discharge: HOME | End: 2022-10-07
Attending: EMERGENCY MEDICINE | Admitting: EMERGENCY MEDICINE

## 2022-10-07 VITALS
RESPIRATION RATE: 14 BRPM | OXYGEN SATURATION: 97 % | DIASTOLIC BLOOD PRESSURE: 81 MMHG | TEMPERATURE: 98.6 F | SYSTOLIC BLOOD PRESSURE: 144 MMHG | HEART RATE: 100 BPM

## 2022-10-07 DIAGNOSIS — I48.91 ATRIAL FIBRILLATION WITH RVR: Primary | ICD-10-CM

## 2022-10-07 LAB
ALBUMIN SERPL-MCNC: 3.7 G/DL (ref 3.5–5.2)
ALBUMIN/GLOB SERPL: 1.5 G/DL
ALP SERPL-CCNC: 82 U/L (ref 39–117)
ALT SERPL W P-5'-P-CCNC: 23 U/L (ref 1–41)
ANION GAP SERPL CALCULATED.3IONS-SCNC: 10.2 MMOL/L (ref 5–15)
AST SERPL-CCNC: 26 U/L (ref 1–40)
BASOPHILS # BLD AUTO: 0.04 10*3/MM3 (ref 0–0.2)
BASOPHILS NFR BLD AUTO: 0.7 % (ref 0–1.5)
BILIRUB SERPL-MCNC: 0.5 MG/DL (ref 0–1.2)
BUN SERPL-MCNC: 17 MG/DL (ref 8–23)
BUN/CREAT SERPL: 15.7 (ref 7–25)
CALCIUM SPEC-SCNC: 8.6 MG/DL (ref 8.6–10.5)
CHLORIDE SERPL-SCNC: 100 MMOL/L (ref 98–107)
CO2 SERPL-SCNC: 22.8 MMOL/L (ref 22–29)
CREAT SERPL-MCNC: 1.08 MG/DL (ref 0.76–1.27)
DEPRECATED RDW RBC AUTO: 41.2 FL (ref 37–54)
EGFRCR SERPLBLD CKD-EPI 2021: 72 ML/MIN/1.73
EOSINOPHIL # BLD AUTO: 0.14 10*3/MM3 (ref 0–0.4)
EOSINOPHIL NFR BLD AUTO: 2.4 % (ref 0.3–6.2)
ERYTHROCYTE [DISTWIDTH] IN BLOOD BY AUTOMATED COUNT: 11.9 % (ref 12.3–15.4)
GLOBULIN UR ELPH-MCNC: 2.4 GM/DL
GLUCOSE SERPL-MCNC: 123 MG/DL (ref 65–99)
HCT VFR BLD AUTO: 39.3 % (ref 37.5–51)
HGB BLD-MCNC: 13.8 G/DL (ref 13–17.7)
IMM GRANULOCYTES # BLD AUTO: 0.02 10*3/MM3 (ref 0–0.05)
IMM GRANULOCYTES NFR BLD AUTO: 0.3 % (ref 0–0.5)
LYMPHOCYTES # BLD AUTO: 1.48 10*3/MM3 (ref 0.7–3.1)
LYMPHOCYTES NFR BLD AUTO: 25.5 % (ref 19.6–45.3)
MAGNESIUM SERPL-MCNC: 1.9 MG/DL (ref 1.6–2.4)
MCH RBC QN AUTO: 33.7 PG (ref 26.6–33)
MCHC RBC AUTO-ENTMCNC: 35.1 G/DL (ref 31.5–35.7)
MCV RBC AUTO: 96.1 FL (ref 79–97)
MONOCYTES # BLD AUTO: 0.49 10*3/MM3 (ref 0.1–0.9)
MONOCYTES NFR BLD AUTO: 8.4 % (ref 5–12)
NEUTROPHILS NFR BLD AUTO: 3.63 10*3/MM3 (ref 1.7–7)
NEUTROPHILS NFR BLD AUTO: 62.7 % (ref 42.7–76)
NRBC BLD AUTO-RTO: 0 /100 WBC (ref 0–0.2)
NT-PROBNP SERPL-MCNC: 445 PG/ML (ref 0–900)
PLATELET # BLD AUTO: 184 10*3/MM3 (ref 140–450)
PMV BLD AUTO: 9.8 FL (ref 6–12)
POTASSIUM SERPL-SCNC: 4 MMOL/L (ref 3.5–5.2)
PROT SERPL-MCNC: 6.1 G/DL (ref 6–8.5)
QT INTERVAL: 303 MS
RBC # BLD AUTO: 4.09 10*6/MM3 (ref 4.14–5.8)
SODIUM SERPL-SCNC: 133 MMOL/L (ref 136–145)
TROPONIN T SERPL-MCNC: <0.01 NG/ML (ref 0–0.03)
WBC NRBC COR # BLD: 5.8 10*3/MM3 (ref 3.4–10.8)

## 2022-10-07 PROCEDURE — 83735 ASSAY OF MAGNESIUM: CPT | Performed by: EMERGENCY MEDICINE

## 2022-10-07 PROCEDURE — 93010 ELECTROCARDIOGRAM REPORT: CPT | Performed by: INTERNAL MEDICINE

## 2022-10-07 PROCEDURE — 80053 COMPREHEN METABOLIC PANEL: CPT | Performed by: EMERGENCY MEDICINE

## 2022-10-07 PROCEDURE — 71045 X-RAY EXAM CHEST 1 VIEW: CPT

## 2022-10-07 PROCEDURE — 93005 ELECTROCARDIOGRAM TRACING: CPT

## 2022-10-07 PROCEDURE — 83880 ASSAY OF NATRIURETIC PEPTIDE: CPT | Performed by: EMERGENCY MEDICINE

## 2022-10-07 PROCEDURE — 84484 ASSAY OF TROPONIN QUANT: CPT | Performed by: EMERGENCY MEDICINE

## 2022-10-07 PROCEDURE — 99283 EMERGENCY DEPT VISIT LOW MDM: CPT

## 2022-10-07 PROCEDURE — 93005 ELECTROCARDIOGRAM TRACING: CPT | Performed by: EMERGENCY MEDICINE

## 2022-10-07 PROCEDURE — 85025 COMPLETE CBC W/AUTO DIFF WBC: CPT | Performed by: EMERGENCY MEDICINE

## 2022-10-07 PROCEDURE — 96374 THER/PROPH/DIAG INJ IV PUSH: CPT

## 2022-10-07 RX ORDER — METOPROLOL SUCCINATE 25 MG/1
50 TABLET, EXTENDED RELEASE ORAL DAILY
Qty: 180 TABLET | Refills: 0 | Status: SHIPPED | OUTPATIENT
Start: 2022-10-07 | End: 2022-10-10

## 2022-10-07 RX ORDER — SODIUM CHLORIDE 0.9 % (FLUSH) 0.9 %
10 SYRINGE (ML) INJECTION AS NEEDED
Status: DISCONTINUED | OUTPATIENT
Start: 2022-10-07 | End: 2022-10-08 | Stop reason: HOSPADM

## 2022-10-07 RX ADMIN — METOPROLOL TARTRATE 5 MG: 5 INJECTION INTRAVENOUS at 20:18

## 2022-10-07 RX ADMIN — METOPROLOL TARTRATE 25 MG: 25 TABLET, FILM COATED ORAL at 20:19

## 2022-10-07 NOTE — ED TRIAGE NOTES
Patient from home via private vehicle, reports a high heart rate that started about an hour ago. States he has a history of Afib. Takes eliquis and metoprolol. Denies chest pain or shortness of breath.

## 2022-10-08 NOTE — ED PROVIDER NOTES
" EMERGENCY DEPARTMENT ENCOUNTER    Room Number:  40/40  Date of encounter:  10/7/2022  PCP: Zoya Newman MD  Historian: Patient      HPI:  Chief Complaint: Racing heart  A complete HPI/ROS/PMH/PSH/SH/FH are unobtainable due to: Nothing    Context: Zheng Lopez is a 72 y.o. male who presents to the ED c/o racing heart and irregular heartbeat since he was at a [employer] function earlier this evening.  Patient said he first felt a little fullness in his throat and felt \"odd\".  He did not have any chest pain, shortness of breath or dizziness.  He checked his watch and found that his heart rate was in the 130s and decided to come to the emergency department as per the instructions of his cardiologist.  He sees  and was first diagnosed with A. fib in January.  He is very diligent about taking his medications as prescribed and says he has not had any difficulty with it since January.  He is also on Eliquis.    Currently he says his symptoms are moderate and he he was in A. fib with a rate of 141 in triage      PAST MEDICAL HISTORY  Active Ambulatory Problems     Diagnosis Date Noted   • Hyperlipidemia 06/28/2016   • Essential hypertension 06/28/2016   • SYLWIA (obstructive sleep apnea)    • Osteoarthritis involving multiple joints on both sides of body    • GERD (gastroesophageal reflux disease)    • Diverticula of colon    • ED (erectile dysfunction) of organic origin 11/13/2017   • Asymmetric septal hypertrophy (HCC) 11/13/2017   • Non-sustained ventricular tachycardia 11/13/2017   • Chronic pain of left knee 05/29/2018   • Primary osteoarthritis of left knee 05/29/2018   • Left lumbar radiculopathy 11/08/2018   • Left thigh pain 11/08/2018   • Benign prostatic hyperplasia with urinary hesitancy 11/13/2019   • Arthritis of left hip 03/02/2020   • Primary localized osteoarthrosis of left hip 05/11/2020   • Arthritis of right wrist 06/15/2021   • Hyperglycemia 12/21/2021   • Medicare annual wellness visit, " subsequent 12/21/2021   • Paroxysmal atrial fibrillation (HCC) 02/02/2022   • 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     Resolved Ambulatory Problems     Diagnosis Date Noted   • Hypertension    • Cholecystitis    • Impingement of right ulnar nerve    • Cataract of both eyes    • Thrombocythemia 06/15/2021     Past Medical History:   Diagnosis Date   • Bronchitis    • Chicken pox    • Encounter for annual health examination 03/10/2015   • Fatigue    • Gallbladder disease    • High blood pressure    • History of EKG    • History of TB skin testing    • Measles    • Non-ST elevated myocardial infarction (non-STEMI) ([facility]) 1/12/2022   • Persistent cough    • Pulse irregularity    • Sexual problems    • Sleep apnea    • Wellness examination 03/10/2015         PAST SURGICAL HISTORY  Past Surgical History:   Procedure Laterality Date   • CARDIAC CATHETERIZATION N/A 1/12/2022    Procedure: Left Heart Cath;  Surgeon: Vi Orozco MD;  Location:  [facility];  Service: Cardiovascular;  Laterality: N/A;   • CARDIAC CATHETERIZATION N/A 1/12/2022    Procedure: Coronary angiography;  Surgeon: Vi Orozco MD;  Location:  [facility];  Service: Cardiovascular;  Laterality: N/A;   • CARDIAC CATHETERIZATION N/A 1/12/2022    Procedure: Left ventriculography;  Surgeon: Vi Orozco MD;  Location:  [facility];  Service: Cardiovascular;  Laterality: N/A;   • CATARACT EXTRACTION Bilateral    • CATARACT EXTRACTION Right 2013   • CATARACT EXTRACTION Left 2014   • CHOLECYSTECTOMY     • COLONOSCOPY  04/16/2015    Dr. Ritchie   • COLONOSCOPY  03/07/2007   • ELBOW PROCEDURE Right     ulnar nerve impingement release   • FOOT SURGERY Right     right foot (twice)   • GALLBLADDER SURGERY  2009   • KNEE MENISCAL REPAIR Right 2010    torn right meniscus   • LASIK  2006   • RHINOPLASTY  1986   • ROTATOR CUFF REPAIR Left 2010   • SHOULDER ARTHROSCOPY W/ LABRAL REPAIR Right 2015      Sahil  labram repair right shoulder   • SHOULDER LIGAMENT REPAIR Right    • TOTAL HIP ARTHROPLASTY REVISION Left 05/11/2020   • TOTAL KNEE ARTHROPLASTY Right 2011   • WRIST ARTHROPLASTY Right 08/25/2020         FAMILY HISTORY  Family History   Problem Relation Age of Onset   • Dementia Mother    • Migraines Mother    • Osteoporosis Mother    • Stroke Mother 85        early 80s   • Heart disease Mother         tachycardia and had ppm   • Alzheimer's disease Mother    • Supraventricular tachycardia Mother    • Alcohol abuse Father    • Depression Father    • Migraines Father    • Tuberculosis Father    • Cirrhosis Father         41 YO   • Cancer Paternal Uncle 67        mets but unsure of type of ca  BACK CANCER   • Stroke Brother    • Colon cancer Neg Hx    • Prostate cancer Neg Hx    • Heart attack Neg Hx          SOCIAL HISTORY  Social History     Socioeconomic History   • Marital status:    • Number of children: 2   Tobacco Use   • Smoking status: Never Smoker   • Smokeless tobacco: Never Used   Vaping Use   • Vaping Use: Never used   Substance and Sexual Activity   • Alcohol use: No     Comment: none in 5 yrs, Nov 2011 - last drink   • Drug use: No   • Sexual activity: Yes     Partners: Female         ALLERGIES  Patient has no known allergies.        REVIEW OF SYSTEMS  Review of Systems     All systems reviewed and negative except for those discussed in HPI.       PHYSICAL EXAM    I have reviewed the triage vital signs and nursing notes.    ED Triage Vitals [10/07/22 1954]   Temp Heart Rate Resp BP SpO2   98.6 °F (37 °C) (!) 141 18 (!) 155/107 97 %      Temp src Heart Rate Source Patient Position BP Location FiO2 (%)   -- -- -- -- --       Physical Exam  GENERAL: Mild distress  HENT: nares patent  EYES: no scleral icterus  CV: Atrial fib in the 140  RESPIRATORY: normal effort, CTA bilateral  ABDOMEN: soft  MUSCULOSKELETAL: no deformity, no calf tenderness or pedal edema  NEURO: alert, moves all  extremities, follows commands  SKIN: warm, dry        LAB RESULTS  Recent Results (from the past 24 hour(s))   ECG 12 Lead    Collection Time: 10/07/22  7:58 PM   Result Value Ref Range    QT Interval 303 ms   Comprehensive Metabolic Panel    Collection Time: 10/07/22  8:20 PM    Specimen: Blood   Result Value Ref Range    Glucose 123 (H) 65 - 99 mg/dL    BUN 17 8 - 23 mg/dL    Creatinine 1.08 0.76 - 1.27 mg/dL    Sodium 133 (L) 136 - 145 mmol/L    Potassium 4.0 3.5 - 5.2 mmol/L    Chloride 100 98 - 107 mmol/L    CO2 22.8 22.0 - 29.0 mmol/L    Calcium 8.6 8.6 - 10.5 mg/dL    Total Protein 6.1 6.0 - 8.5 g/dL    Albumin 3.70 3.50 - 5.20 g/dL    ALT (SGPT) 23 1 - 41 U/L    AST (SGOT) 26 1 - 40 U/L    Alkaline Phosphatase 82 39 - 117 U/L    Total Bilirubin 0.5 0.0 - 1.2 mg/dL    Globulin 2.4 gm/dL    A/G Ratio 1.5 g/dL    BUN/Creatinine Ratio 15.7 7.0 - 25.0    Anion Gap 10.2 5.0 - 15.0 mmol/L    eGFR 72.0 >60.0 mL/min/1.73   Troponin    Collection Time: 10/07/22  8:20 PM    Specimen: Blood   Result Value Ref Range    Troponin T <0.010 0.000 - 0.030 ng/mL   BNP    Collection Time: 10/07/22  8:20 PM    Specimen: Blood   Result Value Ref Range    proBNP 445.0 0.0 - 900.0 pg/mL   Magnesium    Collection Time: 10/07/22  8:20 PM    Specimen: Blood   Result Value Ref Range    Magnesium 1.9 1.6 - 2.4 mg/dL   CBC Auto Differential    Collection Time: 10/07/22  8:20 PM    Specimen: Blood   Result Value Ref Range    WBC 5.80 3.40 - 10.80 10*3/mm3    RBC 4.09 (L) 4.14 - 5.80 10*6/mm3    Hemoglobin 13.8 13.0 - 17.7 g/dL    Hematocrit 39.3 37.5 - 51.0 %    MCV 96.1 79.0 - 97.0 fL    MCH 33.7 (H) 26.6 - 33.0 pg    MCHC 35.1 31.5 - 35.7 g/dL    RDW 11.9 (L) 12.3 - 15.4 %    RDW-SD 41.2 37.0 - 54.0 fl    MPV 9.8 6.0 - 12.0 fL    Platelets 184 140 - 450 10*3/mm3    Neutrophil % 62.7 42.7 - 76.0 %    Lymphocyte % 25.5 19.6 - 45.3 %    Monocyte % 8.4 5.0 - 12.0 %    Eosinophil % 2.4 0.3 - 6.2 %    Basophil % 0.7 0.0 - 1.5 %    Immature  Grans % 0.3 0.0 - 0.5 %    Neutrophils, Absolute 3.63 1.70 - 7.00 10*3/mm3    Lymphocytes, Absolute 1.48 0.70 - 3.10 10*3/mm3    Monocytes, Absolute 0.49 0.10 - 0.90 10*3/mm3    Eosinophils, Absolute 0.14 0.00 - 0.40 10*3/mm3    Basophils, Absolute 0.04 0.00 - 0.20 10*3/mm3    Immature Grans, Absolute 0.02 0.00 - 0.05 10*3/mm3    nRBC 0.0 0.0 - 0.2 /100 WBC       Ordered the above labs and independently reviewed the results.        RADIOLOGY  XR Chest 1 View    Result Date: 10/7/2022  EXAMINATION: SINGLE VIEW CHEST RADIOGRAPH  HISTORY: 72-year-old male with history of atrial fibrillation.  FINDINGS: An upright AP portable chest radiograph was obtained. Comparison is made to a chest radiograph dated 01/11/2022. The lungs are normal in volume and are clear. The cardiomediastinal silhouette and pulmonary vasculature appear normal. No bony abnormality of the chest is appreciated.      Negative chest radiograph.  This report was finalized on 10/7/2022 8:42 PM by Dr. Zev Stuart M.D.        I ordered the above noted radiological studies. Reviewed by me and discussed with radiologist.  See dictation for official radiology interpretation.      PROCEDURES    Procedures      MEDICATIONS GIVEN IN ER    Medications   sodium chloride 0.9 % flush 10 mL (has no administration in time range)   metoprolol tartrate (LOPRESSOR) injection 5 mg (0 mg Intravenous Hold 10/7/22 2131)   metoprolol tartrate (LOPRESSOR) injection 5 mg (5 mg Intravenous Given 10/7/22 2018)   metoprolol tartrate (LOPRESSOR) tablet 25 mg (25 mg Oral Given 10/7/22 2019)         PROGRESS, DATA ANALYSIS, CONSULTS, AND MEDICAL DECISION MAKING    All labs have been independently reviewed by me.  All radiology studies have been reviewed by me and discussed with radiologist dictating the report.   EKG's independently viewed and interpreted by me.  Discussion below represents my analysis of pertinent findings related to patient's condition, differential diagnosis,  treatment plan and final disposition.        ED Course as of 10/07/22 2322   Fri Oct 07, 2022   2321 CBC and chemistry unremarkable [DP]   2321 Troponin and proBNP are normal [DP]   2321 Chest x-ray is negative with no cardiomegaly or pulmonary edema [DP]   2321 EKG on arrival  Atrial fib in the  140s  No acute ST segment change are present to suggest ischemia, there is no significant change compared to previous from May 12, 2022 other than the rate [DP]   2322 Patient got both IV and p.o. metoprolol and his rate came down nicely to around 100.  He was able to get up and walk to the bathroom on his own without any dizziness or dyspnea and his vitals remained stable. [DP]   2322 Spoke with Dr. Dennis who agrees it would be appropriate to discharge the patient home with an increase in his beta-blocker dose and outpatient follow-up.  He is to continue taking his [DP]      ED Course User Index  [DP] Jeb Mederos MD           PPE: The patient wore a surgical mask throughout the entire patient encounter. I wore an N95.    AS OF 23:22 EDT VITALS:    BP - 144/81  HR - 100  TEMP - 98.6 °F (37 °C)  O2 SATS - 97%        DIAGNOSIS  Final diagnoses:   Atrial fibrillation with RVR ([facility])         DISPOSITION  Discharge           Jeb Mederos MD  10/07/22 2322

## 2022-10-10 ENCOUNTER — OFFICE VISIT (OUTPATIENT)
Dept: CARDIOLOGY | Facility: CLINIC | Age: 72
End: 2022-10-10

## 2022-10-10 VITALS
DIASTOLIC BLOOD PRESSURE: 62 MMHG | HEIGHT: 68 IN | SYSTOLIC BLOOD PRESSURE: 118 MMHG | WEIGHT: 178 LBS | BODY MASS INDEX: 26.98 KG/M2 | HEART RATE: 46 BPM

## 2022-10-10 DIAGNOSIS — Q24.5 CORONARY-MYOCARDIAL BRIDGE: ICD-10-CM

## 2022-10-10 DIAGNOSIS — I10 ESSENTIAL HYPERTENSION: ICD-10-CM

## 2022-10-10 DIAGNOSIS — I48.0 PAROXYSMAL ATRIAL FIBRILLATION: Primary | ICD-10-CM

## 2022-10-10 PROCEDURE — 99214 OFFICE O/P EST MOD 30 MIN: CPT | Performed by: NURSE PRACTITIONER

## 2022-10-10 PROCEDURE — 93000 ELECTROCARDIOGRAM COMPLETE: CPT | Performed by: NURSE PRACTITIONER

## 2022-10-10 RX ORDER — METOPROLOL SUCCINATE 25 MG/1
25 TABLET, EXTENDED RELEASE ORAL DAILY
Qty: 180 TABLET | Refills: 0
Start: 2022-10-10 | End: 2023-02-23 | Stop reason: SDUPTHER

## 2022-10-10 NOTE — PROGRESS NOTES
Date of Office Visit: 10/10/2022  Encounter Provider: SHER Chandra  Place of Service: [facility] CARDIOLOGY  Patient Name: Zheng Lopez  :1950    Chief Complaint   Patient presents with   • Atrial Fibrillation   :     HPI: Zheng Lopez is a 72 y.o. male.  He is a patient who presented in January with a non-STEMI.  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  Medical management was recommended.  The lisinopril was discontinued and the metoprolol was increased.  Echocardiogram demonstrated normal LV systolic function and no significant valvular abnormalities.  Given reports of tachycardia, he was discharged with a 2-week ZIO.  This demonstrated normal sinus rhythm with evidence of atrial fibrillation occurring approximately 1% of the time.  Subsequently, he was started on Eliquis due to an elevated [id number] score.  Given the presence of his myocardial bridge, he was referred to EP.  He was seen by Dr. Moreno on .  Dr. Moreno did not recommend an ablation or antiarrhythmic therapy.    I last saw him in the office in May at which time he was doing well.  He had lost about 20 pounds and was exercising 6 days a week.  No changes were made to his regimen, and he was advised to follow-up with Dr. De Santiago in 6 months.   On 10/7, he presented to the ED with racing heart beats.  He was noted to be in atrial fibrillation RVR and was treated with IV and oral Lopressor.  The Toprol was increased to 50 mg, and he was discharged.  He is here today for follow-up.   He is feeling well overall.  He does feel a little more fatigued since increasing the dose of metoprolol.  He denies any chest pain, shortness of breath, palpitations, edema, dizziness, syncope, bleeding difficulties or melena.  He tells me he was at a [employer] dinner when the atrial fibrillation occurred.  There were no  associated symptoms of chest pain or shortness of breath.  He is still exercising regularly although he took yesterday off due to the fatigue.    Past Medical History:   Diagnosis Date   • Asymmetric septal hypertrophy (HCC)    • Bronchitis     Bronchitis / URI   • Cataract of both eyes    • Chicken pox    • Cholecystitis    • Diverticula of colon    • Encounter for annual health examination 03/10/2015    Annual Health Assessment   • Fatigue    • Gallbladder disease    • GERD (gastroesophageal reflux disease)    • High blood pressure    • History of EKG     10/30/15, 09/19/13, 09/14/12, 10/18/10, 05/04/09   • History of TB skin testing     TB Skin Test: 01/09/14 Completed, 09/14/12 Patient Declines, 10/18/10 Completed, 03/05/08 Completed   • Hyperlipidemia    • Hypertension     still taking meds   • Impingement of right ulnar nerve    • Measles    • Non-ST elevated myocardial infarction (non-STEMI) ([facility]) 1/12/2022   • Osteoarthritis involving multiple joints on both sides of body    • Osteoarthritis involving multiple joints on both sides of body    • Persistent cough    • Pulse irregularity     Irregular pulse PER CARDIO   • Sexual problems     Sexual problems / enjoyment ED   • Sleep apnea     has c-pap   • Thrombocythemia 6/15/2021   • Wellness examination 03/10/2015    Annual Wellness Visit       Past Surgical History:   Procedure Laterality Date   • CARDIAC CATHETERIZATION N/A 1/12/2022    Procedure: Left Heart Cath;  Surgeon: Vi Orozco MD;  Location: [facility] CATH INVASIVE LOCATION;  Service: Cardiovascular;  Laterality: N/A;   • CARDIAC CATHETERIZATION N/A 1/12/2022    Procedure: Coronary angiography;  Surgeon: Vi Orozco MD;  Location:  [facility] CATH INVASIVE LOCATION;  Service: Cardiovascular;  Laterality: N/A;   • CARDIAC CATHETERIZATION N/A 1/12/2022    Procedure: Left ventriculography;  Surgeon: Vi Orozco MD;  Location:  [facility] CATH INVASIVE LOCATION;  Service: Cardiovascular;  Laterality:  N/A;   • CATARACT EXTRACTION Bilateral    • CATARACT EXTRACTION Right 2013   • CATARACT EXTRACTION Left 2014   • CHOLECYSTECTOMY     • COLONOSCOPY  04/16/2015    Dr. Ritchie   • COLONOSCOPY  03/07/2007   • ELBOW PROCEDURE Right     ulnar nerve impingement release   • FOOT SURGERY Right     right foot (twice)   • GALLBLADDER SURGERY  2009   • KNEE MENISCAL REPAIR Right 2010    torn right meniscus   • LASIK  2006   • RHINOPLASTY  1986   • ROTATOR CUFF REPAIR Left 2010   • SHOULDER ARTHROSCOPY W/ LABRAL REPAIR Right 2015    Dr. Baxter  labram repair right shoulder   • SHOULDER LIGAMENT REPAIR Right    • TOTAL HIP ARTHROPLASTY REVISION Left 05/11/2020   • TOTAL KNEE ARTHROPLASTY Right 2011   • WRIST ARTHROPLASTY Right 08/25/2020       Social History     Socioeconomic History   • Marital status:    • Number of children: 2   Tobacco Use   • Smoking status: Never   • Smokeless tobacco: Never   Vaping Use   • Vaping Use: Never used   Substance and Sexual Activity   • Alcohol use: No     Comment: none in 5 yrs, Nov 2011 - last drink   • Drug use: No   • Sexual activity: Yes     Partners: Female       Family History   Problem Relation Age of Onset   • Dementia Mother    • Migraines Mother    • Osteoporosis Mother    • Stroke Mother 85        early 80s   • Heart disease Mother         tachycardia and had ppm   • Alzheimer's disease Mother    • Supraventricular tachycardia Mother    • Alcohol abuse Father    • Depression Father    • Migraines Father    • Tuberculosis Father    • Cirrhosis Father         41 YO   • Cancer Paternal Uncle 67        mets but unsure of type of ca  BACK CANCER   • Stroke Brother    • Colon cancer Neg Hx    • Prostate cancer Neg Hx    • Heart attack Neg Hx        Review of Systems   Constitutional: Positive for malaise/fatigue.   Cardiovascular: Negative.  Negative for chest pain, dyspnea on exertion, leg swelling, orthopnea, paroxysmal nocturnal dyspnea and syncope.   Respiratory: Negative.   "  Hematologic/Lymphatic: Negative for bleeding problem.   Musculoskeletal: Negative for falls.   Gastrointestinal: Negative for melena.   Neurological: Negative for dizziness and light-headedness.       No Known Allergies      Current Outpatient Medications:   •  amoxicillin (AMOXIL) 500 MG capsule, Take 4 capsules by mouth As Needed. With teeth cleaning, Disp: , Rfl:   •  apixaban (ELIQUIS) 5 MG tablet tablet, Take 1 tablet by mouth Every 12 (Twelve) Hours., Disp: 180 tablet, Rfl: 3  •  atorvastatin (LIPITOR) 40 MG tablet, Take 1 tablet by mouth Daily., Disp: 90 tablet, Rfl: 3  •  calcium carbonate (OS-TOMAS) 600 MG tablet, Take 600 mg by mouth Daily., Disp: , Rfl:   •  Cyanocobalamin (VITAMIN B-12 PO), Take 1 tablet by mouth Daily., Disp: , Rfl:   •  diphenhydrAMINE (BENADRYL) 50 MG capsule, Take 50 mg by mouth Every 6 (Six) Hours As Needed for Itching., Disp: , Rfl:   •  esomeprazole (nexIUM) 40 MG capsule, TAKE 1 CAPSULE EVERY MORNING BEFORE BREAKFAST, Disp: 90 capsule, Rfl: 3  •  Flaxseed, Linseed, (FLAX SEED OIL) 1000 MG capsule, Take 1 capsule by mouth 2 (Two) Times a Day., Disp: , Rfl:   •  Magnesium 400 MG capsule, Take 400 mg by mouth Daily., Disp: , Rfl:   •  Melatonin 10 MG tablet, Take 10 mg by mouth Every Night., Disp: , Rfl:   •  metoprolol succinate XL (TOPROL-XL) 25 MG 24 hr tablet, Take 1 tablet by mouth Daily., Disp: 180 tablet, Rfl: 0  •  Multiple Vitamin tablet, Take 1 tablet by mouth Daily., Disp: , Rfl:   •  sildenafil (REVATIO) 20 MG tablet, Take 2 tablets by mouth As Needed (erectile dysfunction)., Disp: 20 tablet, Rfl: 3  •  tamsulosin (FLOMAX) 0.4 MG capsule 24 hr capsule, TAKE 1 CAPSULE DAILY (Patient taking differently: Take 1 capsule by mouth Daily.), Disp: 90 capsule, Rfl: 3      Objective:     Vitals:    10/10/22 1027   BP: 118/62   Pulse: (!) 46   Weight: 80.7 kg (178 lb)   Height: 172.7 cm (68\")     Body mass index is 27.06 kg/m².    PHYSICAL EXAM:    Neck:      Vascular: No JVD. "   Pulmonary:      Effort: Pulmonary effort is normal.      Breath sounds: Normal breath sounds.   Cardiovascular:      Normal rate. Regular rhythm.      Murmurs: There is no murmur.      No gallop. No click. No rub.   Pulses:     Intact distal pulses.           ECG 12 Lead    Date/Time: 10/10/2022 10:34 AM  Performed by: Krystle Mcneil APRN  Authorized by: Krystle Mcneil APRN   Comparison: compared with previous ECG from 10/7/2022  Comparison to previous ECG: Back in sinus  Rhythm: sinus bradycardia  Rate: bradycardic  BPM: 46  T inversion: V4, V5 and V6                Assessment:       Diagnosis Plan   1. Paroxysmal atrial fibrillation (HCC)  ECG 12 Lead      2. Coronary-myocardial bridge        3. Essential hypertension          Orders Placed This Encounter   Procedures   • ECG 12 Lead     This order was created via procedure documentation     Order Specific Question:   Release to patient     Answer:   Routine Release          Plan:     1.  Paroxysmal atrial fibrillation.  Recent RVR over the weekend at which time the metoprolol was increased.  He is in sinus rhythm today; however, he is bradycardic.  I suspect this is why he is fatigued.  What I would recommend is decreasing the dose of metoprolol back to 25 mg twice daily.  I instructed him to take an extra dose as needed.  If he continues to have episodes of atrial fibrillation, he may need to see EP again.   Continue Eliquis.       2.  Myocardial bridge.  Continue metoprolol.        3.  Hypertension.  His blood pressure looks great.      Overall I think he is stable.  I told him to call me with any recurrent issues.  Otherwise, he will see Dr. De Santiago on 11/16.      As always, it has been a pleasure to participate in your patient's care.      Sincerely,         SHER [place]

## 2022-11-02 ENCOUNTER — OFFICE VISIT (OUTPATIENT)
Dept: CARDIOLOGY | Facility: CLINIC | Age: 72
End: 2022-11-02

## 2022-11-02 VITALS
HEART RATE: 47 BPM | BODY MASS INDEX: 28.37 KG/M2 | HEIGHT: 68 IN | DIASTOLIC BLOOD PRESSURE: 70 MMHG | WEIGHT: 187.2 LBS | SYSTOLIC BLOOD PRESSURE: 122 MMHG

## 2022-11-02 DIAGNOSIS — I10 ESSENTIAL HYPERTENSION: ICD-10-CM

## 2022-11-02 DIAGNOSIS — E78.01 FAMILIAL HYPERCHOLESTEROLEMIA: ICD-10-CM

## 2022-11-02 DIAGNOSIS — I42.2 ASYMMETRIC SEPTAL HYPERTROPHY: ICD-10-CM

## 2022-11-02 DIAGNOSIS — Q24.5 CORONARY-MYOCARDIAL BRIDGE: ICD-10-CM

## 2022-11-02 DIAGNOSIS — I47.29 NON-SUSTAINED VENTRICULAR TACHYCARDIA: ICD-10-CM

## 2022-11-02 DIAGNOSIS — I48.0 PAROXYSMAL ATRIAL FIBRILLATION: Primary | ICD-10-CM

## 2022-11-02 PROCEDURE — 99214 OFFICE O/P EST MOD 30 MIN: CPT | Performed by: INTERNAL MEDICINE

## 2022-11-02 PROCEDURE — 93000 ELECTROCARDIOGRAM COMPLETE: CPT | Performed by: INTERNAL MEDICINE

## 2022-11-02 NOTE — PROGRESS NOTES
[facility] Follow Up Office Note     Encounter Date:22  Patient:Zheng Lopez  :1950  MRN:[id number]      Chief Complaint:   Chief Complaint   Patient presents with   • PAF     6 month f/u     History of Presenting Illness:      Mr. Lopez is a 72 y.o. gentleman with past medical history notable for myocardial bridging, moderate basal septal hypertrophy, hypertension, mixed hyperlipidemia, obstructive sleep apnea with CPAP use, and paroxysmal atrial fibrillation presents her office for scheduled follow-up.  I last saw him almost a year ago shortly after hospitalization for chest discomfort and tachycardia where he was found to have myocardial bridging.  Subsequent assessment did find that he did have paroxysmal atrial fibrillation and he has been on anticoagulation and initially was having a lot of issues with atrial fibrillation and we did refer him to see our electrophysiology team but fortunately with weight loss and exercise he is done fantastic.  His atrial fibrillation is under much better control and really no changes were needed from our electrophysiology colleagues perspective.  Since then he is continue to do well.  He did have an isolated episode which he thought was related to atrial fibrillation earlier last month took an extra metoprolol and within a few minutes of rest resolved.      Review of Systems:  Review of Systems   Constitutional: Negative.   HENT: Negative.    Eyes: Negative.    Cardiovascular: Positive for palpitations.   Respiratory: Negative.    Endocrine: Negative.    Hematologic/Lymphatic: Negative.    Skin: Negative.    Musculoskeletal: Negative.    Gastrointestinal: Negative.    Genitourinary: Negative.    Neurological: Negative.    Psychiatric/Behavioral: Negative.    Allergic/Immunologic: Negative.        Current Outpatient Medications on File Prior to Visit   Medication Sig Dispense Refill   • apixaban (ELIQUIS) 5 MG tablet tablet Take 1 tablet by mouth  Every 12 (Twelve) Hours. 180 tablet 3   • atorvastatin (LIPITOR) 40 MG tablet Take 1 tablet by mouth Daily. 90 tablet 3   • calcium carbonate (OS-TOMAS) 600 MG tablet Take 600 mg by mouth Daily.     • Cyanocobalamin (VITAMIN B-12 PO) Take 1 tablet by mouth Daily.     • diphenhydrAMINE (BENADRYL) 50 MG capsule Take 50 mg by mouth Every 6 (Six) Hours As Needed for Itching.     • esomeprazole (nexIUM) 40 MG capsule TAKE 1 CAPSULE EVERY MORNING BEFORE BREAKFAST 90 capsule 3   • Flaxseed, Linseed, (FLAX SEED OIL) 1000 MG capsule Take 1 capsule by mouth 2 (Two) Times a Day.     • Magnesium 400 MG capsule Take 400 mg by mouth Daily.     • Melatonin 10 MG tablet Take 10 mg by mouth Every Night.     • metoprolol succinate XL (TOPROL-XL) 25 MG 24 hr tablet Take 1 tablet by mouth Daily. (Patient taking differently: Take 1 tablet by mouth 2 (Two) Times a Day.) 180 tablet 0   • Multiple Vitamin tablet Take 1 tablet by mouth Daily.     • sildenafil (REVATIO) 20 MG tablet Take 2 tablets by mouth As Needed (erectile dysfunction). 20 tablet 3   • tamsulosin (FLOMAX) 0.4 MG capsule 24 hr capsule TAKE 1 CAPSULE DAILY (Patient taking differently: Take 1 capsule by mouth Daily.) 90 capsule 3   • amoxicillin (AMOXIL) 500 MG capsule Take 4 capsules by mouth As Needed. With teeth cleaning       No current facility-administered medications on file prior to visit.       No Known Allergies    Past Medical History:   Diagnosis Date   • Asymmetric septal hypertrophy (HCC)    • Bronchitis     Bronchitis / URI   • Cataract of both eyes    • Chicken pox    • Cholecystitis    • Diverticula of colon    • Encounter for annual health examination 03/10/2015    Annual Health Assessment   • Fatigue    • Gallbladder disease    • GERD (gastroesophageal reflux disease)    • High blood pressure    • History of EKG     10/30/15, 09/19/13, 09/14/12, 10/18/10, 05/04/09   • History of TB skin testing     TB Skin Test: 01/09/14 Completed, 09/14/12 Patient Declines,  10/18/10 Completed, 03/05/08 Completed   • Hyperlipidemia    • Hypertension     still taking meds   • Impingement of right ulnar nerve    • Measles    • Non-ST elevated myocardial infarction (non-STEMI) ([facility]) 1/12/2022   • Osteoarthritis involving multiple joints on both sides of body    • Osteoarthritis involving multiple joints on both sides of body    • Persistent cough    • Pulse irregularity     Irregular pulse PER CARDIO   • Sexual problems     Sexual problems / enjoyment ED   • Sleep apnea     has c-pap   • Thrombocythemia 6/15/2021   • Wellness examination 03/10/2015    Annual Wellness Visit       Past Surgical History:   Procedure Laterality Date   • CARDIAC CATHETERIZATION N/A 1/12/2022    Procedure: Left Heart Cath;  Surgeon: Vi Orozco MD;  Location:  [facility];  Service: Cardiovascular;  Laterality: N/A;   • CARDIAC CATHETERIZATION N/A 1/12/2022    Procedure: Coronary angiography;  Surgeon: Vi Orozco MD;  Location:  [facility];  Service: Cardiovascular;  Laterality: N/A;   • CARDIAC CATHETERIZATION N/A 1/12/2022    Procedure: Left ventriculography;  Surgeon: Vi Orozco MD;  Location:  [facility];  Service: Cardiovascular;  Laterality: N/A;   • CATARACT EXTRACTION Bilateral    • CATARACT EXTRACTION Right 2013   • CATARACT EXTRACTION Left 2014   • CHOLECYSTECTOMY     • COLONOSCOPY  04/16/2015    Dr. Ritchie   • COLONOSCOPY  03/07/2007   • ELBOW PROCEDURE Right     ulnar nerve impingement release   • FOOT SURGERY Right     right foot (twice)   • GALLBLADDER SURGERY  2009   • KNEE MENISCAL REPAIR Right 2010    torn right meniscus   • LASIK  2006   • RHINOPLASTY  1986   • ROTATOR CUFF REPAIR Left 2010   • SHOULDER ARTHROSCOPY W/ LABRAL REPAIR Right 2015    Dr. Sahil lay repair right shoulder   • SHOULDER LIGAMENT REPAIR Right    • TOTAL HIP ARTHROPLASTY REVISION Left 05/11/2020   • TOTAL KNEE ARTHROPLASTY Right 2011   • WRIST  "ARTHROPLASTY Right 08/25/2020       Social History     Socioeconomic History   • Marital status:    • Number of children: 2   Tobacco Use   • Smoking status: Never   • Smokeless tobacco: Never   Vaping Use   • Vaping Use: Never used   Substance and Sexual Activity   • Alcohol use: No     Comment: none in 5 yrs, Nov 2011 - last drink   • Drug use: No   • Sexual activity: Yes     Partners: Female       Family History   Problem Relation Age of Onset   • Dementia Mother    • Migraines Mother    • Osteoporosis Mother    • Stroke Mother 85        early 80s   • Heart disease Mother         tachycardia and had ppm   • Alzheimer's disease Mother    • Supraventricular tachycardia Mother    • Alcohol abuse Father    • Depression Father    • Migraines Father    • Tuberculosis Father    • Cirrhosis Father         43 YO   • Cancer Paternal Uncle 67        mets but unsure of type of ca  BACK CANCER   • Stroke Brother    • Colon cancer Neg Hx    • Prostate cancer Neg Hx    • Heart attack Neg Hx        The following portions of the patient's history were reviewed and updated as appropriate: allergies, current medications, past family history, past medical history, past social history, past surgical history and problem list.       Objective:       Vitals:    11/02/22 1020   BP: 122/70   BP Location: Left arm   Patient Position: Sitting   Pulse: (!) 47   Weight: 84.9 kg (187 lb 3.2 oz)   Height: 172.7 cm (68\")     Body mass index is 28.46 kg/m².     Physical Exam:  Constitutional: Well appearing, well developed, no acute distress   HENT: Oropharynx clear and membrane moist  Eyes: Normal conjunctiva, no sclera icterus.  Neck: Supple, no carotid bruit bilaterally.  Cardiovascular: Regular rate and rhythm, No Murmur, No bilateral lower extremity edema.  Pulmonary: Normal respiratory effort, normal lung sounds, no wheezing.  Abdominal: Soft, nontender, no hepatosplenomegaly, liver is non-pulsatile.  Neurological: Alert and orient " x 3.   Skin: Warm, dry, no ecchymosis, no rash.  Psych: Appropriate mood and affect. Normal judgment and insight.         Lab Results   Component Value Date     (L) 10/07/2022     09/19/2022    K 4.0 10/07/2022    K 5.0 09/19/2022     10/07/2022    CL 99 09/19/2022    CO2 22.8 10/07/2022    CO2 27.2 09/19/2022    BUN 17 10/07/2022    BUN 14 09/19/2022    CREATININE 1.08 10/07/2022    CREATININE 0.94 09/19/2022    EGFRIFNONA 68 01/11/2022    EGFRIFNONA 68 12/14/2021    EGFRIFAFRI 78 12/14/2021    EGFRIFAFRI 87 06/08/2021    GLUCOSE 123 (H) 10/07/2022    GLUCOSE 98 09/19/2022    CALCIUM 8.6 10/07/2022    CALCIUM 9.4 09/19/2022    PROTENTOTREF 7.0 09/19/2022    PROTENTOTREF 6.9 03/17/2022    ALBUMIN 3.70 10/07/2022    ALBUMIN 4.30 09/19/2022    BILITOT 0.5 10/07/2022    BILITOT 0.7 09/19/2022    AST 26 10/07/2022    AST 31 09/19/2022    ALT 23 10/07/2022    ALT 21 09/19/2022     Lab Results   Component Value Date    WBC 5.80 10/07/2022    WBC 4.10 09/19/2022    HGB 13.8 10/07/2022    HGB 14.6 09/19/2022    HCT 39.3 10/07/2022    HCT 42.8 09/19/2022    MCV 96.1 10/07/2022    MCV 99.1 (H) 09/19/2022     10/07/2022     (L) 09/19/2022     Lab Results   Component Value Date    TRIG 48 09/19/2022    TRIG 65 03/17/2022    HDL 66 (H) 09/19/2022    HDL 56 03/17/2022    LDL 57 09/19/2022    LDL 68 03/17/2022     Lab Results   Component Value Date    PROBNP 445.0 10/07/2022     Lab Results   Component Value Date    TROPONINT <0.010 10/07/2022     Lab Results   Component Value Date    TSH 1.290 12/08/2020    TSH 2.520 10/26/2016           ECG 12 Lead    Date/Time: 11/2/2022 10:42 AM  Performed by: Ferdinand De Santiago MD  Authorized by: Ferdinand De Santiago MD   Comparison: compared with previous ECG from 10/10/2022  Similar to previous ECG  Rhythm: sinus rhythm  T inversion: V4, V5 and V6          Event Monitor 1/31/2022:  · No symptoms reported during the monitoring period. No complications noted.    · The predominant rhythm noted during the testing period was sinus rhythm.   · Evidence of atrial fibrillation was noted. Atrial fibrillation burden was 1%.  Rates in atrial fibrillation ranged from 76 to 193 bpm with an average rate of 132 bpm.  Longest episode of atrial fibrillation lasted 2 hours with an average rate of 123 bpm.   · There were 4 episodes of nonsustained ventricular tachycardia, longest lasting 8 beats and the fastest with a rate of 169 bpm.     Echocardiogram 1/12/2022:  · Calculated left ventricular EF = 63.7% Estimated left ventricular EF = 64% Estimated left ventricular EF was in agreement with the calculated left ventricular EF. Left ventricular systolic function is normal. Normal left ventricular cavity size noted. Left ventricular wall thickness is consistent with mild concentric hypertrophy. There are wall motion abnormalities as noted below Left ventricular diastolic function was normal.  · Left atrial volume is mildly increased.  · Trace mitral valve regurgitation is present.  · There is an echolucent. In the liver suggestive of hepatic cyst. Consider dedicated imaging to assess further if clinically indicated    Cardiac Catheterization 1/12/2022:  · Left main artery: Large-caliber vessel with 0% stenosis.  The vessel trifurcates into left anterior descending, ramus intermedius, and left circumflex arteries.  · Left anterior descending artery: Large caliber vessel with 0% proximal stenosis.  Proximal vessel gives rise to a large caliber first diagonal branch with a high origin near the LAD ostium and no significant disease.  The mid LAD tapers to a moderate caliber vessel and has a long segment of a severe myocardial bridge with near obliteration of the vessel lumen in systole.  The distal LAD then tapers to a small caliber vessel and bifurcates into a small caliber diagonal branch and distal LAD.  · Ramus intermedius: Large-caliber vessel with 0% stenosis.  · Left circumflex artery:  Large-caliber vessel with 0% proximal to distal stenosis.  The distal vessel gives rise to a small caliber first and second obtuse marginal branches with no significant disease.  · Right coronary artery.  This is a large, dominant vessel with 0% proximal to distal stenosis.  The distal vessel bifurcates into a large caliber posterior descending and posterolateral branch.  The posterior descending branch in addition to being large in caliber is a long vessel and appears to supply the apical wall.  There is a long segment with a severe myocardial bridge with near obliteration of the vessel lumen in systole.  Otherwise the posterolateral branch and the posterior descending branch has no significant disease.         Assessment:          Diagnosis Plan   1. Paroxysmal atrial fibrillation (HCC)  ECG 12 Lead      2. Coronary-myocardial bridge        3. Asymmetric septal hypertrophy (HCC)        4. Essential hypertension        5. Non-sustained ventricular tachycardia        6. Familial hypercholesterolemia               Plan:       Mr. Lopez is a 72 y.o. gentleman with past medical history notable for myocardial bridging, moderate basal septal hypertrophy, hypertension, mixed hyperlipidemia, obstructive sleep apnea with CPAP use, and paroxysmal atrial fibrillation presents her office for scheduled follow-up.  Overall he is doing fantastic.  We will continue this current medical regimen.  No changes are needed at this time.  He is doing about potentially having prostate surgery coming up and would be fine to proceed forward with the surgery.  The only accommodations would be to make sure that we maintain good adequate hydration and that he does not get hypovolemic or tachycardic but otherwise should do fine with surgery.        Paroxysmal atrial fibrillation:  · Will initiate anticoagulation given elevated CHADVASC score of 2  · Continue metoprolol succinate   · Was seen by electrophysiology and clinically doing much  better with lifestyle modifications and current medical therapy no antiarrhythmic or invasive ablation procedures needed at this time and less symptoms change    Basal septal hypertrophy:  · No significant obstruction noted  · Continue beta-blocker use  · Avoid hypovolemia    Myocardial bridging:  · Continue beta-blocker use  · Advised to slow down her back off if heart rate gets above 90 bpm    Hypertension:  · Blood pressure well controlled continue with current medical therapy    Mixed hyperlipidemia:  · Continue high potency statin  · Lipid panel 9/2022 demonstrates good control of LDL and total cholesterol  · CMP 9/2022 demonstrates normal LFT and AST    Nonsustained ventricular tachycardia:  · Continue beta-blocker use  · No significant coronary artery disease however there is evidence of myocardial bridging on cardiac catheterization 12/2021  · No high risk features of hypertrophic cardiomyopathy although some basal septal hypertrophy and given age less likely to be a major pathologic issue.    Follow-up:  6 months      Ferdinand De Santiago MD  [facility]  11/02/22  10:45 EDT

## 2022-11-04 ENCOUNTER — TELEPHONE (OUTPATIENT)
Dept: CARDIOLOGY | Facility: CLINIC | Age: 72
End: 2022-11-04

## 2022-11-04 NOTE — TELEPHONE ENCOUNTER
Mr. Lopez paged the answering service. He thinks he had an episode earlier of afib. He took 2 extra doses of metoprolol. Earlier today for HR 80 and 100. His HR currently is 80 and BP is 104/68.     He will hold metoprolol tonight and restart regimen in AM. He will call with any further concerns this weekend.  If he continues to have episodes, I asked him to call Dr. De Santiago.     EZEQUIEL. Thanks.

## 2022-11-08 NOTE — TELEPHONE ENCOUNTER
Call patient.  Episodes of been still very infrequent and so far fairly mild.  Offered having him see EP again but he like to watch these for little bit.  We will continue with as needed extra metoprolol but he wants to hold off on EP referral at this time.  If they get worse I told him let us know we might make adjustments of medications we will have him see EP again

## 2022-12-15 ENCOUNTER — LAB REQUISITION (OUTPATIENT)
Dept: LAB | Facility: HOSPITAL | Age: 72
End: 2022-12-15

## 2022-12-15 DIAGNOSIS — N40.1 BENIGN PROSTATIC HYPERPLASIA WITH LOWER URINARY TRACT SYMPTOMS: ICD-10-CM

## 2022-12-15 PROCEDURE — 88305 TISSUE EXAM BY PATHOLOGIST: CPT | Performed by: UROLOGY

## 2022-12-16 LAB
LAB AP CASE REPORT: NORMAL
PATH REPORT.FINAL DX SPEC: NORMAL
PATH REPORT.GROSS SPEC: NORMAL

## 2023-01-09 ENCOUNTER — OFFICE VISIT (OUTPATIENT)
Dept: SLEEP MEDICINE | Facility: HOSPITAL | Age: 73
End: 2023-01-09
Payer: MEDICARE

## 2023-01-09 VITALS
WEIGHT: 186 LBS | SYSTOLIC BLOOD PRESSURE: 135 MMHG | OXYGEN SATURATION: 100 % | BODY MASS INDEX: 28.19 KG/M2 | HEIGHT: 68 IN | DIASTOLIC BLOOD PRESSURE: 64 MMHG | HEART RATE: 56 BPM

## 2023-01-09 DIAGNOSIS — G47.33 OSA (OBSTRUCTIVE SLEEP APNEA): Primary | ICD-10-CM

## 2023-01-09 PROCEDURE — G0463 HOSPITAL OUTPT CLINIC VISIT: HCPCS

## 2023-01-09 RX ORDER — APIXABAN 5 MG/1
TABLET, FILM COATED ORAL
Qty: 180 TABLET | Refills: 3 | Status: SHIPPED | OUTPATIENT
Start: 2023-01-09

## 2023-01-09 NOTE — PROGRESS NOTES
[facility]  Telephone: [phone] / Fax: [phone] [place]  Telephone: [phone] / Fax: [phone] [place] [place]re[facility]    PCP: Zoya Newman MD    Reason for visit: SYLWIA f/u    Zheng Lopez is a 72 y.o.male  was last seen at [facility] sleep lab in 2022. He has moderately severe SYLWIA diagnosed around 2011. He has been on CPAP since. He recently received replacement device from [employer] and it is working well. He uses Air Fit F20 size medium mask that fits well. He is unaware of leaks or dry mouth. He is unaware of choking/gasping for breath.  His machine is set at fixed pressure of 12cm H2O and appears comfortable. Interval history was reviewed. He is taking Metoprolol and Eliquis.    SH- no tobacco, no alcohol, 2 caffeine per day, no energy drinks.    ROS- negative.    DEMI Vogt's    Current Medications:    Current Outpatient Medications:   •  amoxicillin (AMOXIL) 500 MG capsule, Take 4 capsules by mouth As Needed. With teeth cleaning, Disp: , Rfl:   •  atorvastatin (LIPITOR) 40 MG tablet, Take 1 tablet by mouth Daily., Disp: 90 tablet, Rfl: 3  •  calcium carbonate (OS-TOMAS) 600 MG tablet, Take 600 mg by mouth Daily., Disp: , Rfl:   •  Cyanocobalamin (VITAMIN B-12 PO), Take 1 tablet by mouth Daily., Disp: , Rfl:   •  diphenhydrAMINE (BENADRYL) 50 MG capsule, Take 50 mg by mouth Every 6 (Six) Hours As Needed for Itching., Disp: , Rfl:   •  Eliquis 5 MG tablet tablet, TAKE 1 TABLET EVERY 12 HOURS, Disp: 180 tablet, Rfl: 3  •  esomeprazole (nexIUM) 40 MG capsule, TAKE 1 CAPSULE EVERY MORNING BEFORE BREAKFAST, Disp: 90 capsule, Rfl: 3  •  Flaxseed, Linseed, (FLAX SEED OIL) 1000 MG capsule, Take 1 capsule by mouth 2 (Two) Times a Day., Disp: , Rfl:   •  Magnesium 400 MG capsule, Take 400 mg by mouth Daily., Disp: , Rfl:   •  Melatonin 10 MG tablet, Take 10 mg by mouth Every Night., Disp: , Rfl:   •  metoprolol succinate XL (TOPROL-XL) 25 MG 24 hr tablet, Take 1 tablet by mouth Daily. (Patient  taking differently: Take 1 tablet by mouth 2 (Two) Times a Day.), Disp: 180 tablet, Rfl: 0  •  Multiple Vitamin tablet, Take 1 tablet by mouth Daily., Disp: , Rfl:   •  sildenafil (REVATIO) 20 MG tablet, Take 2 tablets by mouth As Needed (erectile dysfunction)., Disp: 20 tablet, Rfl: 3  •  tamsulosin (FLOMAX) 0.4 MG capsule 24 hr capsule, TAKE 1 CAPSULE DAILY (Patient taking differently: Take 1 capsule by mouth Daily.), Disp: 90 capsule, Rfl: 3   also entered in Sleep Questionnaire    Patient  has a past medical history of Asymmetric septal hypertrophy (HCC), Bronchitis, Cataract of both eyes, Chicken pox, Cholecystitis, Diverticula of colon, Encounter for annual health examination (03/10/2015), Fatigue, Gallbladder disease, GERD (gastroesophageal reflux disease), High blood pressure, History of EKG, History of TB skin testing, Hyperlipidemia, Hypertension, Impingement of right ulnar nerve, Measles, Non-ST elevated myocardial infarction (non-STEMI) (HCC) (1/12/2022), Osteoarthritis involving multiple joints on both sides of body, Osteoarthritis involving multiple joints on both sides of body, Persistent cough, Pulse irregularity, Sexual problems, Sleep apnea, Thrombocythemia (6/15/2021), and Wellness examination (03/10/2015).    I have reviewed the Past Medical History, Past Surgical History, Social History and Family History.    Vital Signs /64   Pulse 56   Ht 172.7 cm (68\")   Wt 84.4 kg (186 lb)   SpO2 100%   BMI 28.28 kg/m²  Body mass index is 28.28 kg/m².    General Alert and oriented. No acute distress noted   Pharynx/Throat Class IV  Mallampati airway, large tongue, no evidence of redundant lateral pharyngeal tissue. No oral lesions. No thrush. Moist mucous membranes.   Head Normocephalic. Symmetrical. Atraumatic.    Nose No septal deviation. No drainage   Chest Wall Normal shape. Symmetric expansion with respiration. No tenderness.   Neck Trachea midline, no thyromegaly or adenopathy    Lungs Clear  to auscultation bilaterally. No wheezes. No rhonchi. No rales. Respirations regular, even and unlabored.   Heart Regular rhythm and normal rate. Normal S1 and S2. No murmur   Abdomen Soft, non-tender and non-distended. Normal bowel sounds. No masses.   Extremities Moves all extremities well. No edema   Psychiatric Normal mood and affect.     Testing:  · Download 11/27/22-1/5/23- 100% use with average nightly use of 6 hours and 39 minutes on CPAP 12cm H2O, AHI 1.6, leak of 0 seconds.    Study-His polysomnography around 2011-revealed apnea-hypopnea index of 27 per sleep hour and minimum SpO2 of 90%. During supine position, he had severe obstructive sleep apnea with Supine AHI of 49 per sleep hour.     Impression:  1. SYLWIA (obstructive sleep apnea)          Plan:  Patient uses the CPAP device and benefits from its use in terms of reduction of hypersomnia and snoring.  AHI appears to be within adequate range. I reviewed download report and original sleep study report with the patient.  Weight loss will be strongly beneficial to reduce the severity of sleep-disordered breathing.  Caution during activities that require prolonged concentration is strongly advised if sleepiness returns. Changing of PAP supplies regularly is important for effective use.      Thank you for allowing me to participate in your patient's care.      SHER Juarez  [facility]  Phone: [phone]      Part of this note may be an electronic transcription/translation of spoken language to printed text using the Dragon Dictation System.

## 2023-02-23 ENCOUNTER — TELEPHONE (OUTPATIENT)
Dept: CARDIOLOGY | Facility: CLINIC | Age: 73
End: 2023-02-23
Payer: MEDICARE

## 2023-02-23 ENCOUNTER — CLINICAL SUPPORT (OUTPATIENT)
Dept: CARDIOLOGY | Facility: CLINIC | Age: 73
End: 2023-02-23
Payer: MEDICARE

## 2023-02-23 DIAGNOSIS — I48.0 PAROXYSMAL ATRIAL FIBRILLATION: Primary | ICD-10-CM

## 2023-02-23 PROCEDURE — 93000 ELECTROCARDIOGRAM COMPLETE: CPT | Performed by: NURSE PRACTITIONER

## 2023-02-23 RX ORDER — METOPROLOL SUCCINATE 50 MG/1
50 TABLET, EXTENDED RELEASE ORAL DAILY
Qty: 180 TABLET | Refills: 3 | Status: SHIPPED | OUTPATIENT
Start: 2023-02-23

## 2023-02-23 RX ORDER — ATORVASTATIN CALCIUM 40 MG/1
TABLET, FILM COATED ORAL
Qty: 90 TABLET | Refills: 3 | Status: SHIPPED | OUTPATIENT
Start: 2023-02-23

## 2023-02-23 NOTE — TELEPHONE ENCOUNTER
Patient came in today is in recurrent atrial fibrillation rates are a little fast still going to have him increase from 25 mg twice daily up to 50 mg twice daily.  He is going on a trip to [place] tomorrow think that is reasonable since he is doing well with this we can see if he is still in atrial fibrillation when he comes back we can think about cardioverting and or also having him see our electrophysiology team for reconsideration of other options.

## 2023-02-23 NOTE — TELEPHONE ENCOUNTER
Zheng Maki called this am. He said he went to the gym two days ago. While he was exercising his HR went up into the 140s and didn't come back down for 2.5-3 hours. Today he is at the gym again and his HR is elevated in the 140s again. So, he went to sit down and called us. He said his heart monitor normally doesn't go above 100 bpm even when he's exercising, but the last two times he has exercised it's gotten high.    He said he is a little lightheaded with his HR so high. His B/P is running 130s/70s. He already took his metoprolol this morning. He said he leaves to go out of town tomorrow morning at 6:15 am. Do you have any recommendations for him?    Thank you,    Indiana Holt RN  Triage [facility]  02/23/23 10:16 EST

## 2023-04-04 DIAGNOSIS — E78.01 FAMILIAL HYPERCHOLESTEROLEMIA: Primary | ICD-10-CM

## 2023-04-04 DIAGNOSIS — Z00.00 MEDICARE ANNUAL WELLNESS VISIT, SUBSEQUENT: ICD-10-CM

## 2023-04-04 DIAGNOSIS — E55.9 HYPOVITAMINOSIS D: ICD-10-CM

## 2023-04-04 DIAGNOSIS — R73.9 HYPERGLYCEMIA: ICD-10-CM

## 2023-04-04 DIAGNOSIS — I10 ESSENTIAL HYPERTENSION: ICD-10-CM

## 2023-04-06 LAB
ALBUMIN SERPL-MCNC: 4.3 G/DL (ref 3.5–5.2)
ALBUMIN/GLOB SERPL: 2.2 G/DL
ALP SERPL-CCNC: 82 U/L (ref 39–117)
ALT SERPL-CCNC: 28 U/L (ref 1–41)
AST SERPL-CCNC: 28 U/L (ref 1–40)
BASOPHILS # BLD AUTO: 0.04 10*3/MM3 (ref 0–0.2)
BASOPHILS NFR BLD AUTO: 0.9 % (ref 0–1.5)
BILIRUB SERPL-MCNC: 0.8 MG/DL (ref 0–1.2)
BUN SERPL-MCNC: 16 MG/DL (ref 8–23)
BUN/CREAT SERPL: 14.3 (ref 7–25)
CALCIUM SERPL-MCNC: 9.3 MG/DL (ref 8.6–10.5)
CHLORIDE SERPL-SCNC: 101 MMOL/L (ref 98–107)
CHOLEST SERPL-MCNC: 127 MG/DL (ref 0–200)
CO2 SERPL-SCNC: 29.5 MMOL/L (ref 22–29)
CREAT SERPL-MCNC: 1.12 MG/DL (ref 0.76–1.27)
EGFRCR SERPLBLD CKD-EPI 2021: 68.9 ML/MIN/1.73
EOSINOPHIL # BLD AUTO: 0.14 10*3/MM3 (ref 0–0.4)
EOSINOPHIL NFR BLD AUTO: 3.3 % (ref 0.3–6.2)
ERYTHROCYTE [DISTWIDTH] IN BLOOD BY AUTOMATED COUNT: 12.4 % (ref 12.3–15.4)
GLOBULIN SER CALC-MCNC: 2 GM/DL
GLUCOSE SERPL-MCNC: 102 MG/DL (ref 65–99)
HBA1C MFR BLD: 5.7 % (ref 4.8–5.6)
HCT VFR BLD AUTO: 41.9 % (ref 37.5–51)
HDLC SERPL-MCNC: 59 MG/DL (ref 40–60)
HGB BLD-MCNC: 14.2 G/DL (ref 13–17.7)
IMM GRANULOCYTES # BLD AUTO: 0.01 10*3/MM3 (ref 0–0.05)
IMM GRANULOCYTES NFR BLD AUTO: 0.2 % (ref 0–0.5)
LDLC SERPL CALC-MCNC: 55 MG/DL (ref 0–100)
LDLC/HDLC SERPL: 0.95 {RATIO}
LYMPHOCYTES # BLD AUTO: 1.57 10*3/MM3 (ref 0.7–3.1)
LYMPHOCYTES NFR BLD AUTO: 37 % (ref 19.6–45.3)
MCH RBC QN AUTO: 32.8 PG (ref 26.6–33)
MCHC RBC AUTO-ENTMCNC: 33.9 G/DL (ref 31.5–35.7)
MCV RBC AUTO: 96.8 FL (ref 79–97)
MONOCYTES # BLD AUTO: 0.4 10*3/MM3 (ref 0.1–0.9)
MONOCYTES NFR BLD AUTO: 9.4 % (ref 5–12)
NEUTROPHILS # BLD AUTO: 2.08 10*3/MM3 (ref 1.7–7)
NEUTROPHILS NFR BLD AUTO: 49.2 % (ref 42.7–76)
NRBC BLD AUTO-RTO: 0 /100 WBC (ref 0–0.2)
PLATELET # BLD AUTO: 156 10*3/MM3 (ref 140–450)
POTASSIUM SERPL-SCNC: 4.6 MMOL/L (ref 3.5–5.2)
PROT SERPL-MCNC: 6.3 G/DL (ref 6–8.5)
RBC # BLD AUTO: 4.33 10*6/MM3 (ref 4.14–5.8)
SODIUM SERPL-SCNC: 137 MMOL/L (ref 136–145)
TRIGL SERPL-MCNC: 60 MG/DL (ref 0–150)
VLDLC SERPL CALC-MCNC: 13 MG/DL (ref 5–40)
WBC # BLD AUTO: 4.24 10*3/MM3 (ref 3.4–10.8)

## 2023-04-11 ENCOUNTER — OFFICE VISIT (OUTPATIENT)
Dept: FAMILY MEDICINE CLINIC | Facility: CLINIC | Age: 73
End: 2023-04-11
Payer: MEDICARE

## 2023-04-11 VITALS
OXYGEN SATURATION: 97 % | TEMPERATURE: 97.6 F | SYSTOLIC BLOOD PRESSURE: 102 MMHG | BODY MASS INDEX: 27.58 KG/M2 | DIASTOLIC BLOOD PRESSURE: 62 MMHG | HEART RATE: 70 BPM | HEIGHT: 68 IN | WEIGHT: 182 LBS

## 2023-04-11 DIAGNOSIS — I10 ESSENTIAL HYPERTENSION: ICD-10-CM

## 2023-04-11 DIAGNOSIS — E78.01 FAMILIAL HYPERCHOLESTEROLEMIA: ICD-10-CM

## 2023-04-11 DIAGNOSIS — Z00.00 MEDICARE ANNUAL WELLNESS VISIT, SUBSEQUENT: Primary | ICD-10-CM

## 2023-04-11 DIAGNOSIS — R73.9 HYPERGLYCEMIA: ICD-10-CM

## 2023-04-11 DIAGNOSIS — Z12.5 SCREENING PSA (PROSTATE SPECIFIC ANTIGEN): ICD-10-CM

## 2023-04-11 PROCEDURE — 3074F SYST BP LT 130 MM HG: CPT | Performed by: FAMILY MEDICINE

## 2023-04-11 PROCEDURE — 1160F RVW MEDS BY RX/DR IN RCRD: CPT | Performed by: FAMILY MEDICINE

## 2023-04-11 PROCEDURE — 1170F FXNL STATUS ASSESSED: CPT | Performed by: FAMILY MEDICINE

## 2023-04-11 PROCEDURE — G0439 PPPS, SUBSEQ VISIT: HCPCS | Performed by: FAMILY MEDICINE

## 2023-04-11 PROCEDURE — 3078F DIAST BP <80 MM HG: CPT | Performed by: FAMILY MEDICINE

## 2023-04-11 PROCEDURE — 1159F MED LIST DOCD IN RCRD: CPT | Performed by: FAMILY MEDICINE

## 2023-04-11 NOTE — PROGRESS NOTES
The ABCs of the Annual Wellness Visit  Subsequent Medicare Wellness Visit    Subjective      Zheng Lopez is a 72 y.o. male who presents for a Subsequent Medicare Wellness Visit.    The following portions of the patient's history were reviewed and   updated as appropriate: allergies, current medications, past family history, past medical history, past social history, past surgical history and problem list.    Compared to one year ago, the patient feels his physical   health is the same.    Compared to one year ago, the patient feels his mental   health is the same.    Recent Hospitalizations:  He was admitted within the past 365 days at [facility] with prostate aqua-ablasian.       Current Medical Providers:  Patient Care Team:  Zoya Sanchez MD as PCP - General (Family Medicine)  Zoya Sanchez MD as Referring Physician (Family Medicine)    Outpatient Medications Prior to Visit   Medication Sig Dispense Refill   • amoxicillin (AMOXIL) 500 MG capsule Take 4 capsules by mouth As Needed. With teeth cleaning     • atorvastatin (LIPITOR) 40 MG tablet TAKE 1 TABLET DAILY 90 tablet 3   • calcium carbonate (OS-TOMAS) 600 MG tablet Take 1 tablet by mouth Daily.     • Cyanocobalamin (VITAMIN B-12 PO) Take 1 tablet by mouth Daily.     • diphenhydrAMINE (BENADRYL) 50 MG capsule Take 1 capsule by mouth Every 6 (Six) Hours As Needed for Itching.     • Eliquis 5 MG tablet tablet TAKE 1 TABLET EVERY 12 HOURS 180 tablet 3   • esomeprazole (nexIUM) 40 MG capsule TAKE 1 CAPSULE EVERY MORNING BEFORE BREAKFAST 90 capsule 3   • Flaxseed, Linseed, (FLAX SEED OIL) 1000 MG capsule Take 1 capsule by mouth 2 (Two) Times a Day.     • Magnesium 400 MG capsule Take 400 mg by mouth Daily.     • Melatonin 10 MG tablet Take 1 tablet by mouth Every Night.     • metoprolol succinate XL (TOPROL-XL) 50 MG 24 hr tablet Take 1 tablet by mouth Daily. 180 tablet 3   • Multiple Vitamin tablet Take 1 tablet by mouth Daily.     • sildenafil  "(REVATIO) 20 MG tablet Take 2 tablets by mouth As Needed (erectile dysfunction). 20 tablet 3   • tamsulosin (FLOMAX) 0.4 MG capsule 24 hr capsule TAKE 1 CAPSULE DAILY (Patient taking differently: Take 1 capsule by mouth Daily.) 90 capsule 3     No facility-administered medications prior to visit.       No opioid medication identified on active medication list. I have reviewed chart for other potential  high risk medication/s and harmful drug interactions in the elderly.          Aspirin is not on active medication list.  Aspirin use is not indicated based on review of current medical condition/s. Risk of harm outweighs potential benefits.  .    Patient Active Problem List   Diagnosis   • Hyperlipidemia   • Essential hypertension   • SYLWIA (obstructive sleep apnea)   • Osteoarthritis involving multiple joints on both sides of body   • GERD (gastroesophageal reflux disease)   • Diverticula of colon   • ED (erectile dysfunction) of organic origin   • Asymmetric septal hypertrophy   • Non-sustained ventricular tachycardia   • Chronic pain of left knee   • Primary osteoarthritis of left knee   • Left lumbar radiculopathy   • Left thigh pain   • Benign prostatic hyperplasia with urinary hesitancy   • Arthritis of left hip   • Primary localized osteoarthrosis of left hip   • Arthritis of right wrist   • Hyperglycemia   • Medicare annual wellness visit, subsequent   • Paroxysmal atrial fibrillation   • Coronary-myocardial bridge     Advance Care Planning   Advance Care Planning     Advance Directive is on file.  ACP discussion was held with the patient during this visit. Patient has an advance directive in EMR which is still valid.      Objective    Vitals:    04/11/23 0832   BP: 102/62   Pulse: 70   Temp: 97.6 °F (36.4 °C)   SpO2: 97%   Weight: 82.6 kg (182 lb)   Height: 172.7 cm (68\")   PainSc: 0-No pain     Estimated body mass index is 27.67 kg/m² as calculated from the following:    Height as of this encounter: 172.7 cm " "(68\").    Weight as of this encounter: 82.6 kg (182 lb).    BMI is >= 25 and <30. (Overweight) The following options were offered after discussion;: exercise counseling/recommendations and nutrition counseling/recommendations    Physical Exam  Vitals reviewed.   Constitutional:       General: He is not in acute distress.     Appearance: Normal appearance. He is well-developed. He is not diaphoretic.   HENT:      Head: Normocephalic and atraumatic. Hair is normal.      Right Ear: Hearing, tympanic membrane, ear canal and external ear normal.      Left Ear: Hearing, tympanic membrane, ear canal and external ear normal.      Nose: Nose normal. No nasal deformity.      Mouth/Throat:      Mouth: Mucous membranes are moist. No oral lesions.      Pharynx: Uvula midline. No uvula swelling.   Eyes:      General: Lids are normal. No scleral icterus.        Right eye: No discharge.         Left eye: No discharge.      Extraocular Movements: Extraocular movements intact.      Right eye: Normal extraocular motion and no nystagmus.      Left eye: Normal extraocular motion and no nystagmus.      Conjunctiva/sclera: Conjunctivae normal.      Pupils: Pupils are equal, round, and reactive to light.   Neck:      Thyroid: No thyromegaly.      Vascular: No JVD.   Cardiovascular:      Rate and Rhythm: Normal rate and regular rhythm.      Pulses: Normal pulses.      Heart sounds: Normal heart sounds. No murmur heard.    No gallop.   Pulmonary:      Effort: Pulmonary effort is normal. No respiratory distress.      Breath sounds: Normal breath sounds. No wheezing or rales.   Chest:      Chest wall: No tenderness.   Abdominal:      General: Bowel sounds are normal. There is no distension.      Palpations: Abdomen is soft. There is no mass.      Tenderness: There is no abdominal tenderness. There is no guarding.      Hernia: No hernia is present.   Musculoskeletal:         General: No tenderness or deformity. Normal range of motion.      " Cervical back: Normal range of motion and neck supple.   Lymphadenopathy:      Cervical: No cervical adenopathy.   Skin:     General: Skin is warm and dry.      Findings: No rash.   Neurological:      Mental Status: He is alert and oriented to person, place, and time.      Cranial Nerves: No cranial nerve deficit.      Motor: No abnormal muscle tone.      Coordination: Coordination normal.      Deep Tendon Reflexes: Reflexes are normal and symmetric. Reflexes normal.   Psychiatric:         Mood and Affect: Mood normal.         Behavior: Behavior normal.         Thought Content: Thought content normal.         Judgment: Judgment normal.         Does the patient have evidence of cognitive impairment?   No    Lab Results   Component Value Date    CHLPL 127 2023    TRIG 60 2023    HDL 59 2023    LDL 55 2023    VLDL 13 2023    HGBA1C 5.70 (H) 2023          HEALTH RISK ASSESSMENT    Smoking Status:  Social History     Tobacco Use   Smoking Status Never   Smokeless Tobacco Never     Alcohol Consumption:  Social History     Substance and Sexual Activity   Alcohol Use No    Comment: none in 5 yrs, 2011 - last drink     Fall Risk Screen:    STEADI Fall Risk Assessment was completed, and patient is at LOW risk for falls.Assessment completed on:2023    Depression Screenin/11/2023     8:41 AM   PHQ-2/PHQ-9 Depression Screening   Little Interest or Pleasure in Doing Things 0-->not at all   Feeling Down, Depressed or Hopeless 0-->not at all   PHQ-9: Brief Depression Severity Measure Score 0       Health Habits and Functional and Cognitive Screenin/11/2023     8:42 AM   Functional & Cognitive Status   Do you have difficulty preparing food and eating? No   Do you have difficulty bathing yourself, getting dressed or grooming yourself? No   Do you have difficulty using the toilet? No   Do you have trouble with steps or getting out of a bed or a chair? No   Current Diet  Well Balanced Diet   Dental Exam Up to date   Eye Exam Up to date   Exercise (times per week) 6 times per week   Current Exercises Include Cardiovascular Workout;Light Weights   Do you need help using the phone?  No   Are you deaf or do you have serious difficulty hearing?  No   Do you need help with transportation? No   Do you need help shopping? No   Do you need help preparing meals?  No   Do you need help with housework?  No   Do you need help with laundry? No   Do you need help taking your medications? No   Do you need help managing money? No   Do you ever drive or ride in a car without wearing a seat belt? No   Have you felt unusual stress, anger or loneliness in the last month? No   Who do you live with? Spouse   If you need help, do you have trouble finding someone available to you? No   Have you been bothered in the last four weeks by sexual problems? No   Do you have difficulty concentrating, remembering or making decisions? No       Age-appropriate Screening Schedule:  Refer to the list below for future screening recommendations based on patient's age, sex and/or medical conditions. Orders for these recommended tests are listed in the plan section. The patient has been provided with a written plan.    Health Maintenance   Topic Date Due   • INFLUENZA VACCINE  08/01/2023   • LIPID PANEL  04/05/2024   • ANNUAL WELLNESS VISIT  04/11/2024   • COLORECTAL CANCER SCREENING  04/16/2025   • TDAP/TD VACCINES (4 - Td or Tdap) 06/22/2031   • HEPATITIS C SCREENING  Completed   • COVID-19 Vaccine  Completed   • Pneumococcal Vaccine 65+  Completed   • ZOSTER VACCINE  Completed                  CMS Preventative Services Quick Reference  Risk Factors Identified During Encounter:    None Identified    The above risks/problems have been discussed with the patient.  Pertinent information has been shared with the patient in the After Visit Summary.    Diagnoses and all orders for this visit:    1. Medicare annual wellness  visit, subsequent (Primary)    2. Screening PSA (prostate specific antigen)  -     PSA SCREENING; Future    3. Essential hypertension  -     Comprehensive Metabolic Panel; Future  -     CBC & Differential; Future    4. Familial hypercholesterolemia  -     Lipid Panel; Future    5. Hyperglycemia  -     Hemoglobin A1c; Future    Doing great overall except for A-fib.  Not able to exercise as much as he normally is, up-to-date with colon cancer screening for the next 2 years, dental health, vision health, immunizations.    A.Fib.-Not well controlled, has an appointment with Geri Bravo tomorrow, the nurse practitioner for Dr. Moreno.  He is currently taking metoprolol 50 mg twice daily but still having heart rates that are sustained in the 130s with mild exercise.  This is inhibiting his ability to do his regular workouts 4 out of 5 days a week.  I do think this is likely contributing to hyperglycemia.  He was asking about cardiac ablations which I think would be helpful, he may benefit from transitioning to a different beta-blocker as well but will defer this to cardiology.    In terms of urinary complaints, BPH completely resolved with aqua ablation.  He is very pleased, now off tamsulosin completely.  We will get PSA with next labs.    Follow Up:   Next Medicare Wellness visit to be scheduled in 1 year.    Return in about 6 months (around 10/11/2023), or if symptoms worsen or fail to improve, for Recheck HTN and hyperglycemia with labs prior .    An After Visit Summary and PPPS were made available to the patient.      Zoya Sanchez MD

## 2023-04-12 ENCOUNTER — OFFICE VISIT (OUTPATIENT)
Dept: CARDIOLOGY | Facility: CLINIC | Age: 73
End: 2023-04-12
Payer: MEDICARE

## 2023-04-12 VITALS
WEIGHT: 188 LBS | HEART RATE: 50 BPM | SYSTOLIC BLOOD PRESSURE: 126 MMHG | BODY MASS INDEX: 28.49 KG/M2 | HEIGHT: 68 IN | DIASTOLIC BLOOD PRESSURE: 82 MMHG

## 2023-04-12 DIAGNOSIS — I10 ESSENTIAL HYPERTENSION: ICD-10-CM

## 2023-04-12 DIAGNOSIS — I42.2 ASYMMETRIC SEPTAL HYPERTROPHY: ICD-10-CM

## 2023-04-12 DIAGNOSIS — I48.0 PAROXYSMAL ATRIAL FIBRILLATION: Primary | ICD-10-CM

## 2023-04-12 DIAGNOSIS — Q24.5 CORONARY-MYOCARDIAL BRIDGE: ICD-10-CM

## 2023-04-12 PROCEDURE — 3079F DIAST BP 80-89 MM HG: CPT | Performed by: NURSE PRACTITIONER

## 2023-04-12 PROCEDURE — 3074F SYST BP LT 130 MM HG: CPT | Performed by: NURSE PRACTITIONER

## 2023-04-12 PROCEDURE — 1159F MED LIST DOCD IN RCRD: CPT | Performed by: NURSE PRACTITIONER

## 2023-04-12 PROCEDURE — 99214 OFFICE O/P EST MOD 30 MIN: CPT | Performed by: NURSE PRACTITIONER

## 2023-04-12 PROCEDURE — 93000 ELECTROCARDIOGRAM COMPLETE: CPT | Performed by: NURSE PRACTITIONER

## 2023-04-12 PROCEDURE — 1160F RVW MEDS BY RX/DR IN RCRD: CPT | Performed by: NURSE PRACTITIONER

## 2023-04-12 RX ORDER — METOPROLOL SUCCINATE 50 MG/1
50 TABLET, EXTENDED RELEASE ORAL 2 TIMES DAILY
Qty: 180 TABLET | Refills: 3 | Status: SHIPPED | OUTPATIENT
Start: 2023-04-12

## 2023-04-12 NOTE — PROGRESS NOTES
Date of Office Visit: 2023  Encounter Provider: SHER Johns  Place of Service: [facility] CARDIOLOGY  Patient Name: Zheng Lopez  :1950    Chief Complaint   Patient presents with   • paroxysmal AFIB     1 yr f/u    • NSVT   • asymmetric septal hypertrophy   :     HPI: Zheng Lopez is a 72 y.o. male who is followed by Dr. De Santiago--- myocardial bridging, moderate basal septal hypertrophy, HTN, HLD, SYLWIA/CPAP and PAF.    Referred to Dr. Moreno--saw him in 2022 (had seen him back in )---initially had a lot of trouble with PAF but lost significant amount of weight and episodes significantly decreased.     Echo 2022--EF 64%, trace MR.    Cath 2022---no significant CAD, significant coronary myocardial bridge of mid LAD and mid PDA.     Called Dr. De Santiago in late Feb with elevated HR/PAF---had him come in for EKG--AF, 's--he increased his metoprolol and recommended follow up with EP.     He reports almost daily episodes of PAF---no provoking factors that he is aware of, randomly occurs--day and night, sometimes at rest, sometimes with activity.     When not having afib, he feels good--no chest pain, dyspnea, PND, orthopnea or edema.    When in AF--he has palpitations, discomfort in his throat and low power. Sometimes he has lightheadedness, no syncope or near syncope. On average episodes last 3-4 hours.     He is on apixaban for AC, no issues.     Past Medical History:   Diagnosis Date   • Asymmetric septal hypertrophy    • Bronchitis     Bronchitis / URI   • Cataract of both eyes    • Chicken pox    • Cholecystitis    • Diverticula of colon    • Encounter for annual health examination 03/10/2015    Annual Health Assessment   • Fatigue    • Gallbladder disease    • GERD (gastroesophageal reflux disease)    • High blood pressure    • History of EKG     10/30/15, 13, 12, 10/18/10, 09   • History of TB skin testing     TB Skin  Test: 01/09/14 Completed, 09/14/12 Patient Declines, 10/18/10 Completed, 03/05/08 Completed   • Hyperlipidemia    • Hypertension     still taking meds   • Impingement of right ulnar nerve    • Measles    • Non-ST elevated myocardial infarction (non-STEMI) 1/12/2022   • Osteoarthritis involving multiple joints on both sides of body    • Osteoarthritis involving multiple joints on both sides of body    • Persistent cough    • Pulse irregularity     Irregular pulse PER CARDIO   • Sexual problems     Sexual problems / enjoyment ED   • Sleep apnea     has c-pap   • Thrombocythemia 6/15/2021   • Wellness examination 03/10/2015    Annual Wellness Visit       Past Surgical History:   Procedure Laterality Date   • CARDIAC CATHETERIZATION N/A 1/12/2022    Procedure: Left Heart Cath;  Surgeon: Vi Orozco MD;  Location:  [facility];  Service: Cardiovascular;  Laterality: N/A;   • CARDIAC CATHETERIZATION N/A 1/12/2022    Procedure: Coronary angiography;  Surgeon: Vi Orozco MD;  Location:  [facility];  Service: Cardiovascular;  Laterality: N/A;   • CARDIAC CATHETERIZATION N/A 1/12/2022    Procedure: Left ventriculography;  Surgeon: Vi Orozco MD;  Location:  [facility];  Service: Cardiovascular;  Laterality: N/A;   • CATARACT EXTRACTION Bilateral    • CATARACT EXTRACTION Right 2013   • CATARACT EXTRACTION Left 2014   • CHOLECYSTECTOMY     • COLONOSCOPY  04/16/2015    Dr. Ritchie   • COLONOSCOPY  03/07/2007   • ELBOW PROCEDURE Right     ulnar nerve impingement release   • FOOT SURGERY Right     right foot (twice)   • GALLBLADDER SURGERY  2009   • KNEE MENISCAL REPAIR Right 2010    torn right meniscus   • LASIK  2006   • RHINOPLASTY  1986   • ROTATOR CUFF REPAIR Left 2010   • SHOULDER ARTHROSCOPY W/ LABRAL REPAIR Right 2015    Dr. Sahil lay repair right shoulder   • SHOULDER LIGAMENT REPAIR Right    • TOTAL HIP ARTHROPLASTY REVISION Left 05/11/2020   •  TOTAL KNEE ARTHROPLASTY Right 2011   • WRIST ARTHROPLASTY Right 08/25/2020       Social History     Socioeconomic History   • Marital status:    • Number of children: 2   Tobacco Use   • Smoking status: Never   • Smokeless tobacco: Never   Vaping Use   • Vaping Use: Never used   Substance and Sexual Activity   • Alcohol use: No     Comment: none in 5 yrs, Nov 2011 - last drink   • Drug use: No   • Sexual activity: Yes     Partners: Female       Family History   Problem Relation Age of Onset   • Dementia Mother    • Migraines Mother    • Osteoporosis Mother    • Stroke Mother 85        early 80s   • Heart disease Mother         tachycardia and had ppm   • Alzheimer's disease Mother    • Supraventricular tachycardia Mother    • Alcohol abuse Father    • Depression Father    • Migraines Father    • Tuberculosis Father    • Cirrhosis Father         41 YO   • Cancer Paternal Uncle 67        mets but unsure of type of ca  BACK CANCER   • Stroke Brother    • Colon cancer Neg Hx    • Prostate cancer Neg Hx    • Heart attack Neg Hx        Review of Systems   Constitutional: Positive for malaise/fatigue. Negative for chills and fever.   Cardiovascular: Positive for irregular heartbeat and palpitations. Negative for chest pain, dyspnea on exertion, leg swelling, near-syncope, orthopnea, paroxysmal nocturnal dyspnea and syncope.   Respiratory: Negative for cough and shortness of breath.    Musculoskeletal: Negative for joint pain, joint swelling and myalgias.   Gastrointestinal: Negative for abdominal pain, diarrhea, melena, nausea and vomiting.   Genitourinary: Negative for frequency and hematuria.   Neurological: Positive for light-headedness. Negative for numbness, paresthesias and seizures.   Allergic/Immunologic: Negative.    All other systems reviewed and are negative.      No Known Allergies      Current Outpatient Medications:   •  amoxicillin (AMOXIL) 500 MG capsule, Take 4 capsules by mouth As Needed. With  "teeth cleaning, Disp: , Rfl:   •  atorvastatin (LIPITOR) 40 MG tablet, TAKE 1 TABLET DAILY, Disp: 90 tablet, Rfl: 3  •  calcium carbonate (OS-TOMAS) 600 MG tablet, Take 1 tablet by mouth Daily., Disp: , Rfl:   •  Cyanocobalamin (VITAMIN B-12 PO), Take 1 tablet by mouth Daily., Disp: , Rfl:   •  diphenhydrAMINE (BENADRYL) 50 MG capsule, Take 1 capsule by mouth Every 6 (Six) Hours As Needed for Itching., Disp: , Rfl:   •  Eliquis 5 MG tablet tablet, TAKE 1 TABLET EVERY 12 HOURS, Disp: 180 tablet, Rfl: 3  •  esomeprazole (nexIUM) 40 MG capsule, TAKE 1 CAPSULE EVERY MORNING BEFORE BREAKFAST, Disp: 90 capsule, Rfl: 3  •  Flaxseed, Linseed, (FLAX SEED OIL) 1000 MG capsule, Take 1 capsule by mouth 2 (Two) Times a Day., Disp: , Rfl:   •  Magnesium 400 MG capsule, Take 400 mg by mouth Daily., Disp: , Rfl:   •  Melatonin 10 MG tablet, Take 1 tablet by mouth Every Night., Disp: , Rfl:   •  metoprolol succinate XL (TOPROL-XL) 50 MG 24 hr tablet, Take 1 tablet by mouth 2 (Two) Times a Day., Disp: 180 tablet, Rfl: 3  •  Multiple Vitamin tablet, Take 1 tablet by mouth Daily., Disp: , Rfl:   •  sildenafil (REVATIO) 20 MG tablet, Take 2 tablets by mouth As Needed (erectile dysfunction)., Disp: 20 tablet, Rfl: 3      Objective:     Vitals:    04/12/23 0915   BP: 126/82   Pulse: 50   Weight: 85.3 kg (188 lb)   Height: 172.7 cm (68\")     Body mass index is 28.59 kg/m².    PHYSICAL EXAM:    Vitals Reviewed.   General Appearance: No acute distress, well developed and well nourished.   Eyes: Conjunctiva and lids: No erythema, swelling, or discharge. Sclera non-icteric.   HENT: Atraumatic, normocephalic. External eyes, ears, and nose normal.   Respiratory: No signs of respiratory distress. Respiration rhythm and depth normal.   Clear to auscultation. No rales, crackles, rhonchi, or wheezing auscultated.   Cardiovascular:  Heart Rate and Rhythm: Normal, Heart Sounds: Normal S1 and S2. No S3 or S4 noted.  Murmurs: No murmurs noted. No rubs, " thrills, or gallops.   Arterial Pulses:  Posterior tibialis and dorsalis pedis pulses normal.   Lower Extremities: No edema noted.  Gastrointestinal:  Abdomen soft, non-distended, non-tender.   Musculoskeletal: Normal movement of extremities  Skin: Warm and dry.   Psychiatric: Patient alert and oriented to person, place, and time. Speech and behavior appropriate. Normal mood and affect.       ECG 12 Lead    Date/Time: 4/12/2023 9:11 AM  Performed by: Heather Bravo APRN  Authorized by: Heather Bravo APRN   Comparison: compared with previous ECG   Similar to previous ECG  Rhythm: sinus rhythm  Ectopy: atrial premature contractions  BPM: 50                Assessment:       Diagnosis Plan   1. Paroxysmal atrial fibrillation  ECG 12 Lead    Holter Monitor - 72 Hour Up To 15 Days      2. Coronary-myocardial bridge        3. Asymmetric septal hypertrophy        4. Essential hypertension               Plan:       1. PAF---increase in frequency and duration of PAF--symptomatic, fatigue, palpitations, lightheadedness. He has researched ablation and is interested. Will check short Zio (5-7 days) and discuss with Dr. Moreno--we did discuss ablation risks/benefits but he may also be a candidate for AAD---will see what Zio shows and call with further recommendations.     He can take extra metoprolol for prolonged episodes--instructed on how to do this.     2. Myocardial bridging by cath in 2022---stable, follows with Dr. De Santiago.     3. Asymmetric septal hypertrophy.    4. HTN, controlled.     Will call with Zio results and further recommendations at that time.     As always, it has been a pleasure to participate in your patient's care.      Sincerely,         SHER Hamilton

## 2023-04-24 ENCOUNTER — TELEPHONE (OUTPATIENT)
Dept: CARDIOLOGY | Facility: CLINIC | Age: 73
End: 2023-04-24

## 2023-04-24 NOTE — TELEPHONE ENCOUNTER
"  Caller: Zheng Lopez \"CAMPOS\"    Relationship: Self    Best call back number: [phone]    What is the best time to reach you: ANYTIME     Who are you requesting to speak with (clinical staff, provider,  specific staff member):  CECILE UNDERWOOD    What was the call regarding: PATIENT MAILED HEART MONITOR LAST Monday 4/17/23.  PATIENT IS TRACKING PACKAGE AND IT APPEARS TO HAVE GOTTEN TO THE CORRECT CITY HOWEVER FOR THE PAST 5 DAYS TRACKING SAYS PACKAGE IS IN THE SYSTEM BUT IT WILL BE LATE. PATIENT IS CONCERNED AS CECILE UNDERWOOD WILL NOT GET THE RESULTS AS QUICK AS HE HAD HOPED.  PLEASE CALL PATIENT BACK.       Do you require a callback: YES       "

## 2023-04-25 ENCOUNTER — TELEPHONE (OUTPATIENT)
Dept: CARDIOLOGY | Facility: CLINIC | Age: 73
End: 2023-04-25
Payer: MEDICARE

## 2023-04-25 DIAGNOSIS — I48.0 PAROXYSMAL ATRIAL FIBRILLATION: Primary | ICD-10-CM

## 2023-04-25 NOTE — TELEPHONE ENCOUNTER
Discussed monitor results with Dr. Moreno---recommends ablation---discussed this possibility when I saw him in the office    Called with results---went over recommendation of ablation---risks/benefits---he wants to proceed.     Order placed

## 2023-04-27 ENCOUNTER — TELEPHONE (OUTPATIENT)
Dept: CARDIOLOGY | Facility: CLINIC | Age: 73
End: 2023-04-27
Payer: MEDICARE

## 2023-04-27 ENCOUNTER — TRANSCRIBE ORDERS (OUTPATIENT)
Dept: CARDIOLOGY | Facility: CLINIC | Age: 73
End: 2023-04-27
Payer: MEDICARE

## 2023-04-27 DIAGNOSIS — Z01.810 PRE-OPERATIVE CARDIOVASCULAR EXAMINATION: Primary | ICD-10-CM

## 2023-04-27 DIAGNOSIS — Z13.6 SCREENING FOR ISCHEMIC HEART DISEASE: ICD-10-CM

## 2023-04-27 NOTE — TELEPHONE ENCOUNTER
Dr. De Santiago this patient got scheduled today for a fib ablation and has seen EP I went ahead and canceled his 6 month fu with you on 5/4. His ablation is scheduled for 5/25 with JM when do you want me to reschedule him to see you for a FU.    Thanks Tegan

## 2023-05-09 ENCOUNTER — LAB (OUTPATIENT)
Dept: LAB | Facility: HOSPITAL | Age: 73
End: 2023-05-09
Payer: MEDICARE

## 2023-05-09 DIAGNOSIS — Z13.6 SCREENING FOR ISCHEMIC HEART DISEASE: ICD-10-CM

## 2023-05-09 DIAGNOSIS — Z01.810 PRE-OPERATIVE CARDIOVASCULAR EXAMINATION: ICD-10-CM

## 2023-05-09 LAB
ANION GAP SERPL CALCULATED.3IONS-SCNC: 8 MMOL/L (ref 5–15)
BASOPHILS # BLD AUTO: 0.05 10*3/MM3 (ref 0–0.2)
BASOPHILS NFR BLD AUTO: 1 % (ref 0–1.5)
BUN SERPL-MCNC: 16 MG/DL (ref 8–23)
BUN/CREAT SERPL: 17.6 (ref 7–25)
CALCIUM SPEC-SCNC: 8.5 MG/DL (ref 8.6–10.5)
CHLORIDE SERPL-SCNC: 101 MMOL/L (ref 98–107)
CO2 SERPL-SCNC: 27 MMOL/L (ref 22–29)
CREAT SERPL-MCNC: 0.91 MG/DL (ref 0.76–1.27)
DEPRECATED RDW RBC AUTO: 44.3 FL (ref 37–54)
EGFRCR SERPLBLD CKD-EPI 2021: 88.4 ML/MIN/1.73
EOSINOPHIL # BLD AUTO: 0.15 10*3/MM3 (ref 0–0.4)
EOSINOPHIL NFR BLD AUTO: 2.9 % (ref 0.3–6.2)
ERYTHROCYTE [DISTWIDTH] IN BLOOD BY AUTOMATED COUNT: 12.4 % (ref 12.3–15.4)
GLUCOSE SERPL-MCNC: 92 MG/DL (ref 65–99)
HCT VFR BLD AUTO: 43.5 % (ref 37.5–51)
HGB BLD-MCNC: 15.1 G/DL (ref 13–17.7)
IMM GRANULOCYTES # BLD AUTO: 0.02 10*3/MM3 (ref 0–0.05)
IMM GRANULOCYTES NFR BLD AUTO: 0.4 % (ref 0–0.5)
LYMPHOCYTES # BLD AUTO: 1.83 10*3/MM3 (ref 0.7–3.1)
LYMPHOCYTES NFR BLD AUTO: 35.1 % (ref 19.6–45.3)
MCH RBC QN AUTO: 33.8 PG (ref 26.6–33)
MCHC RBC AUTO-ENTMCNC: 34.7 G/DL (ref 31.5–35.7)
MCV RBC AUTO: 97.3 FL (ref 79–97)
MONOCYTES # BLD AUTO: 0.49 10*3/MM3 (ref 0.1–0.9)
MONOCYTES NFR BLD AUTO: 9.4 % (ref 5–12)
NEUTROPHILS NFR BLD AUTO: 2.67 10*3/MM3 (ref 1.7–7)
NEUTROPHILS NFR BLD AUTO: 51.2 % (ref 42.7–76)
NRBC BLD AUTO-RTO: 0 /100 WBC (ref 0–0.2)
PLATELET # BLD AUTO: 171 10*3/MM3 (ref 140–450)
PMV BLD AUTO: 10 FL (ref 6–12)
POTASSIUM SERPL-SCNC: 4.8 MMOL/L (ref 3.5–5.2)
RBC # BLD AUTO: 4.47 10*6/MM3 (ref 4.14–5.8)
SODIUM SERPL-SCNC: 136 MMOL/L (ref 136–145)
WBC NRBC COR # BLD: 5.21 10*3/MM3 (ref 3.4–10.8)

## 2023-05-09 PROCEDURE — 85025 COMPLETE CBC W/AUTO DIFF WBC: CPT

## 2023-05-09 PROCEDURE — 36415 COLL VENOUS BLD VENIPUNCTURE: CPT

## 2023-05-09 PROCEDURE — 80048 BASIC METABOLIC PNL TOTAL CA: CPT

## 2023-05-25 ENCOUNTER — HOSPITAL ENCOUNTER (OUTPATIENT)
Facility: HOSPITAL | Age: 73
Setting detail: HOSPITAL OUTPATIENT SURGERY
Discharge: HOME | End: 2023-05-25
Attending: INTERNAL MEDICINE | Admitting: INTERNAL MEDICINE
Payer: MEDICARE

## 2023-05-25 ENCOUNTER — ANESTHESIA EVENT (OUTPATIENT)
Dept: CARDIOLOGY | Facility: HOSPITAL | Age: 73
End: 2023-05-25
Payer: MEDICARE

## 2023-05-25 ENCOUNTER — ANESTHESIA (OUTPATIENT)
Dept: CARDIOLOGY | Facility: HOSPITAL | Age: 73
End: 2023-05-25
Payer: MEDICARE

## 2023-05-25 VITALS
OXYGEN SATURATION: 97 % | BODY MASS INDEX: 27.11 KG/M2 | RESPIRATION RATE: 16 BRPM | SYSTOLIC BLOOD PRESSURE: 121 MMHG | HEART RATE: 64 BPM | DIASTOLIC BLOOD PRESSURE: 74 MMHG | TEMPERATURE: 98.1 F | WEIGHT: 183 LBS | HEIGHT: 69 IN

## 2023-05-25 DIAGNOSIS — I48.0 PAROXYSMAL ATRIAL FIBRILLATION: ICD-10-CM

## 2023-05-25 LAB
ACT BLD: 287 SECONDS (ref 82–152)
ACT BLD: 293 SECONDS (ref 82–152)
ACT BLD: 311 SECONDS (ref 82–152)
ACT BLD: 323 SECONDS (ref 82–152)
QT INTERVAL: 368 MS
QT INTERVAL: 374 MS

## 2023-05-25 PROCEDURE — C1759 CATH, INTRA ECHOCARDIOGRAPHY: HCPCS | Performed by: INTERNAL MEDICINE

## 2023-05-25 PROCEDURE — 25010000002 NEOSTIGMINE 5 MG/10ML SOLUTION: Performed by: ANESTHESIOLOGY

## 2023-05-25 PROCEDURE — 25010000002 ONDANSETRON PER 1 MG: Performed by: ANESTHESIOLOGY

## 2023-05-25 PROCEDURE — 92960 CARDIOVERSION ELECTRIC EXT: CPT | Performed by: INTERNAL MEDICINE

## 2023-05-25 PROCEDURE — 93010 ELECTROCARDIOGRAM REPORT: CPT | Performed by: INTERNAL MEDICINE

## 2023-05-25 PROCEDURE — 25010000002 PROPOFOL 10 MG/ML EMULSION: Performed by: ANESTHESIOLOGY

## 2023-05-25 PROCEDURE — 25010000002 PHENYLEPHRINE 10 MG/ML SOLUTION: Performed by: ANESTHESIOLOGY

## 2023-05-25 PROCEDURE — S0260 H&P FOR SURGERY: HCPCS | Performed by: INTERNAL MEDICINE

## 2023-05-25 PROCEDURE — C1732 CATH, EP, DIAG/ABL, 3D/VECT: HCPCS | Performed by: INTERNAL MEDICINE

## 2023-05-25 PROCEDURE — C1894 INTRO/SHEATH, NON-LASER: HCPCS | Performed by: INTERNAL MEDICINE

## 2023-05-25 PROCEDURE — C1766 INTRO/SHEATH,STRBLE,NON-PEEL: HCPCS | Performed by: INTERNAL MEDICINE

## 2023-05-25 PROCEDURE — 25010000002 PROTAMINE SULFATE PER 10 MG: Performed by: INTERNAL MEDICINE

## 2023-05-25 PROCEDURE — 93656 COMPRE EP EVAL ABLTJ ATR FIB: CPT | Performed by: INTERNAL MEDICINE

## 2023-05-25 PROCEDURE — 25010000002 PROCAINAMIDE PER 1 G: Performed by: INTERNAL MEDICINE

## 2023-05-25 PROCEDURE — C1893 INTRO/SHEATH, FIXED,NON-PEEL: HCPCS | Performed by: INTERNAL MEDICINE

## 2023-05-25 PROCEDURE — 25010000002 FENTANYL CITRATE (PF) 50 MCG/ML SOLUTION: Performed by: ANESTHESIOLOGY

## 2023-05-25 PROCEDURE — 25010000002 HEPARIN (PORCINE) PER 1000 UNITS: Performed by: INTERNAL MEDICINE

## 2023-05-25 PROCEDURE — 85347 COAGULATION TIME ACTIVATED: CPT

## 2023-05-25 PROCEDURE — 93005 ELECTROCARDIOGRAM TRACING: CPT | Performed by: INTERNAL MEDICINE

## 2023-05-25 PROCEDURE — C1730 CATH, EP, 19 OR FEW ELECT: HCPCS | Performed by: INTERNAL MEDICINE

## 2023-05-25 PROCEDURE — C1769 GUIDE WIRE: HCPCS | Performed by: INTERNAL MEDICINE

## 2023-05-25 RX ORDER — LIDOCAINE HYDROCHLORIDE 20 MG/ML
INJECTION, SOLUTION INFILTRATION; PERINEURAL AS NEEDED
Status: DISCONTINUED | OUTPATIENT
Start: 2023-05-25 | End: 2023-05-25 | Stop reason: SURG

## 2023-05-25 RX ORDER — HYDROMORPHONE HYDROCHLORIDE 1 MG/ML
0.5 INJECTION, SOLUTION INTRAMUSCULAR; INTRAVENOUS; SUBCUTANEOUS
Status: DISCONTINUED | OUTPATIENT
Start: 2023-05-25 | End: 2023-05-25 | Stop reason: HOSPADM

## 2023-05-25 RX ORDER — DROPERIDOL 2.5 MG/ML
0.62 INJECTION, SOLUTION INTRAMUSCULAR; INTRAVENOUS ONCE
Status: DISCONTINUED | OUTPATIENT
Start: 2023-05-25 | End: 2023-05-25 | Stop reason: HOSPADM

## 2023-05-25 RX ORDER — SODIUM CHLORIDE 9 MG/ML
75 INJECTION, SOLUTION INTRAVENOUS CONTINUOUS
Status: DISCONTINUED | OUTPATIENT
Start: 2023-05-25 | End: 2023-05-25 | Stop reason: HOSPADM

## 2023-05-25 RX ORDER — FENTANYL CITRATE 50 UG/ML
INJECTION, SOLUTION INTRAMUSCULAR; INTRAVENOUS AS NEEDED
Status: DISCONTINUED | OUTPATIENT
Start: 2023-05-25 | End: 2023-05-25 | Stop reason: SURG

## 2023-05-25 RX ORDER — SODIUM CHLORIDE 0.9 % (FLUSH) 0.9 %
10 SYRINGE (ML) INJECTION AS NEEDED
Status: DISCONTINUED | OUTPATIENT
Start: 2023-05-25 | End: 2023-05-25 | Stop reason: HOSPADM

## 2023-05-25 RX ORDER — SODIUM CHLORIDE, SODIUM LACTATE, POTASSIUM CHLORIDE, CALCIUM CHLORIDE 600; 310; 30; 20 MG/100ML; MG/100ML; MG/100ML; MG/100ML
INJECTION, SOLUTION INTRAVENOUS CONTINUOUS PRN
Status: DISCONTINUED | OUTPATIENT
Start: 2023-05-25 | End: 2023-05-25 | Stop reason: SURG

## 2023-05-25 RX ORDER — HYDROCODONE BITARTRATE AND ACETAMINOPHEN 5; 325 MG/1; MG/1
1 TABLET ORAL ONCE AS NEEDED
Status: DISCONTINUED | OUTPATIENT
Start: 2023-05-25 | End: 2023-05-25 | Stop reason: HOSPADM

## 2023-05-25 RX ORDER — METOPROLOL SUCCINATE 50 MG/1
25 TABLET, EXTENDED RELEASE ORAL DAILY
Qty: 180 TABLET | Refills: 3
Start: 2023-05-25

## 2023-05-25 RX ORDER — ONDANSETRON 2 MG/ML
INJECTION INTRAMUSCULAR; INTRAVENOUS AS NEEDED
Status: DISCONTINUED | OUTPATIENT
Start: 2023-05-25 | End: 2023-05-25 | Stop reason: SURG

## 2023-05-25 RX ORDER — SODIUM CHLORIDE 0.9 % (FLUSH) 0.9 %
10 SYRINGE (ML) INJECTION EVERY 12 HOURS SCHEDULED
Status: DISCONTINUED | OUTPATIENT
Start: 2023-05-25 | End: 2023-05-25 | Stop reason: HOSPADM

## 2023-05-25 RX ORDER — HEPARIN SODIUM 1000 [USP'U]/ML
INJECTION, SOLUTION INTRAVENOUS; SUBCUTANEOUS
Status: DISCONTINUED | OUTPATIENT
Start: 2023-05-25 | End: 2023-05-25 | Stop reason: HOSPADM

## 2023-05-25 RX ORDER — ROCURONIUM BROMIDE 10 MG/ML
INJECTION, SOLUTION INTRAVENOUS AS NEEDED
Status: DISCONTINUED | OUTPATIENT
Start: 2023-05-25 | End: 2023-05-25 | Stop reason: SURG

## 2023-05-25 RX ORDER — GLYCOPYRROLATE 0.2 MG/ML
INJECTION INTRAMUSCULAR; INTRAVENOUS AS NEEDED
Status: DISCONTINUED | OUTPATIENT
Start: 2023-05-25 | End: 2023-05-25 | Stop reason: SURG

## 2023-05-25 RX ORDER — EPHEDRINE SULFATE 50 MG/ML
INJECTION, SOLUTION INTRAVENOUS AS NEEDED
Status: DISCONTINUED | OUTPATIENT
Start: 2023-05-25 | End: 2023-05-25 | Stop reason: SURG

## 2023-05-25 RX ORDER — ACETAMINOPHEN 325 MG/1
650 TABLET ORAL EVERY 4 HOURS PRN
Status: DISCONTINUED | OUTPATIENT
Start: 2023-05-25 | End: 2023-05-25 | Stop reason: HOSPADM

## 2023-05-25 RX ORDER — PROPOFOL 10 MG/ML
VIAL (ML) INTRAVENOUS AS NEEDED
Status: DISCONTINUED | OUTPATIENT
Start: 2023-05-25 | End: 2023-05-25 | Stop reason: SURG

## 2023-05-25 RX ORDER — SODIUM CHLORIDE 9 MG/ML
INJECTION, SOLUTION INTRAVENOUS
Status: COMPLETED | OUTPATIENT
Start: 2023-05-25 | End: 2023-05-25

## 2023-05-25 RX ORDER — PROTAMINE SULFATE 10 MG/ML
INJECTION, SOLUTION INTRAVENOUS
Status: DISCONTINUED | OUTPATIENT
Start: 2023-05-25 | End: 2023-05-25 | Stop reason: HOSPADM

## 2023-05-25 RX ORDER — HYDROCODONE BITARTRATE AND ACETAMINOPHEN 5; 325 MG/1; MG/1
1 TABLET ORAL EVERY 4 HOURS PRN
Status: DISCONTINUED | OUTPATIENT
Start: 2023-05-25 | End: 2023-05-25 | Stop reason: HOSPADM

## 2023-05-25 RX ORDER — NALOXONE HCL 0.4 MG/ML
0.4 VIAL (ML) INJECTION
Status: DISCONTINUED | OUTPATIENT
Start: 2023-05-25 | End: 2023-05-25 | Stop reason: HOSPADM

## 2023-05-25 RX ORDER — NEOSTIGMINE METHYLSULFATE 0.5 MG/ML
INJECTION, SOLUTION INTRAVENOUS AS NEEDED
Status: DISCONTINUED | OUTPATIENT
Start: 2023-05-25 | End: 2023-05-25 | Stop reason: SURG

## 2023-05-25 RX ORDER — FENTANYL CITRATE 50 UG/ML
50 INJECTION, SOLUTION INTRAMUSCULAR; INTRAVENOUS
Status: DISCONTINUED | OUTPATIENT
Start: 2023-05-25 | End: 2023-05-25 | Stop reason: HOSPADM

## 2023-05-25 RX ORDER — PHENYLEPHRINE HYDROCHLORIDE 10 MG/ML
INJECTION INTRAVENOUS AS NEEDED
Status: DISCONTINUED | OUTPATIENT
Start: 2023-05-25 | End: 2023-05-25 | Stop reason: SURG

## 2023-05-25 RX ADMIN — NEOSTIGMINE METHYLSULFATE 1.5 MG: 0.5 INJECTION INTRAVENOUS at 12:00

## 2023-05-25 RX ADMIN — ROCURONIUM BROMIDE 10 MG: 50 INJECTION INTRAVENOUS at 10:29

## 2023-05-25 RX ADMIN — PROPOFOL 180 MG: 10 INJECTION, EMULSION INTRAVENOUS at 09:12

## 2023-05-25 RX ADMIN — SODIUM CHLORIDE, POTASSIUM CHLORIDE, SODIUM LACTATE AND CALCIUM CHLORIDE: 600; 310; 30; 20 INJECTION, SOLUTION INTRAVENOUS at 09:10

## 2023-05-25 RX ADMIN — GLYCOPYRROLATE 0.3 MG: 0.2 INJECTION INTRAMUSCULAR; INTRAVENOUS at 12:01

## 2023-05-25 RX ADMIN — FENTANYL CITRATE 50 MCG: 50 INJECTION, SOLUTION INTRAMUSCULAR; INTRAVENOUS at 09:12

## 2023-05-25 RX ADMIN — LIDOCAINE HYDROCHLORIDE 60 MG: 20 INJECTION, SOLUTION INFILTRATION; PERINEURAL at 09:12

## 2023-05-25 RX ADMIN — SODIUM CHLORIDE 75 ML/HR: 9 INJECTION, SOLUTION INTRAVENOUS at 07:34

## 2023-05-25 RX ADMIN — ROCURONIUM BROMIDE 40 MG: 50 INJECTION INTRAVENOUS at 09:12

## 2023-05-25 RX ADMIN — ONDANSETRON 4 MG: 2 INJECTION INTRAMUSCULAR; INTRAVENOUS at 12:02

## 2023-05-25 RX ADMIN — PHENYLEPHRINE HYDROCHLORIDE 80 MCG: 10 INJECTION, SOLUTION INTRAVENOUS at 09:24

## 2023-05-25 RX ADMIN — EPHEDRINE SULFATE 10 MG: 50 INJECTION INTRAVENOUS at 09:20

## 2023-05-25 RX ADMIN — FENTANYL CITRATE 50 MCG: 50 INJECTION, SOLUTION INTRAMUSCULAR; INTRAVENOUS at 10:29

## 2023-05-25 NOTE — PERIOPERATIVE NURSING NOTE
Patient was up at side of bed to begin ambulation when bleeding was noted from R groin site, patient laid back in flat position and manual pressure applied for 10 minutes, hemostasis achieved, patient remains flat on bedrest.

## 2023-05-25 NOTE — Clinical Note
The DP pulses are +1 bilaterally. The PT pulses are +1 bilaterally. Heels clear. No signs of skin breakdown. Heels elevated on cushion. Hand check clear. Cocoon under patient and turned on 43° celsius. Clearsight finger cuff in use.

## 2023-05-25 NOTE — ANESTHESIA POSTPROCEDURE EVALUATION
"Patient: Zheng Lopez    Procedure Summary     Date: 05/25/23 Room / Location: [facility]  /  [facility]    Anesthesia Start: 0904 Anesthesia Stop: 1221    Procedures:       Ablation atrial fibrillation      3D Mapping EP Diagnosis:       Paroxysmal atrial fibrillation      (Focal PAF)    Providers: Mk Moreno MD Provider: Keny Dugan MD    Anesthesia Type: general ASA Status: 3          Anesthesia Type: general    Vitals  Vitals Value Taken Time   /69 05/25/23 1430   Temp 36.7 °C (98.1 °F) 05/25/23 1218   Pulse 68 05/25/23 1443   Resp 15 05/25/23 1430   SpO2 96 % 05/25/23 1443   Vitals shown include unvalidated device data.        Post Anesthesia Care and Evaluation    Patient location during evaluation: PACU  Level of consciousness: awake  Pain management: adequate    Airway patency: patent  Anesthetic complications: No anesthetic complications  PONV Status: controlled  Cardiovascular status: acceptable  Respiratory status: acceptable  Hydration status: acceptable    Comments: /69   Pulse 62   Temp 36.7 °C (98.1 °F)   Resp 15   Ht 175.3 cm (69\")   Wt 83 kg (183 lb)   SpO2 96%   BMI 27.02 kg/m²         "

## 2023-05-25 NOTE — ANESTHESIA PREPROCEDURE EVALUATION
Anesthesia Evaluation                  Airway   Mallampati: II  Neck ROM: full  Dental      Pulmonary    (+) sleep apnea,   Cardiovascular     Rhythm: irregular    (+) hypertension, past MI , dysrhythmias Atrial Fib,       Neuro/Psych  GI/Hepatic/Renal/Endo    (+)  GERD,      Musculoskeletal     Abdominal    Substance History      OB/GYN          Other                        Anesthesia Plan    ASA 3     general       Anesthetic plan, risks, benefits, and alternatives have been provided, discussed and informed consent has been obtained with: patient.        CODE STATUS:

## 2023-05-25 NOTE — Clinical Note
Replaced previous sheath in the right femoral vein. 8f preface sheath exchanged for 8.5f medium curl agilis sheath

## 2023-05-25 NOTE — DISCHARGE INSTRUCTIONS
[facility]  Cardiology  [address] [place] [address]  [phone]    Coronary Ablation After Care    Refer to this sheet in the next few weeks. These instructions provide you with information on caring for yourself after your procedure. Your health care provider may also give you more specific instructions. Your treatment has been planned according to current medical practices, but problems sometimes occur. Call your health care provider if you have any problems or questions after your procedure.      What to Expect After the Procedure:  After your procedure, it is typical to have the following sensations:  Minor discomfort or tenderness and a small bump at the catheter insertion site(s). The bump(s) should usually decrease in size and tenderness within 1 to 2 weeks.  Any bruising will usually fade within 2 to 4 weeks.  Home Care Instructions:  Do not apply powder or lotion to the site.  Do not take baths, swim, or use a hot tub until your health care provider approves and the site is completely healed.  Do not bend, squat, or lift anything over 20 lb (9 kg) or as directed by your health care provider. However, we recommend lifting nothing heavier than a gallon of milk.    You may shower 24 hours after the procedure. Remove the bandage (dressing) and gently wash the site with plain soap and water. Gently pat the site dry. You may apply a band aid daily for 2 days if desired.    Inspect the site at least twice daily.  Increase your fluid intake for the next 2 days.    Limit your activity for the first 48 hours.   Avoid strenuous activity for 1 week or as advised by your physician.    Follow instructions about when you can drive or return to work as directed by your physician.    Hold direct pressure over the site when you cough, sneeze, laugh or change positions.  Do this for the next 2 days.    Call Your Doctor If:  You have drainage (other than a small amount of blood on the dressing).  You  have chills or a fever > 101.  You have redness, warmth, swelling (larger than a walnut), or pain at the insertion   You develop palpitations, chest pain or shortness of breath, feel faint, or pass out.  You develop pain, discoloration, coldness, numbness, tingling, or severe bruising in the leg that held the catheter.  You develop bleeding from any other place, such as the bowels.  You have heavy bleeding from the site.  If this happens, hold pressure on the site and call 911.        Make Sure You:  Understand these instructions.  Will watch your condition.  Will get help right away if you are not doing well or get worse.

## 2023-05-25 NOTE — PERIOPERATIVE NURSING NOTE
Dr Moreno made aware of bleeding with first attempt of ambulation, no new orders, pt remains on continued bedrest.

## 2023-05-25 NOTE — ANESTHESIA PROCEDURE NOTES
Airway  Urgency: elective    Date/Time: 5/25/2023 9:15 AM  Airway not difficult    General Information and Staff    Patient location during procedure: OR  Anesthesiologist: Keny Dugan MD    Indications and Patient Condition  Indications for airway management: airway protection    Preoxygenated: yes  MILS maintained throughout  Mask difficulty assessment: 1 - vent by mask    Final Airway Details  Final airway type: endotracheal airway      Successful airway: ETT  Cuffed: yes   Successful intubation technique: direct laryngoscopy  Endotracheal tube insertion site: oral  Blade: Proctor  Blade size: 2  ETT size (mm): 7.5  Cormack-Lehane Classification: grade I - full view of glottis  Placement verified by: chest auscultation   Number of attempts at approach: 1  Assessment: lips, teeth, and gum same as pre-op

## 2023-05-30 RX ORDER — ESOMEPRAZOLE MAGNESIUM 40 MG/1
CAPSULE, DELAYED RELEASE ORAL
Qty: 90 CAPSULE | Refills: 3 | Status: SHIPPED | OUTPATIENT
Start: 2023-05-30

## 2023-06-07 NOTE — PROGRESS NOTES
New Shoulder      Patient: Zheng Lopez        YOB: 1950    Medical Record Number: [id number]        Chief Complaints: Left shoulder pain    History of Present Illness: This is a 72-year-old male who presents complaining of left shoulder pain he is right-hand dominant he states has been ongoing about 4 to 6 weeks no history injury change in activity that he can recall does have a history of a labral tear on the right it feels similar to that.  He has a very remote history of rotator cuff repair on the left in 2010.  He does not recall a change in activity it bothers him with certain activities is not bothering him at night symptoms are mild to moderate intermittent worse with activity somewhat better with rest he had a heart ablation 2 months ago and stopped going to the gym at this time and he states that rest has helped.  His past medical history as listed below reviewed by me he is on chronic Eliquis  Allergies: No Known Allergies    Medications:   Home Medications:  Current Outpatient Medications on File Prior to Visit   Medication Sig    amoxicillin (AMOXIL) 500 MG capsule Take 4 capsules by mouth As Needed. With teeth cleaning    atorvastatin (LIPITOR) 40 MG tablet TAKE 1 TABLET DAILY    calcium carbonate (OS-TOMAS) 600 MG tablet Take 1 tablet by mouth Daily.    Cyanocobalamin (VITAMIN B-12 PO) Take 1 tablet by mouth Daily.    diphenhydrAMINE (BENADRYL) 50 MG capsule Take 1 capsule by mouth Every 6 (Six) Hours As Needed for Itching.    Eliquis 5 MG tablet tablet TAKE 1 TABLET EVERY 12 HOURS    esomeprazole (nexIUM) 40 MG capsule TAKE 1 CAPSULE EVERY MORNING BEFORE BREAKFAST    Flaxseed, Linseed, (FLAX SEED OIL) 1000 MG capsule Take 1 capsule by mouth 2 (Two) Times a Day.    Magnesium 400 MG capsule Take 400 mg by mouth Daily.    Melatonin 10 MG tablet Take 1 tablet by mouth Every Night.    metoprolol succinate XL (TOPROL-XL) 50 MG 24 hr tablet Take 0.5 tablets by mouth Daily.    Multiple  Vitamin tablet Take 1 tablet by mouth Daily.    sildenafil (REVATIO) 20 MG tablet Take 2 tablets by mouth As Needed (erectile dysfunction).     No current facility-administered medications on file prior to visit.     Current Medications:  Scheduled Meds:  Continuous Infusions:No current facility-administered medications for this visit.    PRN Meds:.    Past Medical History:   Diagnosis Date    Asymmetric septal hypertrophy     Bronchitis     Bronchitis / URI    Cataract of both eyes     Chicken pox     Cholecystitis     Diverticula of colon     Encounter for annual health examination 03/10/2015    Annual Health Assessment    Fatigue     Gallbladder disease     GERD (gastroesophageal reflux disease)     High blood pressure     History of EKG     10/30/15, 09/19/13, 09/14/12, 10/18/10, 05/04/09    History of TB skin testing     TB Skin Test: 01/09/14 Completed, 09/14/12 Patient Declines, 10/18/10 Completed, 03/05/08 Completed    Hyperlipidemia     Hypertension     still taking meds    Impingement of right ulnar nerve     Measles     Non-ST elevated myocardial infarction (non-STEMI) 1/12/2022    Osteoarthritis involving multiple joints on both sides of body     Osteoarthritis involving multiple joints on both sides of body     Persistent cough     Pulse irregularity     Irregular pulse PER CARDIO    Sexual problems     Sexual problems / enjoyment ED    Sleep apnea     has c-pap    Thrombocythemia 6/15/2021    Wellness examination 03/10/2015    Annual Wellness Visit        Past Surgical History:   Procedure Laterality Date    CARDIAC CATHETERIZATION N/A 01/12/2022    Procedure: Left Heart Cath;  Surgeon: Vi Orozco MD;  Location:  [facility] CATH INVASIVE LOCATION;  Service: Cardiovascular;  Laterality: N/A;    CARDIAC CATHETERIZATION N/A 01/12/2022    Procedure: Coronary angiography;  Surgeon: Vi Orozco MD;  Location: [facility] CATH INVASIVE LOCATION;  Service: Cardiovascular;  Laterality: N/A;    CARDIAC  CATHETERIZATION N/A 01/12/2022    Procedure: Left ventriculography;  Surgeon: Vi Orozco MD;  Location:  [facility] CATH INVASIVE LOCATION;  Service: Cardiovascular;  Laterality: N/A;    CARDIAC ELECTROPHYSIOLOGY PROCEDURE N/A 5/25/2023    Procedure: Ablation atrial fibrillation;  Surgeon: Mk Moreno MD;  Location:  [facility] CATH INVASIVE LOCATION;  Service: Cardiovascular;  Laterality: N/A;    CATARACT EXTRACTION Bilateral     CATARACT EXTRACTION Right 2013    CATARACT EXTRACTION Left 2014    CHOLECYSTECTOMY      COLONOSCOPY  04/16/2015    Dr. Ritchie    COLONOSCOPY  03/07/2007    ELBOW PROCEDURE Right     ulnar nerve impingement release    FOOT SURGERY Right     right foot (twice)    GALLBLADDER SURGERY  2009    KNEE MENISCAL REPAIR Right 2010    torn right meniscus    LASIK  2006    PROSTATE AQUABLATION  12/2022    RHINOPLASTY  1986    ROTATOR CUFF REPAIR Left 2010    SHOULDER ARTHROSCOPY W/ LABRAL REPAIR Right 2015    Dr. Sahil lay repair right shoulder    SHOULDER LIGAMENT REPAIR Right     TOTAL HIP ARTHROPLASTY REVISION Left 05/11/2020    TOTAL KNEE ARTHROPLASTY Right 2011    WRIST ARTHROPLASTY Right 08/25/2020        Social History     Occupational History    Occupation: teacher2   Tobacco Use    Smoking status: Never    Smokeless tobacco: Never   Vaping Use    Vaping Use: Never used   Substance and Sexual Activity    Alcohol use: No     Comment: none in 5 yrs, Nov 2011 - last drink    Drug use: No    Sexual activity: Yes     Partners: Female      Social History     Social History Narrative    Not on file        Family History   Problem Relation Age of Onset    Dementia Mother     Migraines Mother     Osteoporosis Mother     Stroke Mother 85        early 80s    Heart disease Mother         tachycardia and had ppm    Alzheimer's disease Mother     Supraventricular tachycardia Mother     Alcohol abuse Father     Depression Father     Migraines Father     Tuberculosis Father     Cirrhosis Father          "41 YO    Cancer Paternal Uncle 67        mets but unsure of type of ca  BACK CANCER    Stroke Brother     Colon cancer Neg Hx     Prostate cancer Neg Hx     Heart attack Neg Hx              Review of Systems:     Review of Systems      Physical Exam: 72 y.o. male  General Appearance:    Alert, cooperative, in no acute distress                   Vitals:    06/08/23 0803   Temp: 98 °F (36.7 °C)   TempSrc: Temporal   Weight: 83 kg (183 lb)   Height: 175.3 cm (69\")   PainSc:   2      Patient is alert and read ×3 no acute distress appears her above-listed at height weight and age.  Affect is normal respiratory rate is normal unlabored. Heart rate regular rate rhythm, sclera, dentition and hearing are normal for the purpose of this exam.    Ortho Exam  Physical exam of the left shoulder reveals no overlying skin changes no lymphedema no lymphadenopathy.  Patient has active flexion 180 with mild symptoms abduction is similar external rotation is to 50 and internal rotation to the upper lumbar spine with mild symptoms.  Patient has good rotator cuff strength 4+ over 5 with isometric strength testing with pain.  Patient has a positive impingement and a positive Henry sign.  Patient has good cervical range of motion which is full and asymptomatic no radicular symptoms.  Patient has a normal elbow exam.  Good distal pulses are presentPatient has pain with overhead activity and a positive Neer sign and a positive empty can sign  They have a positive drop arm any definitive painful arc left  Large Joint Arthrocentesis: L subacromial bursa  Date/Time: 6/8/2023 8:12 AM  Consent given by: patient  Site marked: site marked  Timeout: Immediately prior to procedure a time out was called to verify the correct patient, procedure, equipment, support staff and site/side marked as required   Supporting Documentation  Indications: pain   Procedure Details  Location: shoulder - L subacromial bursa  Preparation: Patient was prepped and " draped in the usual sterile fashion  Needle gauge: 21G.  Approach: posterior  Medications administered: 80 mg methylPREDNISolone acetate 80 MG/ML; 2 mL lidocaine PF 1% 1 %  Patient tolerance: patient tolerated the procedure well with no immediate complications            Radiology:   AP, Scapular Y and Axillary Lateral of the left shoulder were ordered/reviewed to evauate shoulder pain.  I have compared x-rays done in 2010.  He does have evidence of prior rotator cuff repair with 1 suture anchor present he does have some mild narrowing of the joint space as best seen on the AP  Imaging Results (Most Recent)       Procedure Component Value Units Date/Time    XR Shoulder 2+ View Left [[id number]] Resulted: 06/08/23 0753     Updated: 06/08/23 0753    Impression:      Ordering physician's impression is located in the Encounter Note dated 06/08/23. X-ray performed in the DR room.            Assessment/Plan: Left shoulder pain he does have some mild degenerative changes but I really think his symptoms are due to cuff.  Explained to him the spectrum of rotator cuff injuries.  He still has good function is able to do many things I would like to inject him as a diagnostic and therapeutic tool if he fails to improve we could pursue other means of testing.  When his cardiologist will allow he will get back on his exercises which we did discuss.  He is on Eliquis he knows his risk is a bit higher of bleeding and knows to be more diligent with the ice  Cortisone Injection. See procedure note.  Cortisone Injection for DIAGNOSTIC and THERAPUTIC purposes.

## 2023-06-08 ENCOUNTER — OFFICE VISIT (OUTPATIENT)
Dept: ORTHOPEDIC SURGERY | Facility: CLINIC | Age: 73
End: 2023-06-08
Payer: MEDICARE

## 2023-06-08 VITALS — HEIGHT: 69 IN | WEIGHT: 183 LBS | TEMPERATURE: 98 F | BODY MASS INDEX: 27.11 KG/M2

## 2023-06-08 DIAGNOSIS — M25.512 LEFT SHOULDER PAIN, UNSPECIFIED CHRONICITY: Primary | ICD-10-CM

## 2023-06-08 DIAGNOSIS — M75.42 IMPINGEMENT SYNDROME OF LEFT SHOULDER: ICD-10-CM

## 2023-06-08 RX ORDER — METHYLPREDNISOLONE ACETATE 80 MG/ML
80 INJECTION, SUSPENSION INTRA-ARTICULAR; INTRALESIONAL; INTRAMUSCULAR; SOFT TISSUE
Status: COMPLETED | OUTPATIENT
Start: 2023-06-08 | End: 2023-06-08

## 2023-06-08 RX ORDER — LIDOCAINE HYDROCHLORIDE 10 MG/ML
2 INJECTION, SOLUTION EPIDURAL; INFILTRATION; INTRACAUDAL; PERINEURAL
Status: COMPLETED | OUTPATIENT
Start: 2023-06-08 | End: 2023-06-08

## 2023-06-08 RX ADMIN — METHYLPREDNISOLONE ACETATE 80 MG: 80 INJECTION, SUSPENSION INTRA-ARTICULAR; INTRALESIONAL; INTRAMUSCULAR; SOFT TISSUE at 08:12

## 2023-06-08 RX ADMIN — LIDOCAINE HYDROCHLORIDE 2 ML: 10 INJECTION, SOLUTION EPIDURAL; INFILTRATION; INTRACAUDAL; PERINEURAL at 08:12

## 2023-06-23 PROBLEM — Z98.890 H/O CARDIAC RADIOFREQUENCY ABLATION: Status: ACTIVE | Noted: 2023-06-23

## 2023-07-17 ENCOUNTER — HOSPITAL ENCOUNTER (INPATIENT)
Facility: HOSPITAL | Age: 73
LOS: 1 days | Discharge: HOME | DRG: 310 | End: 2023-07-19
Attending: INTERNAL MEDICINE | Admitting: INTERNAL MEDICINE
Payer: MEDICARE

## 2023-07-17 LAB
ANION GAP SERPL CALCULATED.3IONS-SCNC: 7 MMOL/L (ref 5–15)
BASOPHILS # BLD AUTO: 0.04 10*3/MM3 (ref 0–0.2)
BASOPHILS NFR BLD AUTO: 0.7 % (ref 0–1.5)
BUN SERPL-MCNC: 19 MG/DL (ref 8–23)
BUN/CREAT SERPL: 18.4 (ref 7–25)
CALCIUM SPEC-SCNC: 8.6 MG/DL (ref 8.6–10.5)
CHLORIDE SERPL-SCNC: 103 MMOL/L (ref 98–107)
CO2 SERPL-SCNC: 26 MMOL/L (ref 22–29)
CREAT SERPL-MCNC: 1.03 MG/DL (ref 0.76–1.27)
DEPRECATED RDW RBC AUTO: 41.9 FL (ref 37–54)
EGFRCR SERPLBLD CKD-EPI 2021: 76.2 ML/MIN/1.73
EOSINOPHIL # BLD AUTO: 0.14 10*3/MM3 (ref 0–0.4)
EOSINOPHIL NFR BLD AUTO: 2.3 % (ref 0.3–6.2)
ERYTHROCYTE [DISTWIDTH] IN BLOOD BY AUTOMATED COUNT: 12.3 % (ref 12.3–15.4)
GLUCOSE SERPL-MCNC: 97 MG/DL (ref 65–99)
HCT VFR BLD AUTO: 39.3 % (ref 37.5–51)
HGB BLD-MCNC: 13.9 G/DL (ref 13–17.7)
IMM GRANULOCYTES # BLD AUTO: 0.02 10*3/MM3 (ref 0–0.05)
IMM GRANULOCYTES NFR BLD AUTO: 0.3 % (ref 0–0.5)
LYMPHOCYTES # BLD AUTO: 1.58 10*3/MM3 (ref 0.7–3.1)
LYMPHOCYTES NFR BLD AUTO: 26 % (ref 19.6–45.3)
MAGNESIUM SERPL-MCNC: 2.4 MG/DL (ref 1.6–2.4)
MCH RBC QN AUTO: 33.7 PG (ref 26.6–33)
MCHC RBC AUTO-ENTMCNC: 35.4 G/DL (ref 31.5–35.7)
MCV RBC AUTO: 95.4 FL (ref 79–97)
MONOCYTES # BLD AUTO: 0.48 10*3/MM3 (ref 0.1–0.9)
MONOCYTES NFR BLD AUTO: 7.9 % (ref 5–12)
NEUTROPHILS NFR BLD AUTO: 3.81 10*3/MM3 (ref 1.7–7)
NEUTROPHILS NFR BLD AUTO: 62.8 % (ref 42.7–76)
NRBC BLD AUTO-RTO: 0 /100 WBC (ref 0–0.2)
PLATELET # BLD AUTO: 163 10*3/MM3 (ref 140–450)
PMV BLD AUTO: 10.4 FL (ref 6–12)
POTASSIUM SERPL-SCNC: 4.8 MMOL/L (ref 3.5–5.2)
QT INTERVAL: 320 MS
RBC # BLD AUTO: 4.12 10*6/MM3 (ref 4.14–5.8)
SODIUM SERPL-SCNC: 136 MMOL/L (ref 136–145)
WBC NRBC COR # BLD: 6.07 10*3/MM3 (ref 3.4–10.8)

## 2023-07-17 PROCEDURE — 85025 COMPLETE CBC W/AUTO DIFF WBC: CPT

## 2023-07-17 PROCEDURE — 93010 ELECTROCARDIOGRAM REPORT: CPT | Performed by: INTERNAL MEDICINE

## 2023-07-17 PROCEDURE — 93005 ELECTROCARDIOGRAM TRACING: CPT | Performed by: INTERNAL MEDICINE

## 2023-07-17 PROCEDURE — 80048 BASIC METABOLIC PNL TOTAL CA: CPT

## 2023-07-17 PROCEDURE — 93005 ELECTROCARDIOGRAM TRACING: CPT

## 2023-07-17 PROCEDURE — 83735 ASSAY OF MAGNESIUM: CPT

## 2023-07-17 RX ORDER — DOFETILIDE 0.5 MG/1
500 CAPSULE ORAL EVERY 12 HOURS
Status: DISCONTINUED | OUTPATIENT
Start: 2023-07-17 | End: 2023-07-19 | Stop reason: HOSPADM

## 2023-07-17 RX ORDER — CHOLECALCIFEROL (VITAMIN D3) 125 MCG
10 CAPSULE ORAL NIGHTLY
Status: DISCONTINUED | OUTPATIENT
Start: 2023-07-17 | End: 2023-07-19 | Stop reason: HOSPADM

## 2023-07-17 RX ORDER — ATORVASTATIN CALCIUM 20 MG/1
40 TABLET, FILM COATED ORAL NIGHTLY
Status: DISCONTINUED | OUTPATIENT
Start: 2023-07-17 | End: 2023-07-19 | Stop reason: HOSPADM

## 2023-07-17 RX ORDER — METOPROLOL SUCCINATE 25 MG/1
25 TABLET, EXTENDED RELEASE ORAL DAILY
Status: DISCONTINUED | OUTPATIENT
Start: 2023-07-17 | End: 2023-07-19 | Stop reason: HOSPADM

## 2023-07-17 RX ORDER — CALCIUM CARBONATE 500 MG/1
1 TABLET, CHEWABLE ORAL DAILY PRN
Status: DISCONTINUED | OUTPATIENT
Start: 2023-07-17 | End: 2023-07-19 | Stop reason: HOSPADM

## 2023-07-17 RX ORDER — NITROGLYCERIN 0.4 MG/1
0.4 TABLET SUBLINGUAL
Status: DISCONTINUED | OUTPATIENT
Start: 2023-07-17 | End: 2023-07-19 | Stop reason: HOSPADM

## 2023-07-17 RX ADMIN — APIXABAN 5 MG: 5 TABLET, FILM COATED ORAL at 21:18

## 2023-07-17 RX ADMIN — Medication 10 MG: at 21:19

## 2023-07-17 RX ADMIN — DOFETILIDE 500 MCG: 0.5 CAPSULE ORAL at 17:00

## 2023-07-17 NOTE — PLAN OF CARE
Goal Outcome Evaluation:  Plan of Care Reviewed With: patient        Progress: no change          Pt being treated for A-fib. Pt c/o increase fatigue and palpitations. Tikosyn administer this evening with qtc wnl. Pt educated on the risk of the medication. Remain in Afib to a flutter on the monitor with HR in 90's. All other vvs. WCTM. Plan to d/c home in 2-3 days.

## 2023-07-17 NOTE — PROGRESS NOTES
[facility] Clinical Pharmacy Services: Tikosyn (Dofetilide) Consult    Pharmacy has been consulted to look over Zheng VALENTINO Lopez's profile to review appropriateness of starting tikosyn inpatient based on renal function and drug interactions per SHER Arroyo's request.    Initiation   Patient's creatinine clearance has been hand-calculated using actual body weight: 74.7 mL/min    CrCl (mL/min) = (140 - AGE) X Actual Body Weight (kg)        X 0.85 (for females)                                                              72 X SCr (mg/dL)       Dose was verified based on CrCl:   CrCl >60 mL/minute: Initial: 500 mcg twice daily.    The baseline QTc has been reviewed and confirmed that QTc < 440 msec (500 msec in patients with ventricular conduction abnormality) QTc 415 (atrial fibrillation)     Drug Interactions    CONTRAINDICATED WARNING Other Antiarrhythmics   verapamil   cimetidine   ketoconazole   trimethoprim   cotrimoxazole   hydrochlorothiazide   prochlorperazine   megestrol   dolutegravir   dronedarone  phenothiazines   amiodarone   tricyclic antidepressants   bepridil   some macrolide antibiotics   SSRIs   azole antifungals   fluoroquinolone antibiotics   protease inhibitors   potassium-depleting diuretics  procainamide   disopyramide   lidocaine   mexilitene   flecanide   propafenone   ibutilide   sotalol   amiodarone         Based on the known drug interactions, these medications were discussed with the ordering provider:  Diphenhydramine 50 mg q6h prn    Assessment/Plan    1. Based on patient's renal function and baseline EKG, dosing guidelines recommend a starting dose of tikosyn 500 mcg PO every 12 hours. This dose may change based on the EKG 2-3 hrs after initial dose, or per physician's judgement. QTc interval will continue to be monitored in-hospital after all additional doses to see if QTc has increased. If the QTc has increased by greater than 15%, or if the QTc is greater than 500 msec (550  msec for patients with ventricular conduction abnormalities), dose reduction should be considered. If patient does not convert to normal sinus rhythm within 24 hours of initiation of tikosyn therapy, electrical conversion may be scheduled for the patient.    Dose Adjustments based on prolonged QTc  Starting Dose Based on CrCl: Then the Adjusted Dose (for QT prolongation) is:   500mcg PO BID (greater than 60ml/min) 250mcg PO BID   250mcg PO BID (40 - 60ml/min) 125mcg PO BID   125mcg PO BID (20 - 39.9ml/min) 125mcg PO Daily     2. Ensure serum potassium and magnesium are maintained within normal range while on tikosyn to reduce risk of torsade de pointes     Potassium/Magnesium is currently:   Potassium   Date Value Ref Range Status   07/17/2023 4.8 3.5 - 5.2 mmol/L Final   05/12/2020 4.5 3.5 - 5.1 mmol/L Final     Magnesium   Date Value Ref Range Status   07/17/2023 2.4 1.6 - 2.4 mg/dL Final     Both K and Mag are within normal limits. Potassium and Magnesium protocols have been ordered on the patient's profile to replace as needed during hospitalization.    Thank you for allowing me to participate in your patient's care. Please call pharmacy with any questions or concerns.    Diane Mcgrath, Pharm.D., [facility]   Clinical Pharmacist   Phone Extension #[phone]

## 2023-07-18 PROBLEM — I48.19 ATRIAL FIBRILLATION, PERSISTENT: Status: ACTIVE | Noted: 2023-07-18

## 2023-07-18 LAB
ANION GAP SERPL CALCULATED.3IONS-SCNC: 10 MMOL/L (ref 5–15)
BUN SERPL-MCNC: 17 MG/DL (ref 8–23)
BUN/CREAT SERPL: 22.1 (ref 7–25)
CALCIUM SPEC-SCNC: 8.6 MG/DL (ref 8.6–10.5)
CHLORIDE SERPL-SCNC: 102 MMOL/L (ref 98–107)
CO2 SERPL-SCNC: 22 MMOL/L (ref 22–29)
CREAT SERPL-MCNC: 0.77 MG/DL (ref 0.76–1.27)
EGFRCR SERPLBLD CKD-EPI 2021: 93.9 ML/MIN/1.73
GLUCOSE SERPL-MCNC: 100 MG/DL (ref 65–99)
MAGNESIUM SERPL-MCNC: 2.3 MG/DL (ref 1.6–2.4)
POTASSIUM SERPL-SCNC: 4.2 MMOL/L (ref 3.5–5.2)
QT INTERVAL: 330 MS
QT INTERVAL: 414 MS
QT INTERVAL: 426 MS
QT INTERVAL: 467 MS
SODIUM SERPL-SCNC: 134 MMOL/L (ref 136–145)

## 2023-07-18 PROCEDURE — 93010 ELECTROCARDIOGRAM REPORT: CPT | Performed by: INTERNAL MEDICINE

## 2023-07-18 PROCEDURE — 93005 ELECTROCARDIOGRAM TRACING: CPT

## 2023-07-18 PROCEDURE — 93005 ELECTROCARDIOGRAM TRACING: CPT | Performed by: INTERNAL MEDICINE

## 2023-07-18 PROCEDURE — 80048 BASIC METABOLIC PNL TOTAL CA: CPT

## 2023-07-18 PROCEDURE — 99222 1ST HOSP IP/OBS MODERATE 55: CPT

## 2023-07-18 PROCEDURE — 83735 ASSAY OF MAGNESIUM: CPT

## 2023-07-18 RX ADMIN — DOFETILIDE 500 MCG: 0.5 CAPSULE ORAL at 06:02

## 2023-07-18 RX ADMIN — METOPROLOL SUCCINATE 25 MG: 25 TABLET, EXTENDED RELEASE ORAL at 08:29

## 2023-07-18 RX ADMIN — Medication 10 MG: at 19:55

## 2023-07-18 RX ADMIN — DOFETILIDE 500 MCG: 0.5 CAPSULE ORAL at 18:55

## 2023-07-18 RX ADMIN — ATORVASTATIN CALCIUM 40 MG: 20 TABLET, FILM COATED ORAL at 19:54

## 2023-07-18 RX ADMIN — APIXABAN 5 MG: 5 TABLET, FILM COATED ORAL at 19:54

## 2023-07-18 RX ADMIN — APIXABAN 5 MG: 5 TABLET, FILM COATED ORAL at 08:29

## 2023-07-18 NOTE — H&P
Electrophysiology History & Physical    Date of Hospital Visit: 2023  9:23 AM  Encounter Provider: SHER Bardales  Place of Service: [facility] CARDIOLOGY  Patient Name: Zheng Lopez  :1950  Referral Provider: Mk Moreno MD      Chief complaint: fatigue, persistent atrial fibrillation    History of Present Illness:    Zheng Lopez is a 72 y.o. male who follows with Dr. De Santiago--- myocardial bridging, moderate basal septal hypertrophy, hypertension, hyperlipidemia, SYLWIA treated with CPAP and PAF.     Dr. Moreno in 2022--- newly a lot of trouble with PAF but then lost a lot of weight and his episodes decreased.      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      2023 increased episodes of AF, heart rate 140s, he saw me in early April, we did a ZIO which showed a 63% AF burden, ablation was recommended     2023 PVI.     Saw VS on 2023. He was having recurrent A fib captured on his kardia. Subsequent monitor showed persistent AF throughout the monitoring period. Recommended admission for dofetilide.         Since being back in AF. He notes fatigue which impacts his QOL. He does say that his AF is more tolerable than it was before the ablation. QT is okay. Normal kidney function and electrolytes.         Past Medical History:   Diagnosis Date    Asymmetric septal hypertrophy     Bronchitis     Bronchitis / URI    Cataract of both eyes     Chicken pox     Cholecystitis     Diverticula of colon     Encounter for annual health examination 03/10/2015    Annual Health Assessment    Fatigue     Gallbladder disease     GERD (gastroesophageal reflux disease)     High blood pressure     History of EKG     10/30/15, 13, 12, 10/18/10, 09    History of TB skin testing     TB Skin Test: 14 Completed, 12 Patient Declines,  10/18/10 Completed, 03/05/08 Completed    Hyperlipidemia     Hypertension     still taking meds    Impingement of right ulnar nerve     Measles     Non-ST elevated myocardial infarction (non-STEMI) 1/12/2022    Osteoarthritis involving multiple joints on both sides of body     Osteoarthritis involving multiple joints on both sides of body     Persistent cough     Pulse irregularity     Irregular pulse PER CARDIO    Sexual problems     Sexual problems / enjoyment ED    Sleep apnea     has c-pap    Thrombocythemia 6/15/2021    Wellness examination 03/10/2015    Annual Wellness Visit         Past Surgical History:   Procedure Laterality Date    CARDIAC CATHETERIZATION N/A 01/12/2022    Procedure: Left Heart Cath;  Surgeon: Vi Orozco MD;  Location:  [facility];  Service: Cardiovascular;  Laterality: N/A;    CARDIAC CATHETERIZATION N/A 01/12/2022    Procedure: Coronary angiography;  Surgeon: Vi Orozco MD;  Location:  [facility];  Service: Cardiovascular;  Laterality: N/A;    CARDIAC CATHETERIZATION N/A 01/12/2022    Procedure: Left ventriculography;  Surgeon: Vi Orozco MD;  Location:  [facility];  Service: Cardiovascular;  Laterality: N/A;    CARDIAC ELECTROPHYSIOLOGY PROCEDURE N/A 5/25/2023    Procedure: Ablation atrial fibrillation;  Surgeon: Mk Moreno MD;  Location:  [facility];  Service: Cardiovascular;  Laterality: N/A;    CATARACT EXTRACTION Bilateral     CATARACT EXTRACTION Right 2013    CATARACT EXTRACTION Left 2014    CHOLECYSTECTOMY      COLONOSCOPY  04/16/2015    Dr. Ritchie    COLONOSCOPY  03/07/2007    ELBOW PROCEDURE Right     ulnar nerve impingement release    FOOT SURGERY Right     right foot (twice)    GALLBLADDER SURGERY  2009    KNEE MENISCAL REPAIR Right 2010    torn right meniscus    LASIK  2006    PROSTATE AQUABLATION  12/2022    RHINOPLASTY  1986    ROTATOR CUFF REPAIR Left 2010    SHOULDER ARTHROSCOPY  W/ LABRAL REPAIR Right 2015    Dr. Sahil lay repair right shoulder    SHOULDER LIGAMENT REPAIR Right     TOTAL HIP ARTHROPLASTY REVISION Left 05/11/2020    TOTAL KNEE ARTHROPLASTY Right 2011    WRIST ARTHROPLASTY Right 08/25/2020       Family History   Problem Relation Age of Onset    Dementia Mother     Migraines Mother     Osteoporosis Mother     Stroke Mother 85        early 80s    Heart disease Mother         tachycardia and had ppm    Alzheimer's disease Mother     Supraventricular tachycardia Mother     Alcohol abuse Father     Depression Father     Migraines Father     Tuberculosis Father     Cirrhosis Father         41 YO    Cancer Paternal Uncle 67        mets but unsure of type of ca  BACK CANCER    Stroke Brother     Colon cancer Neg Hx     Prostate cancer Neg Hx     Heart attack Neg Hx        Medications Prior to Admission   Medication Sig Dispense Refill Last Dose    atorvastatin (LIPITOR) 40 MG tablet TAKE 1 TABLET DAILY 90 tablet 3 7/17/2023    calcium carbonate (OS-TOMAS) 600 MG tablet Take 1 tablet by mouth Daily.   7/17/2023    Cyanocobalamin (VITAMIN B-12 PO) Take 1 tablet by mouth Daily.   7/17/2023    diphenhydrAMINE (BENADRYL) 50 MG capsule Take 1 capsule by mouth Every 6 (Six) Hours As Needed for Itching.   7/16/2023    Eliquis 5 MG tablet tablet TAKE 1 TABLET EVERY 12 HOURS 180 tablet 3 7/17/2023    esomeprazole (nexIUM) 40 MG capsule TAKE 1 CAPSULE EVERY MORNING BEFORE BREAKFAST 90 capsule 3 7/17/2023    Flaxseed, Linseed, (FLAX SEED OIL) 1000 MG capsule Take 1 capsule by mouth 2 (Two) Times a Day.   7/17/2023    Magnesium 400 MG capsule Take 500 mg by mouth Daily.   Patient Taking Differently    Melatonin 10 MG tablet Take 1 tablet by mouth Every Night.   7/16/2023    metoprolol succinate XL (TOPROL-XL) 50 MG 24 hr tablet Take 0.5 tablets by mouth Daily. 180 tablet 3 7/17/2023    Multiple Vitamin tablet Take 1 tablet by mouth Daily.   7/17/2023    amoxicillin (AMOXIL) 500 MG capsule  Take 4 capsules by mouth As Needed. With teeth cleaning   More than a month    sildenafil (REVATIO) 20 MG tablet Take 2 tablets by mouth As Needed (erectile dysfunction). 20 tablet 3 Unknown       Current Meds  No current facility-administered medications on file prior to encounter.     Current Outpatient Medications on File Prior to Encounter   Medication Sig Dispense Refill    atorvastatin (LIPITOR) 40 MG tablet TAKE 1 TABLET DAILY 90 tablet 3    calcium carbonate (OS-TOMAS) 600 MG tablet Take 1 tablet by mouth Daily.      Cyanocobalamin (VITAMIN B-12 PO) Take 1 tablet by mouth Daily.      diphenhydrAMINE (BENADRYL) 50 MG capsule Take 1 capsule by mouth Every 6 (Six) Hours As Needed for Itching.      Eliquis 5 MG tablet tablet TAKE 1 TABLET EVERY 12 HOURS 180 tablet 3    esomeprazole (nexIUM) 40 MG capsule TAKE 1 CAPSULE EVERY MORNING BEFORE BREAKFAST 90 capsule 3    Flaxseed, Linseed, (FLAX SEED OIL) 1000 MG capsule Take 1 capsule by mouth 2 (Two) Times a Day.      Magnesium 400 MG capsule Take 500 mg by mouth Daily.      Melatonin 10 MG tablet Take 1 tablet by mouth Every Night.      metoprolol succinate XL (TOPROL-XL) 50 MG 24 hr tablet Take 0.5 tablets by mouth Daily. 180 tablet 3    Multiple Vitamin tablet Take 1 tablet by mouth Daily.      amoxicillin (AMOXIL) 500 MG capsule Take 4 capsules by mouth As Needed. With teeth cleaning      sildenafil (REVATIO) 20 MG tablet Take 2 tablets by mouth As Needed (erectile dysfunction). 20 tablet 3         Social History     Socioeconomic History    Marital status:     Number of children: 2   Tobacco Use    Smoking status: Never    Smokeless tobacco: Never   Vaping Use    Vaping Use: Never used   Substance and Sexual Activity    Alcohol use: No     Comment: none in 5 yrs, Nov 2011 - last drink    Drug use: No    Sexual activity: Yes     Partners: Female         REVIEW OF SYSTEMS: All systems reviewed. Pertinent positives identified in HPI. All other systems are  "negative.     12 point ROS was performed and is negative except as outlined in HPI            Objective:     Vitals:    07/17/23 2351 07/18/23 0433 07/18/23 0813 07/18/23 0829   BP: 114/74 99/63 112/73    BP Location: Right arm Right arm Right arm    Patient Position: Lying Lying Lying    Pulse: 58 58  65   Resp: 17 17 18    Temp: 97.5 °F (36.4 °C) 97.8 °F (36.6 °C)     TempSrc: Axillary Oral Oral    SpO2: 100% 97%     Weight:       Height:         Body mass index is 27.32 kg/m².  Flowsheet Rows      Flowsheet Row First Filed Value   Admission Height 175.3 cm (69\") Documented at 07/17/2023 0953   Admission Weight 83.9 kg (185 lb) Documented at 07/17/2023 0953            PHYSICAL EXAM:  Constitutional:       General: Awake.      Appearance: Healthy appearance.   Cardiovascular:      PMI at left midclavicular line. Tachycardia present. Irregularly irregular rhythm.   Neurological:      Mental Status: Alert.   Psychiatric:         Behavior: Behavior is cooperative.                                                                            Lab Review:  Results from last 7 days   Lab Units 07/18/23  0301 07/17/23  1114   SODIUM mmol/L 134* 136   POTASSIUM mmol/L 4.2 4.8   CHLORIDE mmol/L 102 103   CO2 mmol/L 22.0 26.0   BUN mg/dL 17 19   CREATININE mg/dL 0.77 1.03   GLUCOSE mg/dL 100* 97   CALCIUM mg/dL 8.6 8.6         Results from last 7 days   Lab Units 07/17/23  1114   WBC 10*3/mm3 6.07   HEMOGLOBIN g/dL 13.9   HEMATOCRIT % 39.3   PLATELETS 10*3/mm3 163             Results from last 7 days   Lab Units 07/18/23  0301   MAGNESIUM mg/dL 2.3                         EKG:       I personally viewed and interpreted the patient's EKG/Telemetry tracings    Assessment:      Atrial fibrillation, persistent       Plan:     Admit for dofetilide initiation. Normal kidney function, electrolytes, and QT. We will start 500mcg BID.       SHER Bardales  [facility]  07/18/23  11:46 EDT   "

## 2023-07-18 NOTE — CASE MANAGEMENT/SOCIAL WORK
Discharge Planning Assessment  [facility]     Patient Name: Zheng Lopez  MRN: [id number]  Today's Date: 7/18/2023    Admit Date: 7/17/2023    Plan: Home; follow for cost of Tikosyn   Discharge Needs Assessment       Row Name 07/18/23 1153       Living Environment    People in Home spouse    Current Living Arrangements home    Potentially Unsafe Housing Conditions none    Primary Care Provided by self    Provides Primary Care For no one    Family Caregiver if Needed spouse    Quality of Family Relationships helpful;involved;supportive    Able to Return to Prior Arrangements yes       Resource/Environmental Concerns    Resource/Environmental Concerns none       Transition Planning    Patient/Family Anticipates Transition to home with family    Patient/Family Anticipated Services at Transition none    Transportation Anticipated family or friend will provide       Discharge Needs Assessment    Readmission Within the Last 30 Days no previous admission in last 30 days    Equipment Currently Used at Home cpap    Concerns to be Addressed no discharge needs identified;denies needs/concerns at this time    Anticipated Changes Related to Illness none    Equipment Needed After Discharge none                   Discharge Plan       Row Name 07/18/23 1153       Plan    Plan Home; follow for cost of Tikosyn    Plan Comments CCP met with patient and patient's wife at bedside. CCP role explained and discharge planning discussed. Face sheet verified and IMM noted. Patient's PCP is Dr. Sanchez. Patient lives with his spouse. Patient is independent with his ADLs and does not use DME. Patient has CPAP machine. Patient has used [facility] in the past and has been to [place] for rehab. Patient plans to return home at discharge. CCP will follow for Tikosyn cost and assist as needed. Lela BEAR                  Continued Care and Services - Admitted Since 7/17/2023    Coordination has not been started for this encounter.           Demographic Summary       Row Name 07/18/23 1153       General Information    Admission Type inpatient    Arrived From emergency department    Required Notices Provided Important Message from Medicare    Referral Source admission list    Reason for Consult discharge planning    Preferred Language English                   Functional Status       Row Name 07/18/23 1153       Functional Status    Usual Activity Tolerance good    Current Activity Tolerance good       Functional Status, IADL    Medications independent    Meal Preparation independent    Housekeeping independent    Laundry independent    Shopping independent       Mental Status    General Appearance WDL WDL       Mental Status Summary    Recent Changes in Mental Status/Cognitive Functioning no changes                   Psychosocial    No documentation.                  Abuse/Neglect    No documentation.                  Legal    No documentation.                  Substance Abuse    No documentation.                  Patient Forms    No documentation.                     MARIANELA Alexander

## 2023-07-18 NOTE — PROGRESS NOTES
Patient converted to NSR after first dose of dofetilide. QT is okay. Will continue with 500mcg BID, continue metoprolol for now and monitor HR. Will monitor today and possibly home tomorrow.

## 2023-07-19 VITALS
SYSTOLIC BLOOD PRESSURE: 119 MMHG | WEIGHT: 185 LBS | HEART RATE: 61 BPM | RESPIRATION RATE: 18 BRPM | BODY MASS INDEX: 27.4 KG/M2 | DIASTOLIC BLOOD PRESSURE: 72 MMHG | HEIGHT: 69 IN | TEMPERATURE: 98.4 F | OXYGEN SATURATION: 96 %

## 2023-07-19 LAB
ANION GAP SERPL CALCULATED.3IONS-SCNC: 6.5 MMOL/L (ref 5–15)
BUN SERPL-MCNC: 19 MG/DL (ref 8–23)
BUN/CREAT SERPL: 18.3 (ref 7–25)
CALCIUM SPEC-SCNC: 8.8 MG/DL (ref 8.6–10.5)
CHLORIDE SERPL-SCNC: 103 MMOL/L (ref 98–107)
CO2 SERPL-SCNC: 26.5 MMOL/L (ref 22–29)
CREAT SERPL-MCNC: 1.04 MG/DL (ref 0.76–1.27)
EGFRCR SERPLBLD CKD-EPI 2021: 75.3 ML/MIN/1.73
GLUCOSE SERPL-MCNC: 105 MG/DL (ref 65–99)
MAGNESIUM SERPL-MCNC: 2.2 MG/DL (ref 1.6–2.4)
POTASSIUM SERPL-SCNC: 4.8 MMOL/L (ref 3.5–5.2)
QT INTERVAL: 462 MS
QT INTERVAL: 465 MS
SODIUM SERPL-SCNC: 136 MMOL/L (ref 136–145)

## 2023-07-19 PROCEDURE — 93005 ELECTROCARDIOGRAM TRACING: CPT | Performed by: INTERNAL MEDICINE

## 2023-07-19 PROCEDURE — 80048 BASIC METABOLIC PNL TOTAL CA: CPT | Performed by: INTERNAL MEDICINE

## 2023-07-19 PROCEDURE — 83735 ASSAY OF MAGNESIUM: CPT | Performed by: INTERNAL MEDICINE

## 2023-07-19 PROCEDURE — 93010 ELECTROCARDIOGRAM REPORT: CPT | Performed by: INTERNAL MEDICINE

## 2023-07-19 PROCEDURE — 99238 HOSP IP/OBS DSCHRG MGMT 30/<: CPT

## 2023-07-19 RX ORDER — DOFETILIDE 0.12 MG/1
375 CAPSULE ORAL EVERY 12 HOURS
Qty: 180 CAPSULE | Refills: 1 | Status: SHIPPED | OUTPATIENT
Start: 2023-07-19 | End: 2023-07-21 | Stop reason: SDUPTHER

## 2023-07-19 RX ADMIN — METOPROLOL SUCCINATE 25 MG: 25 TABLET, EXTENDED RELEASE ORAL at 09:32

## 2023-07-19 RX ADMIN — DOFETILIDE 500 MCG: 0.5 CAPSULE ORAL at 06:55

## 2023-07-19 RX ADMIN — APIXABAN 5 MG: 5 TABLET, FILM COATED ORAL at 06:55

## 2023-07-19 NOTE — PROGRESS NOTES
Discharge Planning Assessment  [facility]     Patient Name: Zheng Lopez  MRN: [id number]  Today's Date: 7/19/2023    Admit Date: 7/17/2023    Plan: Home   Discharge Needs Assessment    No documentation.                  Discharge Plan       Row Name 07/19/23 1647       Plan    Plan Home    Final Discharge Disposition Code 01 - home or self-care    Final Note Home                  Continued Care and Services - Discharged on 7/19/2023 Admission date: 7/17/2023 - Discharge disposition: Home or Self Care   Coordination has not been started for this encounter.       Expected Discharge Date and Time       Expected Discharge Date Expected Discharge Time    Jul 19, 2023            Demographic Summary    No documentation.                  Functional Status    No documentation.                  Psychosocial    No documentation.                  Abuse/Neglect    No documentation.                  Legal    No documentation.                  Substance Abuse    No documentation.                  Patient Forms    No documentation.                     Viri Diaz RN

## 2023-07-19 NOTE — DISCHARGE SUMMARY
DISCHARGE NOTE    Patient Name: Zheng Lopez  Age/Sex: 72 y.o. male  : 1950  MRN: [id number]    Date of Discharge:  2023   Date of Admit: 2023  Encounter Provider: SHER Bardales  Place of Service: [facility] CARDIOLOGY  Patient Care Team:  Zoya Sanchez MD as PCP - General (Family Medicine)  Zoya Sanchez MD as Referring Physician (Family Medicine)    Subjective:     Discharge Diagnosis:    Atrial fibrillation, persistent      Hospital Course:   Zheng Lopez is a 72 y.o. male who follows with Dr. De Santiago--- myocardial bridging, moderate basal septal hypertrophy, hypertension, hyperlipidemia, SYLWIA treated with CPAP and PAF.     Dr. Moreno in 2022--- newly a lot of trouble with PAF but then lost a lot of weight and his episodes decreased.      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      2023 increased episodes of AF, heart rate 140s, he saw me in early April, we did a ZIO which showed a 63% AF burden, ablation was recommended     2023 PVI.      Saw VS on 2023. He was having recurrent A fib captured on his kardia. Subsequent monitor showed persistent AF throughout the monitoring period. Recommended admission for dofetilide.          He was admitted for dofetilide initiation.  His Benadryl was stopped and metoprolol decreased to 25 mg daily.  He was started on 500 mcg twice daily, converted to normal sinus rhythm after the first dose.  This morning his QT slightly prolonged with subtle T wave changes.  We are going to discharge him today on 375 mcg twice daily.  He will come to the office on Friday for repeat EKG and will follow-up in 1 month.  Vital Signs  Temp:  [97.9 °F (36.6 °C)-98.7 °F (37.1 °C)] 98.4 °F (36.9 °C)  Heart Rate:  [59-65]  61  Resp:  [16-18] 18  BP: (105-120)/(69-84) 119/72    Intake/Output Summary (Last 24 hours) at 7/19/2023 1230  Last data filed at 7/19/2023 0828  Gross per 24 hour   Intake 480 ml   Output 950 ml   Net -470 ml       Physical Exam:    General Appearance: No acute distress, well developed and well nourished.   Eyes: Conjunctiva and lids: No erythema, swelling, or discharge. Sclera non-icteric.   HENT: Atraumatic, normocephalic. External eyes, ears, and nose normal.   Respiratory: No signs of respiratory distress. Respiration rhythm and depth normal.   Clear to auscultation. No rales, crackles, rhonchi, or wheezing auscultated.   Cardiovascular:  Heart Rate and Rhythm: Normal, Heart Sounds: Normal S1 and S2. No S3 or S4 noted.  Gastrointestinal:  Abdomen soft, non-distended, non-tender.  Musculoskeletal: Normal movement of extremities  Skin: Warm and dry.   Psychiatric: Patient alert and oriented to person, place, and time. Speech and behavior appropriate. Normal mood and affect.    Labs:   Results from last 7 days   Lab Units 07/19/23  0400 07/18/23  0301 07/17/23  1114   SODIUM mmol/L 136 134* 136   POTASSIUM mmol/L 4.8 4.2 4.8   CHLORIDE mmol/L 103 102 103   CO2 mmol/L 26.5 22.0 26.0   BUN mg/dL 19 17 19   CREATININE mg/dL 1.04 0.77 1.03   GLUCOSE mg/dL 105* 100* 97   CALCIUM mg/dL 8.8 8.6 8.6         Results from last 7 days   Lab Units 07/17/23  1114   WBC 10*3/mm3 6.07   HEMOGLOBIN g/dL 13.9   HEMATOCRIT % 39.3   PLATELETS 10*3/mm3 163         Results from last 7 days   Lab Units 07/19/23  0400 07/18/23  0301 07/17/23  1114   MAGNESIUM mg/dL 2.2 2.3 2.4                   Discharge Diet:    Dietary Orders (From admission, onward)       Start     Ordered    07/18/23 1046  Diet: Regular/House Diet; Texture: Regular Texture (IDDSI 7); Fluid Consistency: Thin (IDDSI 0)  Diet Effective Now        References:    Diet Order [place]   Question Answer Comment   Diets: Regular/House Diet    Texture: Regular Texture  (IDDSI 7)    Fluid Consistency: Thin (IDDSI 0)        07/18/23 1045                      Activity at Discharge:      Discharge Medications     Discharge Medications        New Medications        Instructions Start Date   dofetilide 125 MCG capsule  Commonly known as: TIKOSYN   375 mcg, Oral, Every 12 Hours             Continue These Medications        Instructions Start Date   amoxicillin 500 MG capsule  Commonly known as: AMOXIL   4 capsules, Oral, As Needed, With teeth cleaning      atorvastatin 40 MG tablet  Commonly known as: LIPITOR   TAKE 1 TABLET DAILY      calcium carbonate 600 MG tablet  Commonly known as: OS-TOMAS   600 mg, Oral, Daily      Eliquis 5 MG tablet tablet  Generic drug: apixaban   TAKE 1 TABLET EVERY 12 HOURS      esomeprazole 40 MG capsule  Commonly known as: nexIUM   TAKE 1 CAPSULE EVERY MORNING BEFORE BREAKFAST      Flax Seed Oil 1000 MG capsule   1 capsule, Oral, 2 Times Daily      Magnesium 400 MG capsule   500 mg, Oral, Daily      Melatonin 10 MG tablet   10 mg, Oral, Nightly      metoprolol succinate XL 50 MG 24 hr tablet  Commonly known as: TOPROL-XL   25 mg, Oral, Daily      multivitamin tablet tablet  Commonly known as: THERAGRAN   1 tablet, Oral, Daily      sildenafil 20 MG tablet  Commonly known as: REVATIO   40 mg, Oral, As Needed      VITAMIN B-12 PO   1 tablet, Oral, Daily             Stop These Medications      diphenhydrAMINE 50 MG capsule  Commonly known as: BENADRYL              Discharge disposition: home    Follow-up Appointments   Follow-up Information       Zoya Sanchez MD. Schedule an appointment as soon as possible for a visit in 1 week(s).    Specialty: Family Medicine  Contact information:  [address] [place] [address]  [phone]                           Future Appointments   Date Time Provider Department Center   7/21/2023  2:30 PM STEPHAN JORDAN [place] [facility]   7/28/2023 10:00 AM Zoya Sanchez MD [facility]   8/23/2023   9:00 AM Mk Moreno MD [facility]   9/18/2023  2:00 PM LAB [facility]   9/18/2023  2:20 PM Vivek Trujillo MD [facility]   10/6/2023  8:50 [facility]   10/13/2023  8:00 AM Zoya Sanchez MD [facility]   11/6/2023 10:45 AM Ferdinand De Santiago MD [facility]   1/16/2024  8:00 AM [facility] None         Rodrick Arroyo, SHER  07/19/23  12:30 EDT

## 2023-07-28 ENCOUNTER — OFFICE VISIT (OUTPATIENT)
Dept: FAMILY MEDICINE CLINIC | Facility: CLINIC | Age: 73
End: 2023-07-28
Payer: MEDICARE

## 2023-07-28 VITALS
WEIGHT: 183 LBS | OXYGEN SATURATION: 97 % | HEART RATE: 65 BPM | HEIGHT: 69 IN | SYSTOLIC BLOOD PRESSURE: 122 MMHG | BODY MASS INDEX: 27.11 KG/M2 | TEMPERATURE: 97.5 F | DIASTOLIC BLOOD PRESSURE: 82 MMHG

## 2023-07-28 DIAGNOSIS — Z98.890 H/O CARDIAC RADIOFREQUENCY ABLATION: ICD-10-CM

## 2023-07-28 DIAGNOSIS — Z09 HOSPITAL DISCHARGE FOLLOW-UP: Primary | ICD-10-CM

## 2023-07-28 DIAGNOSIS — I48.19 ATRIAL FIBRILLATION, PERSISTENT: ICD-10-CM

## 2023-07-28 NOTE — PROGRESS NOTES
Transitional Care Follow Up Visit  Subjective     Zheng Lopez is a 72 y.o. male who presents for a transitional care management visit.    Within 48 business hours after discharge our office contacted him via telephone to coordinate his care and needs.      I reviewed and discussed the details of that call along with the discharge summary, hospital problems, inpatient lab results, inpatient diagnostic studies, and consultation reports with Zheng.     Current outpatient and discharge medications have been reconciled for the patient.  Reviewed by: Zoya Sanchez MD          7/19/2023     5:56 PM   Date of TCM Phone Call   [facility]   Date of Admission 7/17/2023   Date of Discharge 7/19/2023   Discharge Disposition Home or Self Care     Risk for Readmission (LACE) Score: 7 (7/19/2023  6:00 AM)      History of Present Illness   Course During Hospital Stay: Pleasant 72-year-old male here to follow-up for hospitalization at [facility], admitted July 17 and discharged July 19, 2023 for atrial fibrillation.  This has been a chronic problem, he has had a cardiac ablation in May, unfortunately his A-fib was worsening with symptoms of fatigue.  Zio patch showed that he was in the 100% A-fib.    Therefore he was admitted for medication with plans for cardioversion, initiated on dofetilide (4 infusions) for atrial fibrillation,  His apixaban was continued, no complications with admission.  Thankfully he converted with medication alone and did not need cardioversion.  Outpatient he was continued on metoprolol 12.5 mg twice a day, Eliquis 5 mg twice a day, Tikosyn 375 mcg every 12 hours, atorvastatin with improvement.  Thankfully he is feeling much more energy now.     The following portions of the patient's history were reviewed and updated as appropriate: allergies, current medications, past family history, past medical history, past social history, past surgical history, and problem  list.    Review of Systems    Objective   Physical Exam  Vitals and nursing note reviewed.   Constitutional:       General: He is not in acute distress.     Appearance: He is well-developed.   HENT:      Head: Normocephalic.      Nose: Nose normal.   Cardiovascular:      Rate and Rhythm: Normal rate and regular rhythm.      Heart sounds: Normal heart sounds. No murmur heard.  Pulmonary:      Effort: Pulmonary effort is normal. No respiratory distress.      Breath sounds: Normal breath sounds.   Musculoskeletal:         General: Normal range of motion.   Skin:     General: Skin is warm and dry.      Findings: No rash.   Neurological:      Mental Status: He is alert and oriented to person, place, and time.   Psychiatric:         Behavior: Behavior normal.         Thought Content: Thought content normal.         Judgment: Judgment normal.       Assessment & Plan   Diagnoses and all orders for this visit:    1. Hospital discharge follow-up (Primary)    2. Atrial fibrillation, persistent    3. H/O cardiac radiofrequency ablation--5/2023 PVI      Very pleasant 72-year-old male here to follow-up after hospitalization for atrial fibrillation thankfully he was medically cardioverted with Tikosyn, continuing this medication outpatient, 375 mcg every 12 hours.  Continue with metoprolol, Eliquis and atorvastatin.  He is feeling great, energy is much better.    I do agree that an Apple Watch could be helpful with knowing if he is in A-fib or if his heart rate changes.  He was wondering if this could be a helpful tool.    He has already restarted exercise without any adverse effects.    Plan to follow-up with cardiology and with me in October as scheduled.    Return if symptoms worsen or fail to improve, for as scheduled .    Zoya Sanchez MD

## 2023-09-01 ENCOUNTER — TELEPHONE (OUTPATIENT)
Dept: FAMILY MEDICINE CLINIC | Facility: CLINIC | Age: 73
End: 2023-09-01
Payer: MEDICARE

## 2023-09-01 NOTE — TELEPHONE ENCOUNTER
"  Caller: Zheng Lopez \"CAMPOS\"    Relationship: Self    Best call back number: [phone]     What orders are you requesting (i.e. lab or imaging): VASCULAR SCREENING     In what timeframe would the patient need to come in:      Where will you receive your lab/imaging services: [facility]     Additional notes: PATIENT WANTS TO KNOW IF DR VALLES WILL ORDER HIM AN VASCULAR SCREENING.               "

## 2023-09-01 NOTE — TELEPHONE ENCOUNTER
Is there a specific vascular screening he is looking for?  Looks like he had pretty extensive cardiovascular testing in July.  Is he having symptoms of an area that feels like its not getting good blood supply?  If so I would be happy to recommend to talk through things and see what specific test we need to do.  If he is looking for a [employer] screening I do not think he needs a referral for that.

## 2023-10-13 ENCOUNTER — OFFICE VISIT (OUTPATIENT)
Dept: FAMILY MEDICINE CLINIC | Facility: CLINIC | Age: 73
End: 2023-10-13
Payer: MEDICARE

## 2023-10-13 VITALS
WEIGHT: 182 LBS | BODY MASS INDEX: 26.96 KG/M2 | OXYGEN SATURATION: 98 % | SYSTOLIC BLOOD PRESSURE: 124 MMHG | DIASTOLIC BLOOD PRESSURE: 66 MMHG | HEIGHT: 69 IN | HEART RATE: 77 BPM | TEMPERATURE: 97.8 F

## 2023-10-13 DIAGNOSIS — R73.9 HYPERGLYCEMIA: ICD-10-CM

## 2023-10-13 DIAGNOSIS — I10 ESSENTIAL HYPERTENSION: Primary | ICD-10-CM

## 2023-10-13 PROCEDURE — 1159F MED LIST DOCD IN RCRD: CPT | Performed by: FAMILY MEDICINE

## 2023-10-13 PROCEDURE — 1160F RVW MEDS BY RX/DR IN RCRD: CPT | Performed by: FAMILY MEDICINE

## 2023-10-13 PROCEDURE — 99214 OFFICE O/P EST MOD 30 MIN: CPT | Performed by: FAMILY MEDICINE

## 2023-10-13 PROCEDURE — 3078F DIAST BP <80 MM HG: CPT | Performed by: FAMILY MEDICINE

## 2023-10-13 PROCEDURE — 3074F SYST BP LT 130 MM HG: CPT | Performed by: FAMILY MEDICINE

## 2023-10-13 NOTE — ASSESSMENT & PLAN NOTE
Well-controlled, reviewed labs.  A-fib well controlled with cardiology, not on lisinopril anymore.  Continue metoprolol 25 mg.

## 2023-10-13 NOTE — PROGRESS NOTES
"Chief Complaint  Hypertension ( No complains ,doing well  excellent had labs )    Subjective        Zheng Lopez presents to [facility]  Hypertension    Pleasant 73-year-old male here to follow-up for hypertension which is well controlled, hyperglycemia which is improving.  Thankfully feeling well, discussed labs.    Objective   Vital Signs:  /66   Pulse 77   Temp 97.8 øF (36.6 øC)   Ht 175.3 cm (69\")   Wt 82.6 kg (182 lb)   SpO2 98%   BMI 26.88 kg/mý   Estimated body mass index is 26.88 kg/mý as calculated from the following:    Height as of this encounter: 175.3 cm (69\").    Weight as of this encounter: 82.6 kg (182 lb).       Physical Exam  Vitals and nursing note reviewed.   Constitutional:       General: He is not in acute distress.     Appearance: He is well-developed.   HENT:      Head: Normocephalic.      Nose: Nose normal.   Cardiovascular:      Rate and Rhythm: Normal rate and regular rhythm.      Heart sounds: Normal heart sounds. No murmur heard.  Pulmonary:      Effort: Pulmonary effort is normal. No respiratory distress.      Breath sounds: Normal breath sounds.   Musculoskeletal:         General: Normal range of motion.   Skin:     General: Skin is warm and dry.      Findings: No rash.   Neurological:      Mental Status: He is alert and oriented to person, place, and time.   Psychiatric:         Behavior: Behavior normal.         Thought Content: Thought content normal.         Judgment: Judgment normal.        Result Review :  The following data was reviewed by: Zoya Sanchez MD on 10/13/2023:  CMP          7/18/2023    03:01 7/19/2023    04:00 10/6/2023    08:39   CMP   Glucose 100  105  107    BUN 17  19  14    Creatinine 0.77  1.04  1.01    EGFR 93.9  75.3     Sodium 134  136  137    Potassium 4.2  4.8  5.0    Chloride 102  103  100    Calcium 8.6  8.8  9.5    Total Protein   7.1    Albumin   4.9    Globulin   2.2    Total Bilirubin   0.8  "   Alkaline Phosphatase   84    AST (SGOT)   31    ALT (SGPT)   25    BUN/Creatinine Ratio 22.1  18.3  13.9    Anion Gap 10.0  6.5       CBC          5/9/2023    09:13 7/17/2023    11:14 10/6/2023    08:39   CBC   WBC 5.21  6.07  4.14    RBC 4.47  4.12  4.68    Hemoglobin 15.1  13.9  15.7    Hematocrit 43.5  39.3  44.9    MCV 97.3  95.4  95.9    MCH 33.8  33.7  33.5    MCHC 34.7  35.4  35.0    RDW 12.4  12.3  12.4    Platelets 171  163  135      Lipid Panel          4/5/2023    08:22 10/6/2023    08:39   Lipid Panel   Total Cholesterol 127  141    Triglycerides 60  44    HDL Cholesterol 59  73    VLDL Cholesterol 13  10    LDL Cholesterol  55  58    LDL/HDL Ratio 0.95       A1C Last 3 Results          4/5/2023    08:22 10/6/2023    08:39   HGBA1C Last 3 Results   Hemoglobin A1C 5.70  5.80      PSA          10/6/2023    08:39   PSA   PSA 0.951                   Assessment and Plan   Diagnoses and all orders for this visit:    1. Essential hypertension (Primary)  Assessment & Plan:  Well-controlled, reviewed labs.  A-fib well controlled with cardiology, not on lisinopril anymore.  Continue metoprolol 25 mg.    Orders:  -     Comprehensive Metabolic Panel; Future  -     CBC & Differential; Future  -     Lipid Panel; Future    2. Hyperglycemia  Assessment & Plan:  Patient has previously had an elevated A1c of 5.7 in the past.  Labs from last week show A1c of 5.8.  Patient counseled on diet and exercise.  Able to maintain good control, no changes.  Follow-up in 6 months.    Orders:  -     Hemoglobin A1c; Future             Follow Up   Return in about 6 months (around 4/13/2024), or if symptoms worsen or fail to improve, for Medicare Wellness with fasting labs prior.  Patient was given instructions and counseling regarding his condition or for health maintenance advice. Please see specific information pulled into the AVS if appropriate.     Zoya Sanchez MD

## 2023-10-13 NOTE — ASSESSMENT & PLAN NOTE
Patient has previously had an elevated A1c of 5.7 in the past.  Labs from last week show A1c of 5.8.  Patient counseled on diet and exercise.  Able to maintain good control, no changes.  Follow-up in 6 months.

## 2023-10-31 ENCOUNTER — LAB (OUTPATIENT)
Dept: LAB | Facility: HOSPITAL | Age: 73
End: 2023-10-31
Payer: MEDICARE

## 2023-10-31 ENCOUNTER — OFFICE VISIT (OUTPATIENT)
Dept: ONCOLOGY | Facility: CLINIC | Age: 73
End: 2023-10-31
Payer: MEDICARE

## 2023-10-31 VITALS
HEART RATE: 59 BPM | RESPIRATION RATE: 18 BRPM | OXYGEN SATURATION: 97 % | HEIGHT: 69 IN | SYSTOLIC BLOOD PRESSURE: 173 MMHG | WEIGHT: 191.7 LBS | BODY MASS INDEX: 28.39 KG/M2 | TEMPERATURE: 97.7 F | DIASTOLIC BLOOD PRESSURE: 82 MMHG

## 2023-10-31 DIAGNOSIS — D69.6 THROMBOCYTOPENIA: ICD-10-CM

## 2023-10-31 DIAGNOSIS — D69.6 THROMBOCYTOPENIA: Primary | ICD-10-CM

## 2023-10-31 LAB
BASOPHILS # BLD AUTO: 0.04 10*3/MM3 (ref 0–0.2)
BASOPHILS NFR BLD AUTO: 0.8 % (ref 0–1.5)
DEPRECATED RDW RBC AUTO: 43 FL (ref 37–54)
EOSINOPHIL # BLD AUTO: 0.14 10*3/MM3 (ref 0–0.4)
EOSINOPHIL NFR BLD AUTO: 2.6 % (ref 0.3–6.2)
ERYTHROCYTE [DISTWIDTH] IN BLOOD BY AUTOMATED COUNT: 11.9 % (ref 12.3–15.4)
HCT VFR BLD AUTO: 42.4 % (ref 37.5–51)
HGB BLD-MCNC: 14.9 G/DL (ref 13–17.7)
IMM GRANULOCYTES # BLD AUTO: 0 10*3/MM3 (ref 0–0.05)
IMM GRANULOCYTES NFR BLD AUTO: 0 % (ref 0–0.5)
LYMPHOCYTES # BLD AUTO: 1.48 10*3/MM3 (ref 0.7–3.1)
LYMPHOCYTES NFR BLD AUTO: 27.8 % (ref 19.6–45.3)
MCH RBC QN AUTO: 34.4 PG (ref 26.6–33)
MCHC RBC AUTO-ENTMCNC: 35.1 G/DL (ref 31.5–35.7)
MCV RBC AUTO: 97.9 FL (ref 79–97)
MONOCYTES # BLD AUTO: 0.46 10*3/MM3 (ref 0.1–0.9)
MONOCYTES NFR BLD AUTO: 8.6 % (ref 5–12)
NEUTROPHILS NFR BLD AUTO: 3.21 10*3/MM3 (ref 1.7–7)
NEUTROPHILS NFR BLD AUTO: 60.2 % (ref 42.7–76)
NRBC BLD AUTO-RTO: 0 /100 WBC (ref 0–0.2)
PLATELET # BLD AUTO: 233 10*3/MM3 (ref 140–450)
PMV BLD AUTO: 8.6 FL (ref 6–12)
RBC # BLD AUTO: 4.33 10*6/MM3 (ref 4.14–5.8)
WBC NRBC COR # BLD: 5.33 10*3/MM3 (ref 3.4–10.8)

## 2023-10-31 PROCEDURE — 85025 COMPLETE CBC W/AUTO DIFF WBC: CPT

## 2023-10-31 PROCEDURE — 36415 COLL VENOUS BLD VENIPUNCTURE: CPT

## 2023-10-31 NOTE — PROGRESS NOTES
REASON FOR CONSULTATION/CHIEF COMPLAINT:     Evaluation & management for thrombocytopenia                             REQUESTING PHYSICIAN: No ref. provider found  RECORDS OBTAINED:  Records of the patients history including those from the electronic medical record were reviewed and summarized in detail.    HISTORY OF PRESENT ILLNESS:    The patient is a 73 y.o. year old male who is here for evaluation for thrombocytopenia. On chart review, patient appears to have chronic thrombocytopenia since 2015 ranging between 90s - 200s.Patient denies any excessive bleeding or bruising.   The CBC from today show platelet count of 96k, Hb/Hct 15.4/43.6, wbc of 5.96 with normal differential. Patient denies any fever, chills, night sweats or weight loss. No lymphadenopathy. Denies any history of autoimmune condition.   Patient reports of significant alcohol abuse for > 15 years, but has not had much drinks in last 5-10 years. Denies ever being diagnosed with liver cirrhosis or any other liver/spleen disease. No family history of blood disorders.     Of note, patienthad tripped & fell injuring his right shoulder & right eyebrow in July 2021. Right shoulder is still in sling.   Improved now.    July 2021: Ultrasound abdomen showed normal liver and spleen size.  Suspected fatty liver.  Hepatic cyst within the left lobe.  Cholecystectomy.    Plan made for active surveillance for his mild and fluctuating thrombocytopenia.    March 2022: Platelets 171,000.  September 2022: Platelets 217,000.  Rest of the CBC within normal range  October 2023: Platelets improved to 233,000.    INTERIM HISTORY:  Patient returns to the clinic for a f/u visit.  Denies any bleeding or bruising. Says he is trying to eat better and has lost some weight. Feels better.  Continues to be on Eliquis for A. Fib.  He was hospitalized in July 2023 and started on Tikosyn for A-fib control.  Says he has been eating healthy and working out regularly.     Past Medical  History:   Diagnosis Date    Asymmetric septal hypertrophy     Bronchitis     Bronchitis / URI    Cataract of both eyes     Chicken pox     Cholecystitis     Diverticula of colon     Encounter for annual health examination 03/10/2015    Annual Health Assessment    Fatigue     Gallbladder disease     GERD (gastroesophageal reflux disease)     High blood pressure     History of EKG     10/30/15, 09/19/13, 09/14/12, 10/18/10, 05/04/09    History of TB skin testing     TB Skin Test: 01/09/14 Completed, 09/14/12 Patient Declines, 10/18/10 Completed, 03/05/08 Completed    Hyperlipidemia     Hypertension     still taking meds    Impingement of right ulnar nerve     Measles     Non-ST elevated myocardial infarction (non-STEMI) 1/12/2022    Osteoarthritis involving multiple joints on both sides of body     Osteoarthritis involving multiple joints on both sides of body     Persistent cough     Pulse irregularity     Irregular pulse PER CARDIO    Sexual problems     Sexual problems / enjoyment ED    Sleep apnea     has c-pap    Thrombocythemia 6/15/2021    Wellness examination 03/10/2015    Annual Wellness Visit     Past Surgical History:   Procedure Laterality Date    CARDIAC CATHETERIZATION N/A 01/12/2022    Procedure: Left Heart Cath;  Surgeon: Vi Orozco MD;  Location:  [facility];  Service: Cardiovascular;  Laterality: N/A;    CARDIAC CATHETERIZATION N/A 01/12/2022    Procedure: Coronary angiography;  Surgeon: Vi Orozco MD;  Location:  [facility];  Service: Cardiovascular;  Laterality: N/A;    CARDIAC CATHETERIZATION N/A 01/12/2022    Procedure: Left ventriculography;  Surgeon: Vi Orozco MD;  Location:  [facility];  Service: Cardiovascular;  Laterality: N/A;    CARDIAC ELECTROPHYSIOLOGY PROCEDURE N/A 5/25/2023    Procedure: Ablation atrial fibrillation;  Surgeon: Mk Moreno MD;  Location:  [facility];  Service: Cardiovascular;   Laterality: N/A;    CATARACT EXTRACTION Bilateral     CATARACT EXTRACTION Right 2013    CATARACT EXTRACTION Left 2014    CHOLECYSTECTOMY      COLONOSCOPY  04/16/2015    Dr. Ritchie    COLONOSCOPY  03/07/2007    ELBOW PROCEDURE Right     ulnar nerve impingement release    FOOT SURGERY Right     right foot (twice)    GALLBLADDER SURGERY  2009    KNEE MENISCAL REPAIR Right 2010    torn right meniscus    LASIK  2006    PROSTATE AQUABLATION  12/2022    RHINOPLASTY  1986    ROTATOR CUFF REPAIR Left 2010    SHOULDER ARTHROSCOPY W/ LABRAL REPAIR Right 2015    Dr. Baxter  labram repair right shoulder    SHOULDER LIGAMENT REPAIR Right     TOTAL HIP ARTHROPLASTY REVISION Left 05/11/2020    TOTAL KNEE ARTHROPLASTY Right 2011    WRIST ARTHROPLASTY Right 08/25/2020       MEDICATIONS    Current Outpatient Medications:     amoxicillin (AMOXIL) 500 MG capsule, Take 4 capsules by mouth As Needed. With teeth cleaning, Disp: , Rfl:     atorvastatin (LIPITOR) 40 MG tablet, TAKE 1 TABLET DAILY, Disp: 90 tablet, Rfl: 3    calcium carbonate (OS-TOMAS) 600 MG tablet, Take 1 tablet by mouth Daily., Disp: , Rfl:     Cyanocobalamin (VITAMIN B-12 PO), Take 1 tablet by mouth Daily., Disp: , Rfl:     dofetilide (TIKOSYN) 125 MCG capsule, Take 3 capsules by mouth Every 12 (Twelve) Hours., Disp: 540 capsule, Rfl: 1    Eliquis 5 MG tablet tablet, TAKE 1 TABLET EVERY 12 HOURS, Disp: 180 tablet, Rfl: 3    esomeprazole (nexIUM) 40 MG capsule, TAKE 1 CAPSULE EVERY MORNING BEFORE BREAKFAST, Disp: 90 capsule, Rfl: 3    Flaxseed, Linseed, (FLAX SEED OIL) 1000 MG capsule, Take 1 capsule by mouth 2 (Two) Times a Day., Disp: , Rfl:     Magnesium 400 MG capsule, Take 500 mg by mouth Daily., Disp: , Rfl:     Melatonin 10 MG tablet, Take 1 tablet by mouth Every Night., Disp: , Rfl:     metoprolol succinate XL (TOPROL-XL) 25 MG 24 hr tablet, Take 0.5 tablets by mouth Daily., Disp: 30 tablet, Rfl: 2    Multiple Vitamin tablet, Take 1 tablet by mouth Daily., Disp: ,  "Rfl:     sildenafil (REVATIO) 20 MG tablet, Take 2 tablets by mouth As Needed (erectile dysfunction)., Disp: 20 tablet, Rfl: 3    ALLERGIES:   No Known Allergies    SOCIAL HISTORY:       Social History     Socioeconomic History    Marital status:     Number of children: 2   Tobacco Use    Smoking status: Never    Smokeless tobacco: Never   Vaping Use    Vaping Use: Never used   Substance and Sexual Activity    Alcohol use: No     Comment: none in 5 yrs, Nov 2011 - last drink    Drug use: No    Sexual activity: Yes     Partners: Female         FAMILY HISTORY:  Family History   Problem Relation Age of Onset    Dementia Mother     Migraines Mother     Osteoporosis Mother     Stroke Mother 85        early 80s    Heart disease Mother         tachycardia and had ppm    Alzheimer's disease Mother     Supraventricular tachycardia Mother     Alcohol abuse Father     Depression Father     Migraines Father     Tuberculosis Father     Cirrhosis Father         41 YO    Cancer Paternal Uncle 67        mets but unsure of type of ca  BACK CANCER    Stroke Brother     Colon cancer Neg Hx     Prostate cancer Neg Hx     Heart attack Neg Hx        REVIEW OF SYSTEMS:  As per HPI       Vitals:    10/31/23 1503   BP: 173/82   Pulse: 59   Resp: 18   Temp: 97.7 °F (36.5 °C)   TempSrc: Temporal   SpO2: 97%   Weight: 87 kg (191 lb 11.2 oz)   Height: 175.3 cm (69.02\")   PainSc: 0-No pain           10/31/2023     2:50 PM   Current Status   ECOG score 0      PHYSICAL EXAM:    CONSTITUTIONAL:  Vital signs reviewed.  No distress, looks comfortable.  EYES:  Conjunctiva and lids unremarkable.   EARS,NOSE,MOUTH,THROAT:  Ears and nose appear unremarkable.  Lips, teeth, gums appear unremarkable.  RESPIRATORY:  Normal respiratory effort.  Lungs clear to auscultation bilaterally.  CARDIOVASCULAR:  Normal S1, S2.  No murmurs rubs or gallops.  No significant lower extremity edema  GASTROINTESTINAL: Abdomen appears unremarkable.  " Nondistended  LYMPHATIC:  No cervical, supraclavicular lymphadenopathy.  NEURO: AO x 3,  No focal deficits.  Appears to have equal strength all 4 extremities.  MUSCULOSKELETAL:  Unremarkable digits/nails.  No cyanosis or clubbing.   SKIN:  Warm.  No rashes.  PSYCHIATRIC:  Normal judgment and insight.  Normal mood and affect.     RECENT LABS:        Orders Only on 10/31/2023   Component Date Value Ref Range Status    WBC 10/31/2023 5.33  3.40 - 10.80 10*3/mm3 Final    RBC 10/31/2023 4.33  4.14 - 5.80 10*6/mm3 Final    Hemoglobin 10/31/2023 14.9  13.0 - 17.7 g/dL Final    Hematocrit 10/31/2023 42.4  37.5 - 51.0 % Final    MCV 10/31/2023 97.9 (H)  79.0 - 97.0 fL Final    MCH 10/31/2023 34.4 (H)  26.6 - 33.0 pg Final    MCHC 10/31/2023 35.1  31.5 - 35.7 g/dL Final    RDW 10/31/2023 11.9 (L)  12.3 - 15.4 % Final    RDW-SD 10/31/2023 43.0  37.0 - 54.0 fl Final    MPV 10/31/2023 8.6  6.0 - 12.0 fL Final    Platelets 10/31/2023 233  140 - 450 10*3/mm3 Final    Neutrophil % 10/31/2023 60.2  42.7 - 76.0 % Final    Lymphocyte % 10/31/2023 27.8  19.6 - 45.3 % Final    Monocyte % 10/31/2023 8.6  5.0 - 12.0 % Final    Eosinophil % 10/31/2023 2.6  0.3 - 6.2 % Final    Basophil % 10/31/2023 0.8  0.0 - 1.5 % Final    Immature Grans % 10/31/2023 0.0  0.0 - 0.5 % Final    Neutrophils, Absolute 10/31/2023 3.21  1.70 - 7.00 10*3/mm3 Final    Lymphocytes, Absolute 10/31/2023 1.48  0.70 - 3.10 10*3/mm3 Final    Monocytes, Absolute 10/31/2023 0.46  0.10 - 0.90 10*3/mm3 Final    Eosinophils, Absolute 10/31/2023 0.14  0.00 - 0.40 10*3/mm3 Final    Basophils, Absolute 10/31/2023 0.04  0.00 - 0.20 10*3/mm3 Final    Immature Grans, Absolute 10/31/2023 0.00  0.00 - 0.05 10*3/mm3 Final    nRBC 10/31/2023 0.0  0.0 - 0.2 /100 WBC Final       ASSESSMENT:   is a pleasant 73 y/o WM who comes to this clinic for evaluation & management for thrombocytopenia.     # Thrombocytopenia:  Patient appears to have chronic thrombocytopenia, fluctuating  between 90s-200s, at least since 2015. Hb/Hct & WBC count within normal limits. Smear review shows decreased but normal appearing platelets. No current medications suspected to cause thrombocytopenia.   Work up for autoimmune condition & viral infection came back negative. US abdomen is suggestive of fatty liver. No splenomegaly.   Given history of alcohol abuse in the past & US abdomen showing fatty liver, underlying liver disease is likely the cause of his isolated thrombocytopenia.  The CBC from 7/28/21 show platelet count of 110k. Plan made for surveillance  CBC from 11/24/21 show platelets 235k. No new symptoms. Did not start or stopped any meds. Quit alcohol > 10 yrs ago. Is a retired .   CBC from 3/23/22 shows platelet count of 171,000. Fluctuating.  Platelets 217,000 in September 2022.  10/31/2023: Overall asymptomatic.  CBC from today show normal platelet count of 233,000. At this time, will continue to monitor & repeat labs in 12 months time.  If stays within normal range, will discharge him back to PCP.  Patient is agreeable to the plan.     # Afib: On Tikosyn & eliquis. Advised to f/u cardiology    #CAD: Had non-STEMI in January 2022.  Follows up with cardiology.    PLAN:   - Active surveillance  - repeat labs in 12 months time.    Orders Placed This Encounter   Procedures    CBC & Differential     Standing Status:   Future     Standing Expiration Date:   1/3/2025     Order Specific Question:   Manual Differential     Answer:   No     Order Specific Question:   Release to patient     Answer:   Routine Release [[id number]]   Total time spent during this patient encounter is 35 minutes. The total time spent with the patient includes at least one or more of the following: preparing to see the patient by reviewing of tests, prior notes or other relevant information, performing appropriate independent examination & evaluation, counseling, ordering of medications, tests or procedures,  communicating with other healthcare professionals, when appropriate to coordinate care, documenting clinic information in the electronic medical records or other health records, independently interpreting results of tests and communicating the results to the patient/family or caregiver.

## 2023-11-06 ENCOUNTER — OFFICE VISIT (OUTPATIENT)
Age: 73
End: 2023-11-06
Payer: MEDICARE

## 2023-11-06 VITALS
WEIGHT: 191.4 LBS | DIASTOLIC BLOOD PRESSURE: 98 MMHG | HEART RATE: 65 BPM | HEIGHT: 69 IN | SYSTOLIC BLOOD PRESSURE: 148 MMHG | BODY MASS INDEX: 28.35 KG/M2

## 2023-11-06 DIAGNOSIS — Q24.5 CORONARY-MYOCARDIAL BRIDGE: ICD-10-CM

## 2023-11-06 DIAGNOSIS — I42.2 ASYMMETRIC SEPTAL HYPERTROPHY: Primary | ICD-10-CM

## 2023-11-06 DIAGNOSIS — I48.0 PAROXYSMAL ATRIAL FIBRILLATION: ICD-10-CM

## 2023-11-06 DIAGNOSIS — I10 ESSENTIAL HYPERTENSION: ICD-10-CM

## 2023-11-06 DIAGNOSIS — I47.29 NON-SUSTAINED VENTRICULAR TACHYCARDIA: ICD-10-CM

## 2023-11-06 DIAGNOSIS — E78.01 FAMILIAL HYPERCHOLESTEROLEMIA: ICD-10-CM

## 2023-11-06 PROCEDURE — 1159F MED LIST DOCD IN RCRD: CPT | Performed by: INTERNAL MEDICINE

## 2023-11-06 PROCEDURE — 3080F DIAST BP >= 90 MM HG: CPT | Performed by: INTERNAL MEDICINE

## 2023-11-06 PROCEDURE — 93000 ELECTROCARDIOGRAM COMPLETE: CPT | Performed by: INTERNAL MEDICINE

## 2023-11-06 PROCEDURE — 1160F RVW MEDS BY RX/DR IN RCRD: CPT | Performed by: INTERNAL MEDICINE

## 2023-11-06 PROCEDURE — 3077F SYST BP >= 140 MM HG: CPT | Performed by: INTERNAL MEDICINE

## 2023-11-06 PROCEDURE — 99214 OFFICE O/P EST MOD 30 MIN: CPT | Performed by: INTERNAL MEDICINE

## 2023-11-06 NOTE — PROGRESS NOTES
[facility] Follow Up Office Note     Encounter Date:23  Patient:Zheng Lopez  :1950  MRN:[id number]      Chief Complaint:   Chief Complaint   Patient presents with    Paroxysmal atrial fibrillation     1 year f/u     History of Presenting Illness:      Mr. Lopez is a 73 y.o. gentleman with past medical history notable for myocardial bridging, moderate basal septal hypertrophy, hypertension, mixed hyperlipidemia, obstructive sleep apnea with CPAP use, and paroxysmal atrial fibrillation presents her office for scheduled follow-up.  I last saw him well over a year ago at that time was having more symptoms of atrial fibrillation he was actually referred to our electrophysiology team underwent an ablation last summer unfortunately had recurrent atrial fibrillation despite ablation eventually started on dofetilide load and converted to sinus on his own with medical therapy back in July.  Since then he is doing well with no recurrent atrial fibrillation.  He is tolerating his medication without any complaints..  Pressure is elevated today in the office there was a rack on [employer] he was laid he is fairly stressed out related to this.  If I look back throughout his blood pressure checks throughout the year blood pressure is typically well controlled.  At home it also is better controlled this likely does not require present his current blood pressure control        Review of Systems:  Review of Systems   Constitutional: Negative.   HENT: Negative.     Eyes: Negative.    Cardiovascular: Negative.    Respiratory: Negative.     Endocrine: Negative.    Hematologic/Lymphatic: Negative.    Skin: Negative.    Musculoskeletal: Negative.    Gastrointestinal: Negative.    Genitourinary: Negative.    Neurological: Negative.    Psychiatric/Behavioral: Negative.     Allergic/Immunologic: Negative.        Current Outpatient Medications on File Prior to Visit   Medication Sig Dispense Refill     atorvastatin (LIPITOR) 40 MG tablet TAKE 1 TABLET DAILY 90 tablet 3    calcium carbonate (OS-TOMAS) 600 MG tablet Take 1 tablet by mouth Daily.      Cyanocobalamin (VITAMIN B-12 PO) Take 1 tablet by mouth Daily.      dofetilide (TIKOSYN) 125 MCG capsule Take 3 capsules by mouth Every 12 (Twelve) Hours. 540 capsule 1    Eliquis 5 MG tablet tablet TAKE 1 TABLET EVERY 12 HOURS 180 tablet 3    esomeprazole (nexIUM) 40 MG capsule TAKE 1 CAPSULE EVERY MORNING BEFORE BREAKFAST 90 capsule 3    Flaxseed, Linseed, (FLAX SEED OIL) 1000 MG capsule Take 1 capsule by mouth 2 (Two) Times a Day.      Magnesium 400 MG capsule Take 500 mg by mouth Daily.      metoprolol succinate XL (TOPROL-XL) 25 MG 24 hr tablet Take 0.5 tablets by mouth Daily. 30 tablet 2    Multiple Vitamin tablet Take 1 tablet by mouth Daily.      sildenafil (REVATIO) 20 MG tablet Take 2 tablets by mouth As Needed (erectile dysfunction). 20 tablet 3    amoxicillin (AMOXIL) 500 MG capsule Take 4 capsules by mouth As Needed. With teeth cleaning (Patient not taking: Reported on 11/6/2023)      [DISCONTINUED] Melatonin 10 MG tablet Take 1 tablet by mouth Every Night.       No current facility-administered medications on file prior to visit.       No Known Allergies    Past Medical History:   Diagnosis Date    Asymmetric septal hypertrophy     Benign prostatic hyperplasia with urinary hesitancy 11/13/2019    Bronchitis     Bronchitis / URI    Cataract of both eyes     Chicken pox     Cholecystitis     Diverticula of colon     ED (erectile dysfunction) of organic origin 11/13/2017    Encounter for annual health examination 03/10/2015    Annual Health Assessment    Fatigue     Gallbladder disease     GERD (gastroesophageal reflux disease)     High blood pressure     History of EKG     10/30/15, 09/19/13, 09/14/12, 10/18/10, 05/04/09    History of TB skin testing     TB Skin Test: 01/09/14 Completed, 09/14/12 Patient Declines, 10/18/10 Completed, 03/05/08 Completed     Hyperlipidemia     Hypertension     still taking meds    Impingement of right ulnar nerve     Measles     Non-ST elevated myocardial infarction (non-STEMI) 01/12/2022    Osteoarthritis involving multiple joints on both sides of body     Osteoarthritis involving multiple joints on both sides of body     Persistent cough     Pulse irregularity     Irregular pulse PER CARDIO    Sexual problems     Sexual problems / enjoyment ED    Sleep apnea     has c-pap    Thrombocythemia 06/15/2021    Wellness examination 03/10/2015    Annual Wellness Visit       Past Surgical History:   Procedure Laterality Date    CARDIAC CATHETERIZATION N/A 01/12/2022    Procedure: Left Heart Cath;  Surgeon: Vi Orozco MD;  Location:  [facility];  Service: Cardiovascular;  Laterality: N/A;    CARDIAC CATHETERIZATION N/A 01/12/2022    Procedure: Coronary angiography;  Surgeon: Vi Orozco MD;  Location:  [facility];  Service: Cardiovascular;  Laterality: N/A;    CARDIAC CATHETERIZATION N/A 01/12/2022    Procedure: Left ventriculography;  Surgeon: Vi Orozco MD;  Location:  [facility];  Service: Cardiovascular;  Laterality: N/A;    CARDIAC ELECTROPHYSIOLOGY PROCEDURE N/A 5/25/2023    Procedure: Ablation atrial fibrillation;  Surgeon: Mk Moreno MD;  Location:  [facility];  Service: Cardiovascular;  Laterality: N/A;    CATARACT EXTRACTION Bilateral     CATARACT EXTRACTION Right 2013    CATARACT EXTRACTION Left 2014    CHOLECYSTECTOMY      COLONOSCOPY  04/16/2015    Dr. Ritchie    COLONOSCOPY  03/07/2007    ELBOW PROCEDURE Right     ulnar nerve impingement release    FOOT SURGERY Right     right foot (twice)    GALLBLADDER SURGERY  2009    KNEE MENISCAL REPAIR Right 2010    torn right meniscus    LASIK  2006    PROSTATE AQUABLATION  12/2022    RHINOPLASTY  1986    ROTATOR CUFF REPAIR Left 2010    SHOULDER ARTHROSCOPY W/ LABRAL REPAIR Right 2015    Dr. Baxter  " labram repair right shoulder    SHOULDER LIGAMENT REPAIR Right     TOTAL HIP ARTHROPLASTY REVISION Left 05/11/2020    TOTAL KNEE ARTHROPLASTY Right 2011    WRIST ARTHROPLASTY Right 08/25/2020       Social History     Socioeconomic History    Marital status:     Number of children: 2   Tobacco Use    Smoking status: Never    Smokeless tobacco: Never   Vaping Use    Vaping Use: Never used   Substance and Sexual Activity    Alcohol use: No     Comment: none in 5 yrs, Nov 2011 - last drink    Drug use: No    Sexual activity: Yes     Partners: Female       Family History   Problem Relation Age of Onset    Dementia Mother     Migraines Mother     Osteoporosis Mother     Stroke Mother 85        early 80s    Heart disease Mother         tachycardia and had ppm    Alzheimer's disease Mother     Supraventricular tachycardia Mother     Alcohol abuse Father     Depression Father     Migraines Father     Tuberculosis Father     Cirrhosis Father         41 YO    Cancer Paternal Uncle 67        mets but unsure of type of ca  BACK CANCER    Stroke Brother     Colon cancer Neg Hx     Prostate cancer Neg Hx     Heart attack Neg Hx        The following portions of the patient's history were reviewed and updated as appropriate: allergies, current medications, past family history, past medical history, past social history, past surgical history and problem list.       Objective:       Vitals:    11/06/23 1059   BP: 148/98   BP Location: Left arm   Patient Position: Sitting   Pulse: 65   Weight: 86.8 kg (191 lb 6.4 oz)   Height: 175.3 cm (69.02\")       Body mass index is 28.25 kg/m².     Physical Exam:  Constitutional: Well appearing, Well-developed, No acute distress   HENT: Oropharynx clear and membrane moist  Eyes: Normal conjunctiva, no sclera icterus.  Neck: Supple, no carotid bruit bilaterally.  Cardiovascular: Regular rate and rhythm, No Murmur, No bilateral lower extremity edema.  Pulmonary: Normal respiratory effort, " normal lung sounds, no wheezing.  Neurological: Alert and orient x 3.   Skin: Warm, dry, no ecchymosis, no rash.  Psych: Appropriate mood and affect. Normal judgment and insight.          Lab Results   Component Value Date     10/06/2023     07/19/2023    K 5.0 10/06/2023    K 4.8 07/19/2023     10/06/2023     07/19/2023    CO2 28.4 10/06/2023    CO2 26.5 07/19/2023    BUN 14 10/06/2023    BUN 19 07/19/2023    CREATININE 1.01 10/06/2023    CREATININE 1.04 07/19/2023    EGFRIFNONA 68 01/11/2022    EGFRIFNONA 68 12/14/2021    EGFRIFAFRI 78 12/14/2021    EGFRIFAFRI 87 06/08/2021    GLUCOSE 107 (H) 10/06/2023    GLUCOSE 105 (H) 07/19/2023    CALCIUM 9.5 10/06/2023    CALCIUM 8.8 07/19/2023    PROTENTOTREF 7.1 10/06/2023    PROTENTOTREF 6.3 04/05/2023    ALBUMIN 4.9 10/06/2023    ALBUMIN 4.3 04/05/2023    BILITOT 0.8 10/06/2023    BILITOT 0.8 04/05/2023    AST 31 10/06/2023    AST 28 04/05/2023    ALT 25 10/06/2023    ALT 28 04/05/2023     Lab Results   Component Value Date    WBC 5.33 10/31/2023    WBC 4.14 10/06/2023    HGB 14.9 10/31/2023    HGB 15.7 10/06/2023    HCT 42.4 10/31/2023    HCT 44.9 10/06/2023    MCV 97.9 (H) 10/31/2023    MCV 95.9 10/06/2023     10/31/2023     (L) 10/06/2023     Lab Results   Component Value Date    TRIG 44 10/06/2023    TRIG 60 04/05/2023    HDL 73 (H) 10/06/2023    HDL 59 04/05/2023    LDL 58 10/06/2023    LDL 55 04/05/2023     Lab Results   Component Value Date    PROBNP 445.0 10/07/2022     Lab Results   Component Value Date    TROPONINT <0.010 10/07/2022     Lab Results   Component Value Date    TSH 1.290 12/08/2020    TSH 2.520 10/26/2016           ECG 12 Lead    Date/Time: 11/6/2023 11:15 AM  Performed by: Ferdinand De Santiago MD    Authorized by: Ferdinand De Santiago MD  Comparison: compared with previous ECG from 7/21/2023  Similar to previous ECG  Rhythm: sinus rhythm        Holter monitor 7/7/2023 tracings reviewed by myself:  An abnormal  monitor study.  He is in A-fib for the entire 6 days 20 hours.  Heart rates ranged from 62-1 76 with an average of 108.    Atrial fibrillation ablation 5/25/2023 Dr. Moreno:  Successful ablation of atrial fibrillation    Event Monitor 1/31/2022:  No symptoms reported during the monitoring period. No complications noted.   The predominant rhythm noted during the testing period was sinus rhythm.   Evidence of atrial fibrillation was noted. Atrial fibrillation burden was 1%.  Rates in atrial fibrillation ranged from 76 to 193 bpm with an average rate of 132 bpm.  Longest episode of atrial fibrillation lasted 2 hours with an average rate of 123 bpm.   There were 4 episodes of nonsustained ventricular tachycardia, longest lasting 8 beats and the fastest with a rate of 169 bpm.     Echocardiogram 1/12/2022:  Calculated left ventricular EF = 63.7% Estimated left ventricular EF = 64% Estimated left ventricular EF was in agreement with the calculated left ventricular EF. Left ventricular systolic function is normal. Normal left ventricular cavity size noted. Left ventricular wall thickness is consistent with mild concentric hypertrophy. There are wall motion abnormalities as noted below Left ventricular diastolic function was normal.  Left atrial volume is mildly increased.  Trace mitral valve regurgitation is present.  There is an echolucent. In the liver suggestive of hepatic cyst. Consider dedicated imaging to assess further if clinically indicated    Cardiac Catheterization 1/12/2022:  Left main artery: Large-caliber vessel with 0% stenosis.  The vessel trifurcates into left anterior descending, ramus intermedius, and left circumflex arteries.  Left anterior descending artery: Large caliber vessel with 0% proximal stenosis.  Proximal vessel gives rise to a large caliber first diagonal branch with a high origin near the LAD ostium and no significant disease.  The mid LAD tapers to a moderate caliber vessel and has a  long segment of a severe myocardial bridge with near obliteration of the vessel lumen in systole.  The distal LAD then tapers to a small caliber vessel and bifurcates into a small caliber diagonal branch and distal LAD.  Ramus intermedius: Large-caliber vessel with 0% stenosis.  Left circumflex artery: Large-caliber vessel with 0% proximal to distal stenosis.  The distal vessel gives rise to a small caliber first and second obtuse marginal branches with no significant disease.  Right coronary artery.  This is a large, dominant vessel with 0% proximal to distal stenosis.  The distal vessel bifurcates into a large caliber posterior descending and posterolateral branch.  The posterior descending branch in addition to being large in caliber is a long vessel and appears to supply the apical wall.  There is a long segment with a severe myocardial bridge with near obliteration of the vessel lumen in systole.  Otherwise the posterolateral branch and the posterior descending branch has no significant disease.         Assessment:          Diagnosis Plan   1. Asymmetric septal hypertrophy        2. Non-sustained ventricular tachycardia        3. Paroxysmal atrial fibrillation  ECG 12 Lead      4. Coronary-myocardial bridge        5. Familial hypercholesterolemia        6. Essential hypertension                 Plan:       Mr. Lopez is a 73 y.o. gentleman with past medical history notable for myocardial bridging, moderate basal septal hypertrophy, hypertension, mixed hyperlipidemia, obstructive sleep apnea with CPAP use, and paroxysmal atrial fibrillation presents her office for scheduled follow-up.  Overall he is doing well.  No new complaints today.  Blood pressure is elevated in the office but better controlled at home I think this does not represent his home blood pressure control as even at prior office visits this year his blood pressure is very well controlled.  He will keep a close eye on his blood pressure at home and  if it does continue to rise and other subsequent office visits may need to consider adding a second agent such as amlodipine      Paroxysmal atrial fibrillation:  Will continue anticoagulation given elevated CHADVASC score of 2  Continue metoprolol succinate and dofetilide as directed by electrophysiology  Prior ablation 4/2023    Basal septal hypertrophy:  No significant obstruction noted  Continue beta-blocker use  Avoid hypovolemia    Myocardial bridging:  Continue beta-blocker use  Advised to slow down her back off if heart rate gets above 90 bpm    Hypertension:  Blood pressure elevated in the office but does not represent home blood pressure control according to his readings at home as well as at other readings at other office visits    Mixed hyperlipidemia:  Continue high potency statin  Lipid panel 10/2023 demonstrates good control of LDL and total cholesterol  CMP 10/2023 demonstrates normal LFT and AST    Nonsustained ventricular tachycardia:  Continue beta-blocker use  No significant coronary artery disease however there is evidence of myocardial bridging on cardiac catheterization 12/2021  No high risk features of hypertrophic cardiomyopathy although some basal septal hypertrophy and given age less likely to be a major pathologic issue.      Follow-up:  6 months      Ferdinand De Santiago MD  [facility]  11/06/23  11:16 EST

## 2023-11-07 NOTE — PROGRESS NOTES
Patient: Zheng Lopez  YOB: 1950  Date of Service: 11/7/2023    Chief Complaints:   Left shoulder pain  Subjective:    History of Present Illness: Pt is seen in the office today with complaints of left shoulder pain is prior rotator cuff repair I saw him about 3 months ago in June we injected him fully but about 5 months ago he got good relief his symptoms have returned he is really not interested in think surgical        Allergies: No Known Allergies    Medications:   Home Medications:  Current Outpatient Medications on File Prior to Visit   Medication Sig    amoxicillin (AMOXIL) 500 MG capsule Take 4 capsules by mouth As Needed. With teeth cleaning (Patient not taking: Reported on 11/6/2023)    atorvastatin (LIPITOR) 40 MG tablet TAKE 1 TABLET DAILY    calcium carbonate (OS-TOMAS) 600 MG tablet Take 1 tablet by mouth Daily.    Cyanocobalamin (VITAMIN B-12 PO) Take 1 tablet by mouth Daily.    dofetilide (TIKOSYN) 125 MCG capsule Take 3 capsules by mouth Every 12 (Twelve) Hours.    Eliquis 5 MG tablet tablet TAKE 1 TABLET EVERY 12 HOURS    esomeprazole (nexIUM) 40 MG capsule TAKE 1 CAPSULE EVERY MORNING BEFORE BREAKFAST    Flaxseed, Linseed, (FLAX SEED OIL) 1000 MG capsule Take 1 capsule by mouth 2 (Two) Times a Day.    Magnesium 400 MG capsule Take 500 mg by mouth Daily.    metoprolol succinate XL (TOPROL-XL) 25 MG 24 hr tablet Take 0.5 tablets by mouth Daily.    Multiple Vitamin tablet Take 1 tablet by mouth Daily.    sildenafil (REVATIO) 20 MG tablet Take 2 tablets by mouth As Needed (erectile dysfunction).     No current facility-administered medications on file prior to visit.     Current Medications:  Scheduled Meds:  Continuous Infusions:No current facility-administered medications for this visit.    PRN Meds:.    I have reviewed the patient's medical history in detail and updated the computerized patient record.  Review and summarization of old records include:    Past Medical History:    Diagnosis Date    Asymmetric septal hypertrophy     Benign prostatic hyperplasia with urinary hesitancy 11/13/2019    Bronchitis     Bronchitis / URI    Cataract of both eyes     Chicken pox     Cholecystitis     Diverticula of colon     ED (erectile dysfunction) of organic origin 11/13/2017    Encounter for annual health examination 03/10/2015    Annual Health Assessment    Fatigue     Gallbladder disease     GERD (gastroesophageal reflux disease)     High blood pressure     History of EKG     10/30/15, 09/19/13, 09/14/12, 10/18/10, 05/04/09    History of TB skin testing     TB Skin Test: 01/09/14 Completed, 09/14/12 Patient Declines, 10/18/10 Completed, 03/05/08 Completed    Hyperlipidemia     Hypertension     still taking meds    Impingement of right ulnar nerve     Measles     Non-ST elevated myocardial infarction (non-STEMI) 01/12/2022    Osteoarthritis involving multiple joints on both sides of body     Osteoarthritis involving multiple joints on both sides of body     Persistent cough     Pulse irregularity     Irregular pulse PER CARDIO    Sexual problems     Sexual problems / enjoyment ED    Sleep apnea     has c-pap    Thrombocythemia 06/15/2021    Wellness examination 03/10/2015    Annual Wellness Visit        Past Surgical History:   Procedure Laterality Date    CARDIAC CATHETERIZATION N/A 01/12/2022    Procedure: Left Heart Cath;  Surgeon: Vi Orozco MD;  Location:  [facility];  Service: Cardiovascular;  Laterality: N/A;    CARDIAC CATHETERIZATION N/A 01/12/2022    Procedure: Coronary angiography;  Surgeon: Vi Orozco MD;  Location:  [facility];  Service: Cardiovascular;  Laterality: N/A;    CARDIAC CATHETERIZATION N/A 01/12/2022    Procedure: Left ventriculography;  Surgeon: Vi Orozco MD;  Location:  [facility];  Service: Cardiovascular;  Laterality: N/A;    CARDIAC ELECTROPHYSIOLOGY PROCEDURE N/A 5/25/2023    Procedure:  Ablation atrial fibrillation;  Surgeon: Mk Moreno MD;  Location:  [facility];  Service: Cardiovascular;  Laterality: N/A;    CATARACT EXTRACTION Bilateral     CATARACT EXTRACTION Right 2013    CATARACT EXTRACTION Left 2014    CHOLECYSTECTOMY      COLONOSCOPY  04/16/2015    Dr. Ritchie    COLONOSCOPY  03/07/2007    ELBOW PROCEDURE Right     ulnar nerve impingement release    FOOT SURGERY Right     right foot (twice)    GALLBLADDER SURGERY  2009    KNEE MENISCAL REPAIR Right 2010    torn right meniscus    LASIK  2006    PROSTATE AQUABLATION  12/2022    RHINOPLASTY  1986    ROTATOR CUFF REPAIR Left 2010    SHOULDER ARTHROSCOPY W/ LABRAL REPAIR Right 2015    Dr. Baxter  labram repair right shoulder    SHOULDER LIGAMENT REPAIR Right     TOTAL HIP ARTHROPLASTY REVISION Left 05/11/2020    TOTAL KNEE ARTHROPLASTY Right 2011    WRIST ARTHROPLASTY Right 08/25/2020        Social History     Occupational History    Occupation: teacher2   Tobacco Use    Smoking status: Never    Smokeless tobacco: Never   Vaping Use    Vaping Use: Never used   Substance and Sexual Activity    Alcohol use: No     Comment: none in 5 yrs, Nov 2011 - last drink    Drug use: No    Sexual activity: Yes     Partners: Female      Social History     Social History Narrative    Not on file        Family History   Problem Relation Age of Onset    Dementia Mother     Migraines Mother     Osteoporosis Mother     Stroke Mother 85        early 80s    Heart disease Mother         tachycardia and had ppm    Alzheimer's disease Mother     Supraventricular tachycardia Mother     Alcohol abuse Father     Depression Father     Migraines Father     Tuberculosis Father     Cirrhosis Father         41 YO    Cancer Paternal Uncle 67        mets but unsure of type of ca  BACK CANCER    Stroke Brother     Colon cancer Neg Hx     Prostate cancer Neg Hx     Heart attack Neg Hx        ROS: 14 point review of systems was performed and was negative  except for documented findings in HPI and today's encounter.     Allergies: No Known Allergies  Constitutional:  Denies fever, shaking or chills   Eyes:  Denies change in visual acuity   HENT:  Denies nasal congestion or sore throat   Respiratory:  Denies cough or shortness of breath   Cardiovascular:  Denies chest pain or severe LE edema   GI:  Denies abdominal pain, nausea, vomiting, bloody stools or diarrhea   Musculoskeletal:  Numbness, tingling, or loss of motor function only as noted above in history of present illness.  : Denies painful urination or hematuria  Integument:  Denies rash, lesion or ulceration   Neurologic:  Denies headache or focal weakness  Endocrine:  Denies lymphadenopathy  Psych:  Denies confusion or change in mental status   Hem:  Denies active bleeding      Physical Exam: 73 y.o. male  Wt Readings from Last 3 Encounters:   11/06/23 86.8 kg (191 lb 6.4 oz)   10/31/23 87 kg (191 lb 11.2 oz)   10/13/23 82.6 kg (182 lb)       There is no height or weight on file to calculate BMI.    There were no vitals filed for this visit.  Vital signs reviewed.   General Appearance:    Alert, cooperative, in no acute distress                    Ortho exam      Exam is unchanged           Assessment: Left shoulder pain status post rotator cuff repair I think this is probably related to a new injury to his cuff I think he benefited from an injection in the past we like to do it again which I think is fine    Plan:   Follow up as indicated.  Ice, elevate, and rest as needed.  Discussed conservative measures of pain control including ice, bracing.  Also talked about the importance of strengtheninght    Latanya Baxter M.D.    Large Joint Arthrocentesis: L subacromial bursa  Date/Time: 11/9/2023 1:14 PM  Consent given by: patient  Site marked: site marked  Timeout: Immediately prior to procedure a time out was called to verify the correct patient, procedure, equipment, support staff and site/side marked as  required   Supporting Documentation  Indications: pain   Procedure Details  Location: shoulder - L subacromial bursa  Preparation: Patient was prepped and draped in the usual sterile fashion  Needle gauge: 21 G.  Approach: posterior  Medications administered: 2 mL lidocaine PF 1% 1 %; 80 mg methylPREDNISolone acetate 80 MG/ML  Patient tolerance: patient tolerated the procedure well with no immediate complications

## 2023-11-09 ENCOUNTER — CLINICAL SUPPORT (OUTPATIENT)
Dept: ORTHOPEDIC SURGERY | Facility: CLINIC | Age: 73
End: 2023-11-09
Payer: MEDICARE

## 2023-11-09 VITALS — BODY MASS INDEX: 28.38 KG/M2 | TEMPERATURE: 98.2 F | WEIGHT: 191.6 LBS | HEIGHT: 69 IN

## 2023-11-09 DIAGNOSIS — M75.102 TEAR OF LEFT ROTATOR CUFF, UNSPECIFIED TEAR EXTENT, UNSPECIFIED WHETHER TRAUMATIC: Primary | ICD-10-CM

## 2023-11-09 RX ORDER — METHYLPREDNISOLONE ACETATE 80 MG/ML
80 INJECTION, SUSPENSION INTRA-ARTICULAR; INTRALESIONAL; INTRAMUSCULAR; SOFT TISSUE
Status: COMPLETED | OUTPATIENT
Start: 2023-11-09 | End: 2023-11-09

## 2023-11-09 RX ORDER — LIDOCAINE HYDROCHLORIDE 10 MG/ML
2 INJECTION, SOLUTION EPIDURAL; INFILTRATION; INTRACAUDAL; PERINEURAL
Status: COMPLETED | OUTPATIENT
Start: 2023-11-09 | End: 2023-11-09

## 2023-11-09 RX ADMIN — METHYLPREDNISOLONE ACETATE 80 MG: 80 INJECTION, SUSPENSION INTRA-ARTICULAR; INTRALESIONAL; INTRAMUSCULAR; SOFT TISSUE at 13:14

## 2023-11-09 RX ADMIN — LIDOCAINE HYDROCHLORIDE 2 ML: 10 INJECTION, SOLUTION EPIDURAL; INFILTRATION; INTRACAUDAL; PERINEURAL at 13:14

## 2023-11-28 RX ORDER — APIXABAN 5 MG/1
TABLET, FILM COATED ORAL
Qty: 180 TABLET | Refills: 3 | Status: SHIPPED | OUTPATIENT
Start: 2023-11-28

## 2023-12-05 ENCOUNTER — TELEPHONE (OUTPATIENT)
Dept: SLEEP MEDICINE | Facility: HOSPITAL | Age: 73
End: 2023-12-05
Payer: MEDICARE

## 2023-12-05 NOTE — TELEPHONE ENCOUNTER
Spoke with pt about his supplies. Pt is having a hard time getting his supplies from [employer]. Tech reached out to Minoo @ [employer] to see if she could help with pts supply issue.

## 2023-12-20 RX ORDER — DOFETILIDE 0.12 MG/1
375 CAPSULE ORAL EVERY 12 HOURS
Qty: 540 CAPSULE | Refills: 1 | Status: SHIPPED | OUTPATIENT
Start: 2023-12-20

## 2023-12-21 ENCOUNTER — TELEPHONE (OUTPATIENT)
Age: 73
End: 2023-12-21
Payer: MEDICARE

## 2023-12-21 NOTE — TELEPHONE ENCOUNTER
Fax received from [employer] regarding patient's dose of dofetilide.    Please advise whether or not dose needs to be adjusted.    Thanks!

## 2024-01-16 ENCOUNTER — OFFICE VISIT (OUTPATIENT)
Dept: SLEEP MEDICINE | Facility: HOSPITAL | Age: 74
End: 2024-01-16
Payer: MEDICARE

## 2024-01-16 VITALS
HEART RATE: 71 BPM | HEIGHT: 69 IN | WEIGHT: 192 LBS | SYSTOLIC BLOOD PRESSURE: 145 MMHG | OXYGEN SATURATION: 95 % | DIASTOLIC BLOOD PRESSURE: 79 MMHG | BODY MASS INDEX: 28.44 KG/M2

## 2024-01-16 DIAGNOSIS — I48.0 PAROXYSMAL ATRIAL FIBRILLATION: ICD-10-CM

## 2024-01-16 DIAGNOSIS — G47.33 OBSTRUCTIVE SLEEP APNEA: Primary | ICD-10-CM

## 2024-01-16 PROCEDURE — G0463 HOSPITAL OUTPT CLINIC VISIT: HCPCS

## 2024-01-16 NOTE — PROGRESS NOTES
"[facility] SLEEP MEDICINE  [address]  [address]  [facility] [address] [phone]    PCP: Zoya Sanchez MD    Reason for visit:  Sleep disorders: SYLWIA    Zheng \"CAMPOS\" is a 73 y.o.male who was seen in the [facility] today. Annual fu. He is doing well with CPAP. He was in hospital for Afib July 2023 and started on Tikosyn - no problems since then. He sleeps from 10pm to 5:30am. He uses ffm, fits well, no leaks or dry mouth. Wakes up rested and refreshed. No EDS. He is very compliant. Has newer machine through [employer].  [place] Sleepiness Scale is 3. Caffeine 4 per day. Alcohol 0 per week.    Zheng \"CAMPOS\"  reports that he has never smoked. He has never been exposed to tobacco smoke. He has never used smokeless tobacco.    Pertinent Positive Review of Systems of denies  Rest of Review of Systems was negative as recorded in Sleep Questionnaire.    Patient  has a past medical history of Asymmetric septal hypertrophy, Benign prostatic hyperplasia with urinary hesitancy (11/13/2019), Bronchitis, Cataract of both eyes, Chicken pox, Cholecystitis, Diverticula of colon, ED (erectile dysfunction) of organic origin (11/13/2017), Encounter for annual health examination (03/10/2015), Fatigue, Gallbladder disease, GERD (gastroesophageal reflux disease), High blood pressure, History of EKG, History of TB skin testing, Hyperlipidemia, Hypertension, Impingement of right ulnar nerve, Measles, Non-ST elevated myocardial infarction (non-STEMI) (01/12/2022), Osteoarthritis involving multiple joints on both sides of body, Osteoarthritis involving multiple joints on both sides of body, Persistent cough, Pulse irregularity, Sexual problems, Sleep apnea, Thrombocythemia (06/15/2021), and Wellness examination (03/10/2015).     Current Medications:    Current Outpatient Medications:     amoxicillin (AMOXIL) 500 MG capsule, Take 4 capsules by mouth As Needed. With teeth cleaning, Disp: , Rfl:     " "atorvastatin (LIPITOR) 40 MG tablet, TAKE 1 TABLET DAILY, Disp: 90 tablet, Rfl: 3    calcium carbonate (OS-TOMAS) 600 MG tablet, Take 1 tablet by mouth Daily., Disp: , Rfl:     Cyanocobalamin (VITAMIN B-12 PO), Take 1 tablet by mouth Daily., Disp: , Rfl:     dofetilide (TIKOSYN) 125 MCG capsule, Take 3 capsules by mouth Every 12 (Twelve) Hours., Disp: 540 capsule, Rfl: 1    Eliquis 5 MG tablet tablet, TAKE 1 TABLET EVERY 12 HOURS, Disp: 180 tablet, Rfl: 3    esomeprazole (nexIUM) 40 MG capsule, TAKE 1 CAPSULE EVERY MORNING BEFORE BREAKFAST, Disp: 90 capsule, Rfl: 3    Flaxseed, Linseed, (FLAX SEED OIL) 1000 MG capsule, Take 1 capsule by mouth 2 (Two) Times a Day., Disp: , Rfl:     Magnesium 400 MG capsule, Take 500 mg by mouth Daily., Disp: , Rfl:     metoprolol succinate XL (TOPROL-XL) 25 MG 24 hr tablet, Take 0.5 tablets by mouth Daily., Disp: 30 tablet, Rfl: 2    Multiple Vitamin tablet, Take 1 tablet by mouth Daily., Disp: , Rfl:     sildenafil (REVATIO) 20 MG tablet, Take 2 tablets by mouth As Needed (erectile dysfunction)., Disp: 20 tablet, Rfl: 3   also entered in Sleep Questionnaire         Vital Signs: /79   Pulse 71   Ht 175.3 cm (69\")   Wt 87.1 kg (192 lb)   SpO2 95%   BMI 28.35 kg/m²     Body mass index is 28.35 kg/m².       Tongue: Normal       Dentition: good       Pharynx: Posterior pharyngeal pillars are narrow   Mallampatti: II (hard and soft palate, upper portion of tonsils anduvula visible)        General: Alert. Cooperative. Well developed. No acute distress.             Head:  Normocephalic. Symmetrical. Atraumatic.              Nose: No septal deviation. No drainage.          Throat: No oral lesions. No thrush. Moist mucous membranes.    Chest Wall:  Normal shape. Symmetric expansion with respiration. No tenderness.             Neck:  Trachea midline.           Lungs:  Clear to auscultation bilaterally. No wheezes. No rhonchi. No rales. Respirations regular, even and unlabored.         " "   Heart:  Regular rhythm and normal rate. Normal S1 and S2. No murmur.     Abdomen:  Soft, non-tender and non-distended. Normal bowel sounds. No masses.  Extremities:  Moves all extremities well. No edema.    Psychiatric: Normal mood and affect.    Diagnostic data available to date is as below and was reviewed on current visit:  2011-revealed apnea-hypopnea index of 27 per sleep hour and minimum SpO2 of 90%. During supine position, he had severe obstructive sleep apnea with Supine AHI of 49 per sleep hour     No results found for: \"IRON\", \"TIBC\", \"FERRITIN\"    Most current available usage data reviewed on 01/16/2024:        DME Company: [employer]    Prescription to DME for replacement supplies as below:    full face mask      Description Replacement    Nasal PILLOWS      A 7034 Nasal Pillows  every 3 mth    A 7033 Repl Nasal Pillows  2 per mth    Nasal MASK/CUSHION      A 7034 Nasal Mask/Cushion  every 3 mth    A 7032 Repl Nasal Mask/Cushion  2 per mth    Full Face MASK     x A 7030 Full Face Mask  every 3 mth   x A 7031 Repl Face Mask  1 per mth      A 4604 Heated Tubing  every 3 mth    A 7037 Standard Tubing  every 3 mth   x A 7035 Headgear  every 3 mth   x A 7046 Repl Humidifier Chamber  every 6 yrs   x A 7038 Disposable Filters  2 per mth   x A 7039 Non-disposable Filter  every 6 mth   x A 7036 Chin Strap  every 6 mth     Orders Placed This Encounter   Procedures    SCANNED - PULMONARY RESULTS          Impression:  1. Obstructive sleep apnea    2. Paroxysmal atrial fibrillation        Plan:  Zheng \"CAMPOS\" is compliant and uses his device regularly. He has Afib and important to use his machine regularly. His mask works well. He can get a new device through [employer] any time but has a new device from [employer].    I reiterated the importance of effective treatment of obstructive sleep apnea with PAP machine.  Cardiovascular health risks of untreated sleep apnea were again reviewed.  Patient was asked to remain cautious if " there is persistent hypersomnolence.     Change of PAP supplies regularly is important for effective use.  Change of cushion on the mask or plugs on nasal pillows along with disposable filters once every month and change of mask frame, tubing, headgear and Velcro straps every 6 months at the minimum was reiterated.    This patient is compliant with PAP machine and benefits from its use.  Apnea hypopneas index is corrected/improved.  Daytime hypersomnolence has resolved.     Patient will follow up in this clinic in 1 year aprn.    Thank you for allowing me to participate in your patient's care.    Electronically signed by Jerry Velasco MD, 01/16/24, 8:05 AM EST.    Part of this note may be an electronic transcription/translation of spoken language to printed text using the Dragon Dictation System.

## 2024-02-08 ENCOUNTER — HOSPITAL ENCOUNTER (INPATIENT)
Facility: HOSPITAL | Age: 74
LOS: 1 days | Discharge: HOME | DRG: 392 | End: 2024-02-11
Attending: EMERGENCY MEDICINE | Admitting: STUDENT IN AN ORGANIZED HEALTH CARE EDUCATION/TRAINING PROGRAM
Payer: MEDICARE

## 2024-02-08 DIAGNOSIS — R10.9 ACUTE ABDOMINAL PAIN: ICD-10-CM

## 2024-02-08 DIAGNOSIS — K52.9 GASTROENTERITIS: ICD-10-CM

## 2024-02-08 DIAGNOSIS — R55 NEAR SYNCOPE: Primary | ICD-10-CM

## 2024-02-08 DIAGNOSIS — R11.2 NAUSEA AND VOMITING, UNSPECIFIED VOMITING TYPE: ICD-10-CM

## 2024-02-08 DIAGNOSIS — I95.9 HYPOTENSION, UNSPECIFIED HYPOTENSION TYPE: ICD-10-CM

## 2024-02-08 LAB
BASOPHILS # BLD AUTO: 0.02 10*3/MM3 (ref 0–0.2)
BASOPHILS NFR BLD AUTO: 0.3 % (ref 0–1.5)
DEPRECATED RDW RBC AUTO: 40.8 FL (ref 37–54)
EOSINOPHIL # BLD AUTO: 0.09 10*3/MM3 (ref 0–0.4)
EOSINOPHIL NFR BLD AUTO: 1.2 % (ref 0.3–6.2)
ERYTHROCYTE [DISTWIDTH] IN BLOOD BY AUTOMATED COUNT: 12.1 % (ref 12.3–15.4)
HCT VFR BLD AUTO: 40.8 % (ref 37.5–51)
HGB BLD-MCNC: 14.6 G/DL (ref 13–17.7)
IMM GRANULOCYTES # BLD AUTO: 0.01 10*3/MM3 (ref 0–0.05)
IMM GRANULOCYTES NFR BLD AUTO: 0.1 % (ref 0–0.5)
LYMPHOCYTES # BLD AUTO: 0.35 10*3/MM3 (ref 0.7–3.1)
LYMPHOCYTES NFR BLD AUTO: 4.7 % (ref 19.6–45.3)
MCH RBC QN AUTO: 33.6 PG (ref 26.6–33)
MCHC RBC AUTO-ENTMCNC: 35.8 G/DL (ref 31.5–35.7)
MCV RBC AUTO: 93.8 FL (ref 79–97)
MONOCYTES # BLD AUTO: 0.34 10*3/MM3 (ref 0.1–0.9)
MONOCYTES NFR BLD AUTO: 4.5 % (ref 5–12)
NEUTROPHILS NFR BLD AUTO: 6.69 10*3/MM3 (ref 1.7–7)
NEUTROPHILS NFR BLD AUTO: 89.2 % (ref 42.7–76)
NRBC BLD AUTO-RTO: 0 /100 WBC (ref 0–0.2)
PLATELET # BLD AUTO: 116 10*3/MM3 (ref 140–450)
PMV BLD AUTO: 11.6 FL (ref 6–12)
RBC # BLD AUTO: 4.35 10*6/MM3 (ref 4.14–5.8)
WBC NRBC COR # BLD AUTO: 7.5 10*3/MM3 (ref 3.4–10.8)

## 2024-02-08 PROCEDURE — 93005 ELECTROCARDIOGRAM TRACING: CPT | Performed by: EMERGENCY MEDICINE

## 2024-02-08 PROCEDURE — 99285 EMERGENCY DEPT VISIT HI MDM: CPT

## 2024-02-08 PROCEDURE — 85025 COMPLETE CBC W/AUTO DIFF WBC: CPT | Performed by: EMERGENCY MEDICINE

## 2024-02-08 PROCEDURE — 25810000003 SODIUM CHLORIDE 0.9 % SOLUTION: Performed by: EMERGENCY MEDICINE

## 2024-02-08 PROCEDURE — 93010 ELECTROCARDIOGRAM REPORT: CPT | Performed by: INTERNAL MEDICINE

## 2024-02-08 RX ORDER — SODIUM CHLORIDE 9 MG/ML
125 INJECTION, SOLUTION INTRAVENOUS CONTINUOUS
Status: DISCONTINUED | OUTPATIENT
Start: 2024-02-08 | End: 2024-02-09

## 2024-02-08 RX ORDER — SODIUM CHLORIDE 0.9 % (FLUSH) 0.9 %
10 SYRINGE (ML) INJECTION AS NEEDED
Status: DISCONTINUED | OUTPATIENT
Start: 2024-02-08 | End: 2024-02-11 | Stop reason: HOSPADM

## 2024-02-08 RX ADMIN — SODIUM CHLORIDE 500 ML: 9 INJECTION, SOLUTION INTRAVENOUS at 23:26

## 2024-02-09 ENCOUNTER — APPOINTMENT (OUTPATIENT)
Dept: CT IMAGING | Facility: HOSPITAL | Age: 74
DRG: 392 | End: 2024-02-09
Payer: MEDICARE

## 2024-02-09 PROBLEM — K52.9 GASTROENTERITIS: Status: ACTIVE | Noted: 2024-02-09

## 2024-02-09 LAB
ADV 40+41 DNA STL QL NAA+NON-PROBE: NOT DETECTED
ALBUMIN SERPL-MCNC: 4.3 G/DL (ref 3.5–5.2)
ALBUMIN/GLOB SERPL: 1.7 G/DL
ALP SERPL-CCNC: 81 U/L (ref 39–117)
ALT SERPL W P-5'-P-CCNC: 26 U/L (ref 1–41)
ANION GAP SERPL CALCULATED.3IONS-SCNC: 10 MMOL/L (ref 5–15)
ANION GAP SERPL CALCULATED.3IONS-SCNC: 14.9 MMOL/L (ref 5–15)
AST SERPL-CCNC: 33 U/L (ref 1–40)
ASTRO TYP 1-8 RNA STL QL NAA+NON-PROBE: NOT DETECTED
BILIRUB SERPL-MCNC: 0.9 MG/DL (ref 0–1.2)
BILIRUB UR QL STRIP: NEGATIVE
BUN SERPL-MCNC: 16 MG/DL (ref 8–23)
BUN SERPL-MCNC: 18 MG/DL (ref 8–23)
BUN/CREAT SERPL: 17.4 (ref 7–25)
BUN/CREAT SERPL: 18 (ref 7–25)
C CAYETANENSIS DNA STL QL NAA+NON-PROBE: NOT DETECTED
C COLI+JEJ+UPSA DNA STL QL NAA+NON-PROBE: NOT DETECTED
C DIFF TOX GENS STL QL NAA+PROBE: NEGATIVE
CALCIUM SPEC-SCNC: 7.8 MG/DL (ref 8.6–10.5)
CALCIUM SPEC-SCNC: 8.4 MG/DL (ref 8.6–10.5)
CHLORIDE SERPL-SCNC: 100 MMOL/L (ref 98–107)
CHLORIDE SERPL-SCNC: 98 MMOL/L (ref 98–107)
CLARITY UR: CLEAR
CO2 SERPL-SCNC: 18.1 MMOL/L (ref 22–29)
CO2 SERPL-SCNC: 23 MMOL/L (ref 22–29)
COLOR UR: NORMAL
CREAT SERPL-MCNC: 0.92 MG/DL (ref 0.76–1.27)
CREAT SERPL-MCNC: 1 MG/DL (ref 0.76–1.27)
CRYPTOSP DNA STL QL NAA+NON-PROBE: NOT DETECTED
D-LACTATE SERPL-SCNC: 1.9 MMOL/L (ref 0.5–2)
DEPRECATED RDW RBC AUTO: 43.2 FL (ref 37–54)
E HISTOLYT DNA STL QL NAA+NON-PROBE: NOT DETECTED
EAEC PAA PLAS AGGR+AATA ST NAA+NON-PRB: DETECTED
EC STX1+STX2 GENES STL QL NAA+NON-PROBE: NOT DETECTED
EGFRCR SERPLBLD CKD-EPI 2021: 78.5 ML/MIN/1.73
EGFRCR SERPLBLD CKD-EPI 2021: 86.7 ML/MIN/1.73
EPEC EAE GENE STL QL NAA+NON-PROBE: NOT DETECTED
ERYTHROCYTE [DISTWIDTH] IN BLOOD BY AUTOMATED COUNT: 12.2 % (ref 12.3–15.4)
ETEC LTA+ST1A+ST1B TOX ST NAA+NON-PROBE: NOT DETECTED
G LAMBLIA DNA STL QL NAA+NON-PROBE: NOT DETECTED
GEN 5 2HR TROPONIN T REFLEX: 18 NG/L
GLOBULIN UR ELPH-MCNC: 2.5 GM/DL
GLUCOSE SERPL-MCNC: 123 MG/DL (ref 65–99)
GLUCOSE SERPL-MCNC: 124 MG/DL (ref 65–99)
GLUCOSE UR STRIP-MCNC: NEGATIVE MG/DL
HBA1C MFR BLD: 5.4 % (ref 4.8–5.6)
HCT VFR BLD AUTO: 38.9 % (ref 37.5–51)
HGB BLD-MCNC: 13.8 G/DL (ref 13–17.7)
HGB UR QL STRIP.AUTO: NEGATIVE
KETONES UR QL STRIP: NEGATIVE
LEUKOCYTE ESTERASE UR QL STRIP.AUTO: NEGATIVE
LIPASE SERPL-CCNC: 50 U/L (ref 13–60)
MCH RBC QN AUTO: 34.5 PG (ref 26.6–33)
MCHC RBC AUTO-ENTMCNC: 35.5 G/DL (ref 31.5–35.7)
MCV RBC AUTO: 97.3 FL (ref 79–97)
NITRITE UR QL STRIP: NEGATIVE
NOROVIRUS GI+II RNA STL QL NAA+NON-PROBE: DETECTED
P SHIGELLOIDES DNA STL QL NAA+NON-PROBE: NOT DETECTED
PH UR STRIP.AUTO: 6.5 [PH] (ref 5–8)
PLATELET # BLD AUTO: 188 10*3/MM3 (ref 140–450)
PMV BLD AUTO: 9 FL (ref 6–12)
POTASSIUM SERPL-SCNC: 3.9 MMOL/L (ref 3.5–5.2)
POTASSIUM SERPL-SCNC: 4.4 MMOL/L (ref 3.5–5.2)
PROT SERPL-MCNC: 6.8 G/DL (ref 6–8.5)
PROT UR QL STRIP: NEGATIVE
QT INTERVAL: 447 MS
QTC INTERVAL: 462 MS
RBC # BLD AUTO: 4 10*6/MM3 (ref 4.14–5.8)
RVA RNA STL QL NAA+NON-PROBE: NOT DETECTED
S ENT+BONG DNA STL QL NAA+NON-PROBE: NOT DETECTED
SAPO I+II+IV+V RNA STL QL NAA+NON-PROBE: NOT DETECTED
SHIGELLA SP+EIEC IPAH ST NAA+NON-PROBE: NOT DETECTED
SODIUM SERPL-SCNC: 131 MMOL/L (ref 136–145)
SODIUM SERPL-SCNC: 133 MMOL/L (ref 136–145)
SP GR UR STRIP: <=1.005 (ref 1–1.03)
TROPONIN T DELTA: -4 NG/L
TROPONIN T SERPL HS-MCNC: 22 NG/L
UROBILINOGEN UR QL STRIP: NORMAL
V CHOL+PARA+VUL DNA STL QL NAA+NON-PROBE: NOT DETECTED
V CHOLERAE DNA STL QL NAA+NON-PROBE: NOT DETECTED
WBC NRBC COR # BLD AUTO: 7.5 10*3/MM3 (ref 3.4–10.8)
Y ENTEROCOL DNA STL QL NAA+NON-PROBE: NOT DETECTED

## 2024-02-09 PROCEDURE — 83605 ASSAY OF LACTIC ACID: CPT | Performed by: EMERGENCY MEDICINE

## 2024-02-09 PROCEDURE — 87493 C DIFF AMPLIFIED PROBE: CPT | Performed by: NURSE PRACTITIONER

## 2024-02-09 PROCEDURE — 25810000003 SODIUM CHLORIDE 0.9 % SOLUTION: Performed by: EMERGENCY MEDICINE

## 2024-02-09 PROCEDURE — 83036 HEMOGLOBIN GLYCOSYLATED A1C: CPT | Performed by: NURSE PRACTITIONER

## 2024-02-09 PROCEDURE — 74177 CT ABD & PELVIS W/CONTRAST: CPT

## 2024-02-09 PROCEDURE — 99204 OFFICE O/P NEW MOD 45 MIN: CPT | Performed by: INTERNAL MEDICINE

## 2024-02-09 PROCEDURE — 83690 ASSAY OF LIPASE: CPT | Performed by: EMERGENCY MEDICINE

## 2024-02-09 PROCEDURE — G0378 HOSPITAL OBSERVATION PER HR: HCPCS

## 2024-02-09 PROCEDURE — 85027 COMPLETE CBC AUTOMATED: CPT | Performed by: NURSE PRACTITIONER

## 2024-02-09 PROCEDURE — 81003 URINALYSIS AUTO W/O SCOPE: CPT | Performed by: EMERGENCY MEDICINE

## 2024-02-09 PROCEDURE — 25010000002 ONDANSETRON PER 1 MG: Performed by: NURSE PRACTITIONER

## 2024-02-09 PROCEDURE — 25810000003 SODIUM CHLORIDE 0.9 % SOLUTION: Performed by: NURSE PRACTITIONER

## 2024-02-09 PROCEDURE — 80053 COMPREHEN METABOLIC PANEL: CPT | Performed by: EMERGENCY MEDICINE

## 2024-02-09 PROCEDURE — 87507 IADNA-DNA/RNA PROBE TQ 12-25: CPT | Performed by: NURSE PRACTITIONER

## 2024-02-09 PROCEDURE — 84484 ASSAY OF TROPONIN QUANT: CPT | Performed by: EMERGENCY MEDICINE

## 2024-02-09 PROCEDURE — 25510000001 IOPAMIDOL 61 % SOLUTION: Performed by: EMERGENCY MEDICINE

## 2024-02-09 PROCEDURE — 36415 COLL VENOUS BLD VENIPUNCTURE: CPT | Performed by: NURSE PRACTITIONER

## 2024-02-09 RX ORDER — CALCIUM CARBONATE 500 MG/1
2 TABLET, CHEWABLE ORAL 2 TIMES DAILY PRN
Status: DISCONTINUED | OUTPATIENT
Start: 2024-02-09 | End: 2024-02-11 | Stop reason: HOSPADM

## 2024-02-09 RX ORDER — ACETAMINOPHEN 650 MG/1
650 SUPPOSITORY RECTAL EVERY 4 HOURS PRN
Status: DISCONTINUED | OUTPATIENT
Start: 2024-02-09 | End: 2024-02-11 | Stop reason: HOSPADM

## 2024-02-09 RX ORDER — ACETAMINOPHEN 160 MG/5ML
650 SOLUTION ORAL EVERY 4 HOURS PRN
Status: DISCONTINUED | OUTPATIENT
Start: 2024-02-09 | End: 2024-02-11 | Stop reason: HOSPADM

## 2024-02-09 RX ORDER — METOPROLOL SUCCINATE 25 MG/1
12.5 TABLET, EXTENDED RELEASE ORAL DAILY
Status: DISCONTINUED | OUTPATIENT
Start: 2024-02-09 | End: 2024-02-11 | Stop reason: HOSPADM

## 2024-02-09 RX ORDER — CHOLECALCIFEROL (VITAMIN D3) 125 MCG
500 CAPSULE ORAL DAILY
Status: DISCONTINUED | OUTPATIENT
Start: 2024-02-09 | End: 2024-02-11 | Stop reason: HOSPADM

## 2024-02-09 RX ORDER — ATORVASTATIN CALCIUM 20 MG/1
40 TABLET, FILM COATED ORAL DAILY
Status: DISCONTINUED | OUTPATIENT
Start: 2024-02-09 | End: 2024-02-11 | Stop reason: HOSPADM

## 2024-02-09 RX ORDER — ONDANSETRON 2 MG/ML
4 INJECTION INTRAMUSCULAR; INTRAVENOUS EVERY 6 HOURS PRN
Status: DISCONTINUED | OUTPATIENT
Start: 2024-02-09 | End: 2024-02-11 | Stop reason: HOSPADM

## 2024-02-09 RX ORDER — AMOXICILLIN 250 MG
2 CAPSULE ORAL 2 TIMES DAILY PRN
Status: DISCONTINUED | OUTPATIENT
Start: 2024-02-09 | End: 2024-02-11 | Stop reason: HOSPADM

## 2024-02-09 RX ORDER — SODIUM CHLORIDE 0.9 % (FLUSH) 0.9 %
10 SYRINGE (ML) INJECTION AS NEEDED
Status: DISCONTINUED | OUTPATIENT
Start: 2024-02-09 | End: 2024-02-11 | Stop reason: HOSPADM

## 2024-02-09 RX ORDER — NITROGLYCERIN 0.4 MG/1
0.4 TABLET SUBLINGUAL
Status: DISCONTINUED | OUTPATIENT
Start: 2024-02-09 | End: 2024-02-11 | Stop reason: HOSPADM

## 2024-02-09 RX ORDER — POLYETHYLENE GLYCOL 3350 17 G/17G
17 POWDER, FOR SOLUTION ORAL DAILY PRN
Status: DISCONTINUED | OUTPATIENT
Start: 2024-02-09 | End: 2024-02-11 | Stop reason: HOSPADM

## 2024-02-09 RX ORDER — SODIUM CHLORIDE 9 MG/ML
75 INJECTION, SOLUTION INTRAVENOUS CONTINUOUS
Status: DISCONTINUED | OUTPATIENT
Start: 2024-02-09 | End: 2024-02-11 | Stop reason: HOSPADM

## 2024-02-09 RX ORDER — ACETAMINOPHEN 325 MG/1
650 TABLET ORAL EVERY 4 HOURS PRN
Status: DISCONTINUED | OUTPATIENT
Start: 2024-02-09 | End: 2024-02-11 | Stop reason: HOSPADM

## 2024-02-09 RX ORDER — SODIUM CHLORIDE 9 MG/ML
40 INJECTION, SOLUTION INTRAVENOUS AS NEEDED
Status: DISCONTINUED | OUTPATIENT
Start: 2024-02-09 | End: 2024-02-11 | Stop reason: HOSPADM

## 2024-02-09 RX ORDER — BISACODYL 10 MG
10 SUPPOSITORY, RECTAL RECTAL DAILY PRN
Status: DISCONTINUED | OUTPATIENT
Start: 2024-02-09 | End: 2024-02-11 | Stop reason: HOSPADM

## 2024-02-09 RX ORDER — ONDANSETRON 4 MG/1
4 TABLET, ORALLY DISINTEGRATING ORAL EVERY 6 HOURS PRN
Status: DISCONTINUED | OUTPATIENT
Start: 2024-02-09 | End: 2024-02-11 | Stop reason: HOSPADM

## 2024-02-09 RX ORDER — BISACODYL 5 MG/1
5 TABLET, DELAYED RELEASE ORAL DAILY PRN
Status: DISCONTINUED | OUTPATIENT
Start: 2024-02-09 | End: 2024-02-11 | Stop reason: HOSPADM

## 2024-02-09 RX ORDER — FAMOTIDINE 10 MG/ML
20 INJECTION, SOLUTION INTRAVENOUS EVERY 12 HOURS SCHEDULED
Status: DISCONTINUED | OUTPATIENT
Start: 2024-02-09 | End: 2024-02-10

## 2024-02-09 RX ORDER — SODIUM CHLORIDE 0.9 % (FLUSH) 0.9 %
10 SYRINGE (ML) INJECTION EVERY 12 HOURS SCHEDULED
Status: DISCONTINUED | OUTPATIENT
Start: 2024-02-09 | End: 2024-02-11 | Stop reason: HOSPADM

## 2024-02-09 RX ADMIN — FAMOTIDINE 20 MG: 10 INJECTION INTRAVENOUS at 20:20

## 2024-02-09 RX ADMIN — APIXABAN 5 MG: 5 TABLET, FILM COATED ORAL at 20:20

## 2024-02-09 RX ADMIN — APIXABAN 5 MG: 5 TABLET, FILM COATED ORAL at 09:27

## 2024-02-09 RX ADMIN — DOFETILIDE 375 MCG: 0.25 CAPSULE ORAL at 10:58

## 2024-02-09 RX ADMIN — ONDANSETRON HYDROCHLORIDE 4 MG: 2 INJECTION, SOLUTION INTRAMUSCULAR; INTRAVENOUS at 15:43

## 2024-02-09 RX ADMIN — ONDANSETRON HYDROCHLORIDE 4 MG: 2 INJECTION, SOLUTION INTRAMUSCULAR; INTRAVENOUS at 03:20

## 2024-02-09 RX ADMIN — SODIUM CHLORIDE 100 ML/HR: 9 INJECTION, SOLUTION INTRAVENOUS at 14:51

## 2024-02-09 RX ADMIN — IOPAMIDOL 85 ML: 612 INJECTION, SOLUTION INTRAVENOUS at 02:02

## 2024-02-09 RX ADMIN — ONDANSETRON HYDROCHLORIDE 4 MG: 2 INJECTION, SOLUTION INTRAMUSCULAR; INTRAVENOUS at 09:33

## 2024-02-09 RX ADMIN — METOPROLOL SUCCINATE 12.5 MG: 25 TABLET, EXTENDED RELEASE ORAL at 10:56

## 2024-02-09 RX ADMIN — SODIUM CHLORIDE 100 ML/HR: 9 INJECTION, SOLUTION INTRAVENOUS at 04:02

## 2024-02-09 RX ADMIN — Medication 500 MCG: at 10:56

## 2024-02-09 RX ADMIN — Medication 10 ML: at 09:24

## 2024-02-09 RX ADMIN — DOFETILIDE 375 MCG: 0.25 CAPSULE ORAL at 20:20

## 2024-02-09 RX ADMIN — SODIUM CHLORIDE 500 ML: 9 INJECTION, SOLUTION INTRAVENOUS at 02:20

## 2024-02-09 RX ADMIN — ATORVASTATIN CALCIUM 40 MG: 20 TABLET, FILM COATED ORAL at 09:27

## 2024-02-09 RX ADMIN — SODIUM CHLORIDE 125 ML/HR: 9 INJECTION, SOLUTION INTRAVENOUS at 01:55

## 2024-02-09 NOTE — ED PROVIDER NOTES
EMERGENCY DEPARTMENT ENCOUNTER  Room Number:  24/24  PCP: Zoya Sanchez MD  Independent Historians: Patient      HPI:  Chief Complaint: had concerns including Abdominal Pain and Dizziness.     A complete HPI/ROS/PMH/PSH/SH/FH are unobtainable due to: None    Chronic or social conditions impacting patient care (Social Determinants of Health): None      Context: Zheng Lopez is a 73 y.o. male with a medical history of paroxysmal atrial fibrillation for which he is chronically anticoagulated and on a QT prolonging medication who presents to the ED c/o acute abdominal discomfort with nausea, vomiting and urge that he may need to have a bowel movement.  Patient reports he was feeling his normal self throughout the day today and then about 9:00 began to have some nausea and walk to the bathroom.  He laid down because he felt very lightheaded.  He ultimately vomited 1 time and had a bit of cramping in his legs.  He feels like he needs to have a bowel movement and has not yet had any diarrhea.  Patient does report that he and his wife took care of their granddaughter the first 3 days of this week because she had to stay home from school ill with GI complaints.  No reported fevers. Mild abdominal cramping at this time reported.  Patient received medication by EMS prior to arrival for nausea and reports that has also resolved.      Review of prior external notes (non-ED) -and- Review of prior external test results outside of this encounter:  Hospital discharge summary 7/19/2023 reviewed: Patient with history of paroxysmal atrial fibrillation was under the care of Dr. Moreno and was admitted for initiation of dofetilide.      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/2017    Asymmetric septal hypertrophy 11/13/2017    Non-sustained ventricular tachycardia 11/13/2017    Chronic pain of left knee 05/29/2018    Primary osteoarthritis of left knee 05/29/2018    Left lumbar radiculopathy 11/08/2018    Left thigh pain 11/08/2018    Benign prostatic hyperplasia with urinary hesitancy 11/13/2019    Arthritis of left hip 03/02/2020    Primary localized osteoarthrosis of left hip 05/11/2020    Arthritis of right wrist 06/15/2021    Hyperglycemia 12/21/2021    Medicare annual wellness visit, subsequent 12/21/2021    Paroxysmal atrial fibrillation 02/02/2022    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    H/O cardiac radiofrequency ablation--5/2023 PVI 06/23/2023    Atrial fibrillation, persistent 07/18/2023     Resolved Ambulatory Problems     Diagnosis Date Noted    Hypertension     Cholecystitis     Impingement of right ulnar nerve     Cataract of both eyes     Thrombocythemia 06/15/2021     Past Medical History:   Diagnosis Date    Bronchitis     Chicken pox     Encounter for annual health examination 03/10/2015    Fatigue     Gallbladder disease     Gastroenteritis 2/9/2024    High blood pressure     History of EKG     History of TB skin testing     Measles     Non-ST elevated myocardial infarction (non-STEMI) 01/12/2022    Persistent cough     Pulse irregularity     Sexual problems     Sleep apnea     Wellness examination 03/10/2015         PAST SURGICAL HISTORY  Past Surgical History:   Procedure Laterality Date    CARDIAC CATHETERIZATION N/A 01/12/2022    Procedure: Left Heart Cath;  Surgeon: Vi Orozco MD;  Location:  [facility];  Service: Cardiovascular;  Laterality: N/A;    CARDIAC CATHETERIZATION N/A 01/12/2022    Procedure: Coronary angiography;  Surgeon: Vi Orozco MD;  Location:  [facility];  Service: Cardiovascular;  Laterality: N/A;    CARDIAC  CATHETERIZATION N/A 01/12/2022    Procedure: Left ventriculography;  Surgeon: Vi Orozco MD;  Location:  [facility];  Service: Cardiovascular;  Laterality: N/A;    CARDIAC ELECTROPHYSIOLOGY PROCEDURE N/A 5/25/2023    Procedure: Ablation atrial fibrillation;  Surgeon: Mk Mroeno MD;  Location:  [facility];  Service: Cardiovascular;  Laterality: N/A;    CATARACT EXTRACTION Bilateral     CATARACT EXTRACTION Right 2013    CATARACT EXTRACTION Left 2014    CHOLECYSTECTOMY      COLONOSCOPY  04/16/2015    Dr. Ritchie    COLONOSCOPY  03/07/2007    ELBOW PROCEDURE Right     ulnar nerve impingement release    FOOT SURGERY Right     right foot (twice)    GALLBLADDER SURGERY  2009    KNEE MENISCAL REPAIR Right 2010    torn right meniscus    LASIK  2006    PROSTATE AQUABLATION  12/2022    RHINOPLASTY  1986    ROTATOR CUFF REPAIR Left 2010    SHOULDER ARTHROSCOPY W/ LABRAL REPAIR Right 2015    Dr. Sahil lay repair right shoulder    SHOULDER LIGAMENT REPAIR Right     TOTAL HIP ARTHROPLASTY REVISION Left 05/11/2020    TOTAL KNEE ARTHROPLASTY Right 2011    WRIST ARTHROPLASTY Right 08/25/2020         FAMILY HISTORY  Family History   Problem Relation Age of Onset    Dementia Mother     Migraines Mother     Osteoporosis Mother     Stroke Mother 85        early 80s    Heart disease Mother         tachycardia and had ppm    Alzheimer's disease Mother     Supraventricular tachycardia Mother     Alcohol abuse Father     Depression Father     Migraines Father     Tuberculosis Father     Cirrhosis Father         41 YO    Cancer Paternal Uncle 67        mets but unsure of type of ca  BACK CANCER    Stroke Brother     Colon cancer Neg Hx     Prostate cancer Neg Hx     Heart attack Neg Hx          SOCIAL HISTORY  Social History     Socioeconomic History    Marital status:     Number of children: 2   Tobacco Use    Smoking status: Never     Passive exposure: Never    Smokeless tobacco:  Never   Vaping Use    Vaping Use: Never used   Substance and Sexual Activity    Alcohol use: No     Comment: none in 5 yrs, Nov 2011 - last drink    Drug use: No    Sexual activity: Yes     Partners: Female         ALLERGIES  Patient has no known allergies.      REVIEW OF SYSTEMS  Review of Systems  Included in HPI  All systems reviewed and negative except for those discussed in HPI.      PHYSICAL EXAM    I have reviewed the triage vital signs and nursing notes.    ED Triage Vitals [02/08/24 2226]   Temp Heart Rate Resp BP SpO2   98 °F (36.7 °C) 62 18 128/77 97 %      Temp src Heart Rate Source Patient Position BP Location FiO2 (%)   Tympanic Monitor Sitting Left arm --       Physical Exam    Physical Exam   Constitutional: No distress.  Nontoxic  HENT:  Head: Normocephalic and atraumatic.   Oropharynx: Mucous membranes are moist.   Eyes: . No scleral icterus. No conjunctival pallor.  Neck: Normal range of motion. Neck supple.   Cardiovascular: Pink warm and well perfused throughout.    Pulmonary/Chest: No respiratory distress.  No tachypnea or increased work of breathing appreciated.    Abdominal: Soft. There is very mild diffuse discomfort with palpation without focal tenderness. There is no rebound and no guarding.   Musculoskeletal: Moves all extremities equally.    Neurological: Alert and oriented.  No acute focal deficit appreciated.  Speech fluent and easily intelligible.  Skin: Skin is pink, warm, and dry.   Psychiatric: Mood and affect normal.   Nursing note and vitals reviewed.             LAB RESULTS  Recent Results (from the past 24 hour(s))   CBC Auto Differential    Collection Time: 02/08/24 10:57 PM    Specimen: Blood   Result Value Ref Range    WBC 7.50 3.40 - 10.80 10*3/mm3    RBC 4.35 4.14 - 5.80 10*6/mm3    Hemoglobin 14.6 13.0 - 17.7 g/dL    Hematocrit 40.8 37.5 - 51.0 %    MCV 93.8 79.0 - 97.0 fL    MCH 33.6 (H) 26.6 - 33.0 pg    MCHC 35.8 (H) 31.5 - 35.7 g/dL    RDW 12.1 (L) 12.3 - 15.4 %     RDW-SD 40.8 37.0 - 54.0 fl    MPV 11.6 6.0 - 12.0 fL    Platelets 116 (L) 140 - 450 10*3/mm3    Neutrophil % 89.2 (H) 42.7 - 76.0 %    Lymphocyte % 4.7 (L) 19.6 - 45.3 %    Monocyte % 4.5 (L) 5.0 - 12.0 %    Eosinophil % 1.2 0.3 - 6.2 %    Basophil % 0.3 0.0 - 1.5 %    Immature Grans % 0.1 0.0 - 0.5 %    Neutrophils, Absolute 6.69 1.70 - 7.00 10*3/mm3    Lymphocytes, Absolute 0.35 (L) 0.70 - 3.10 10*3/mm3    Monocytes, Absolute 0.34 0.10 - 0.90 10*3/mm3    Eosinophils, Absolute 0.09 0.00 - 0.40 10*3/mm3    Basophils, Absolute 0.02 0.00 - 0.20 10*3/mm3    Immature Grans, Absolute 0.01 0.00 - 0.05 10*3/mm3    nRBC 0.0 0.0 - 0.2 /100 WBC   ECG 12 Lead Drug Monitoring    Collection Time: 02/08/24 11:10 PM   Result Value Ref Range    QT Interval 447 ms    QTC Interval 462 ms   Comprehensive Metabolic Panel    Collection Time: 02/09/24 12:01 AM    Specimen: Blood   Result Value Ref Range    Glucose 123 (H) 65 - 99 mg/dL    BUN 18 8 - 23 mg/dL    Creatinine 1.00 0.76 - 1.27 mg/dL    Sodium 131 (L) 136 - 145 mmol/L    Potassium 3.9 3.5 - 5.2 mmol/L    Chloride 98 98 - 107 mmol/L    CO2 18.1 (L) 22.0 - 29.0 mmol/L    Calcium 8.4 (L) 8.6 - 10.5 mg/dL    Total Protein 6.8 6.0 - 8.5 g/dL    Albumin 4.3 3.5 - 5.2 g/dL    ALT (SGPT) 26 1 - 41 U/L    AST (SGOT) 33 1 - 40 U/L    Alkaline Phosphatase 81 39 - 117 U/L    Total Bilirubin 0.9 0.0 - 1.2 mg/dL    Globulin 2.5 gm/dL    A/G Ratio 1.7 g/dL    BUN/Creatinine Ratio 18.0 7.0 - 25.0    Anion Gap 14.9 5.0 - 15.0 mmol/L    eGFR 78.5 >60.0 mL/min/1.73   High Sensitivity Troponin T    Collection Time: 02/09/24 12:01 AM    Specimen: Blood   Result Value Ref Range    HS Troponin T 22 (H) <22 ng/L   Lactic Acid, Plasma    Collection Time: 02/09/24 12:01 AM    Specimen: Blood   Result Value Ref Range    Lactate 1.9 0.5 - 2.0 mmol/L   Lipase    Collection Time: 02/09/24 12:01 AM    Specimen: Blood   Result Value Ref Range    Lipase 50 13 - 60 U/L   Urinalysis With Microscopic If  Indicated (No Culture) - Urine, Clean Catch    Collection Time: 02/09/24 12:25 AM    Specimen: Urine, Clean Catch   Result Value Ref Range    Color, UA Straw Yellow, Straw    Appearance, UA Clear Clear    pH, UA 6.5 5.0 - 8.0    Specific Gravity, UA <=1.005 1.005 - 1.030    Glucose, UA Negative Negative    Ketones, UA Negative Negative    Bilirubin, UA Negative Negative    Blood, UA Negative Negative    Protein, UA Negative Negative    Leuk Esterase, UA Negative Negative    Nitrite, UA Negative Negative    Urobilinogen, UA 0.2 E.U./dL 0.2 - 1.0 E.U./dL   High Sensitivity Troponin T 2Hr    Collection Time: 02/09/24  2:20 AM    Specimen: Blood   Result Value Ref Range    HS Troponin T 18 <22 ng/L    Troponin T Delta -4 (L) >=-4 - <+4 ng/L         RADIOLOGY  CT Abdomen Pelvis With Contrast    Result Date: 2/9/2024  CT OF THE ABDOMEN PELVIS WITH CONTRAST  HISTORY: Nausea and vomiting  COMPARISON: None available.  TECHNIQUE: Axial CT imaging was obtained through the abdomen and pelvis. IV contrast was administered.  FINDINGS: Images through the lung bases demonstrate some minimal scarring versus atelectasis. Scattered cysts are noted throughout the liver. The patient has a moderate hiatal hernia. The remainder of the stomach is also markedly distended with fluid. The duodenal bulb does appear thick walled. Multiple patulous loops of small bowel are seen. Appearance is suspicious for gastroenteritis. Gallbladder is absent. Calcified granulomata are noted within the spleen. Pancreas is normal. Somewhat bulky appearance to the left adrenal gland may reflect hyperplasia. Right adrenal gland is normal. The kidneys enhance symmetrically. No hydronephrosis is seen. There are aortoiliac calcifications. No distal ureteral or bladder stones are seen. Prostate gland contains dystrophic calcifications. There is colonic diverticulosis. The appendix is normal. The patient does have a relatively collapsed segment of sigmoid colon.  Consider follow-up imaging to exclude any underlying stricture or mass lesion. No acute osseous abnormalities are seen. Patient is status post left hip arthroplasty.        1. The patient's stomach is markedly distended and fluid-filled. There are also patulous loops of small bowel, without evidence of small bowel obstruction. Appearance is favored to reflect gastroenteritis. There is some duodenal wall thickening. A component of gastric outlet obstruction/ileus is not excluded, given the degree of distention of the stomach, as well as of the stomach within the hiatal hernia.  Radiation dose reduction techniques were utilized, including automated exposure control and exposure modulation based on body size.   This report was finalized on 2/9/2024 2:37 AM by Dr. Carly Mason M.D on Workstation: [id number]         MEDICATIONS GIVEN IN ER  Medications   sodium chloride 0.9 % flush 10 mL (has no administration in time range)   sodium chloride 0.9 % infusion (125 mL/hr Intravenous New Bag 2/9/24 0155)   sodium chloride 0.9 % bolus 500 mL (0 mL Intravenous Stopped 2/9/24 0152)   sodium chloride 0.9 % bolus 500 mL (500 mL Intravenous New Bag 2/9/24 0220)   iopamidol (ISOVUE-300) 61 % injection 85 mL (85 mL Intravenous Given 2/9/24 0202)         ORDERS PLACED DURING THIS VISIT:  Orders Placed This Encounter   Procedures    CT Abdomen Pelvis With Contrast    Comprehensive Metabolic Panel    Urinalysis With Microscopic If Indicated (No Culture) - Urine, Clean Catch    High Sensitivity Troponin T    Lactic Acid, Plasma    CBC Auto Differential    Lipase    High Sensitivity Troponin T 2Hr    Monitor Blood Pressure    Pulse Oximetry, Continuous    Orthostatic Vitals    [facility] (on-call MD unless specified) Details    ECG 12 Lead Drug Monitoring    Insert Peripheral IV    Insert 2nd peripheral IV    Initiate Observation Status    CBC & Differential         OUTPATIENT MEDICATION MANAGEMENT:  Current Facility-Administered  Medications Ordered in Epic   Medication Dose Route Frequency Provider Last Rate Last Admin    sodium chloride 0.9 % flush 10 mL  10 mL Intravenous PRN Juan Carlos Hwang MD        sodium chloride 0.9 % infusion  125 mL/hr Intravenous Continuous Juan Carlos Hwang  mL/hr at 02/09/24 0155 125 mL/hr at 02/09/24 0155     Current Outpatient Medications Ordered in Epic   Medication Sig Dispense Refill    amoxicillin (AMOXIL) 500 MG capsule Take 4 capsules by mouth As Needed. With teeth cleaning      atorvastatin (LIPITOR) 40 MG tablet TAKE 1 TABLET DAILY 90 tablet 3    calcium carbonate (OS-TOMAS) 600 MG tablet Take 1 tablet by mouth Daily.      Cyanocobalamin (VITAMIN B-12 PO) Take 1 tablet by mouth Daily.      dofetilide (TIKOSYN) 125 MCG capsule Take 3 capsules by mouth Every 12 (Twelve) Hours. 540 capsule 1    Eliquis 5 MG tablet tablet TAKE 1 TABLET EVERY 12 HOURS 180 tablet 3    esomeprazole (nexIUM) 40 MG capsule TAKE 1 CAPSULE EVERY MORNING BEFORE BREAKFAST 90 capsule 3    Flaxseed, Linseed, (FLAX SEED OIL) 1000 MG capsule Take 1 capsule by mouth 2 (Two) Times a Day.      Magnesium 400 MG capsule Take 500 mg by mouth Daily.      metoprolol succinate XL (TOPROL-XL) 25 MG 24 hr tablet Take 0.5 tablets by mouth Daily. 30 tablet 2    Multiple Vitamin tablet Take 1 tablet by mouth Daily.      sildenafil (REVATIO) 20 MG tablet Take 2 tablets by mouth As Needed (erectile dysfunction). 20 tablet 3         PROCEDURES  Procedures      Total critical care time: Approximately 40 minutes    Due to a high probability of clinically significant, life threatening deterioration, the patient required my highest level of preparedness to intervene emergently and I personally spent this critical care time directly and personally managing the patient. This critical care time included obtaining a history; examining the patient; vital sign monitoring; ordering and review of studies; arranging urgent treatment with development of a management  plan; evaluation of patient's response to treatment; frequent reassessment; and, discussions with other providers.    This critical care time was performed to assess and manage the high probability of imminent, life-threatening deterioration that could result in multi-organ failure. It was exclusive of separately billable procedures and treating other patients and teaching time.    Please see MDM section and the rest of the note for further information on patient assessment and treatment.        PROGRESS, DATA ANALYSIS, CONSULTS, AND MEDICAL DECISION MAKING  All labs have been independently interpreted by me.  All radiology studies have been reviewed by me. All EKG's have been independently viewed and interpreted by me.  Discussion below represents my analysis of pertinent findings related to patient's condition, differential diagnosis, treatment plan and final disposition.    Differential diagnosis:   My differential diagnosis for abdominal pain includes but is not limited to:  Gastritis, gastroenteritis, peptic ulcer disease, GERD, esophageal perforation, acute appendicitis, mesenteric adenitis, Meckel’s diverticulum, epiploic appendagitis, diverticulitis, colon cancer, ulcerative colitis, Crohn’s disease, intussusception, small bowel obstruction, adhesions, ischemic bowel, perforated viscus, ileus, obstipation, biliary colic, cholecystitis, cholelithiasis, Red-Wilian Sahil, hepatitis, pancreatitis, common bile duct obstruction, cholangitis, bile leak, splenic trauma, splenic rupture, splenic infarction, splenic abscess, abdominal abscess, ascites, spontaneous bacterial peritonitis, hernia, UTI, cystitis, prostatitis, ureterolithiasis, urinary obstruction, AAA, myocardial infarction, pneumonia, cancer, porphyria, DKA, medications, sickle cell, viral syndrome, zoster      Clinical Scores:                  ED Course as of 02/09/24 0259   Thu Feb 08, 2024   2327 EKG           EKG time: 2310  Rhythm/Rate: Sinus, 65  P  waves and NC: DESTIN within normal limits  QRS, axis: Narrow complex  ST and T waves: No STEMI; QTc within normal limits    Interpreted Contemporaneously by me, independently viewed  Comparison: 7/19/2023-sinus   [RS]   2327 WBC: 7.50 [RS]   2327 Hemoglobin: 14.6 [RS]   2327 Platelets(!): 116 [RS]   Fri Feb 09, 2024   0053 Specific Gravity, UA: <=1.005 [RS]   0054 Leukocytes, UA: Negative [RS]   0054 Nitrite, UA: Negative [RS]   0054 Lactate: 1.9 [RS]   0120 Lipase: 50 [RS]   0120 HS Troponin T(!): 22 [RS]   0120 Glucose(!): 123 [RS]   0120 BUN: 18 [RS]   0120 Creatinine: 1.00 [RS]   0120 Sodium(!): 131 [RS]   0120 Potassium: 3.9 [RS]   0120 ALT (SGPT): 26 [RS]   0120 AST (SGOT): 33 [RS]   0120 Total Bilirubin: 0.9 [RS]   0159 BP: 92/44  Additional IV fluids ordered. [RS]   0220 RADIOLOGY      Study: IV contrasted CT abdomen pelvis  Findings: Large volume of fluid in the stomach with quite a lot of fluid noted throughout the small intestine concerning for enteritis.  Normal aortic diameter in the abdomen.  I independently viewed and interpreted these images contemporaneously with treatment.    [RS]   0241 BP: 127/57  Blood pressure responding well to fluid bolus.  Reviewed findings and concerns with patient who is agreeable with plan for admission. [RS]   0258 CONSULT        Provider: NANDO Sandoval    Discussion: Reviewed patient history, ED presentation and evaluation as well as response to therapies in the ED.  She is agreeable to accept the patient for admission on behalf of Dr. Eisenberg.    Agreeable c treatment and planned disposition.         [RS]      ED Course User Index  [RS] Juan Carlos Hwang MD         Prescription drug monitoring program review: NA    AS OF 02:59 EST VITALS:    BP - 126/64  HR - 69  TEMP - 98 °F (36.7 °C) (Tympanic)  O2 SATS - 96%    COMPLEXITY OF CARE  The patient requires admission.      DIAGNOSIS  Final diagnoses:   Near syncope   Nausea and vomiting, unspecified vomiting type   Acute  abdominal pain   Hypotension, unspecified hypotension type   Gastroenteritis         DISPOSITION  ED Disposition       ED Disposition   Decision to Admit    Condition   --    Comment   Level of Care: Telemetry [5]   Diagnosis: Gastroenteritis [739490]   Admitting Physician: TAMMY FINN [[id number]]   Bed Request Comments: 5 S. observation                    ADMISSION    Discussed treatment plan and reason for admission with pt/family and admitting physician.  Pt/family voiced understanding of the plan for admission for further testing/treatment as needed.       Please note that portions of this document were completed with a voice recognition program.    Note Disclaimer: At [facility], we believe that sharing information builds trust and better relationships. You are receiving this note because you recently visited [facility]. It is possible you will see health information before a provider has talked with you about it. This kind of information can be easy to misunderstand. To help you fully understand what it means for your health, we urge you to discuss this note with your provider.         Juan Carlos Hwang MD  02/09/24 0259

## 2024-02-09 NOTE — CONSULTS
[facility]  Initial Inpatient Consult Note    Referring Provider: N/V, abd pain, possible GOO on CT     Reason for Consultation: Dr Rothman    Subjective     History of present illness:    73 y.o. male, previously known to our service, that we are asked to see for nausea vomiting abdominal pain and abnormal CT suggesting possible gastric outlet obstruction.  Patient was feeling normal until about 9:00 last night.  He had the sudden onset of dyspepsia and felt like he was going to vomit.  He went to the restroom and tried to relieve himself but had significant abdominal cramping in his legs and was unable to get up.  EMS was contacted and he went to the emergency room.  He reports he had a large bowel movement in the emergency room but it was not loose.  He did vomit once.  He reports that he has a granddaughter that had gastroenteritis-like symptoms whom they cared for earlier in the week.  His wife developed similar symptoms this morning.    He has some upper abdominal discomfort.  He still feels a little queasy.  He has not had any further bowel movements.  No chronic nausea vomiting or abdominal pain.  He had a colonoscopy in 2015.    Count and hemoglobin are normal.  BUN and creatinine are normal.  CT with IV contrast, reviewed by me, shows a significantly distended stomach with hiatal hernia.  Small bowel loops are also distended.  No obvious obstruction.    Past Medical History:  Past Medical History:   Diagnosis Date    Asymmetric septal hypertrophy     Benign prostatic hyperplasia with urinary hesitancy 11/13/2019    Bronchitis     Bronchitis / URI    Cataract of both eyes     Chicken pox     Cholecystitis     Diverticula of colon     ED (erectile dysfunction) of organic origin 11/13/2017    Encounter for annual health examination 03/10/2015    Annual Health Assessment    Fatigue     Gallbladder disease     Gastroenteritis 2/9/2024    GERD (gastroesophageal reflux disease)     High  blood pressure     History of EKG     10/30/15, 09/19/13, 09/14/12, 10/18/10, 05/04/09    History of TB skin testing     TB Skin Test: 01/09/14 Completed, 09/14/12 Patient Declines, 10/18/10 Completed, 03/05/08 Completed    Hyperlipidemia     Hypertension     still taking meds    Impingement of right ulnar nerve     Measles     Non-ST elevated myocardial infarction (non-STEMI) 01/12/2022    Osteoarthritis involving multiple joints on both sides of body     Osteoarthritis involving multiple joints on both sides of body     Persistent cough     Pulse irregularity     Irregular pulse PER CARDIO    Sexual problems     Sexual problems / enjoyment ED    Sleep apnea     has c-pap    Thrombocythemia 06/15/2021    Wellness examination 03/10/2015    Annual Wellness Visit     Past Surgical History:  Past Surgical History:   Procedure Laterality Date    CARDIAC CATHETERIZATION N/A 01/12/2022    Procedure: Left Heart Cath;  Surgeon: Vi Orozco MD;  Location:  [facility];  Service: Cardiovascular;  Laterality: N/A;    CARDIAC CATHETERIZATION N/A 01/12/2022    Procedure: Coronary angiography;  Surgeon: Vi Orozco MD;  Location:  [facility];  Service: Cardiovascular;  Laterality: N/A;    CARDIAC CATHETERIZATION N/A 01/12/2022    Procedure: Left ventriculography;  Surgeon: Vi Orozco MD;  Location:  [facility];  Service: Cardiovascular;  Laterality: N/A;    CARDIAC ELECTROPHYSIOLOGY PROCEDURE N/A 5/25/2023    Procedure: Ablation atrial fibrillation;  Surgeon: Mk Moreno MD;  Location:  [facility];  Service: Cardiovascular;  Laterality: N/A;    CATARACT EXTRACTION Bilateral     CATARACT EXTRACTION Right 2013    CATARACT EXTRACTION Left 2014    CHOLECYSTECTOMY      COLONOSCOPY  04/16/2015    Dr. Ritchie    COLONOSCOPY  03/07/2007    ELBOW PROCEDURE Right     ulnar nerve impingement release    FOOT SURGERY Right     right foot (twice)     GALLBLADDER SURGERY  2009    KNEE MENISCAL REPAIR Right 2010    torn right meniscus    LASIK  2006    PROSTATE AQUABLATION  12/2022    RHINOPLASTY  1986    ROTATOR CUFF REPAIR Left 2010    SHOULDER ARTHROSCOPY W/ LABRAL REPAIR Right 2015    Dr. Sahil lay repair right shoulder    SHOULDER LIGAMENT REPAIR Right     TOTAL HIP ARTHROPLASTY REVISION Left 05/11/2020    TOTAL KNEE ARTHROPLASTY Right 2011    WRIST ARTHROPLASTY Right 08/25/2020      Social History:   Social History     Tobacco Use    Smoking status: Never     Passive exposure: Never    Smokeless tobacco: Never   Substance Use Topics    Alcohol use: No     Comment: none in 5 yrs, Nov 2011 - last drink      Family History:  Family History   Problem Relation Age of Onset    Dementia Mother     Migraines Mother     Osteoporosis Mother     Stroke Mother 85        early 80s    Heart disease Mother         tachycardia and had ppm    Alzheimer's disease Mother     Supraventricular tachycardia Mother     Alcohol abuse Father     Depression Father     Migraines Father     Tuberculosis Father     Cirrhosis Father         41 YO    Cancer Paternal Uncle 67        mets but unsure of type of ca  BACK CANCER    Stroke Brother     Colon cancer Neg Hx     Prostate cancer Neg Hx     Heart attack Neg Hx        Home Meds:  Medications Prior to Admission   Medication Sig Dispense Refill Last Dose    atorvastatin (LIPITOR) 40 MG tablet TAKE 1 TABLET DAILY 90 tablet 3     calcium carbonate (OS-TOMAS) 600 MG tablet Take 1 tablet by mouth Daily.       Cyanocobalamin (VITAMIN B-12 PO) Take 1 tablet by mouth Daily.       dofetilide (TIKOSYN) 125 MCG capsule Take 3 capsules by mouth Every 12 (Twelve) Hours. 540 capsule 1     Eliquis 5 MG tablet tablet TAKE 1 TABLET EVERY 12 HOURS 180 tablet 3     esomeprazole (nexIUM) 40 MG capsule TAKE 1 CAPSULE EVERY MORNING BEFORE BREAKFAST 90 capsule 3     Magnesium 400 MG capsule Take 500 mg by mouth Daily.       metoprolol succinate XL  (TOPROL-XL) 25 MG 24 hr tablet Take 0.5 tablets by mouth Daily. 30 tablet 2     Multiple Vitamin tablet Take 1 tablet by mouth Daily.       sildenafil (REVATIO) 20 MG tablet Take 2 tablets by mouth As Needed (erectile dysfunction). 20 tablet 3     amoxicillin (AMOXIL) 500 MG capsule Take 4 capsules by mouth As Needed. With teeth cleaning       Flaxseed, Linseed, (FLAX SEED OIL) 1000 MG capsule Take 1 capsule by mouth 2 (Two) Times a Day.        Current Meds:   apixaban, 5 mg, Oral, Q12H  atorvastatin, 40 mg, Oral, Daily  dofetilide, 375 mcg, Oral, Q12H  metoprolol succinate XL, 12.5 mg, Oral, Daily  sodium chloride, 10 mL, Intravenous, Q12H  vitamin B-12, 500 mcg, Oral, Daily      Allergies:  No Known Allergies  Review of Systems  Pertinent items are noted in HPI, all other systems reviewed and negative     Objective     Vital Signs  Temp:  [98 °F (36.7 °C)-99.1 °F (37.3 °C)] 99.1 °F (37.3 °C)  Heart Rate:  [53-83] 73  Resp:  [16-18] 16  BP: ()/(44-77) 123/57  Physical Exam:  General Appearance:    Alert, cooperative, in no acute distress   Head:    Normocephalic, without obvious abnormality, atraumatic   Eyes:          conjunctivae and sclerae normal, no   icterus   Throat:   no thrush, oral mucosa moist   Neck:   Supple, no adenopathy   Lungs:     Clear to auscultation bilaterally    Heart:    Regular rhythm and normal rate    Chest Wall:    No abnormalities observed   Abdomen:     Soft, nondistended, epigastric tenderness; normal bowel sounds   Extremities:   no edema, no redness   Skin:   No bruising or rash   Psychiatric:  normal mood and insight     Results Review:   I reviewed the patient's new clinical results.    Results from last 7 days   Lab Units 02/09/24  0503 02/08/24  2257   WBC 10*3/mm3 7.50 7.50   HEMOGLOBIN g/dL 13.8 14.6   HEMATOCRIT % 38.9 40.8   PLATELETS 10*3/mm3 188 116*     Results from last 7 days   Lab Units 02/09/24  0503 02/09/24  0001   SODIUM mmol/L 133* 131*   POTASSIUM mmol/L  4.4 3.9   CHLORIDE mmol/L 100 98   CO2 mmol/L 23.0 18.1*   BUN mg/dL 16 18   CREATININE mg/dL 0.92 1.00   CALCIUM mg/dL 7.8* 8.4*   BILIRUBIN mg/dL  --  0.9   ALK PHOS U/L  --  81   ALT (SGPT) U/L  --  26   AST (SGOT) U/L  --  33   GLUCOSE mg/dL 124* 123*         Lab Results   Lab Value Date/Time    LIPASE 50 02/09/2024 0001       Radiology:  CT Abdomen Pelvis With Contrast   Final Result       1. The patient's stomach is markedly distended and fluid-filled. There   are also patulous loops of small bowel, without evidence of small bowel   obstruction. Appearance is favored to reflect gastroenteritis. There is   some duodenal wall thickening. A component of gastric outlet   obstruction/ileus is not excluded, given the degree of distention of the   stomach, as well as of the stomach within the hiatal hernia.       Radiation dose reduction techniques were utilized, including automated   exposure control and exposure modulation based on body size.           This report was finalized on 2/9/2024 2:37 AM by Dr. Carly Mason M.D on Workstation: [id number]              Assessment & Plan   Active Hospital Problems    Diagnosis     **Gastroenteritis     Atrial fibrillation, persistent     H/O cardiac radiofrequency ablation--5/2023 PVI     Paroxysmal atrial fibrillation     Benign prostatic hyperplasia with urinary hesitancy     SYLWIA (obstructive sleep apnea)     Essential hypertension     Hyperlipidemia        Assessment:  Nausea vomiting abdominal pain  Abnormal CT with gastric distention, findings suggestive of gastroenteritis    Plan:  Clinical presentation most consistent with gastroenteritis.  Abrupt onset of symptoms with multiple sick contacts that have shared similar symptoms suggest infectious etiology over gastric outlet obstruction.  These can appear similar on CT scan.  Still somewhat symptomatic and I do not think he has turned the corner just yet-recommend that he stick with ice chips and sips of water  until his symptoms improve a little more.  Continue IV fluids and antiemetics as needed  Check stool for GI PCR should he be able to provide a sample.  Follow clinical course      I discussed the patients findings and my recommendations with patient.          Pati Khan M.D.  [facility]  [phone]

## 2024-02-09 NOTE — H&P
Patient Name:  Zheng Lopez  YOB: 1950  MRN:  [id number]  Admit Date:  2/8/2024  Patient Care Team:  Zoya Sanchez MD as PCP - General (Family Medicine)  Zoya Sanchez MD as Referring Physician (Family Medicine)  Vivek Trujillo MD as Consulting Physician (Hematology and Oncology)      Subjective   History Present Illness     Chief Complaint   Patient presents with    Abdominal Pain    Dizziness       Mr. Lopez is a 73 y.o. with a history of paroxysmal atrial fibrillation, long-term anticoagulation and treatment with Tikosyn, BPH, HTN, HLD, SYLWIA that presents with nausea vomiting diarrhea and abdominal cramping.  While in the bathroom to vomit and possibly defecate he became lightheaded requiring him to get on the floor.  Wife was present and called EMS.  Earlier in the week his wife had been caring for grandchildren who had a GI illness and she is now at home with similar GI symptoms.  He denies fever or chills.  He has some mild abdominal cramping but none presently.  He denies chest pain, shortness of breath, palpitations, diaphoresis, LUTS or focal neurologic deficits.      History of Present Illness    Review of Systems   Constitutional:  Positive for activity change, appetite change, chills and fatigue. Negative for fever.   HENT:  Negative for trouble swallowing.    Respiratory:  Negative for cough, choking, chest tightness and shortness of breath.    Cardiovascular:  Negative for chest pain, palpitations and leg swelling.   Gastrointestinal:  Positive for abdominal pain, diarrhea, nausea and vomiting. Negative for abdominal distention, blood in stool and constipation.   Genitourinary:  Negative for dysuria.   Musculoskeletal:  Negative for back pain.   Skin:  Negative for pallor.   Neurological:  Negative for dizziness and light-headedness.   Psychiatric/Behavioral:  Negative for confusion.         Personal History     Past Medical History:   Diagnosis Date    Asymmetric  septal hypertrophy     Benign prostatic hyperplasia with urinary hesitancy 11/13/2019    Bronchitis     Bronchitis / URI    Cataract of both eyes     Chicken pox     Cholecystitis     Diverticula of colon     ED (erectile dysfunction) of organic origin 11/13/2017    Encounter for annual health examination 03/10/2015    Annual Health Assessment    Fatigue     Gallbladder disease     Gastroenteritis 2/9/2024    GERD (gastroesophageal reflux disease)     High blood pressure     History of EKG     10/30/15, 09/19/13, 09/14/12, 10/18/10, 05/04/09    History of TB skin testing     TB Skin Test: 01/09/14 Completed, 09/14/12 Patient Declines, 10/18/10 Completed, 03/05/08 Completed    Hyperlipidemia     Hypertension     still taking meds    Impingement of right ulnar nerve     Measles     Non-ST elevated myocardial infarction (non-STEMI) 01/12/2022    Osteoarthritis involving multiple joints on both sides of body     Osteoarthritis involving multiple joints on both sides of body     Persistent cough     Pulse irregularity     Irregular pulse PER CARDIO    Sexual problems     Sexual problems / enjoyment ED    Sleep apnea     has c-pap    Thrombocythemia 06/15/2021    Wellness examination 03/10/2015    Annual Wellness Visit     Past Surgical History:   Procedure Laterality Date    CARDIAC CATHETERIZATION N/A 01/12/2022    Procedure: Left Heart Cath;  Surgeon: Vi Orozco MD;  Location:  [facility];  Service: Cardiovascular;  Laterality: N/A;    CARDIAC CATHETERIZATION N/A 01/12/2022    Procedure: Coronary angiography;  Surgeon: Vi Orozco MD;  Location:  [facility];  Service: Cardiovascular;  Laterality: N/A;    CARDIAC CATHETERIZATION N/A 01/12/2022    Procedure: Left ventriculography;  Surgeon: Vi Orozco MD;  Location:  [facility];  Service: Cardiovascular;  Laterality: N/A;    CARDIAC ELECTROPHYSIOLOGY PROCEDURE N/A 5/25/2023    Procedure: Ablation  atrial fibrillation;  Surgeon: Mk Moreno MD;  Location:  [facility];  Service: Cardiovascular;  Laterality: N/A;    CATARACT EXTRACTION Bilateral     CATARACT EXTRACTION Right 2013    CATARACT EXTRACTION Left 2014    CHOLECYSTECTOMY      COLONOSCOPY  04/16/2015    Dr. Ritchie    COLONOSCOPY  03/07/2007    ELBOW PROCEDURE Right     ulnar nerve impingement release    FOOT SURGERY Right     right foot (twice)    GALLBLADDER SURGERY  2009    KNEE MENISCAL REPAIR Right 2010    torn right meniscus    LASIK  2006    PROSTATE AQUABLATION  12/2022    RHINOPLASTY  1986    ROTATOR CUFF REPAIR Left 2010    SHOULDER ARTHROSCOPY W/ LABRAL REPAIR Right 2015    Dr. Baxter  labram repair right shoulder    SHOULDER LIGAMENT REPAIR Right     TOTAL HIP ARTHROPLASTY REVISION Left 05/11/2020    TOTAL KNEE ARTHROPLASTY Right 2011    WRIST ARTHROPLASTY Right 08/25/2020     Family History   Problem Relation Age of Onset    Dementia Mother     Migraines Mother     Osteoporosis Mother     Stroke Mother 85        early 80s    Heart disease Mother         tachycardia and had ppm    Alzheimer's disease Mother     Supraventricular tachycardia Mother     Alcohol abuse Father     Depression Father     Migraines Father     Tuberculosis Father     Cirrhosis Father         43 YO    Cancer Paternal Uncle 67        mets but unsure of type of ca  BACK CANCER    Stroke Brother     Colon cancer Neg Hx     Prostate cancer Neg Hx     Heart attack Neg Hx      Social History     Tobacco Use    Smoking status: Never     Passive exposure: Never    Smokeless tobacco: Never   Vaping Use    Vaping Use: Never used   Substance Use Topics    Alcohol use: No     Comment: none in 5 yrs, Nov 2011 - last drink    Drug use: No     No current facility-administered medications on file prior to encounter.     Current Outpatient Medications on File Prior to Encounter   Medication Sig Dispense Refill    atorvastatin (LIPITOR) 40 MG tablet TAKE 1 TABLET  DAILY 90 tablet 3    calcium carbonate (OS-TOMAS) 600 MG tablet Take 1 tablet by mouth Daily.      Cyanocobalamin (VITAMIN B-12 PO) Take 1 tablet by mouth Daily.      dofetilide (TIKOSYN) 125 MCG capsule Take 3 capsules by mouth Every 12 (Twelve) Hours. 540 capsule 1    Eliquis 5 MG tablet tablet TAKE 1 TABLET EVERY 12 HOURS 180 tablet 3    esomeprazole (nexIUM) 40 MG capsule TAKE 1 CAPSULE EVERY MORNING BEFORE BREAKFAST 90 capsule 3    Magnesium 400 MG capsule Take 500 mg by mouth Daily.      metoprolol succinate XL (TOPROL-XL) 25 MG 24 hr tablet Take 0.5 tablets by mouth Daily. 30 tablet 2    Multiple Vitamin tablet Take 1 tablet by mouth Daily.      sildenafil (REVATIO) 20 MG tablet Take 2 tablets by mouth As Needed (erectile dysfunction). 20 tablet 3    amoxicillin (AMOXIL) 500 MG capsule Take 4 capsules by mouth As Needed. With teeth cleaning      Flaxseed, Linseed, (FLAX SEED OIL) 1000 MG capsule Take 1 capsule by mouth 2 (Two) Times a Day.       No Known Allergies    Objective    Objective     Vital Signs  Temp:  [98 °F (36.7 °C)-98.6 °F (37 °C)] 98.6 °F (37 °C)  Heart Rate:  [53-83] 83  Resp:  [16-18] 16  BP: ()/(44-77) 102/45  SpO2:  [94 %-98 %] 97 %  on   ;   Device (Oxygen Therapy): room air  Body mass index is 28.94 kg/m².    Physical Exam    Results Review:  I reviewed the patient's new clinical results.  I reviewed the patient's new imaging results and agree with the interpretation.  I reviewed the patient's other test results and agree with the interpretation  I personally viewed and interpreted the patient's EKG/Telemetry data  Discussed with ED provider.    Lab Results (last 24 hours)       Procedure Component Value Units Date/Time    CBC & Differential [[id number]]  (Abnormal) Collected: 02/08/24 2257    Specimen: Blood Updated: 02/08/24 2307    Narrative:      The following orders were created for panel order CBC & Differential.  Procedure                               Abnormality          Status                     ---------                               -----------         ------                     CBC Auto Differential[[id number]]        Abnormal            Final result                 Please view results for these tests on the individual orders.    CBC Auto Differential [[id number]]  (Abnormal) Collected: 02/08/24 2257    Specimen: Blood Updated: 02/08/24 2307     WBC 7.50 10*3/mm3      RBC 4.35 10*6/mm3      Hemoglobin 14.6 g/dL      Hematocrit 40.8 %      MCV 93.8 fL      MCH 33.6 pg      MCHC 35.8 g/dL      RDW 12.1 %      RDW-SD 40.8 fl      MPV 11.6 fL      Platelets 116 10*3/mm3      Neutrophil % 89.2 %      Lymphocyte % 4.7 %      Monocyte % 4.5 %      Eosinophil % 1.2 %      Basophil % 0.3 %      Immature Grans % 0.1 %      Neutrophils, Absolute 6.69 10*3/mm3      Lymphocytes, Absolute 0.35 10*3/mm3      Monocytes, Absolute 0.34 10*3/mm3      Eosinophils, Absolute 0.09 10*3/mm3      Basophils, Absolute 0.02 10*3/mm3      Immature Grans, Absolute 0.01 10*3/mm3      nRBC 0.0 /100 WBC     Comprehensive Metabolic Panel [[id number]]  (Abnormal) Collected: 02/09/24 0001    Specimen: Blood Updated: 02/09/24 0056     Glucose 123 mg/dL      BUN 18 mg/dL      Creatinine 1.00 mg/dL      Sodium 131 mmol/L      Potassium 3.9 mmol/L      Comment: Slight hemolysis detected by analyzer. Result may be falsely elevated.        Chloride 98 mmol/L      CO2 18.1 mmol/L      Calcium 8.4 mg/dL      Total Protein 6.8 g/dL      Albumin 4.3 g/dL      ALT (SGPT) 26 U/L      AST (SGOT) 33 U/L      Alkaline Phosphatase 81 U/L      Total Bilirubin 0.9 mg/dL      Globulin 2.5 gm/dL      A/G Ratio 1.7 g/dL      BUN/Creatinine Ratio 18.0     Anion Gap 14.9 mmol/L      eGFR 78.5 mL/min/1.73     Narrative:      GFR Normal >60  Chronic Kidney Disease <60  Kidney Failure <15    The GFR formula is only valid for adults with stable renal function between ages 18 and 70.    High Sensitivity Troponin T [[id number]]  (Abnormal)  Collected: 02/09/24 0001    Specimen: Blood Updated: 02/09/24 0056     HS Troponin T 22 ng/L     Narrative:      High Sensitive Troponin T Reference Range:  <14.0 ng/L- Negative Female for AMI  <22.0 ng/L- Negative Male for AMI  >=14 - Abnormal Female indicating possible myocardial injury.  >=22 - Abnormal Male indicating possible myocardial injury.   Clinicians would have to utilize clinical acumen, EKG, Troponin, and serial changes to determine if it is an Acute Myocardial Infarction or myocardial injury due to an underlying chronic condition.         Lactic Acid, Plasma [[id number]]  (Normal) Collected: 02/09/24 0001    Specimen: Blood Updated: 02/09/24 0029     Lactate 1.9 mmol/L     Lipase [[id number]]  (Normal) Collected: 02/09/24 0001    Specimen: Blood Updated: 02/09/24 0102     Lipase 50 U/L     Urinalysis With Microscopic If Indicated (No Culture) - Urine, Clean Catch [[id number]]  (Normal) Collected: 02/09/24 0025    Specimen: Urine, Clean Catch Updated: 02/09/24 0033     Color, UA Straw     Appearance, UA Clear     pH, UA 6.5     Specific Gravity, UA <=1.005     Glucose, UA Negative     Ketones, UA Negative     Bilirubin, UA Negative     Blood, UA Negative     Protein, UA Negative     Leuk Esterase, UA Negative     Nitrite, UA Negative     Urobilinogen, UA 0.2 E.U./dL    Narrative:      Urine microscopic not indicated.    High Sensitivity Troponin T 2Hr [[id number]]  (Abnormal) Collected: 02/09/24 0220    Specimen: Blood Updated: 02/09/24 0247     HS Troponin T 18 ng/L      Troponin T Delta -4 ng/L     Narrative:      High Sensitive Troponin T Reference Range:  <14.0 ng/L- Negative Female for AMI  <22.0 ng/L- Negative Male for AMI  >=14 - Abnormal Female indicating possible myocardial injury.  >=22 - Abnormal Male indicating possible myocardial injury.   Clinicians would have to utilize clinical acumen, EKG, Troponin, and serial changes to determine if it is an Acute Myocardial Infarction or myocardial  injury due to an underlying chronic condition.         Basic Metabolic Panel [[id number]]  (Abnormal) Collected: 02/09/24 0503    Specimen: Blood Updated: 02/09/24 0558     Glucose 124 mg/dL      BUN 16 mg/dL      Creatinine 0.92 mg/dL      Sodium 133 mmol/L      Potassium 4.4 mmol/L      Chloride 100 mmol/L      CO2 23.0 mmol/L      Calcium 7.8 mg/dL      BUN/Creatinine Ratio 17.4     Anion Gap 10.0 mmol/L      eGFR 86.7 mL/min/1.73     Narrative:      GFR Normal >60  Chronic Kidney Disease <60  Kidney Failure <15    The GFR formula is only valid for adults with stable renal function between ages 18 and 70.    CBC (No Diff) [[id number]]  (Abnormal) Collected: 02/09/24 0503    Specimen: Blood Updated: 02/09/24 0545     WBC 7.50 10*3/mm3      RBC 4.00 10*6/mm3      Hemoglobin 13.8 g/dL      Hematocrit 38.9 %      MCV 97.3 fL      MCH 34.5 pg      MCHC 35.5 g/dL      RDW 12.2 %      RDW-SD 43.2 fl      MPV 9.0 fL      Platelets 188 10*3/mm3             Imaging Results (Last 24 Hours)       Procedure Component Value Units Date/Time    CT Abdomen Pelvis With Contrast [[id number]] Collected: 02/09/24 0230     Updated: 02/09/24 0240    Narrative:      CT OF THE ABDOMEN PELVIS WITH CONTRAST     HISTORY: Nausea and vomiting     COMPARISON: None available.     TECHNIQUE: Axial CT imaging was obtained through the abdomen and pelvis.  IV contrast was administered.     FINDINGS:  Images through the lung bases demonstrate some minimal scarring versus  atelectasis. Scattered cysts are noted throughout the liver. The patient  has a moderate hiatal hernia. The remainder of the stomach is also  markedly distended with fluid. The duodenal bulb does appear thick  walled. Multiple patulous loops of small bowel are seen. Appearance is  suspicious for gastroenteritis. Gallbladder is absent. Calcified  granulomata are noted within the spleen. Pancreas is normal. Somewhat  bulky appearance to the left adrenal gland may reflect  hyperplasia.  Right adrenal gland is normal. The kidneys enhance symmetrically. No  hydronephrosis is seen. There are aortoiliac calcifications. No distal  ureteral or bladder stones are seen. Prostate gland contains dystrophic  calcifications. There is colonic diverticulosis. The appendix is normal.  The patient does have a relatively collapsed segment of sigmoid colon.  Consider follow-up imaging to exclude any underlying stricture or mass  lesion. No acute osseous abnormalities are seen. Patient is status post  left hip arthroplasty.          Impression:         1. The patient's stomach is markedly distended and fluid-filled. There  are also patulous loops of small bowel, without evidence of small bowel  obstruction. Appearance is favored to reflect gastroenteritis. There is  some duodenal wall thickening. A component of gastric outlet  obstruction/ileus is not excluded, given the degree of distention of the  stomach, as well as of the stomach within the hiatal hernia.     Radiation dose reduction techniques were utilized, including automated  exposure control and exposure modulation based on body size.        This report was finalized on 2/9/2024 2:37 AM by Dr. Carly Mason M.D on Workstation: [id number]               Results for orders placed during the hospital encounter of 01/12/22    Adult Transthoracic Echo Complete W/ Cont if Necessary Per Protocol    Interpretation Summary  · Calculated left ventricular EF = 63.7% Estimated left ventricular EF = 64% Estimated left ventricular EF was in agreement with the calculated left ventricular EF. Left ventricular systolic function is normal. Normal left ventricular cavity size noted. Left ventricular wall thickness is consistent with mild concentric hypertrophy. There are wall motion abnormalities as noted below Left ventricular diastolic function was normal.  · Left atrial volume is mildly increased.  · Trace mitral valve regurgitation is present.  · There  is an echolucent. In the liver suggestive of hepatic cyst. Consider dedicated imaging to assess further if clinically indicated      ECG 12 Lead Drug Monitoring   Preliminary Result   HEART RATE= 64  bpm   RR Interval= 938  ms   MI Interval= 193  ms   P Horizontal Axis= -28  deg   P Front Axis= -26  deg   QRSD Interval= 99  ms   QT Interval= 447  ms   QTcB= 462  ms   QRS Axis= -21  deg   T Wave Axis= 89  deg   - ABNORMAL ECG -   Sinus rhythm   Borderline left axis deviation   Probable anteroseptal infarct, old   Electronically Signed By:    Date and Time of Study: 2024-02-08 23:10:03      SCANNED - TELEMETRY     Final Result      SCANNED - TELEMETRY     Final Result           Assessment/Plan     Active Hospital Problems    Diagnosis  POA    **Gastroenteritis [K52.9]  Yes    Atrial fibrillation, persistent [I48.19]  Yes    H/O cardiac radiofrequency ablation--5/2023 PVI [Z98.890]  Not Applicable    Paroxysmal atrial fibrillation [I48.0]  Yes    Benign prostatic hyperplasia with urinary hesitancy [N40.1, R39.11]  Yes    SYLWIA (obstructive sleep apnea) [G47.33]  Yes    Essential hypertension [I10]  Yes    Hyperlipidemia [E78.5]  Yes      Resolved Hospital Problems   No resolved problems to display.       Mr. Lopez is a 73 y.o.  with PAF on OAC and dofetilide that presents with abdominal pain, nausea vomiting. Near syncope associated with urge to defecate. Recent exposure to ill contacts.      CT a/p with gastroenteritis, duodenal thickening and possible GOO  GI consulted  Check stool studies  Supportive care with IVF and antiemetics.   Pepcid IV but change to PPI if cdiff negative    Near syncope  Associated with urge to defecate/ vomit  so likely vasovagal. No  acute change on EKG. Delta troponin -4. Denies CP or other cardiac symptoms. Check orthostatics    Mild hyponatremia- associated with GI losses. Continue IVF    Hyperglycemia- check a1c    B12 deficiency - Continue B12 replacement    HTN- BP low end of normal.  Hold home agents or continue with holding parameters      VTE Prophylaxis - SCDs.  Code Status - Full code.       SHER Lopez  [facility]  02/09/24  09:06 EST

## 2024-02-09 NOTE — PLAN OF CARE
Problem: Adult Inpatient Plan of Care  Goal: Plan of Care Review  Outcome: Ongoing, Progressing  Flowsheets (Taken 2/9/2024 0507)  Outcome Evaluation: Pt arrived from the ER. Has c/o of nause and vomiting. VSS. NSR. AO x 4.  Goal: Patient-Specific Goal (Individualized)  Outcome: Ongoing, Progressing  Goal: Absence of Hospital-Acquired Illness or Injury  Outcome: Ongoing, Progressing  Intervention: Identify and Manage Fall Risk  Recent Flowsheet Documentation  Taken 2/9/2024 0352 by Fiona Gaona, RN  Safety Promotion/Fall Prevention:   activity supervised   assistive device/personal items within reach   clutter free environment maintained   lighting adjusted   mobility aid in reach   nonskid shoes/slippers when out of bed   safety round/check completed   toileting scheduled  Intervention: Prevent and Manage VTE (Venous Thromboembolism) Risk  Recent Flowsheet Documentation  Taken 2/9/2024 0352 by Fiona Gaona, RN  Activity Management: activity encouraged  Goal: Optimal Comfort and Wellbeing  Outcome: Ongoing, Progressing  Goal: Readiness for Transition of Care  Outcome: Ongoing, Progressing  Intervention: Mutually Develop Transition Plan  Recent Flowsheet Documentation  Taken 2/9/2024 0350 by Fiona Gaona, RN  Transportation Anticipated: family or friend will provide  Patient/Family Anticipated Services at Transition: none  Patient/Family Anticipates Transition to: home with family  Taken 2/9/2024 0349 by Fiona Gaona, RN  Equipment Currently Used at Home: none   Goal Outcome Evaluation:              Outcome Evaluation: Pt arrived from the ER. Has c/o of nause and vomiting. VSS. NSR. AO x 4.

## 2024-02-09 NOTE — ED NOTES
Nursing report ED to floor  Zheng Lopez  73 y.o.  male    HPI :   Chief Complaint   Patient presents with    Abdominal Pain    Dizziness       Admitting doctor:   Rodrick Eisenberg MD    Admitting diagnosis:   The primary encounter diagnosis was Near syncope. Diagnoses of Nausea and vomiting, unspecified vomiting type, Acute abdominal pain, Hypotension, unspecified hypotension type, and Gastroenteritis were also pertinent to this visit.    Code status:   Current Code Status       Date Active Code Status Order ID Comments User Context       2/9/2024 0259 CPR (Attempt to Resuscitate) [id number]  Annmarie Collins APRN ED        Question Answer    Code Status (Patient has no pulse and is not breathing) CPR (Attempt to Resuscitate)    Medical Interventions (Patient has pulse or is breathing) Full Support                    Allergies:   Patient has no known allergies.    Isolation:   No active isolations    Intake and Output    Intake/Output Summary (Last 24 hours) at 2/9/2024 0321  Last data filed at 2/9/2024 0152  Gross per 24 hour   Intake 500 ml   Output --   Net 500 ml       Weight:       02/08/24 2226   Weight: 87.1 kg (192 lb)       Most recent vitals:   Vitals:    02/09/24 0242 02/09/24 0247 02/09/24 0248 02/09/24 0303   BP: 125/62 126/64 126/64 129/62   BP Location:       Pulse: 69 69 69 63   Resp:       Temp:       TempSrc:       SpO2: 96% 96% 96% 96%   Weight:       Height:           Active LDAs/IV Access:   Lines, Drains & Airways       Active LDAs       Name Placement date Placement time Site Days    Peripheral IV 02/08/24 2303 Anterior;Right Forearm 02/08/24 2303  Forearm  less than 1    Peripheral IV 02/09/24 0220 Distal;Left;Posterior Forearm 02/09/24 0220  Forearm  less than 1                    Labs (abnormal labs have a star):   Labs Reviewed   COMPREHENSIVE METABOLIC PANEL - Abnormal; Notable for the following components:       Result Value    Glucose 123 (*)     Sodium 131 (*)     CO2 18.1 (*)      Calcium 8.4 (*)     All other components within normal limits    Narrative:     GFR Normal >60  Chronic Kidney Disease <60  Kidney Failure <15    The GFR formula is only valid for adults with stable renal function between ages 18 and 70.   TROPONIN - Abnormal; Notable for the following components:    HS Troponin T 22 (*)     All other components within normal limits    Narrative:     High Sensitive Troponin T Reference Range:  <14.0 ng/L- Negative Female for AMI  <22.0 ng/L- Negative Male for AMI  >=14 - Abnormal Female indicating possible myocardial injury.  >=22 - Abnormal Male indicating possible myocardial injury.   Clinicians would have to utilize clinical acumen, EKG, Troponin, and serial changes to determine if it is an Acute Myocardial Infarction or myocardial injury due to an underlying chronic condition.        CBC WITH AUTO DIFFERENTIAL - Abnormal; Notable for the following components:    MCH 33.6 (*)     MCHC 35.8 (*)     RDW 12.1 (*)     Platelets 116 (*)     Neutrophil % 89.2 (*)     Lymphocyte % 4.7 (*)     Monocyte % 4.5 (*)     Lymphocytes, Absolute 0.35 (*)     All other components within normal limits   HIGH SENSITIVITIY TROPONIN T 2HR - Abnormal; Notable for the following components:    Troponin T Delta -4 (*)     All other components within normal limits    Narrative:     High Sensitive Troponin T Reference Range:  <14.0 ng/L- Negative Female for AMI  <22.0 ng/L- Negative Male for AMI  >=14 - Abnormal Female indicating possible myocardial injury.  >=22 - Abnormal Male indicating possible myocardial injury.   Clinicians would have to utilize clinical acumen, EKG, Troponin, and serial changes to determine if it is an Acute Myocardial Infarction or myocardial injury due to an underlying chronic condition.        URINALYSIS W/ MICROSCOPIC IF INDICATED (NO CULTURE) - Normal    Narrative:     Urine microscopic not indicated.   LACTIC ACID, PLASMA - Normal   LIPASE - Normal   BASIC METABOLIC PANEL    CBC (NO DIFF)   CBC AND DIFFERENTIAL    Narrative:     The following orders were created for panel order CBC & Differential.  Procedure                               Abnormality         Status                     ---------                               -----------         ------                     CBC Auto Differential[[id number]]        Abnormal            Final result                 Please view results for these tests on the individual orders.       EKG:   ECG 12 Lead Drug Monitoring   Preliminary Result   HEART RATE= 64  bpm   RR Interval= 938  ms   [place] Interval= 193  ms   P Horizontal Axis= -28  deg   P Front Axis= -26  deg   QRSD Interval= 99  ms   QT Interval= 447  ms   QTcB= 462  ms   QRS Axis= -21  deg   T Wave Axis= 89  deg   - ABNORMAL ECG -   Sinus rhythm   Borderline left axis deviation   Probable anteroseptal infarct, old   Electronically Signed By:    Date and Time of Study: 2024-02-08 23:10:03          Meds given in ED:   Medications   sodium chloride 0.9 % flush 10 mL (has no administration in time range)   sodium chloride 0.9 % infusion (125 mL/hr Intravenous New Bag 2/9/24 0155)   sodium chloride 0.9 % flush 10 mL (has no administration in time range)   sodium chloride 0.9 % flush 10 mL (has no administration in time range)   sodium chloride 0.9 % infusion 40 mL (has no administration in time range)   nitroglycerin (NITROSTAT) SL tablet 0.4 mg (has no administration in time range)   sodium chloride 0.9 % infusion (has no administration in time range)   acetaminophen (TYLENOL) tablet 650 mg (has no administration in time range)     Or   acetaminophen (TYLENOL) 160 MG/5ML oral solution 650 mg (has no administration in time range)     Or   acetaminophen (TYLENOL) suppository 650 mg (has no administration in time range)   sennosides-docusate (PERICOLACE) 8.6-50 MG per tablet 2 tablet (has no administration in time range)     And   polyethylene glycol (MIRALAX) packet 17 g (has no administration in  time range)     And   bisacodyl (DULCOLAX) EC tablet 5 mg (has no administration in time range)     And   bisacodyl (DULCOLAX) suppository 10 mg (has no administration in time range)   ondansetron ODT (ZOFRAN-ODT) disintegrating tablet 4 mg ( Oral Not Given:  See Alt 2/9/24 0320)     Or   ondansetron (ZOFRAN) injection 4 mg (4 mg Intravenous Given 2/9/24 0320)   calcium carbonate (TUMS) chewable tablet 500 mg (200 mg elemental) (has no administration in time range)   sodium chloride 0.9 % bolus 500 mL (0 mL Intravenous Stopped 2/9/24 0152)   sodium chloride 0.9 % bolus 500 mL (500 mL Intravenous New Bag 2/9/24 0220)   iopamidol (ISOVUE-300) 61 % injection 85 mL (85 mL Intravenous Given 2/9/24 0202)       Imaging results:  CT Abdomen Pelvis With Contrast    Result Date: 2/9/2024   1. The patient's stomach is markedly distended and fluid-filled. There are also patulous loops of small bowel, without evidence of small bowel obstruction. Appearance is favored to reflect gastroenteritis. There is some duodenal wall thickening. A component of gastric outlet obstruction/ileus is not excluded, given the degree of distention of the stomach, as well as of the stomach within the hiatal hernia.  Radiation dose reduction techniques were utilized, including automated exposure control and exposure modulation based on body size.   This report was finalized on 2/9/2024 2:37 AM by Dr. Carly Mason M.D on Workstation: [id number]       Ambulatory status:   - assist in bed    Social issues:   Social History     Socioeconomic History    Marital status:     Number of children: 2   Tobacco Use    Smoking status: Never     Passive exposure: Never    Smokeless tobacco: Never   Vaping Use    Vaping Use: Never used   Substance and Sexual Activity    Alcohol use: No     Comment: none in 5 yrs, Nov 2011 - last drink    Drug use: No    Sexual activity: Yes     Partners: Female       NIH Stroke Scale:         Annmarie Fortune  RN  02/09/24 03:21 EST

## 2024-02-09 NOTE — ED NOTES
Pt hypotensive at this time. States he feels too weak to try to stand up for orthostatic vital signs. MD aware of blood pressure and pt status. New orders to be placed.

## 2024-02-10 PROBLEM — A04.4 E COLI ENTERITIS: Status: ACTIVE | Noted: 2024-02-10

## 2024-02-10 PROBLEM — A08.11 ENTERITIS DUE TO NOROVIRUS: Status: ACTIVE | Noted: 2024-02-10

## 2024-02-10 LAB
ALBUMIN SERPL-MCNC: 3.7 G/DL (ref 3.5–5.2)
ALBUMIN/GLOB SERPL: 1.5 G/DL
ALP SERPL-CCNC: 64 U/L (ref 39–117)
ALT SERPL W P-5'-P-CCNC: 23 U/L (ref 1–41)
ANION GAP SERPL CALCULATED.3IONS-SCNC: 9.6 MMOL/L (ref 5–15)
AST SERPL-CCNC: 29 U/L (ref 1–40)
BILIRUB SERPL-MCNC: 0.8 MG/DL (ref 0–1.2)
BUN SERPL-MCNC: 13 MG/DL (ref 8–23)
BUN/CREAT SERPL: 11.5 (ref 7–25)
CALCIUM SPEC-SCNC: 8.2 MG/DL (ref 8.6–10.5)
CHLORIDE SERPL-SCNC: 105 MMOL/L (ref 98–107)
CO2 SERPL-SCNC: 22.4 MMOL/L (ref 22–29)
CREAT SERPL-MCNC: 1.13 MG/DL (ref 0.76–1.27)
DEPRECATED RDW RBC AUTO: 42.3 FL (ref 37–54)
EGFRCR SERPLBLD CKD-EPI 2021: 67.8 ML/MIN/1.73
ERYTHROCYTE [DISTWIDTH] IN BLOOD BY AUTOMATED COUNT: 12 % (ref 12.3–15.4)
GLOBULIN UR ELPH-MCNC: 2.4 GM/DL
GLUCOSE SERPL-MCNC: 95 MG/DL (ref 65–99)
HCT VFR BLD AUTO: 40.5 % (ref 37.5–51)
HGB BLD-MCNC: 14 G/DL (ref 13–17.7)
MAGNESIUM SERPL-MCNC: 2 MG/DL (ref 1.6–2.4)
MCH RBC QN AUTO: 33.8 PG (ref 26.6–33)
MCHC RBC AUTO-ENTMCNC: 34.6 G/DL (ref 31.5–35.7)
MCV RBC AUTO: 97.8 FL (ref 79–97)
PLATELET # BLD AUTO: 186 10*3/MM3 (ref 140–450)
PMV BLD AUTO: 9 FL (ref 6–12)
POTASSIUM SERPL-SCNC: 4.5 MMOL/L (ref 3.5–5.2)
PROT SERPL-MCNC: 6.1 G/DL (ref 6–8.5)
RBC # BLD AUTO: 4.14 10*6/MM3 (ref 4.14–5.8)
SODIUM SERPL-SCNC: 137 MMOL/L (ref 136–145)
WBC NRBC COR # BLD AUTO: 6.52 10*3/MM3 (ref 3.4–10.8)

## 2024-02-10 PROCEDURE — 85027 COMPLETE CBC AUTOMATED: CPT | Performed by: NURSE PRACTITIONER

## 2024-02-10 PROCEDURE — 83735 ASSAY OF MAGNESIUM: CPT | Performed by: HOSPITALIST

## 2024-02-10 PROCEDURE — 80053 COMPREHEN METABOLIC PANEL: CPT | Performed by: NURSE PRACTITIONER

## 2024-02-10 PROCEDURE — 99231 SBSQ HOSP IP/OBS SF/LOW 25: CPT | Performed by: INTERNAL MEDICINE

## 2024-02-10 RX ORDER — PANTOPRAZOLE SODIUM 40 MG/10ML
40 INJECTION, POWDER, LYOPHILIZED, FOR SOLUTION INTRAVENOUS EVERY 12 HOURS SCHEDULED
Status: DISCONTINUED | OUTPATIENT
Start: 2024-02-10 | End: 2024-02-11 | Stop reason: HOSPADM

## 2024-02-10 RX ADMIN — ATORVASTATIN CALCIUM 40 MG: 20 TABLET, FILM COATED ORAL at 09:21

## 2024-02-10 RX ADMIN — Medication 500 MCG: at 09:20

## 2024-02-10 RX ADMIN — METOPROLOL SUCCINATE 12.5 MG: 25 TABLET, EXTENDED RELEASE ORAL at 09:21

## 2024-02-10 RX ADMIN — Medication 10 ML: at 21:36

## 2024-02-10 RX ADMIN — DOFETILIDE 375 MCG: 0.25 CAPSULE ORAL at 09:22

## 2024-02-10 RX ADMIN — FAMOTIDINE 20 MG: 10 INJECTION INTRAVENOUS at 09:22

## 2024-02-10 RX ADMIN — Medication 10 ML: at 09:22

## 2024-02-10 RX ADMIN — SODIUM CHLORIDE 75 ML/HR: 9 INJECTION, SOLUTION INTRAVENOUS at 19:19

## 2024-02-10 RX ADMIN — DOFETILIDE 375 MCG: 0.25 CAPSULE ORAL at 21:35

## 2024-02-10 RX ADMIN — PANTOPRAZOLE SODIUM 40 MG: 40 INJECTION, POWDER, FOR SOLUTION INTRAVENOUS at 21:35

## 2024-02-10 RX ADMIN — PANTOPRAZOLE SODIUM 40 MG: 40 INJECTION, POWDER, FOR SOLUTION INTRAVENOUS at 11:14

## 2024-02-10 RX ADMIN — APIXABAN 5 MG: 5 TABLET, FILM COATED ORAL at 09:21

## 2024-02-10 RX ADMIN — APIXABAN 5 MG: 5 TABLET, FILM COATED ORAL at 21:35

## 2024-02-10 NOTE — PLAN OF CARE
Goal Outcome Evaluation:  Plan of Care Reviewed With: patient        Progress: improving  Outcome Evaluation: Monitor pain,labs,and vitals. VSS. Room air. No c/o pain on current shift. No c/o nausea on current shift. Increased diet to full liquids per orders. stool frequency slowing down on current shift. Will continue to monitor.

## 2024-02-10 NOTE — PROGRESS NOTES
[facility]  Inpatient Progress Note    Reason for Follow Up: Nausea with vomiting, abdominal pain, possible outlet obstruction by CT    Subjective     Interval History:   Discussed with patient, discussed with RN.  Symptoms improving with relative gut rest.  He request advancement of diet, I advised he advance slowly to see if he can tolerate full liquids before advancing beyond that.  He is amenable to that.  I suspect the etiology of this is consistent with some infectious source given his history and lab findings.    Current Facility-Administered Medications:     acetaminophen (TYLENOL) tablet 650 mg, 650 mg, Oral, Q4H PRN **OR** acetaminophen (TYLENOL) 160 MG/5ML oral solution 650 mg, 650 mg, Oral, Q4H PRN **OR** acetaminophen (TYLENOL) suppository 650 mg, 650 mg, Rectal, Q4H PRN, Annmarie Collins APRN    apixaban (ELIQUIS) tablet 5 mg, 5 mg, Oral, Q12H, Jenny Mckeon APRN, 5 mg at 02/10/24 0921    atorvastatin (LIPITOR) tablet 40 mg, 40 mg, Oral, Daily, Jenny Mckeon APRN, 40 mg at 02/10/24 0921    sennosides-docusate (PERICOLACE) 8.6-50 MG per tablet 2 tablet, 2 tablet, Oral, BID PRN **AND** polyethylene glycol (MIRALAX) packet 17 g, 17 g, Oral, Daily PRN **AND** bisacodyl (DULCOLAX) EC tablet 5 mg, 5 mg, Oral, Daily PRN **AND** bisacodyl (DULCOLAX) suppository 10 mg, 10 mg, Rectal, Daily PRN, Annmarie Collins APRN    calcium carbonate (TUMS) chewable tablet 500 mg (200 mg elemental), 2 tablet, Oral, BID PRN, Annmarie Collins APRN    dofetilide (TIKOSYN) capsule 375 mcg, 375 mcg, Oral, Q12H, Jenny Mckeon APRN, 375 mcg at 02/10/24 0922    metoprolol succinate XL (TOPROL-XL) 24 hr tablet 12.5 mg, 12.5 mg, Oral, Daily, Jenny Mckeon APRN, 12.5 mg at 02/10/24 0921    nitroglycerin (NITROSTAT) SL tablet 0.4 mg, 0.4 mg, Sublingual, Q5 Min PRN, Annmarie Collins, SHER    ondansetron ODT (ZOFRAN-ODT) disintegrating tablet 4 mg, 4 mg, Oral, Q6H PRN  **OR** ondansetron (ZOFRAN) injection 4 mg, 4 mg, Intravenous, Q6H PRN, Annmarie Collins APRN, 4 mg at 02/09/24 1543    pantoprazole (PROTONIX) injection 40 mg, 40 mg, Intravenous, Q12H, Mk Rothman MD, 40 mg at 02/10/24 1114    [COMPLETED] Insert Peripheral IV, , , Once **AND** sodium chloride 0.9 % flush 10 mL, 10 mL, Intravenous, PRN, Juan Carlos Hwang MD    sodium chloride 0.9 % flush 10 mL, 10 mL, Intravenous, Q12H, Annmarie Collins APRN, 10 mL at 02/10/24 0922    sodium chloride 0.9 % flush 10 mL, 10 mL, Intravenous, PRN, Annmarie Collins APRN    sodium chloride 0.9 % infusion 40 mL, 40 mL, Intravenous, PRN, Annmarie Collins APRN    sodium chloride 0.9 % infusion, 75 mL/hr, Intravenous, Continuous, Mk Rothman MD, Last Rate: 75 mL/hr at 02/10/24 1355, 75 mL/hr at 02/10/24 1355    vitamin B-12 (CYANOCOBALAMIN) tablet 500 mcg, 500 mcg, Oral, Daily, Jenny Mckeon APRN, 500 mcg at 02/10/24 0920  Review of Systems:    All systems were reviewed and negative except for:  Gastrointestinal: positive for  See HPI    Objective     Vital Signs  Temp:  [97.9 °F (36.6 °C)-99 °F (37.2 °C)] 98 °F (36.7 °C)  Heart Rate:  [68-75] 68  Resp:  [14-18] 18  BP: (128-144)/(67-83) 144/83  Body mass index is 28.94 kg/m².    Intake/Output Summary (Last 24 hours) at 2/10/2024 1534  Last data filed at 2/10/2024 1300  Gross per 24 hour   Intake 2652.5 ml   Output --   Net 2652.5 ml     I/O this shift:  In: 480 [P.O.:480]  Out: -      Physical Exam:   General: patient awake, alert and cooperative   Eyes: Normal lids and lashes, no scleral icterus   Neck: supple, normal ROM   Skin: warm and dry, not jaundiced   Cardiovascular: regular rhythm and rate, no murmurs auscultated   Pulm: clear to auscultation bilaterally, regular and unlabored   Abdomen: soft, nontender, nondistended; normal bowel sounds   Extremities: no rash or edema   Psychiatric: Normal mood and behavior; memory intact     Results Review:     I  reviewed the patient's new clinical results.    Results from last 7 days   Lab Units 02/10/24  0533 02/09/24  0503 02/08/24  2257   WBC 10*3/mm3 6.52 7.50 7.50   HEMOGLOBIN g/dL 14.0 13.8 14.6   HEMATOCRIT % 40.5 38.9 40.8   PLATELETS 10*3/mm3 186 188 116*     Results from last 7 days   Lab Units 02/10/24  0533 02/09/24  0503 02/09/24  0001   SODIUM mmol/L 137 133* 131*   POTASSIUM mmol/L 4.5 4.4 3.9   CHLORIDE mmol/L 105 100 98   CO2 mmol/L 22.4 23.0 18.1*   BUN mg/dL 13 16 18   CREATININE mg/dL 1.13 0.92 1.00   CALCIUM mg/dL 8.2* 7.8* 8.4*   BILIRUBIN mg/dL 0.8  --  0.9   ALK PHOS U/L 64  --  81   ALT (SGPT) U/L 23  --  26   AST (SGOT) U/L 29  --  33   GLUCOSE mg/dL 95 124* 123*         Lab Results   Lab Value Date/Time    LIPASE 50 02/09/2024 0001       Radiology:  CT Abdomen Pelvis With Contrast   Final Result       1. The patient's stomach is markedly distended and fluid-filled. There   are also patulous loops of small bowel, without evidence of small bowel   obstruction. Appearance is favored to reflect gastroenteritis. There is   some duodenal wall thickening. A component of gastric outlet   obstruction/ileus is not excluded, given the degree of distention of the   stomach, as well as of the stomach within the hiatal hernia.       Radiation dose reduction techniques were utilized, including automated   exposure control and exposure modulation based on body size.           This report was finalized on 2/9/2024 2:37 AM by Dr. Carly Mason M.D on Workstation: [id number]              Assessment & Plan     Active Hospital Problems    Diagnosis     **Enteritis due to Norovirus     E coli enteritis     Atrial fibrillation, persistent     H/O cardiac radiofrequency ablation--5/2023 PVI     Paroxysmal atrial fibrillation     Benign prostatic hyperplasia with urinary hesitancy     SYLWIA (obstructive sleep apnea)     Essential hypertension     Hyperlipidemia        Assessment:  Nausea with vomiting, abdominal  pain, diarrhea  Abnormal CT findings      Plan:  Continue to monitor intake and output  Advance diet slowly as tolerated  Symptomatic medications as needed  I discussed the patients findings and my recommendations with patient and nursing staff.    Zheng Blank MD

## 2024-02-10 NOTE — PROGRESS NOTES
Name: Zheng Lopez ADMIT: 2024   : 1950  PCP: Zoya Sanchez MD    MRN: [id number] LOS: 0 days   AGE/SEX: 73 y.o. male  ROOM: [facility]     Subjective   Subjective   Feeling much better today. No N/V/abd pain. He did however develop diarrhea overnight--none so far this AM. Hungry. Voiding well. No F/C/NS.        Objective   Objective   Vital Signs  Temp:  [97.9 °F (36.6 °C)-99.1 °F (37.3 °C)] 98 °F (36.7 °C)  Heart Rate:  [68-76] 68  Resp:  [14-18] 18  BP: (113-144)/(57-83) 144/83  SpO2:  [96 %-97 %] 96 %  on   ;   Device (Oxygen Therapy): room air  Body mass index is 28.94 kg/m².  Physical Exam  Vitals and nursing note reviewed.   Constitutional:       General: He is not in acute distress.     Appearance: He is not ill-appearing, toxic-appearing or diaphoretic.   HENT:      Head: Normocephalic.      Nose: Nose normal.      Mouth/Throat:      Mouth: Mucous membranes are moist.      Pharynx: Oropharynx is clear.   Eyes:      General: No scleral icterus.        Right eye: No discharge.         Left eye: No discharge.      Extraocular Movements: Extraocular movements intact.      Conjunctiva/sclera: Conjunctivae normal.   Cardiovascular:      Rate and Rhythm: Normal rate and regular rhythm.      Pulses: Normal pulses.   Pulmonary:      Effort: Pulmonary effort is normal. No respiratory distress.      Breath sounds: Normal breath sounds. No wheezing or rales.   Abdominal:      General: Bowel sounds are normal. There is no distension.      Palpations: Abdomen is soft.      Tenderness: There is abdominal tenderness (very mild epigastric). There is no guarding or rebound.   Musculoskeletal:         General: No swelling.      Cervical back: Neck supple.   Skin:     General: Skin is warm and dry.      Capillary Refill: Capillary refill takes less than 2 seconds.      Coloration: Skin is not jaundiced.   Neurological:      General: No focal deficit present.      Mental Status: He is alert and oriented to  person, place, and time. Mental status is at baseline.      Cranial Nerves: No cranial nerve deficit.      Coordination: Coordination normal.   Psychiatric:         Mood and Affect: Mood normal.         Behavior: Behavior normal.         Thought Content: Thought content normal.       Results Review     I reviewed the patient's new clinical results.  Results from last 7 days   Lab Units 02/10/24  0533 02/09/24  0503 02/08/24  2257   WBC 10*3/mm3 6.52 7.50 7.50   HEMOGLOBIN g/dL 14.0 13.8 14.6   PLATELETS 10*3/mm3 186 188 116*     Results from last 7 days   Lab Units 02/10/24  0533 02/09/24  0503 02/09/24  0001   SODIUM mmol/L 137 133* 131*   POTASSIUM mmol/L 4.5 4.4 3.9   CHLORIDE mmol/L 105 100 98   CO2 mmol/L 22.4 23.0 18.1*   BUN mg/dL 13 16 18   CREATININE mg/dL 1.13 0.92 1.00   GLUCOSE mg/dL 95 124* 123*   EGFR mL/min/1.73 67.8 86.7 78.5     Results from last 7 days   Lab Units 02/10/24  0533 02/09/24  0001   ALBUMIN g/dL 3.7 4.3   BILIRUBIN mg/dL 0.8 0.9   ALK PHOS U/L 64 81   AST (SGOT) U/L 29 33   ALT (SGPT) U/L 23 26     Results from last 7 days   Lab Units 02/10/24  0533 02/09/24  0503 02/09/24  0001   CALCIUM mg/dL 8.2* 7.8* 8.4*   ALBUMIN g/dL 3.7  --  4.3   MAGNESIUM mg/dL 2.0  --   --      Results from last 7 days   Lab Units 02/09/24  0001   LACTATE mmol/L 1.9     Hemoglobin A1C   Date/Time Value Ref Range Status   02/09/2024 0503 5.40 4.80 - 5.60 % Final       CT Abdomen Pelvis With Contrast    Result Date: 2/9/2024   1. The patient's stomach is markedly distended and fluid-filled. There are also patulous loops of small bowel, without evidence of small bowel obstruction. Appearance is favored to reflect gastroenteritis. There is some duodenal wall thickening. A component of gastric outlet obstruction/ileus is not excluded, given the degree of distention of the stomach, as well as of the stomach within the hiatal hernia.  Radiation dose reduction techniques were utilized, including automated exposure  control and exposure modulation based on body size.   This report was finalized on 2/9/2024 2:37 AM by Dr. Carly Mason M.D on Workstation: [id number]       I have personally reviewed all medications:  Scheduled Medications  apixaban, 5 mg, Oral, Q12H  atorvastatin, 40 mg, Oral, Daily  dofetilide, 375 mcg, Oral, Q12H  famotidine, 20 mg, Intravenous, Q12H  metoprolol succinate XL, 12.5 mg, Oral, Daily  sodium chloride, 10 mL, Intravenous, Q12H  vitamin B-12, 500 mcg, Oral, Daily    Infusions  sodium chloride, 75 mL/hr, Last Rate: 75 mL/hr (02/10/24 0923)    Diet  Diet: Liquid Diets; Clear Liquid; Fluid Consistency: Thin (IDDSI 0)    I have personally reviewed:  [x]  Laboratory   [x]  Microbiology   []  Radiology   [x]  EKG/Telemetry  []  Cardiology/Vascular   []  Pathology    []  Records       Assessment/Plan     Active Hospital Problems    Diagnosis  POA    **Enteritis due to Norovirus [A08.11]  Yes    E coli enteritis [A04.4]  Yes    Atrial fibrillation, persistent [I48.19]  Yes    H/O cardiac radiofrequency ablation--5/2023 PVI [Z98.890]  Not Applicable    Paroxysmal atrial fibrillation [I48.0]  Yes    Benign prostatic hyperplasia with urinary hesitancy [N40.1, R39.11]  Yes    SYLWIA (obstructive sleep apnea) [G47.33]  Yes    Essential hypertension [I10]  Yes    Hyperlipidemia [E78.5]  Yes      Resolved Hospital Problems   No resolved problems to display.       74yo gentleman with PAF (Eliquis), BPH, GERD, HTN, HLD, CAD, and SYLWIA, who presents with N/V and crampy abd pain. He reports near-syncopal episode while in bathroom at home--that's what prompted wife to call EMS.     N/V/Abd pain  Norovirus  EAEC  Continue supportive care with IVFs and PRN antiemetics  Stool studies noted Norovirus and EnteroAggregative E.Coli  Await GI recs today  Tolerating CLD  On Pepcid IV, can change to PPI as no CDiff     Near syncope  Suspect vagal event  Not orthostatic here     HTN  Continue metoprolol with holding  parameters  BPs acceptable at present     PAF  Chronic AC (Eliquis)  HRs fine on metoprolol and Tikosyn  Continue Eliquis     Hypovolemic hyponatremia  Not clinically significant  Normalized with IV NS  Monitor     Hyperglycemia  Suspect stress-related elevation as A1c is wnl  Monitor      Eliquis (home med) for DVT prophylaxis.  Full code.  Discussed with patient and RN.  Anticipate discharge home with family in 1-2 days.      Mk Rothman MD  [facility]  02/10/24  09:53 EST

## 2024-02-10 NOTE — PLAN OF CARE
Problem: Adult Inpatient Plan of Care  Goal: Plan of Care Review  Outcome: Ongoing, Progressing  Flowsheets (Taken 2/10/2024 0415)  Outcome Evaluation: Pt has had a couple loose stools. GI panel positive for Norovirus and Ecoli. Minimal c/o of nausea and no c/o of pain.  Goal: Patient-Specific Goal (Individualized)  Outcome: Ongoing, Progressing  Goal: Absence of Hospital-Acquired Illness or Injury  Outcome: Ongoing, Progressing  Intervention: Identify and Manage Fall Risk  Recent Flowsheet Documentation  Taken 2/10/2024 0407 by Fiona Gaona, RN  Safety Promotion/Fall Prevention:   activity supervised   assistive device/personal items within reach   clutter free environment maintained   lighting adjusted   mobility aid in reach   nonskid shoes/slippers when out of bed   safety round/check completed   toileting scheduled  Taken 2/10/2024 0239 by Fiona Gaona, RN  Safety Promotion/Fall Prevention:   activity supervised   clutter free environment maintained   assistive device/personal items within reach   lighting adjusted   mobility aid in reach   nonskid shoes/slippers when out of bed   safety round/check completed   toileting scheduled  Taken 2/10/2024 0023 by Fiona Gaona, RN  Safety Promotion/Fall Prevention:   activity supervised   assistive device/personal items within reach  Taken 2/9/2024 2210 by Fiona Gaona, RN  Safety Promotion/Fall Prevention:   activity supervised   assistive device/personal items within reach   clutter free environment maintained   lighting adjusted   mobility aid in reach   nonskid shoes/slippers when out of bed   toileting scheduled   safety round/check completed  Taken 2/9/2024 2005 by Fiona Gaona, RN  Safety Promotion/Fall Prevention:   activity supervised   assistive device/personal items within reach   clutter free environment maintained   lighting adjusted   mobility aid in reach   nonskid shoes/slippers when out of bed   safety  round/check completed   toileting scheduled  Intervention: Prevent and Manage VTE (Venous Thromboembolism) Risk  Recent Flowsheet Documentation  Taken 2/10/2024 0407 by Fiona Gaona, RN  Activity Management: activity encouraged  Taken 2/10/2024 0239 by Fiona Gaona, RN  Activity Management: activity encouraged  Taken 2/10/2024 0023 by Fiona Gaona, RN  Activity Management: activity encouraged  Taken 2/9/2024 2210 by Fiona Gaona, RN  Activity Management: activity encouraged  Taken 2/9/2024 2005 by Fiona Gaona, RN  Activity Management: activity encouraged  Goal: Optimal Comfort and Wellbeing  Outcome: Ongoing, Progressing  Goal: Readiness for Transition of Care  Outcome: Ongoing, Progressing   Goal Outcome Evaluation:              Outcome Evaluation: Pt has had a couple loose stools. GI panel positive for Norovirus and Ecoli. Minimal c/o of nausea and no c/o of pain.

## 2024-02-11 ENCOUNTER — READMISSION MANAGEMENT (OUTPATIENT)
Dept: CALL CENTER | Facility: HOSPITAL | Age: 74
End: 2024-02-11
Payer: MEDICARE

## 2024-02-11 VITALS
OXYGEN SATURATION: 98 % | WEIGHT: 195.99 LBS | DIASTOLIC BLOOD PRESSURE: 84 MMHG | SYSTOLIC BLOOD PRESSURE: 155 MMHG | TEMPERATURE: 96.8 F | BODY MASS INDEX: 29.03 KG/M2 | HEIGHT: 69 IN | HEART RATE: 61 BPM | RESPIRATION RATE: 14 BRPM

## 2024-02-11 LAB
ALBUMIN SERPL-MCNC: 3.5 G/DL (ref 3.5–5.2)
ALBUMIN/GLOB SERPL: 1.5 G/DL
ALP SERPL-CCNC: 60 U/L (ref 39–117)
ALT SERPL W P-5'-P-CCNC: 23 U/L (ref 1–41)
ANION GAP SERPL CALCULATED.3IONS-SCNC: 8.8 MMOL/L (ref 5–15)
AST SERPL-CCNC: 30 U/L (ref 1–40)
BILIRUB SERPL-MCNC: 0.7 MG/DL (ref 0–1.2)
BUN SERPL-MCNC: 7 MG/DL (ref 8–23)
BUN/CREAT SERPL: 8.4 (ref 7–25)
CALCIUM SPEC-SCNC: 8 MG/DL (ref 8.6–10.5)
CHLORIDE SERPL-SCNC: 106 MMOL/L (ref 98–107)
CO2 SERPL-SCNC: 24.2 MMOL/L (ref 22–29)
CREAT SERPL-MCNC: 0.83 MG/DL (ref 0.76–1.27)
DEPRECATED RDW RBC AUTO: 44.1 FL (ref 37–54)
EGFRCR SERPLBLD CKD-EPI 2021: 91.3 ML/MIN/1.73
ERYTHROCYTE [DISTWIDTH] IN BLOOD BY AUTOMATED COUNT: 12.2 % (ref 12.3–15.4)
GLOBULIN UR ELPH-MCNC: 2.4 GM/DL
GLUCOSE SERPL-MCNC: 92 MG/DL (ref 65–99)
HCT VFR BLD AUTO: 39.1 % (ref 37.5–51)
HGB BLD-MCNC: 13.6 G/DL (ref 13–17.7)
MAGNESIUM SERPL-MCNC: 2 MG/DL (ref 1.6–2.4)
MCH RBC QN AUTO: 34.1 PG (ref 26.6–33)
MCHC RBC AUTO-ENTMCNC: 34.8 G/DL (ref 31.5–35.7)
MCV RBC AUTO: 98 FL (ref 79–97)
PLATELET # BLD AUTO: 165 10*3/MM3 (ref 140–450)
PMV BLD AUTO: 9.1 FL (ref 6–12)
POTASSIUM SERPL-SCNC: 4.3 MMOL/L (ref 3.5–5.2)
PROT SERPL-MCNC: 5.9 G/DL (ref 6–8.5)
RBC # BLD AUTO: 3.99 10*6/MM3 (ref 4.14–5.8)
SODIUM SERPL-SCNC: 139 MMOL/L (ref 136–145)
WBC NRBC COR # BLD AUTO: 5.88 10*3/MM3 (ref 3.4–10.8)

## 2024-02-11 PROCEDURE — 83735 ASSAY OF MAGNESIUM: CPT | Performed by: HOSPITALIST

## 2024-02-11 PROCEDURE — 85027 COMPLETE CBC AUTOMATED: CPT | Performed by: HOSPITALIST

## 2024-02-11 PROCEDURE — 80053 COMPREHEN METABOLIC PANEL: CPT | Performed by: HOSPITALIST

## 2024-02-11 RX ADMIN — Medication 10 ML: at 08:27

## 2024-02-11 RX ADMIN — PANTOPRAZOLE SODIUM 40 MG: 40 INJECTION, POWDER, FOR SOLUTION INTRAVENOUS at 08:27

## 2024-02-11 RX ADMIN — DOFETILIDE 375 MCG: 0.25 CAPSULE ORAL at 08:27

## 2024-02-11 RX ADMIN — Medication 500 MCG: at 08:27

## 2024-02-11 RX ADMIN — METOPROLOL SUCCINATE 12.5 MG: 25 TABLET, EXTENDED RELEASE ORAL at 08:26

## 2024-02-11 RX ADMIN — ATORVASTATIN CALCIUM 40 MG: 20 TABLET, FILM COATED ORAL at 08:25

## 2024-02-11 RX ADMIN — APIXABAN 5 MG: 5 TABLET, FILM COATED ORAL at 08:26

## 2024-02-11 NOTE — CASE MANAGEMENT/SOCIAL WORK
Case Management Discharge Note      Final Note: dc home         Selected Continued Care - Discharged on 2/11/2024 Admission date: 2/8/2024 - Discharge disposition: Home or Self Care      Destination    No services have been selected for the patient.                Durable Medical Equipment    No services have been selected for the patient.                Dialysis/Infusion    No services have been selected for the patient.                Home Medical Care    No services have been selected for the patient.                Therapy    No services have been selected for the patient.                Community Resources    No services have been selected for the patient.                Community & DME    No services have been selected for the patient.                         Final Discharge Disposition Code: 01 - home or self-care

## 2024-02-11 NOTE — DISCHARGE SUMMARY
Patient Name: Zheng Lopez  : 1950  MRN: [id number]    Date of Admission: 2024  Date of Discharge:  2024  Primary Care Physician: Zoay Sanchez MD      Chief Complaint:   Abdominal Pain and Dizziness      Discharge Diagnoses     Active Hospital Problems    Diagnosis  POA    **Enteritis due to Norovirus [A08.11]  Yes    E coli enteritis [A04.4]  Yes    Atrial fibrillation, persistent [I48.19]  Yes    H/O cardiac radiofrequency ablation--2023 PVI [Z98.890]  Not Applicable    Paroxysmal atrial fibrillation [I48.0]  Yes    Benign prostatic hyperplasia with urinary hesitancy [N40.1, R39.11]  Yes    SYLWIA (obstructive sleep apnea) [G47.33]  Yes    Essential hypertension [I10]  Yes    Hyperlipidemia [E78.5]  Yes      Resolved Hospital Problems   No resolved problems to display.        Hospital Course     Very pleasant 74yo gentleman with PAF (Eliquis), BPH, GERD, HTN, HLD, CAD, and SYLWIA, who presented with N/V and crampy abd pain. He reported near-syncopal episode while in bathroom at home--that's what prompted wife to call EMS. He was diagnosed here with Norovirus and EAEC. Please see below for details of admission:     N/V/Abd pain  Norovirus  Entero-Aggregative E.Coli (EAEC)  Provided supportive care with IVFs and PRN antiemetics  Stool studies noted Norovirus and EnteroAggregative E.Coli  GI does not recommend abx tx for EAEC  Tolerating FLD  Okay to go home today and slowly advance diet as tolerated there     Near syncope  Suspect vagal event  Not orthostatic here  Has not recurred     HTN  Continued metoprolol with holding parameters  BPs acceptable at present     PAF  Chronic AC (Eliquis)  HRs fine on metoprolol and Tikosyn  Continued Eliquis     Hypovolemic hyponatremia  Not clinically significant  Normalized with IV NS     Hyperglycemia  Suspect stress-related elevation as A1c is wnl        Eliquis (home med) for DVT prophylaxis.  Full code.  Discussed with patient and RN.  Anticipate  discharge home with family    Day of Discharge     Subjective:  Feeling much better again today. No N/V/D/abd pain. Tolerating FLD. Voiding well. No F/C/NS. Eager to go home.       Physical Exam:  Temp:  [96.8 °F (36 °C)-97.8 °F (36.6 °C)] 96.8 °F (36 °C)  Heart Rate:  [59-65] 61  Resp:  [14-20] 14  BP: (134-155)/(77-84) 155/84  Body mass index is 28.94 kg/m².  Physical Exam  Vitals and nursing note reviewed.   Constitutional:       General: He is not in acute distress.     Appearance: He is not ill-appearing, toxic-appearing or diaphoretic.   Cardiovascular:      Rate and Rhythm: Normal rate and regular rhythm.      Pulses: Normal pulses.   Pulmonary:      Effort: Pulmonary effort is normal. No respiratory distress.      Breath sounds: Normal breath sounds. No wheezing or rales.   Abdominal:      General: Bowel sounds are normal. There is no distension.      Palpations: Abdomen is soft.      Tenderness: There is no abdominal tenderness.  Musculoskeletal:         General: No swelling.      Cervical back: Neck supple.   Skin:     General: Skin is warm and dry.      Capillary Refill: Capillary refill takes less than 2 seconds.      Coloration: Skin is not jaundiced.   Neurological:      General: No focal deficit present.      Mental Status: He is alert and oriented to person, place, and time. Mental status is at baseline.      Cranial Nerves: No cranial nerve deficit.      Coordination: Coordination normal.   Psychiatric:         Mood and Affect: Mood normal.         Behavior: Behavior normal.         Thought Content: Thought content normal.      Consultants     Consult Orders (all) (From admission, onward)       Start     Ordered    02/09/24 0806  Inpatient Gastroenterology Consult  Once        Specialty:  Gastroenterology  Provider:  Ferdinand Crenshaw MD    02/09/24 0806 02/09/24 0225  [facility] (on-call MD unless specified) Details  Once        Specialty:  Hospitalist  Provider:  (Not yet assigned)    02/09/24 0220                   Procedures     * Surgery not found *    Imaging Results (All)       Procedure Component Value Units Date/Time    CT Abdomen Pelvis With Contrast [[id number]] Collected: 02/09/24 0230     Updated: 02/09/24 0240    Narrative:      CT OF THE ABDOMEN PELVIS WITH CONTRAST     HISTORY: Nausea and vomiting     COMPARISON: None available.     TECHNIQUE: Axial CT imaging was obtained through the abdomen and pelvis.  IV contrast was administered.     FINDINGS:  Images through the lung bases demonstrate some minimal scarring versus  atelectasis. Scattered cysts are noted throughout the liver. The patient  has a moderate hiatal hernia. The remainder of the stomach is also  markedly distended with fluid. The duodenal bulb does appear thick  walled. Multiple patulous loops of small bowel are seen. Appearance is  suspicious for gastroenteritis. Gallbladder is absent. Calcified  granulomata are noted within the spleen. Pancreas is normal. Somewhat  bulky appearance to the left adrenal gland may reflect hyperplasia.  Right adrenal gland is normal. The kidneys enhance symmetrically. No  hydronephrosis is seen. There are aortoiliac calcifications. No distal  ureteral or bladder stones are seen. Prostate gland contains dystrophic  calcifications. There is colonic diverticulosis. The appendix is normal.  The patient does have a relatively collapsed segment of sigmoid colon.  Consider follow-up imaging to exclude any underlying stricture or mass  lesion. No acute osseous abnormalities are seen. Patient is status post  left hip arthroplasty.          Impression:         1. The patient's stomach is markedly distended and fluid-filled. There  are also patulous loops of small bowel, without evidence of small bowel  obstruction. Appearance is favored to reflect gastroenteritis. There is  some duodenal wall thickening. A component of gastric outlet  obstruction/ileus is not excluded, given the degree of distention of  the  stomach, as well as of the stomach within the hiatal hernia.     Radiation dose reduction techniques were utilized, including automated  exposure control and exposure modulation based on body size.        This report was finalized on 2/9/2024 2:37 AM by Dr. Carly Mason M.D on Workstation: [id number]               Results for orders placed during the hospital encounter of 01/12/22    Adult Transthoracic Echo Complete W/ Cont if Necessary Per Protocol    Interpretation Summary  · Calculated left ventricular EF = 63.7% Estimated left ventricular EF = 64% Estimated left ventricular EF was in agreement with the calculated left ventricular EF. Left ventricular systolic function is normal. Normal left ventricular cavity size noted. Left ventricular wall thickness is consistent with mild concentric hypertrophy. There are wall motion abnormalities as noted below Left ventricular diastolic function was normal.  · Left atrial volume is mildly increased.  · Trace mitral valve regurgitation is present.  · There is an echolucent. In the liver suggestive of hepatic cyst. Consider dedicated imaging to assess further if clinically indicated    Pertinent Labs     Results from last 7 days   Lab Units 02/11/24  0335 02/10/24  0533 02/09/24  0503 02/08/24  2257   WBC 10*3/mm3 5.88 6.52 7.50 7.50   HEMOGLOBIN g/dL 13.6 14.0 13.8 14.6   PLATELETS 10*3/mm3 165 186 188 116*     Results from last 7 days   Lab Units 02/11/24  0335 02/10/24  0533 02/09/24  0503 02/09/24  0001   SODIUM mmol/L 139 137 133* 131*   POTASSIUM mmol/L 4.3 4.5 4.4 3.9   CHLORIDE mmol/L 106 105 100 98   CO2 mmol/L 24.2 22.4 23.0 18.1*   BUN mg/dL 7* 13 16 18   CREATININE mg/dL 0.83 1.13 0.92 1.00   GLUCOSE mg/dL 92 95 124* 123*   EGFR mL/min/1.73 91.3 67.8 86.7 78.5     Results from last 7 days   Lab Units 02/11/24  0335 02/10/24  0533 02/09/24  0001   ALBUMIN g/dL 3.5 3.7 4.3   BILIRUBIN mg/dL 0.7 0.8 0.9   ALK PHOS U/L 60 64 81   AST (SGOT) U/L 30 29 33  "  ALT (SGPT) U/L 23 23 26     Results from last 7 days   Lab Units 02/11/24  0335 02/10/24  0533 02/09/24  0503 02/09/24  0001   CALCIUM mg/dL 8.0* 8.2* 7.8* 8.4*   ALBUMIN g/dL 3.5 3.7  --  4.3   MAGNESIUM mg/dL 2.0 2.0  --   --      Results from last 7 days   Lab Units 02/09/24  0001   LIPASE U/L 50     Results from last 7 days   Lab Units 02/09/24  0220 02/09/24  0001   HSTROP T ng/L 18 22*           Invalid input(s): \"LDLCALC\"          Test Results Pending at Discharge       Discharge Details        Discharge Medications        Continue These Medications        Instructions Start Date   atorvastatin 40 MG tablet  Commonly known as: LIPITOR   TAKE 1 TABLET DAILY      calcium carbonate 600 MG tablet  Commonly known as: OS-TOMAS   600 mg, Oral, Daily      dofetilide 125 MCG capsule  Commonly known as: TIKOSYN   375 mcg, Oral, Every 12 Hours      Eliquis 5 MG tablet tablet  Generic drug: apixaban   TAKE 1 TABLET EVERY 12 HOURS      esomeprazole 40 MG capsule  Commonly known as: nexIUM   TAKE 1 CAPSULE EVERY MORNING BEFORE BREAKFAST      Magnesium 400 MG capsule   500 mg, Oral, Daily      metoprolol succinate XL 25 MG 24 hr tablet  Commonly known as: TOPROL-XL   12.5 mg, Oral, Daily      multivitamin tablet tablet  Commonly known as: THERAGRAN   1 tablet, Oral, Daily      sildenafil 20 MG tablet  Commonly known as: REVATIO   40 mg, Oral, As Needed      VITAMIN B-12 PO   1 tablet, Oral, Daily             Stop These Medications      amoxicillin 500 MG capsule  Commonly known as: AMOXIL     Flax Seed Oil 1000 MG capsule              No Known Allergies    Discharge Disposition:  Home or Self Care      Discharge Diet:  Diet Order   Procedures    Diet: Liquid Diets; Full Liquid; Fluid Consistency: Thin (IDDSI 0)       Discharge Activity:   As tolerated    CODE STATUS:    Code Status and Medical Interventions:   Ordered at: 02/09/24 0259     Code Status (Patient has no pulse and is not breathing):    CPR (Attempt to " Resuscitate)     Medical Interventions (Patient has pulse or is breathing):    Full Support       Future Appointments   Date Time Provider Department Center   2/12/2024  1:20 PM Mk Moreno MD [facility] None   5/10/2024 10:20 AM [facility]   5/17/2024  8:30 AM Zoya Sanchez MD [facility]   10/31/2024 12:00 PM LAB CHAIR 2 [facility]  [facility] [place]   10/31/2024 12:20 PM Vivek Trujillo MD [facility] [place]   11/7/2024 10:00 AM Su Small APRN [facility]   1/17/2025  9:15 AM Ibis Daniels APRN [facility] None     Additional Instructions for the Follow-ups that You Need to Schedule       Discharge Follow-up with PCP   As directed       Currently Documented PCP:    Zoya Sanchez MD    PCP Phone Number:    [phone]     Follow Up Details: Dr. Sanchez (PCP) in 1 week               Follow-up Information       Zoya Sanchez MD .    Specialty: Family Medicine  Why: Dr. Sanchez (PCP) in 1 week  Contact information:  [address] [address]  [phone]                             Additional Instructions for the Follow-ups that You Need to Schedule       Discharge Follow-up with PCP   As directed       Currently Documented PCP:    Zoya Sanchez MD    PCP Phone Number:    [phone]     Follow Up Details: Dr. Sanchez (PCP) in 1 week            Time Spent on Discharge:  Greater than 30 minutes      Mk Rothman MD  [facility]  02/11/24  09:39 EST

## 2024-02-11 NOTE — NURSING NOTE
Order to discharge patient. Patient belongings sent with wife and patient. Lines removed. Education packet on full liquid diet provided. Discharge education provided. Patient discharged.

## 2024-02-11 NOTE — OUTREACH NOTE
Prep Survey      Flowsheet Row Responses   [facility] patient discharged from? [facility]   Is LACE score < 7 ? No   Eligibility [facility]   Date of Admission 02/09/24   Date of Discharge 02/11/24   Discharge Disposition Home or Self Care   Discharge diagnosis Enteritis due to Norovirus   Does the patient have one of the following disease processes/diagnoses(primary or secondary)? Other   Does the patient have Home health ordered? No   Is there a DME ordered? No   Prep survey completed? Yes            Diana COSME - Registered Nurse

## 2024-02-11 NOTE — PLAN OF CARE
Goal Outcome Evaluation:  Plan of Care Reviewed With: patient        Progress: improving  Outcome Evaluation: VSS, room air, no c/o pain or s/s of distress.  Full liquid diet - taking PO well. No bm's on shift. NSR/SB on monior. Will continue to monitor.

## 2024-02-12 ENCOUNTER — OFFICE VISIT (OUTPATIENT)
Age: 74
End: 2024-02-12
Payer: MEDICARE

## 2024-02-12 ENCOUNTER — TRANSITIONAL CARE MANAGEMENT TELEPHONE ENCOUNTER (OUTPATIENT)
Dept: CALL CENTER | Facility: HOSPITAL | Age: 74
End: 2024-02-12
Payer: MEDICARE

## 2024-02-12 VITALS
HEART RATE: 67 BPM | SYSTOLIC BLOOD PRESSURE: 124 MMHG | WEIGHT: 189 LBS | HEIGHT: 69 IN | DIASTOLIC BLOOD PRESSURE: 86 MMHG | BODY MASS INDEX: 27.99 KG/M2

## 2024-02-12 DIAGNOSIS — I48.0 PAROXYSMAL ATRIAL FIBRILLATION: ICD-10-CM

## 2024-02-12 DIAGNOSIS — I10 ESSENTIAL HYPERTENSION: ICD-10-CM

## 2024-02-12 DIAGNOSIS — Z98.890 H/O CARDIAC RADIOFREQUENCY ABLATION: Primary | ICD-10-CM

## 2024-02-12 DIAGNOSIS — I42.2 ASYMMETRIC SEPTAL HYPERTROPHY: ICD-10-CM

## 2024-02-19 ENCOUNTER — OFFICE VISIT (OUTPATIENT)
Dept: FAMILY MEDICINE CLINIC | Facility: CLINIC | Age: 74
End: 2024-02-19
Payer: MEDICARE

## 2024-02-19 VITALS
HEART RATE: 66 BPM | TEMPERATURE: 97.6 F | WEIGHT: 188 LBS | BODY MASS INDEX: 27.85 KG/M2 | SYSTOLIC BLOOD PRESSURE: 110 MMHG | DIASTOLIC BLOOD PRESSURE: 70 MMHG | OXYGEN SATURATION: 98 % | HEIGHT: 69 IN

## 2024-02-19 DIAGNOSIS — A08.11 ACUTE GASTROENTEROPATHY DUE TO NOROVIRUS: ICD-10-CM

## 2024-02-19 DIAGNOSIS — Z09 HOSPITAL DISCHARGE FOLLOW-UP: Primary | ICD-10-CM

## 2024-02-19 PROCEDURE — 3074F SYST BP LT 130 MM HG: CPT | Performed by: FAMILY MEDICINE

## 2024-02-19 PROCEDURE — 3078F DIAST BP <80 MM HG: CPT | Performed by: FAMILY MEDICINE

## 2024-02-19 PROCEDURE — 1111F DSCHRG MED/CURRENT MED MERGE: CPT | Performed by: FAMILY MEDICINE

## 2024-02-19 PROCEDURE — 99495 TRANSJ CARE MGMT MOD F2F 14D: CPT | Performed by: FAMILY MEDICINE

## 2024-02-19 PROCEDURE — 1160F RVW MEDS BY RX/DR IN RCRD: CPT | Performed by: FAMILY MEDICINE

## 2024-02-19 PROCEDURE — 1159F MED LIST DOCD IN RCRD: CPT | Performed by: FAMILY MEDICINE

## 2024-02-19 NOTE — PROGRESS NOTES
Transitional Care Follow Up Visit  Subjective     Zheng Lopez is a 73 y.o. male who presents for a transitional care management visit.    Within 48 business hours after discharge our office contacted him via telephone to coordinate his care and needs.      I reviewed and discussed the details of that call along with the discharge summary, hospital problems, inpatient lab results, inpatient diagnostic studies, and consultation reports with Zheng.     Current outpatient and discharge medications have been reconciled for the patient.  Reviewed by: Zoya Sanchez MD          2/11/2024    12:03 PM   Date of TCM Phone Call   [facility]   Date of Admission 2/9/2024   Date of Discharge 2/11/2024   Discharge Disposition Home or Self Care     Risk for Readmission (LACE) Score: 8 (2/11/2024  6:00 AM)      History of Present Illness   Course During Hospital Stay: Pleasant 73-year-old male here to follow-up for hospitalization at [facility] February 8 to February 11, 2024.  He was admitted for enteritis due to norovirus, E. coli enteritis and symptoms of dizziness.  He presented to the emergency room with nausea vomiting, cramping and abdominal pain.  Had a near syncopal episode at home when having a bowel movement which prompted his wife to call EMS.    During his hospitalization he was provided supportive care with IV fluids and antiemetics.  Troponin levels were trended which were reassuring stool studies showed norovirus and Enterra aggregate of E. coli, no antibiotics recommended.  Near syncope suspected to be vasovagal, not orthostasis or episodes that are recurrent.  He was continued on his home meds and was doing well overall.    He has had a little constipation, he is having 5 movements a week, and usually has one a day.  They are also not well formed. But not loose. He is taking matamucil 6 tablets at night, good water,        The following portions of the patient's history were  reviewed and updated as appropriate: allergies, current medications, past family history, past medical history, past social history, past surgical history, and problem list.    Review of Systems  I reviewed the follow test during the hospital.  CBC & Differential (02/08/2024 22:57)  Urinalysis With Microscopic If Indicated (No Culture) - Urine, Clean Catch (02/09/2024 00:25)  Lipase (02/09/2024 00:01)  Lactic Acid, Plasma (02/09/2024 00:01)  High Sensitivity Troponin T (02/09/2024 00:01)  Comprehensive Metabolic Panel (02/09/2024 00:01)  Hemoglobin A1c (02/09/2024 05:03)     Gastrointestinal Panel, PCR - Stool, Per Rectum (02/09/2024 21:44)   Clostridioides difficile Toxin - Stool, Per Rectum (02/09/2024 21:44)     Objective   Physical Exam  Vitals and nursing note reviewed.   Constitutional:       General: He is not in acute distress.     Appearance: He is well-developed.   HENT:      Head: Normocephalic.      Nose: Nose normal.   Cardiovascular:      Rate and Rhythm: Normal rate and regular rhythm.      Heart sounds: Normal heart sounds. No murmur heard.  Pulmonary:      Effort: Pulmonary effort is normal. No respiratory distress.      Breath sounds: Normal breath sounds.   Abdominal:      General: Abdomen is flat. There is no distension.      Palpations: Abdomen is soft.      Tenderness: There is no abdominal tenderness.   Musculoskeletal:         General: Normal range of motion.   Skin:     General: Skin is warm and dry.      Findings: No rash.   Neurological:      Mental Status: He is alert and oriented to person, place, and time.   Psychiatric:         Behavior: Behavior normal.         Thought Content: Thought content normal.         Judgment: Judgment normal.         Assessment & Plan   Diagnoses and all orders for this visit:    1. Hospital discharge follow-up (Primary)    2. Acute gastroenteropathy due to Norovirus  -     Basic Metabolic Panel  -     CBC & Differential    Pleasant 73-year-old male here for  hospitalization follow-up for norovirus and near syncopal event.  Thankfully has not had any residual symptoms, we discussed his symptoms, normal exam today.  Will recheck labs above, continue with gradually increasing his activity at home.  He does have some weakness but this is not unexpected, he has a regular habit of going to the gym, we discussed starting with 50% of his normal routine and then gradually increasing as his strength improves.    Return if symptoms worsen or fail to improve.    Zoya Sanchez MD

## 2024-02-20 LAB
BASOPHILS # BLD AUTO: 0.06 10*3/MM3 (ref 0–0.2)
BASOPHILS NFR BLD AUTO: 1.1 % (ref 0–1.5)
BUN SERPL-MCNC: 16 MG/DL (ref 8–23)
BUN/CREAT SERPL: 16.7 (ref 7–25)
CALCIUM SERPL-MCNC: 8.9 MG/DL (ref 8.6–10.5)
CHLORIDE SERPL-SCNC: 97 MMOL/L (ref 98–107)
CO2 SERPL-SCNC: 25.4 MMOL/L (ref 22–29)
CREAT SERPL-MCNC: 0.96 MG/DL (ref 0.76–1.27)
EGFRCR SERPLBLD CKD-EPI 2021: 82.4 ML/MIN/1.73
EOSINOPHIL # BLD AUTO: 0.12 10*3/MM3 (ref 0–0.4)
EOSINOPHIL NFR BLD AUTO: 2.2 % (ref 0.3–6.2)
ERYTHROCYTE [DISTWIDTH] IN BLOOD BY AUTOMATED COUNT: 12.1 % (ref 12.3–15.4)
GLUCOSE SERPL-MCNC: 103 MG/DL (ref 65–99)
HCT VFR BLD AUTO: 44.6 % (ref 37.5–51)
HGB BLD-MCNC: 16 G/DL (ref 13–17.7)
IMM GRANULOCYTES # BLD AUTO: 0.03 10*3/MM3 (ref 0–0.05)
IMM GRANULOCYTES NFR BLD AUTO: 0.6 % (ref 0–0.5)
LYMPHOCYTES # BLD AUTO: 1.56 10*3/MM3 (ref 0.7–3.1)
LYMPHOCYTES NFR BLD AUTO: 29.2 % (ref 19.6–45.3)
MCH RBC QN AUTO: 34.1 PG (ref 26.6–33)
MCHC RBC AUTO-ENTMCNC: 35.9 G/DL (ref 31.5–35.7)
MCV RBC AUTO: 95.1 FL (ref 79–97)
MONOCYTES # BLD AUTO: 0.5 10*3/MM3 (ref 0.1–0.9)
MONOCYTES NFR BLD AUTO: 9.4 % (ref 5–12)
NEUTROPHILS # BLD AUTO: 3.07 10*3/MM3 (ref 1.7–7)
NEUTROPHILS NFR BLD AUTO: 57.5 % (ref 42.7–76)
PLATELET # BLD AUTO: 257 10*3/MM3 (ref 140–450)
POTASSIUM SERPL-SCNC: 5 MMOL/L (ref 3.5–5.2)
RBC # BLD AUTO: 4.69 10*6/MM3 (ref 4.14–5.8)
SODIUM SERPL-SCNC: 136 MMOL/L (ref 136–145)
WBC # BLD AUTO: 5.34 10*3/MM3 (ref 3.4–10.8)

## 2024-02-20 NOTE — PROGRESS NOTES
Please call patient back with results.  The labs has resulted as overall stable compared to before, your calcium is thankfully normal ironically your chloride is low, so I do not think you need to limit salt is much as you are currently.  Your blood counts are also improving, continue what you are doing now and we can reassess in a few months with your labs.  Please let us know if you have further questions.     Thank you

## 2024-03-26 RX ORDER — ATORVASTATIN CALCIUM 40 MG/1
TABLET, FILM COATED ORAL
Qty: 90 TABLET | Refills: 3 | Status: SHIPPED | OUTPATIENT
Start: 2024-03-26

## 2024-04-01 DIAGNOSIS — N52.9 ED (ERECTILE DYSFUNCTION) OF ORGANIC ORIGIN: ICD-10-CM

## 2024-04-02 RX ORDER — SILDENAFIL CITRATE 20 MG/1
TABLET ORAL
Qty: 20 TABLET | Refills: 0 | Status: SHIPPED | OUTPATIENT
Start: 2024-04-02

## 2024-05-17 ENCOUNTER — OFFICE VISIT (OUTPATIENT)
Dept: FAMILY MEDICINE CLINIC | Facility: CLINIC | Age: 74
End: 2024-05-17
Payer: MEDICARE

## 2024-05-17 VITALS
DIASTOLIC BLOOD PRESSURE: 84 MMHG | SYSTOLIC BLOOD PRESSURE: 122 MMHG | TEMPERATURE: 97.7 F | HEART RATE: 67 BPM | WEIGHT: 184.6 LBS | HEIGHT: 69 IN | BODY MASS INDEX: 27.34 KG/M2 | OXYGEN SATURATION: 95 %

## 2024-05-17 DIAGNOSIS — R73.9 HYPERGLYCEMIA: ICD-10-CM

## 2024-05-17 DIAGNOSIS — I10 PRIMARY HYPERTENSION: ICD-10-CM

## 2024-05-17 DIAGNOSIS — I48.19 ATRIAL FIBRILLATION, PERSISTENT: ICD-10-CM

## 2024-05-17 DIAGNOSIS — Z00.00 MEDICARE ANNUAL WELLNESS VISIT, SUBSEQUENT: Primary | ICD-10-CM

## 2024-05-17 PROCEDURE — 1126F AMNT PAIN NOTED NONE PRSNT: CPT | Performed by: FAMILY MEDICINE

## 2024-05-17 PROCEDURE — 1160F RVW MEDS BY RX/DR IN RCRD: CPT | Performed by: FAMILY MEDICINE

## 2024-05-17 PROCEDURE — 3074F SYST BP LT 130 MM HG: CPT | Performed by: FAMILY MEDICINE

## 2024-05-17 PROCEDURE — 1159F MED LIST DOCD IN RCRD: CPT | Performed by: FAMILY MEDICINE

## 2024-05-17 PROCEDURE — 3079F DIAST BP 80-89 MM HG: CPT | Performed by: FAMILY MEDICINE

## 2024-05-17 PROCEDURE — G0439 PPPS, SUBSEQ VISIT: HCPCS | Performed by: FAMILY MEDICINE

## 2024-05-17 PROCEDURE — 1170F FXNL STATUS ASSESSED: CPT | Performed by: FAMILY MEDICINE

## 2024-05-17 NOTE — PROGRESS NOTES
The ABCs of the Annual Wellness Visit  Subsequent Medicare Wellness Visit    Subjective      Zheng Lopez is a 73 y.o. male who presents for a Subsequent Medicare Wellness Visit.    The following portions of the patient's history were reviewed and   updated as appropriate: allergies, current medications, past family history, past medical history, past social history, past surgical history, and problem list.    Compared to one year ago, the patient feels his physical   health is the same.    Compared to one year ago, the patient feels his mental   health is the same.    Recent Hospitalizations:  This patient has had a [facility] admission record on file within the last 365 days.    Current Medical Providers:  Patient Care Team:  Zoya Sanchez MD as PCP - General (Family Medicine)  Zoya Sanchez MD as Referring Physician (Family Medicine)  Vivek Trujillo MD as Consulting Physician (Hematology and Oncology)    Outpatient Medications Prior to Visit   Medication Sig Dispense Refill    atorvastatin (LIPITOR) 40 MG tablet TAKE 1 TABLET DAILY 90 tablet 3    calcium carbonate (OS-TOMAS) 600 MG tablet Take 1 tablet by mouth Daily.      Cyanocobalamin (VITAMIN B-12 PO) Take 1 tablet by mouth Daily.      dofetilide (TIKOSYN) 125 MCG capsule Take 3 capsules by mouth Every 12 (Twelve) Hours. 540 capsule 1    Eliquis 5 MG tablet tablet TAKE 1 TABLET EVERY 12 HOURS 180 tablet 3    esomeprazole (nexIUM) 40 MG capsule TAKE 1 CAPSULE EVERY MORNING BEFORE BREAKFAST 90 capsule 3    Magnesium 400 MG capsule Take 500 mg by mouth Daily.      metoprolol succinate XL (TOPROL-XL) 25 MG 24 hr tablet Take 0.5 tablets by mouth Daily. 30 tablet 2    Multiple Vitamin tablet Take 1 tablet by mouth Daily.      sildenafil (REVATIO) 20 MG tablet TAKE TWO TABLETS BY MOUTH AS NEEDED FOR ERECTILE DYSFUNCTION 20 tablet 0     No facility-administered medications prior to visit.       No opioid medication identified on active medication  "list. I have reviewed chart for other potential  high risk medication/s and harmful drug interactions in the elderly.        Aspirin is not on active medication list.  Aspirin use is contraindicated for this patient due to: current use of Eliquis.  .    Patient Active Problem List   Diagnosis    Hyperlipidemia    Primary hypertension    SYLWIA (obstructive sleep apnea)    Osteoarthritis involving multiple joints on both sides of body    GERD (gastroesophageal reflux disease)    Diverticula of colon    ED (erectile dysfunction) of organic origin    Asymmetric septal hypertrophy    Non-sustained ventricular tachycardia    Chronic pain of left knee    Primary osteoarthritis of left knee    Left lumbar radiculopathy    Left thigh pain    Benign prostatic hyperplasia with urinary hesitancy    Arthritis of left hip    Primary localized osteoarthrosis of left hip    Arthritis of right wrist    Hyperglycemia    Medicare annual wellness visit, subsequent    Paroxysmal atrial fibrillation    Coronary-myocardial bridge    H/O cardiac radiofrequency ablation--5/2023 PVI    Atrial fibrillation, persistent    Gastroenteritis    Enteritis due to Norovirus    E coli enteritis     Advance Care Planning   Advance Care Planning     Advance Directive is on file.  ACP discussion was held with the patient during this visit. Patient has an advance directive in EMR which is still valid.      Objective    Vitals:    05/17/24 0823   BP: 122/84   Pulse: 67   Temp: 97.7 °F (36.5 °C)   SpO2: 95%   Weight: 83.7 kg (184 lb 9.6 oz)   Height: 175.3 cm (69.02\")   PainSc: 0-No pain     Estimated body mass index is 27.25 kg/m² as calculated from the following:    Height as of this encounter: 175.3 cm (69.02\").    Weight as of this encounter: 83.7 kg (184 lb 9.6 oz).    BMI is >= 25 and <30. (Overweight) The following options were offered after discussion;: exercise counseling/recommendations and nutrition counseling/recommendations    Physical " Exam  Vitals and nursing note reviewed.   Constitutional:       General: He is not in acute distress.     Appearance: Normal appearance. He is well-developed.   HENT:      Head: Normocephalic.      Right Ear: Tympanic membrane and ear canal normal.      Left Ear: Tympanic membrane and ear canal normal.      Nose: Nose normal.   Cardiovascular:      Rate and Rhythm: Normal rate and regular rhythm.      Heart sounds: Normal heart sounds. No murmur heard.  Pulmonary:      Effort: Pulmonary effort is normal. No respiratory distress.      Breath sounds: Normal breath sounds.   Abdominal:      General: Abdomen is flat. There is no distension.      Palpations: Abdomen is soft.      Tenderness: There is no abdominal tenderness.   Musculoskeletal:         General: Normal range of motion.   Skin:     General: Skin is warm and dry.      Findings: No rash.   Neurological:      Mental Status: He is alert and oriented to person, place, and time.   Psychiatric:         Mood and Affect: Mood normal.         Behavior: Behavior normal.         Thought Content: Thought content normal.         Judgment: Judgment normal.         Does the patient have evidence of cognitive impairment?   No    Lab Results   Component Value Date    CHLPL 135 05/10/2024    TRIG 60 05/10/2024    HDL 69 (H) 05/10/2024    LDL 53 05/10/2024    VLDL 13 05/10/2024    HGBA1C 5.30 05/10/2024      Hemoglobin A1c (05/10/2024 09:58)  Lipid Panel (05/10/2024 09:58)  CBC & Differential (05/10/2024 09:58)  Comprehensive Metabolic Panel (05/10/2024 09:58)    HEALTH RISK ASSESSMENT    Smoking Status:  Social History     Tobacco Use   Smoking Status Never    Passive exposure: Never   Smokeless Tobacco Never     Alcohol Consumption:  Social History     Substance and Sexual Activity   Alcohol Use No    Comment: none in 5 yrs, Nov 2011 - last drink     Fall Risk Screen:    STEADI Fall Risk Assessment was completed, and patient is at LOW risk for falls.Assessment completed  on:2024    Depression Screenin/17/2024     8:26 AM   PHQ-2/PHQ-9 Depression Screening   Little Interest or Pleasure in Doing Things 0-->not at all   Feeling Down, Depressed or Hopeless 0-->not at all   PHQ-9: Brief Depression Severity Measure Score 0       Health Habits and Functional and Cognitive Screenin/17/2024     8:26 AM   Functional & Cognitive Status   Do you have difficulty preparing food and eating? No   Do you have difficulty bathing yourself, getting dressed or grooming yourself? No   Do you have difficulty using the toilet? No   Do you have difficulty moving around from place to place? No   Do you have trouble with steps or getting out of a bed or a chair? No   Current Diet Well Balanced Diet   Dental Exam Up to date   Eye Exam Up to date   Exercise (times per week) 6 times per week   Current Exercises Include Home Fitness Gym   Do you need help using the phone?  No   Are you deaf or do you have serious difficulty hearing?  No   Do you need help to go to places out of walking distance? No   Do you need help shopping? No   Do you need help preparing meals?  No   Do you need help with housework?  No   Do you need help with laundry? No   Do you need help taking your medications? No   Do you need help managing money? No   Do you ever drive or ride in a car without wearing a seat belt? No   Have you felt unusual stress, anger or loneliness in the last month? No   Who do you live with? Spouse   If you need help, do you have trouble finding someone available to you? No   Have you been bothered in the last four weeks by sexual problems? No   Do you have difficulty concentrating, remembering or making decisions? No       Age-appropriate Screening Schedule:  Refer to the list below for future screening recommendations based on patient's age, sex and/or medical conditions. Orders for these recommended tests are listed in the plan section. The patient has been provided with a written  plan.    Health Maintenance   Topic Date Due    INFLUENZA VACCINE  08/01/2024    COLORECTAL CANCER SCREENING  04/16/2025    LIPID PANEL  05/10/2025    ANNUAL WELLNESS VISIT  05/17/2025    BMI FOLLOWUP  05/17/2025    TDAP/TD VACCINES (4 - Td or Tdap) 06/22/2031    HEPATITIS C SCREENING  Completed    COVID-19 Vaccine  Completed    RSV Vaccine - Adults  Completed    Pneumococcal Vaccine 65+  Completed    ZOSTER VACCINE  Completed                  CMS Preventative Services Quick Reference  Risk Factors Identified During Encounter:    Dental Screening Recommended  Vision Screening Recommended    The above risks/problems have been discussed with the patient.  Pertinent information has been shared with the patient in the After Visit Summary.    Diagnoses and all orders for this visit:    1. Medicare annual wellness visit, subsequent (Primary)    2. Atrial fibrillation, persistent    3. Primary hypertension  -     Comprehensive Metabolic Panel; Future  -     CBC & Differential; Future  -     Lipid Panel; Future    4. Hyperglycemia  -     Hemoglobin A1c; Future    Here for annual exam, fasting labs with patient which look great, immunizations up-to-date, colon cancer screening up-to-date, cardiovascular screening negative for symptoms, counseled patient to increase vegetable intake, water and 150 minutes of exercise weekly combining weightbearing exercises and aerobic activity.      Follow Up:   Next Medicare Wellness visit to be scheduled in 1 year.    Return in about 6 months (around 11/17/2024), or if symptoms worsen or fail to improve, for Recheck HTN with labs prior.    An After Visit Summary and PPPS were made available to the patient.

## 2024-05-22 RX ORDER — ESOMEPRAZOLE MAGNESIUM 40 MG/1
CAPSULE, DELAYED RELEASE ORAL
Qty: 90 CAPSULE | Refills: 3 | Status: SHIPPED | OUTPATIENT
Start: 2024-05-22

## 2024-06-03 RX ORDER — DOFETILIDE 0.12 MG/1
CAPSULE ORAL
Qty: 540 CAPSULE | Refills: 0 | Status: SHIPPED | OUTPATIENT
Start: 2024-06-03

## 2024-06-08 DIAGNOSIS — N52.9 ED (ERECTILE DYSFUNCTION) OF ORGANIC ORIGIN: ICD-10-CM

## 2024-06-10 RX ORDER — SILDENAFIL CITRATE 20 MG/1
TABLET ORAL
Qty: 20 TABLET | Refills: 0 | Status: SHIPPED | OUTPATIENT
Start: 2024-06-10

## 2024-08-12 ENCOUNTER — OFFICE VISIT (OUTPATIENT)
Age: 74
End: 2024-08-12
Payer: MEDICARE

## 2024-08-12 VITALS
HEART RATE: 55 BPM | SYSTOLIC BLOOD PRESSURE: 136 MMHG | DIASTOLIC BLOOD PRESSURE: 88 MMHG | WEIGHT: 192 LBS | HEIGHT: 69 IN | BODY MASS INDEX: 28.44 KG/M2

## 2024-08-12 DIAGNOSIS — Z51.81 ENCOUNTER FOR MONITORING DOFETILIDE THERAPY: ICD-10-CM

## 2024-08-12 DIAGNOSIS — Z98.890 H/O CARDIAC RADIOFREQUENCY ABLATION: ICD-10-CM

## 2024-08-12 DIAGNOSIS — I48.19 ATRIAL FIBRILLATION, PERSISTENT: Primary | ICD-10-CM

## 2024-08-12 DIAGNOSIS — Q24.5 CORONARY-MYOCARDIAL BRIDGE: ICD-10-CM

## 2024-08-12 DIAGNOSIS — Z79.899 ENCOUNTER FOR MONITORING DOFETILIDE THERAPY: ICD-10-CM

## 2024-08-12 PROCEDURE — 93000 ELECTROCARDIOGRAM COMPLETE: CPT | Performed by: NURSE PRACTITIONER

## 2024-08-12 PROCEDURE — 1159F MED LIST DOCD IN RCRD: CPT | Performed by: NURSE PRACTITIONER

## 2024-08-12 PROCEDURE — 3079F DIAST BP 80-89 MM HG: CPT | Performed by: NURSE PRACTITIONER

## 2024-08-12 PROCEDURE — 1160F RVW MEDS BY RX/DR IN RCRD: CPT | Performed by: NURSE PRACTITIONER

## 2024-08-12 PROCEDURE — 3075F SYST BP GE 130 - 139MM HG: CPT | Performed by: NURSE PRACTITIONER

## 2024-08-12 PROCEDURE — 99213 OFFICE O/P EST LOW 20 MIN: CPT | Performed by: NURSE PRACTITIONER

## 2024-08-12 RX ORDER — DOFETILIDE 0.12 MG/1
375 CAPSULE ORAL 2 TIMES DAILY
Qty: 540 CAPSULE | Refills: 3 | Status: SHIPPED | OUTPATIENT
Start: 2024-08-12

## 2024-08-12 NOTE — PROGRESS NOTES
Date of Office Visit: 2024  Encounter Provider: SHER Johns  Place of Service: [facility] CARDIOLOGY  Patient Name: Zheng Lopez  :1950    Chief Complaint   Patient presents with    paroxysmal AFIB   :     HPI: Zheng Lopez is a 73 y.o. male who follows with Dr. De Santiago and Dr. Moreno---- myocardial bridging, moderate basal septal hypertrophy, hypertension, hyperlipidemia, SYLWIA treated with CPAP and PAF.     Dr. Moreno in 2022--- newly a lot of trouble with PAF but then lost a lot of weight and his episodes decreased.      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      2023 increased episodes of AF, heart rate 140s, he saw me in early April, we did a ZIO which showed a 63% AF burden, ablation was recommended     2023 PVI. Had recurrent persistent AF--admitted and put on dofetilide in 2023.    Presents for follow up .     He had done well, no AF since starting dofetilide in 2023--he discussed at last visit with Dr. Moreno, possibly stopping dofetilide at this visit.     We discussed, he is doing well, has not had any recurrent, tolerates it well and is affordable, we talked discussed pros and cons of stopping---he wants to continue for now.     No chest pain, dyspnea, PND, orthopnea or edema.     Apixaban for OAC.      Past Medical History:   Diagnosis Date    Asymmetric septal hypertrophy     Benign prostatic hyperplasia with urinary hesitancy 2019    Bronchitis     Bronchitis / URI    Cataract of both eyes     Chicken pox     Cholecystitis     Diverticula of colon     ED (erectile dysfunction) of organic origin 2017    Encounter for annual health examination 03/10/2015    Annual Health Assessment    Fatigue     Gallbladder disease     Gastroenteritis 2024    GERD (gastroesophageal reflux disease)     High blood pressure     History  of EKG     10/30/15, 09/19/13, 09/14/12, 10/18/10, 05/04/09    History of TB skin testing     TB Skin Test: 01/09/14 Completed, 09/14/12 Patient Declines, 10/18/10 Completed, 03/05/08 Completed    Hyperlipidemia     Hypertension     still taking meds    Impingement of right ulnar nerve     Measles     Non-ST elevated myocardial infarction (non-STEMI) 01/12/2022    Osteoarthritis involving multiple joints on both sides of body     Osteoarthritis involving multiple joints on both sides of body     Persistent cough     Pulse irregularity     Irregular pulse PER CARDIO    Sexual problems     Sexual problems / enjoyment ED    Sleep apnea     has c-pap    Thrombocythemia 06/15/2021    Wellness examination 03/10/2015    Annual Wellness Visit       Past Surgical History:   Procedure Laterality Date    CARDIAC CATHETERIZATION N/A 01/12/2022    Procedure: Left Heart Cath;  Surgeon: Vi Orozco MD;  Location:  [facility];  Service: Cardiovascular;  Laterality: N/A;    CARDIAC CATHETERIZATION N/A 01/12/2022    Procedure: Coronary angiography;  Surgeon: Vi Orozco MD;  Location:  [facility];  Service: Cardiovascular;  Laterality: N/A;    CARDIAC CATHETERIZATION N/A 01/12/2022    Procedure: Left ventriculography;  Surgeon: Vi Orozco MD;  Location:  [facility];  Service: Cardiovascular;  Laterality: N/A;    CARDIAC ELECTROPHYSIOLOGY PROCEDURE N/A 5/25/2023    Procedure: Ablation atrial fibrillation;  Surgeon: Mk Moreno MD;  Location:  [facility];  Service: Cardiovascular;  Laterality: N/A;    CATARACT EXTRACTION Bilateral     CATARACT EXTRACTION Right 2013    CATARACT EXTRACTION Left 2014    CHOLECYSTECTOMY      COLONOSCOPY  04/16/2015    Dr. Ritchie    COLONOSCOPY  03/07/2007    ELBOW PROCEDURE Right     ulnar nerve impingement release    FOOT SURGERY Right     right foot (twice)    GALLBLADDER SURGERY  2009    KNEE MENISCAL REPAIR  Right 2010    torn right meniscus    LASIK  2006    PROSTATE AQUABLATION  12/2022    RHINOPLASTY  1986    ROTATOR CUFF REPAIR Left 2010    SHOULDER ARTHROSCOPY W/ LABRAL REPAIR Right 2015    Dr. Baxter  labram repair right shoulder    SHOULDER LIGAMENT REPAIR Right     TOTAL HIP ARTHROPLASTY REVISION Left 05/11/2020    TOTAL KNEE ARTHROPLASTY Right 2011    WRIST ARTHROPLASTY Right 08/25/2020       Social History     Socioeconomic History    Marital status:     Number of children: 2   Tobacco Use    Smoking status: Never     Passive exposure: Never    Smokeless tobacco: Never   Vaping Use    Vaping status: Never Used   Substance and Sexual Activity    Alcohol use: No     Comment: none in 5 yrs, Nov 2011 - last drink    Drug use: No    Sexual activity: Yes     Partners: Female       Family History   Problem Relation Age of Onset    Dementia Mother     Migraines Mother     Osteoporosis Mother     Stroke Mother 85        early 80s    Heart disease Mother         tachycardia and had ppm    Alzheimer's disease Mother     Supraventricular tachycardia Mother     Alcohol abuse Father     Depression Father     Migraines Father     Tuberculosis Father     Cirrhosis Father         43 YO    Cancer Paternal Uncle 67        mets but unsure of type of ca  BACK CANCER    Stroke Brother     Colon cancer Neg Hx     Prostate cancer Neg Hx     Heart attack Neg Hx        Review of Systems   Constitutional: Negative for chills, fever and malaise/fatigue.   Cardiovascular:  Negative for chest pain, dyspnea on exertion, leg swelling, near-syncope, orthopnea, palpitations, paroxysmal nocturnal dyspnea and syncope.   Respiratory:  Negative for cough and shortness of breath.    Hematologic/Lymphatic: Negative.    Musculoskeletal:  Negative for joint pain, joint swelling and myalgias.   Gastrointestinal:  Negative for abdominal pain, diarrhea, melena, nausea and vomiting.   Genitourinary:  Negative for frequency and hematuria.  "  Neurological:  Negative for light-headedness, numbness, paresthesias and seizures.   Allergic/Immunologic: Negative.    All other systems reviewed and are negative.      No Known Allergies      Current Outpatient Medications:     atorvastatin (LIPITOR) 40 MG tablet, TAKE 1 TABLET DAILY, Disp: 90 tablet, Rfl: 3    calcium carbonate (OS-TOMAS) 600 MG tablet, Take 1 tablet by mouth Daily., Disp: , Rfl:     Cyanocobalamin (VITAMIN B-12 PO), Take 1 tablet by mouth Daily., Disp: , Rfl:     dofetilide (TIKOSYN) 125 MCG capsule, Take 3 capsules by mouth 2 (Two) Times a Day., Disp: 540 capsule, Rfl: 3    Eliquis 5 MG tablet tablet, TAKE 1 TABLET EVERY 12 HOURS, Disp: 180 tablet, Rfl: 3    esomeprazole (nexIUM) 40 MG capsule, TAKE 1 CAPSULE EVERY MORNING BEFORE BREAKFAST, Disp: 90 capsule, Rfl: 3    Magnesium 400 MG capsule, Take 500 mg by mouth Daily., Disp: , Rfl:     metoprolol succinate XL (TOPROL-XL) 25 MG 24 hr tablet, Take 0.5 tablets by mouth Daily., Disp: 30 tablet, Rfl: 2    Multiple Vitamin tablet, Take 1 tablet by mouth Daily., Disp: , Rfl:     sildenafil (REVATIO) 20 MG tablet, TAKE TWO TABLETS BY MOUTH AS NEEDED FOR ERECTILE DYSFUNCTION, Disp: 20 tablet, Rfl: 0      Objective:     Vitals:    08/12/24 0833   BP: 136/88   Pulse: 55   Weight: 87.1 kg (192 lb)   Height: 175.3 cm (69.02\")     Body mass index is 28.34 kg/m².    PHYSICAL EXAM:    Vitals Reviewed.   General Appearance: No acute distress, well developed and well nourished.   HENT: Atraumatic, normocephalic. External eyes, ears, and nose normal.   Respiratory: No signs of respiratory distress. Respiration rhythm and depth normal.   Clear to auscultation. No rales, crackles, rhonchi, or wheezing auscultated.   Cardiovascular:  Heart Rate and Rhythm: Normal, Heart Sounds:  S1 and S2.   Murmurs: No murmurs noted. No rubs, thrills, or gallops.   Lower Extremities: No edema noted.  Musculoskeletal: Normal movement of extremities  Skin: Warm and dry. "   Psychiatric: Patient alert and oriented to person, place, and time. Speech and behavior appropriate. Normal mood and affect.       ECG 12 Lead    Date/Time: 8/12/2024 9:11 AM  Performed by: Heather Bravo APRN    Authorized by: Heather Bravo APRN  Comparison: compared with previous ECG   Similar to previous ECG  Rhythm: sinus rhythm  BPM: 55  Comments: QTc okay            Assessment:       Diagnosis Plan   1. Atrial fibrillation, persistent        2. H/O cardiac radiofrequency ablation--5/2023 PVI        3. Encounter for monitoring dofetilide therapy        4. Coronary-myocardial bridge               Plan:       1-3. AF, s/p PVI 5/2023, then persistent AF, started on dofetilide 7/2023 and has not had any recurrence since that time. Discussed pros/cons of stopping dofetilide, he is going to stay on it for now. Apixaban for OAC.     4. Coronary--myocardial bridge--follows with Dr. De Santiago, continue metoprolol.     Follow up with Dr. Moreno in 6 months, keep appt with A. Lennox, APRN in Nov as scheduled.     As always, it has been a pleasure to participate in your patient's care.      Sincerely,         SHER Hamilton

## 2024-10-28 RX ORDER — APIXABAN 5 MG/1
TABLET, FILM COATED ORAL
Qty: 180 TABLET | Refills: 3 | Status: SHIPPED | OUTPATIENT
Start: 2024-10-28

## 2024-10-30 ENCOUNTER — TELEPHONE (OUTPATIENT)
Dept: ONCOLOGY | Facility: CLINIC | Age: 74
End: 2024-10-30

## 2024-10-30 NOTE — TELEPHONE ENCOUNTER
"  Caller: Zheng Lopez \"CAMPOS\"    Relationship: Self    Best call back number: [phone]      What was the call regarding: PATIENT CALLED TO CANCEL APPOINTMENT FOR 11-5-24 AND WILL CALL BACK TO RESCHEDULE     "

## 2024-11-07 ENCOUNTER — OFFICE VISIT (OUTPATIENT)
Dept: CARDIOLOGY | Facility: CLINIC | Age: 74
End: 2024-11-07
Payer: MEDICARE

## 2024-11-07 VITALS
BODY MASS INDEX: 28.35 KG/M2 | HEIGHT: 69 IN | DIASTOLIC BLOOD PRESSURE: 76 MMHG | SYSTOLIC BLOOD PRESSURE: 132 MMHG | HEART RATE: 57 BPM

## 2024-11-07 DIAGNOSIS — Q24.5 CORONARY-MYOCARDIAL BRIDGE: ICD-10-CM

## 2024-11-07 DIAGNOSIS — E78.01 FAMILIAL HYPERCHOLESTEROLEMIA: ICD-10-CM

## 2024-11-07 DIAGNOSIS — I51.7 ASYMMETRIC SEPTAL HYPERTROPHY: ICD-10-CM

## 2024-11-07 DIAGNOSIS — I48.0 PAROXYSMAL ATRIAL FIBRILLATION: ICD-10-CM

## 2024-11-07 DIAGNOSIS — I47.29 NON-SUSTAINED VENTRICULAR TACHYCARDIA: ICD-10-CM

## 2024-11-07 DIAGNOSIS — I10 PRIMARY HYPERTENSION: Primary | ICD-10-CM

## 2024-11-07 DIAGNOSIS — I48.19 ATRIAL FIBRILLATION, PERSISTENT: ICD-10-CM

## 2024-11-07 DIAGNOSIS — Z98.890 H/O CARDIAC RADIOFREQUENCY ABLATION: ICD-10-CM
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
## 2024-11-21 DIAGNOSIS — Z12.5 SCREENING FOR PROSTATE CANCER: Primary | ICD-10-CM

## 2024-11-21 LAB — PSA SERPL-MCNC: 1.02 NG/ML (ref 0–4)

## 2024-11-25 ENCOUNTER — OFFICE VISIT (OUTPATIENT)
Dept: FAMILY MEDICINE CLINIC | Facility: CLINIC | Age: 74
End: 2024-11-25
Payer: MEDICARE

## 2024-11-25 VITALS
HEART RATE: 64 BPM | DIASTOLIC BLOOD PRESSURE: 80 MMHG | OXYGEN SATURATION: 98 % | BODY MASS INDEX: 27.78 KG/M2 | HEIGHT: 69 IN | SYSTOLIC BLOOD PRESSURE: 136 MMHG | WEIGHT: 187.6 LBS | RESPIRATION RATE: 16 BRPM | TEMPERATURE: 97.3 F

## 2024-11-25 DIAGNOSIS — D69.6 THROMBOCYTOPENIA: ICD-10-CM

## 2024-11-25 DIAGNOSIS — I48.19 ATRIAL FIBRILLATION, PERSISTENT: ICD-10-CM

## 2024-11-25 DIAGNOSIS — R73.9 HYPERGLYCEMIA: ICD-10-CM

## 2024-11-25 DIAGNOSIS — E78.01 FAMILIAL HYPERCHOLESTEROLEMIA: ICD-10-CM

## 2024-11-25 DIAGNOSIS — I10 PRIMARY HYPERTENSION: Primary | ICD-10-CM

## 2024-11-25 PROCEDURE — 1159F MED LIST DOCD IN RCRD: CPT | Performed by: FAMILY MEDICINE

## 2024-11-25 PROCEDURE — 1126F AMNT PAIN NOTED NONE PRSNT: CPT | Performed by: FAMILY MEDICINE

## 2024-11-25 PROCEDURE — 3079F DIAST BP 80-89 MM HG: CPT | Performed by: FAMILY MEDICINE

## 2024-11-25 PROCEDURE — 1160F RVW MEDS BY RX/DR IN RCRD: CPT | Performed by: FAMILY MEDICINE

## 2024-11-25 PROCEDURE — 99214 OFFICE O/P EST MOD 30 MIN: CPT | Performed by: FAMILY MEDICINE

## 2024-11-25 PROCEDURE — 3075F SYST BP GE 130 - 139MM HG: CPT | Performed by: FAMILY MEDICINE

## 2024-11-25 RX ORDER — CHOLECALCIFEROL (VITAMIN D3) 25 MCG
1000 TABLET ORAL DAILY
COMMUNITY

## 2024-11-25 NOTE — PROGRESS NOTES
"Chief Complaint  Hypertension (Discuss labs, 6 month f/u ) and Knee Pain (Right knee, having surgery 12/10)    Subjective        Zheng Lopez presents to [facility]  Hypertension    Knee Pain       Pleasant 74-year-old male who to follow-up for hypertension which is well-controlled.  Reviewed recent labs with patient, continue metoprolol 12.5 mg twice a day, Eliquis twice a day for A-fib    Thrombocytopenia-chronic, he has seen hematology earlier this in the past, he had a recent likely viral GI infection recently we discussed that this can be the cause of the low platelet counts.  His main concern is that he scheduled for knee replacement surgery December 10    Right knee pain, scheduled surgery December 10.  Having preop labs today.    Objective   Vital Signs:  /80   Pulse 64   Temp 97.3 °F (36.3 °C)   Resp 16   Ht 175.3 cm (69.02\")   Wt 85.1 kg (187 lb 9.6 oz)   SpO2 98%   BMI 27.69 kg/m²   Estimated body mass index is 27.69 kg/m² as calculated from the following:    Height as of this encounter: 175.3 cm (69.02\").    Weight as of this encounter: 85.1 kg (187 lb 9.6 oz).      Physical Exam  Vitals and nursing note reviewed.   Constitutional:       General: He is not in acute distress.     Appearance: He is well-developed.   HENT:      Head: Normocephalic.      Nose: Nose normal.   Cardiovascular:      Rate and Rhythm: Normal rate and regular rhythm.      Heart sounds: Normal heart sounds. No murmur heard.  Pulmonary:      Effort: Pulmonary effort is normal. No respiratory distress.      Breath sounds: Normal breath sounds.   Musculoskeletal:         General: Normal range of motion.   Skin:     General: Skin is warm and dry.      Findings: No rash.   Neurological:      Mental Status: He is alert and oriented to person, place, and time.   Psychiatric:         Behavior: Behavior normal.         Thought Content: Thought content normal.         Judgment: Judgment normal. "        Result Review :  The following data was reviewed by: Zoya Sanchez MD on 11/25/2024:         PSA Screen (11/21/2024 10:15)  Hemoglobin A1c (11/21/2024 09:21)  Lipid Panel (11/21/2024 09:21)  CBC & Differential (11/21/2024 09:21)  Comprehensive Metabolic Panel (11/21/2024 09:21)     Assessment and Plan   Diagnoses and all orders for this visit:    1. Primary hypertension (Primary)  -     Comprehensive Metabolic Panel; Future  -     CBC & Differential; Future  -     Lipid Panel; Future  -     TSH Rfx On Abnormal To Free T4; Future    2. Familial hypercholesterolemia    3. Atrial fibrillation, persistent    4. Thrombocytopenia  -     CBC & Differential; Future    5. Hyperglycemia  -     Hemoglobin A1c; Future    Very pleasant 74-year-old male here to follow-up hypertension which is well-controlled, A-fib which is well-controlled on metoprolol and Eliquis, he does have complications with thrombocytopenia with a platelet count at 66, we discussed that this could have been response to his recent GI infection last week.  He has no evidence of spontaneous bleed, think would be okay for him to continue the Eliquis right now.      However, he has preop testing for knee replacement surgery today, scheduled for right knee replacement December 10.  I will send a note to his hematologist as well to see if there is anything we can do to bring up his platelet counts and time for him to have surgery.         Follow Up   Return in about 6 months (around 5/25/2025), or if symptoms worsen or fail to improve, for Medicare Wellness with fasting labs prior.  Patient was given instructions and counseling regarding his condition or for health maintenance advice. Please see specific information pulled into the AVS if appropriate.

## 2024-11-27 ENCOUNTER — OFFICE VISIT (OUTPATIENT)
Dept: ONCOLOGY | Facility: CLINIC | Age: 74
End: 2024-11-27
Payer: MEDICARE

## 2024-11-27 ENCOUNTER — LAB (OUTPATIENT)
Dept: LAB | Facility: HOSPITAL | Age: 74
End: 2024-11-27
Payer: MEDICARE

## 2024-11-27 VITALS
TEMPERATURE: 98.1 F | HEART RATE: 50 BPM | WEIGHT: 194.5 LBS | OXYGEN SATURATION: 96 % | BODY MASS INDEX: 28.81 KG/M2 | SYSTOLIC BLOOD PRESSURE: 142 MMHG | DIASTOLIC BLOOD PRESSURE: 84 MMHG | HEIGHT: 69 IN | RESPIRATION RATE: 16 BRPM

## 2024-11-27 DIAGNOSIS — D69.6 THROMBOCYTOPENIA: Primary | ICD-10-CM

## 2024-11-27 DIAGNOSIS — D69.6 THROMBOCYTOPENIA: ICD-10-CM

## 2024-11-27 LAB
ALBUMIN SERPL-MCNC: 4.3 G/DL (ref 3.5–5.2)
ALBUMIN/GLOB SERPL: 1.7 G/DL
ALP SERPL-CCNC: 79 U/L (ref 39–117)
ALT SERPL W P-5'-P-CCNC: 24 U/L (ref 1–41)
ANION GAP SERPL CALCULATED.3IONS-SCNC: 8.6 MMOL/L (ref 5–15)
AST SERPL-CCNC: 32 U/L (ref 1–40)
BASOPHILS # BLD AUTO: 0.04 10*3/MM3 (ref 0–0.2)
BASOPHILS NFR BLD AUTO: 0.8 % (ref 0–1.5)
BILIRUB SERPL-MCNC: 0.5 MG/DL (ref 0–1.2)
BUN SERPL-MCNC: 13 MG/DL (ref 8–23)
BUN/CREAT SERPL: 14 (ref 7–25)
CALCIUM SPEC-SCNC: 8.6 MG/DL (ref 8.6–10.5)
CHLORIDE SERPL-SCNC: 98 MMOL/L (ref 98–107)
CO2 SERPL-SCNC: 28.4 MMOL/L (ref 22–29)
CREAT SERPL-MCNC: 0.93 MG/DL (ref 0.76–1.27)
D DIMER PPP FEU-MCNC: 0.33 MCGFEU/ML (ref 0–0.76)
DEPRECATED RDW RBC AUTO: 43.1 FL (ref 37–54)
EGFRCR SERPLBLD CKD-EPI 2021: 85.1 ML/MIN/1.73
EOSINOPHIL # BLD AUTO: 0.18 10*3/MM3 (ref 0–0.4)
EOSINOPHIL NFR BLD AUTO: 3.6 % (ref 0.3–6.2)
ERYTHROCYTE [DISTWIDTH] IN BLOOD BY AUTOMATED COUNT: 11.9 % (ref 12.3–15.4)
FIBRINOGEN PPP-MCNC: 235 MG/DL (ref 219–464)
FOLATE SERPL-MCNC: >20 NG/ML (ref 4.78–24.2)
GLOBULIN UR ELPH-MCNC: 2.6 GM/DL
GLUCOSE SERPL-MCNC: 100 MG/DL (ref 65–99)
HCT VFR BLD AUTO: 42.8 % (ref 37.5–51)
HGB BLD-MCNC: 15.1 G/DL (ref 13–17.7)
IMM GRANULOCYTES # BLD AUTO: 0.01 10*3/MM3 (ref 0–0.05)
IMM GRANULOCYTES NFR BLD AUTO: 0.2 % (ref 0–0.5)
LYMPHOCYTES # BLD AUTO: 1.54 10*3/MM3 (ref 0.7–3.1)
LYMPHOCYTES NFR BLD AUTO: 30.7 % (ref 19.6–45.3)
MCH RBC QN AUTO: 34.2 PG (ref 26.6–33)
MCHC RBC AUTO-ENTMCNC: 35.3 G/DL (ref 31.5–35.7)
MCV RBC AUTO: 96.8 FL (ref 79–97)
MONOCYTES # BLD AUTO: 0.47 10*3/MM3 (ref 0.1–0.9)
MONOCYTES NFR BLD AUTO: 9.4 % (ref 5–12)
NEUTROPHILS NFR BLD AUTO: 2.77 10*3/MM3 (ref 1.7–7)
NEUTROPHILS NFR BLD AUTO: 55.3 % (ref 42.7–76)
NRBC BLD AUTO-RTO: 0 /100 WBC (ref 0–0.2)
PLATELET # BLD AUTO: 268 10*3/MM3 (ref 140–450)
PLATELETS.RETICULATED NFR BLD AUTO: 1.9 % (ref 0.9–6.5)
PMV BLD AUTO: 8.7 FL (ref 6–12)
POTASSIUM SERPL-SCNC: 4.6 MMOL/L (ref 3.5–5.2)
PROT SERPL-MCNC: 6.9 G/DL (ref 6–8.5)
RBC # BLD AUTO: 4.42 10*6/MM3 (ref 4.14–5.8)
SODIUM SERPL-SCNC: 135 MMOL/L (ref 136–145)
VIT B12 BLD-MCNC: >2000 PG/ML (ref 211–946)
WBC NRBC COR # BLD AUTO: 5.01 10*3/MM3 (ref 3.4–10.8)

## 2024-11-27 PROCEDURE — 85384 FIBRINOGEN ACTIVITY: CPT | Performed by: INTERNAL MEDICINE

## 2024-11-27 PROCEDURE — 36415 COLL VENOUS BLD VENIPUNCTURE: CPT

## 2024-11-27 PROCEDURE — 85025 COMPLETE CBC W/AUTO DIFF WBC: CPT

## 2024-11-27 PROCEDURE — 80053 COMPREHEN METABOLIC PANEL: CPT

## 2024-11-27 PROCEDURE — 85055 RETICULATED PLATELET ASSAY: CPT

## 2024-11-27 PROCEDURE — 85379 FIBRIN DEGRADATION QUANT: CPT | Performed by: INTERNAL MEDICINE

## 2024-11-27 PROCEDURE — 82607 VITAMIN B-12: CPT | Performed by: INTERNAL MEDICINE

## 2024-11-27 PROCEDURE — 82746 ASSAY OF FOLIC ACID SERUM: CPT | Performed by: INTERNAL MEDICINE

## 2024-11-27 NOTE — PROGRESS NOTES
REASON FOR CONSULTATION/CHIEF COMPLAINT:     Evaluation & management for thrombocytopenia                             REQUESTING PHYSICIAN: No ref. provider found  RECORDS OBTAINED:  Records of the patients history including those from the electronic medical record were reviewed and summarized in detail.    HISTORY OF PRESENT ILLNESS:    The patient is a 74 y.o. year old male who is here for evaluation for thrombocytopenia. On chart review, patient appears to have chronic thrombocytopenia since 2015 ranging between 90s - 200s.Patient denies any excessive bleeding or bruising.   The CBC from today show platelet count of 96k, Hb/Hct 15.4/43.6, wbc of 5.96 with normal differential. Patient denies any fever, chills, night sweats or weight loss. No lymphadenopathy. Denies any history of autoimmune condition.   Patient reports of significant alcohol abuse for > 15 years, but has not had much drinks in last 5-10 years. Denies ever being diagnosed with liver cirrhosis or any other liver/spleen disease. No family history of blood disorders.     Of note, patienthad tripped & fell injuring his right shoulder & right eyebrow in July 2021. Right shoulder is still in sling.   Improved now.    July 2021: Ultrasound abdomen showed normal liver and spleen size.  Suspected fatty liver.  Hepatic cyst within the left lobe.  Cholecystectomy.    Plan made for active surveillance for his mild and fluctuating thrombocytopenia.    March 2022: Platelets 171,000.  September 2022: Platelets 217,000.  Rest of the CBC within normal range  October 2023: Platelets improved to 233,000.    INTERIM HISTORY:  Patient returns to the clinic for a f/u visit.  Patient was recently seen by his PCP and noted to have drop in platelet count to 66,000.  Patient states he had a recent episode of norovirus GI infection and had significant nausea and vomiting.  His blood work was tested around that time.  Patient remains on Eliquis for history of A-fib.  Denies  any bleeding or bruises.  Patient is planning to undergo right knee joint surgery in next couple of weeks.  He was hospitalized in July 2023 and started on Tikosyn for A-fib control.  Says he has been eating healthy and working out regularly.     Past Medical History:   Diagnosis Date    Asymmetric septal hypertrophy     Benign prostatic hyperplasia with urinary hesitancy 11/13/2019    Bronchitis     Bronchitis / URI    Cataract of both eyes     Chicken pox     Cholecystitis     Diverticula of colon     ED (erectile dysfunction) of organic origin 11/13/2017    Encounter for annual health examination 03/10/2015    Annual Health Assessment    Fatigue     Gallbladder disease     Gastroenteritis 2/9/2024    GERD (gastroesophageal reflux disease)     High blood pressure     History of EKG     10/30/15, 09/19/13, 09/14/12, 10/18/10, 05/04/09    History of TB skin testing     TB Skin Test: 01/09/14 Completed, 09/14/12 Patient Declines, 10/18/10 Completed, 03/05/08 Completed    Hyperlipidemia     Hypertension     still taking meds    Impingement of right ulnar nerve     Measles     Non-ST elevated myocardial infarction (non-STEMI) 01/12/2022    Osteoarthritis involving multiple joints on both sides of body     Osteoarthritis involving multiple joints on both sides of body     Persistent cough     Pulse irregularity     Irregular pulse PER CARDIO    Sexual problems     Sexual problems / enjoyment ED    Sleep apnea     has c-pap    Thrombocythemia 06/15/2021    Wellness examination 03/10/2015    Annual Wellness Visit     Past Surgical History:   Procedure Laterality Date    CARDIAC CATHETERIZATION N/A 01/12/2022    Procedure: Left Heart Cath;  Surgeon: Vi Orozco MD;  Location:  [facility];  Service: Cardiovascular;  Laterality: N/A;    CARDIAC CATHETERIZATION N/A 01/12/2022    Procedure: Coronary angiography;  Surgeon: Vi Orozco MD;  Location:  [facility];  Service:  Cardiovascular;  Laterality: N/A;    CARDIAC CATHETERIZATION N/A 01/12/2022    Procedure: Left ventriculography;  Surgeon: Vi Orozco MD;  Location:  [facility];  Service: Cardiovascular;  Laterality: N/A;    CARDIAC ELECTROPHYSIOLOGY PROCEDURE N/A 05/25/2023    Procedure: Ablation atrial fibrillation;  Surgeon: Mk Moreno MD;  Location:  [facility];  Service: Cardiovascular;  Laterality: N/A;    CATARACT EXTRACTION Bilateral     CATARACT EXTRACTION Right 2013    CATARACT EXTRACTION Left 2014    CHOLECYSTECTOMY      COLONOSCOPY  04/16/2015    Dr. Ritchie    COLONOSCOPY  03/07/2007    ELBOW PROCEDURE Right     ulnar nerve impingement release    FOOT SURGERY Right     right foot (twice)    GALLBLADDER SURGERY  2009    JOINT REPLACEMENT  2011    KNEE MENISCAL REPAIR Right 2010    torn right meniscus    LASIK  2006    PROSTATE AQUABLATION  12/2022    RHINOPLASTY  1986    ROTATOR CUFF REPAIR Left 2010    SHOULDER ARTHROSCOPY W/ LABRAL REPAIR Right 2015    Dr. Baxter  labram repair right shoulder    SHOULDER LIGAMENT REPAIR Right     TOTAL HIP ARTHROPLASTY REVISION Left 05/11/2020    TOTAL KNEE ARTHROPLASTY Right 2011    WRIST ARTHROPLASTY Right 08/25/2020       MEDICATIONS    Current Outpatient Medications:     atorvastatin (LIPITOR) 40 MG tablet, TAKE 1 TABLET DAILY, Disp: 90 tablet, Rfl: 3    calcium carbonate (OS-TOMAS) 600 MG tablet, Take 1 tablet by mouth Daily., Disp: , Rfl:     Cholecalciferol 25 MCG (1000 UT) tablet, Take 1 tablet by mouth Daily., Disp: , Rfl:     Cyanocobalamin (VITAMIN B-12 PO), Take 1 tablet by mouth Daily., Disp: , Rfl:     dofetilide (TIKOSYN) 125 MCG capsule, Take 3 capsules by mouth 2 (Two) Times a Day., Disp: 540 capsule, Rfl: 3    Eliquis 5 MG tablet tablet, TAKE 1 TABLET EVERY 12 HOURS (Patient taking differently: 2 (Two) Times a Day.), Disp: 180 tablet, Rfl: 3    esomeprazole (nexIUM) 40 MG capsule, TAKE 1 CAPSULE EVERY MORNING BEFORE  "BREAKFAST, Disp: 90 capsule, Rfl: 3    Magnesium 400 MG capsule, Take 500 mg by mouth Daily., Disp: , Rfl:     metoprolol succinate XL (TOPROL-XL) 25 MG 24 hr tablet, Take 0.5 tablets by mouth Daily., Disp: 30 tablet, Rfl: 2    Multiple Vitamin tablet, Take 1 tablet by mouth Daily., Disp: , Rfl:     sildenafil (REVATIO) 20 MG tablet, TAKE TWO TABLETS BY MOUTH AS NEEDED FOR ERECTILE DYSFUNCTION, Disp: 20 tablet, Rfl: 0    ALLERGIES:   No Known Allergies    SOCIAL HISTORY:       Social History     Socioeconomic History    Marital status:     Number of children: 2   Tobacco Use    Smoking status: Never     Passive exposure: Never    Smokeless tobacco: Never   Vaping Use    Vaping status: Never Used   Substance and Sexual Activity    Alcohol use: No     Comment: none in 5 yrs, Nov 2011 - last drink    Drug use: No    Sexual activity: Yes     Partners: Female         FAMILY HISTORY:  Family History   Problem Relation Age of Onset    Dementia Mother     Migraines Mother     Osteoporosis Mother     Stroke Mother 85        early 80s    Heart disease Mother         tachycardia and had ppm    Alzheimer's disease Mother     Supraventricular tachycardia Mother     Alcohol abuse Father     Depression Father     Migraines Father     Tuberculosis Father     Cirrhosis Father         41 YO    Cancer Paternal Uncle 67        mets but unsure of type of ca  BACK CANCER    Stroke Brother     Colon cancer Neg Hx     Prostate cancer Neg Hx     Heart attack Neg Hx        REVIEW OF SYSTEMS:  As per HPI       Vitals:    11/27/24 0817   BP: 142/84   Pulse: 50   Resp: 16   Temp: 98.1 °F (36.7 °C)   TempSrc: Oral   SpO2: 96%   Weight: 88.2 kg (194 lb 8 oz)   Height: 175.3 cm (69.02\")   PainSc: 0-No pain           11/27/2024     8:18 AM   Current Status   ECOG score 0      PHYSICAL EXAM:    CONSTITUTIONAL:  Vital signs reviewed.  No distress, looks comfortable.  EYES:  Conjunctiva and lids unremarkable.   EARS,NOSE,MOUTH,THROAT:  Ears " and nose appear unremarkable.  Lips, teeth, gums appear unremarkable.  RESPIRATORY:  Normal respiratory effort.  Lungs clear to auscultation bilaterally.  CARDIOVASCULAR:  Normal S1, S2.  No murmurs rubs or gallops.  No significant lower extremity edema  GASTROINTESTINAL: Abdomen appears unremarkable.  Nondistended  LYMPHATIC:  No cervical, supraclavicular lymphadenopathy.  NEURO: AO x 3,  No focal deficits.  Appears to have equal strength all 4 extremities.  MUSCULOSKELETAL:  Unremarkable digits/nails.  No cyanosis or clubbing.   SKIN:  Warm.  No rashes.  PSYCHIATRIC:  Normal judgment and insight.  Normal mood and affect.     RECENT LABS:        Lab on 11/27/2024   Component Date Value Ref Range Status    IPF 11/27/2024 1.90  0.90 - 6.50 % Final    WBC 11/27/2024 5.01  3.40 - 10.80 10*3/mm3 Final    RBC 11/27/2024 4.42  4.14 - 5.80 10*6/mm3 Final    Hemoglobin 11/27/2024 15.1  13.0 - 17.7 g/dL Final    Hematocrit 11/27/2024 42.8  37.5 - 51.0 % Final    MCV 11/27/2024 96.8  79.0 - 97.0 fL Final    MCH 11/27/2024 34.2 (H)  26.6 - 33.0 pg Final    MCHC 11/27/2024 35.3  31.5 - 35.7 g/dL Final    RDW 11/27/2024 11.9 (L)  12.3 - 15.4 % Final    RDW-SD 11/27/2024 43.1  37.0 - 54.0 fl Final    MPV 11/27/2024 8.7  6.0 - 12.0 fL Final    Platelets 11/27/2024 268  140 - 450 10*3/mm3 Final    Neutrophil % 11/27/2024 55.3  42.7 - 76.0 % Final    Lymphocyte % 11/27/2024 30.7  19.6 - 45.3 % Final    Monocyte % 11/27/2024 9.4  5.0 - 12.0 % Final    Eosinophil % 11/27/2024 3.6  0.3 - 6.2 % Final    Basophil % 11/27/2024 0.8  0.0 - 1.5 % Final    Immature Grans % 11/27/2024 0.2  0.0 - 0.5 % Final    Neutrophils, Absolute 11/27/2024 2.77  1.70 - 7.00 10*3/mm3 Final    Lymphocytes, Absolute 11/27/2024 1.54  0.70 - 3.10 10*3/mm3 Final    Monocytes, Absolute 11/27/2024 0.47  0.10 - 0.90 10*3/mm3 Final    Eosinophils, Absolute 11/27/2024 0.18  0.00 - 0.40 10*3/mm3 Final    Basophils, Absolute 11/27/2024 0.04  0.00 - 0.20 10*3/mm3 Final     Immature Grans, Absolute 11/27/2024 0.01  0.00 - 0.05 10*3/mm3 Final    nRBC 11/27/2024 0.0  0.0 - 0.2 /100 WBC Final   Orders Only on 11/21/2024   Component Date Value Ref Range Status    PSA 11/21/2024 1.020  0.000 - 4.000 ng/mL Final    Comment: Testing Method: [employer] Electrochemiluminescence  Immunoassay(ECLIA)  Values obtained with different assay methods or kits cannot  be used interchangeably.         ASSESSMENT:   is a pleasant 73 y/o WM who comes to this clinic for evaluation & management for thrombocytopenia.     # Thrombocytopenia:  Patient appears to have chronic thrombocytopenia, fluctuating between 90s-200s, at least since 2015. Hb/Hct & WBC count within normal limits. Smear review shows decreased but normal appearing platelets. No current medications suspected to cause thrombocytopenia.   Work up for autoimmune condition & viral infection came back negative. US abdomen is suggestive of fatty liver. No splenomegaly.   Given history of alcohol abuse in the past & US abdomen showing fatty liver, underlying liver disease is likely the cause of his isolated thrombocytopenia.  The CBC from 7/28/21 show platelet count of 110k. Plan made for surveillance  CBC from 11/24/21 show platelets 235k. No new symptoms. Did not start or stopped any meds. Quit alcohol > 10 yrs ago. Is a retired .   CBC from 3/23/22 shows platelet count of 171,000. Fluctuating.  Platelets 217,000 in September 2022.  10/31/2023: Overall asymptomatic.  CBC from today show normal platelet count of 233,000. At this time, will continue to monitor & repeat labs in 12 months time.  If stays within normal range, will discharge him back to PCP.  11/27/2024: Acute drop in platelet count to 66,000 likely related to recent GI infection (norovirus).  Symptoms of infection resolved now.  Repeat CBC from today shows improvement in platelet count to 268,000.  No opposition for patient to undergo right knee  surgery from hematology's standpoint.  I will see him back in this clinic on as needed basis.  Patient is agreeable to the plan.     # Afib: On Tikosyn & eliquis. Advised to f/u cardiology    #CAD: Had non-STEMI in January 2022.  Follows up with cardiology.    PLAN:   -Platelet count normalized (268,000) after resolution of GI infection.  Continue active surveillance  -Follow-up as needed    Orders Placed This Encounter   Procedures    Comprehensive Metabolic Panel     Standing Status:   Future     Number of Occurrences:   1     Standing Expiration Date:   11/26/2025     Order Specific Question:   Release to patient     Answer:   Routine Release [[id number]]    Immature Platelet Fraction     Standing Status:   Future     Number of Occurrences:   1     Standing Expiration Date:   11/26/2025     Order Specific Question:   Release to patient     Answer:   Routine Release [[id number]]    Vitamin B12     Standing Status:   Future     Number of Occurrences:   1     Standing Expiration Date:   11/26/2025     Order Specific Question:   Release to patient     Answer:   Routine Release [[id number]]    Folate     Standing Status:   Future     Number of Occurrences:   1     Standing Expiration Date:   11/26/2025     Order Specific Question:   Release to patient     Answer:   Routine Release [[id number]]    D-dimer, Quantitative     Standing Status:   Future     Number of Occurrences:   1     Standing Expiration Date:   11/26/2025     Order Specific Question:   Release to patient     Answer:   Routine Release [[id number]]    Fibrinogen     Standing Status:   Future     Number of Occurrences:   1     Standing Expiration Date:   11/26/2025     Order Specific Question:   Release to patient     Answer:   Routine Release [[id number]]    CBC & Differential     Standing Status:   Future     Number of Occurrences:   1     Standing Expiration Date:   11/26/2025     Order Specific Question:   Manual Differential     Answer:   No     Order  Specific Question:   Release to patient     Answer:   Routine Release [[id number]]   Total time spent during this patient encounter is 31 minutes. The total time spent with the patient includes: preparing to see the patient by reviewing of tests, prior notes or other relevant information, performing appropriate independent examination & evaluation, counseling, ordering of medications, tests or procedures, documenting clinic information in the electronic medical records or other health records, independently interpreting results of tests and communicating the results to the patient/family or caregiver.

## 2024-12-11 ENCOUNTER — READMISSION MANAGEMENT (OUTPATIENT)
Dept: CALL CENTER | Facility: HOSPITAL | Age: 74
End: 2024-12-11
Payer: MEDICARE

## 2024-12-11 NOTE — OUTREACH NOTE
Prep Survey      Flowsheet Row Responses   [facility] facility patient discharged from? Non-[facility]   Is LACE score < 7 ? Non- Discharge   Eligibility [facility]   Date of Admission 12/10/24   Date of Discharge 12/11/24   Discharge diagnosis Failed Right TKA Patellar Component and Polyethylene Insert S/P Revision TKA with Revision Patellar Componenet, Tibial Polyethylene   Does the patient have one of the following disease processes/diagnoses(primary or secondary)? Total Joint Replacement   Prep survey completed? Yes            Mandi HOLLY - Registered Nurse

## 2024-12-12 ENCOUNTER — TRANSITIONAL CARE MANAGEMENT TELEPHONE ENCOUNTER (OUTPATIENT)
Dept: CALL CENTER | Facility: HOSPITAL | Age: 74
End: 2024-12-12
Payer: MEDICARE

## 2024-12-12 NOTE — OUTREACH NOTE
Call Center TCM Note      Flowsheet Row Responses   [facility] patient discharged from? Non-BH  [SLAUGHTER]   Does the patient have one of the following disease processes/diagnoses(primary or secondary)? Other   TCM attempt successful? Yes  [vr- spouse]   Call start time 1012   Call end time 1015   Discharge diagnosis Failed Right TKA Patellar Component and Polyethylene Insert S/P Revision TKA with Revision Patellar Componenet, Tibial Polyethylene   Meds reviewed with patient/caregiver? Yes   Is the patient having any side effects they believe may be caused by any medication additions or changes? No   Does the patient have all medications ordered at discharge? Yes   Is the patient taking all medications as directed (includes completed medication regime)? Yes   Comments Pt has post op appt with ortho surgeon 1/6/25. TCM call complete.   Does the patient have an appointment with their PCP within 7-14 days of discharge? Other   Nursing Interventions Routed TCM call to PCP office   Has home health visited the patient within 72 hours of discharge? Yes   Home health comments Pt will be doing out pt therapy   Psychosocial issues? No   Did the patient receive a copy of their discharge instructions? Yes   Nursing interventions Reviewed instructions with patient   What is the patient's perception of their health status since discharge? Improving   Is the patient/caregiver able to teach back signs and symptoms related to disease process for when to call PCP? Yes   Is the patient/caregiver able to teach back signs and symptoms related to disease process for when to call 911? Yes   Is the patient/caregiver able to teach back the hierarchy of who to call/visit for symptoms/problems? PCP, Specialist, Home health nurse, Urgent Care, ED, 911 Yes   TCM call completed? Yes   Wrap up additional comments 1/6/25 post op. Wife reports Pt is doing ok. Is having pain. Pt does have all meds as ordered.   Call end time 1015            Skylar  LEENA Valencia    12/12/2024, 10:17 EST

## 2025-01-17 ENCOUNTER — OFFICE VISIT (OUTPATIENT)
Dept: SLEEP MEDICINE | Facility: HOSPITAL | Age: 75
End: 2025-01-17
Payer: MEDICARE

## 2025-01-17 VITALS
HEART RATE: 72 BPM | SYSTOLIC BLOOD PRESSURE: 154 MMHG | BODY MASS INDEX: 28.14 KG/M2 | WEIGHT: 190 LBS | HEIGHT: 69 IN | OXYGEN SATURATION: 98 % | DIASTOLIC BLOOD PRESSURE: 84 MMHG

## 2025-01-17 DIAGNOSIS — I48.0 PAROXYSMAL ATRIAL FIBRILLATION: ICD-10-CM

## 2025-01-17 DIAGNOSIS — G47.33 OBSTRUCTIVE SLEEP APNEA: Primary | ICD-10-CM

## 2025-01-17 PROCEDURE — G0463 HOSPITAL OUTPT CLINIC VISIT: HCPCS

## 2025-01-17 NOTE — PROGRESS NOTES
[facility]  Telephone: [phone] / Fax: [phone] [place]  Telephone: [phone] / Fax: [phone] [place] Bor[place]    PCP: Zoya Sanchez MD    Reason for visit: SYLWIA f/u    Zhneg Lopez is a 74 y.o.male  was last seen at [facility] sleep lab in January 2024. Machine controls the snoring and apneas, he is unable to sleep without it.   He uses a full face mask that fits well. There are no leaks and he denies significant dry mouth. Full face mask fits well. He had interval knee surgery and is still recovering.  His bedtime schedule is 9pm-5am. He wakes up feeling mostly rested.    SH- no tobacco, no alcohol, 4 caffeine.    ROS- negative.    Current Medications:    Current Outpatient Medications:     atorvastatin (LIPITOR) 40 MG tablet, TAKE 1 TABLET DAILY, Disp: 90 tablet, Rfl: 3    calcium carbonate (OS-TOMAS) 600 MG tablet, Take 1 tablet by mouth Daily., Disp: , Rfl:     Cholecalciferol 25 MCG (1000 UT) tablet, Take 1 tablet by mouth Daily., Disp: , Rfl:     Cyanocobalamin (VITAMIN B-12 PO), Take 1 tablet by mouth Daily., Disp: , Rfl:     dofetilide (TIKOSYN) 125 MCG capsule, Take 3 capsules by mouth 2 (Two) Times a Day., Disp: 540 capsule, Rfl: 3    Eliquis 5 MG tablet tablet, TAKE 1 TABLET EVERY 12 HOURS (Patient taking differently: 2 (Two) Times a Day.), Disp: 180 tablet, Rfl: 3    esomeprazole (nexIUM) 40 MG capsule, TAKE 1 CAPSULE EVERY MORNING BEFORE BREAKFAST, Disp: 90 capsule, Rfl: 3    Magnesium 400 MG capsule, Take 500 mg by mouth Daily., Disp: , Rfl:     metoprolol succinate XL (TOPROL-XL) 25 MG 24 hr tablet, Take 0.5 tablets by mouth Daily., Disp: 30 tablet, Rfl: 2    Multiple Vitamin tablet, Take 1 tablet by mouth Daily., Disp: , Rfl:     sildenafil (REVATIO) 20 MG tablet, TAKE TWO TABLETS BY MOUTH AS NEEDED FOR ERECTILE DYSFUNCTION, Disp: 20 tablet, Rfl: 0   also entered in Sleep Questionnaire    Patient  has a past medical history of Asymmetric septal hypertrophy, Benign  "prostatic hyperplasia with urinary hesitancy (11/13/2019), Bronchitis, Cataract of both eyes, Chicken pox, Cholecystitis, Diverticula of colon, ED (erectile dysfunction) of organic origin (11/13/2017), Encounter for annual health examination (03/10/2015), Fatigue, Gallbladder disease, Gastroenteritis (2/9/2024), GERD (gastroesophageal reflux disease), High blood pressure, History of EKG, History of TB skin testing, Hyperlipidemia, Hypertension, Impingement of right ulnar nerve, Measles, Non-ST elevated myocardial infarction (non-STEMI) (01/12/2022), Osteoarthritis involving multiple joints on both sides of body, Osteoarthritis involving multiple joints on both sides of body, Persistent cough, Pulse irregularity, Sexual problems, Sleep apnea, Thrombocythemia (06/15/2021), and Wellness examination (03/10/2015).    I have reviewed the Past Medical History, Past Surgical History, Social History and Family History.    Vital Signs /84   Pulse 72   Ht 175.3 cm (69.02\")   Wt 86.2 kg (190 lb)   SpO2 98%   BMI 28.04 kg/m²  Body mass index is 28.04 kg/m².    General Alert and oriented. No acute distress noted   Pharynx/Throat Class II  Mallampati airway, large tongue, no evidence of redundant lateral pharyngeal tissue. No oral lesions. No thrush. Moist mucous membranes.   Head Normocephalic. Symmetrical. Atraumatic.    Nose No septal deviation. No drainage   Chest Wall Normal shape. Symmetric expansion with respiration. No tenderness.   Neck Trachea midline, no thyromegaly or adenopathy    Lungs Clear to auscultation bilaterally. No wheezes. No rhonchi. No rales. Respirations regular, even and unlabored.   Heart Regular rhythm and normal rate. Normal S1 and S2. No murmur   Abdomen Soft, non-tender and non-distended. Normal bowel sounds. No masses.   Extremities Moves all extremities well. No edema   Psychiatric Normal mood and affect.     Testing:  Download last 90 d usage, average nightly use of 6 hours and 54 " minutes on CPAP 12cm H2O, AHI 2.6/hr, leak is 5 min and 57 seconds.    Study-  Diagnostic data available to date is as below and was reviewed on current visit:  2011-revealed apnea-hypopnea index of 27 per sleep hour and minimum SpO2 of 90%. During supine position, he had severe obstructive sleep apnea with Supine AHI of 49 per sleep hour     Impression:  1. Obstructive sleep apnea    2. Paroxysmal atrial fibrillation          Plan:  He is doing great on CPAP set at 12cm H2O, machine controls the snoring and apneas.  AHI appears to be within adequate range. I reviewed download report and original sleep study report with the patient. I advised pt to contact us if PAP pressures feel excessive or insufficient.     Weight loss will be strongly beneficial to reduce the severity of sleep-disordered breathing.       Follow up with Dr. Velasco in one year    Thank you for allowing me to participate in your patient's care.      SHER Juarez  [facility]  Phone: [phone]      Part of this note may be an electronic transcription/translation of spoken language to printed text using the Dragon Dictation System.

## 2025-02-14 ENCOUNTER — OFFICE VISIT (OUTPATIENT)
Age: 75
End: 2025-02-14
Payer: MEDICARE

## 2025-02-14 VITALS
SYSTOLIC BLOOD PRESSURE: 132 MMHG | HEIGHT: 69 IN | DIASTOLIC BLOOD PRESSURE: 86 MMHG | BODY MASS INDEX: 29.03 KG/M2 | HEART RATE: 62 BPM | WEIGHT: 196 LBS

## 2025-02-14 DIAGNOSIS — I48.0 PAROXYSMAL ATRIAL FIBRILLATION: ICD-10-CM

## 2025-02-14 DIAGNOSIS — I10 PRIMARY HYPERTENSION: ICD-10-CM

## 2025-02-14 DIAGNOSIS — I51.7 ASYMMETRIC SEPTAL HYPERTROPHY: ICD-10-CM

## 2025-02-14 DIAGNOSIS — Z98.890 H/O CARDIAC RADIOFREQUENCY ABLATION: Primary | ICD-10-CM

## 2025-02-14 RX ORDER — DOFETILIDE 0.12 MG/1
250 CAPSULE ORAL 2 TIMES DAILY
Start: 2025-02-14

## 2025-02-14 NOTE — PROGRESS NOTES
Date of Office Visit: 2025  Encounter Provider: Mk Moreno MD  Place of Service: [facility] CARDIOLOGY  Patient Name: Zheng Lopez  : 1950    Subjective:     Encounter Date:2025      Patient ID: Zheng Lopez is a 74 y.o. male who has a cc of  AF    Here is copy and paste from elpidio  moderate basal septal hypertrophy, hypertension, hyperlipidemia, SYLWIA treated with CPAP and PAF.     Dr. Moreno in 2022--- newly a lot of trouble with PAF but then lost a lot of weight and his episodes decreased.      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      2023 increased episodes of AF, heart rate 140s, he saw me in early April, we did a ZIO which showed a 63% AF burden, ablation was recommended     2023 PVI. Had recurrent persistent AF--admitted and put on dofetilide in 2023.   He had done well, no AF since starting dofetilide in 2023--he discussed at last visit with Dr. Moreno, possibly stopping dofetilide at this visit.     The patient had a good year.   No anginal chest pain,   No sig bryant,   No soa,   No fainting,  No orthostasis.   No edema.   Exercise tolerance: limited by knee pain from surgery     There have been no hospital admission since the last visit.     There have been no bleeding events.       Past Medical History:   Diagnosis Date    Asymmetric septal hypertrophy     Benign prostatic hyperplasia with urinary hesitancy 2019    Bronchitis     Bronchitis / URI    Cataract of both eyes     Chicken pox     Cholecystitis     Diverticula of colon     ED (erectile dysfunction) of organic origin 2017    Encounter for annual health examination 03/10/2015    Annual Health Assessment    Fatigue     Gallbladder disease     Gastroenteritis 2024    GERD (gastroesophageal reflux disease)     High blood pressure     History of EKG     10/30/15, 13,  09/14/12, 10/18/10, 05/04/09    History of TB skin testing     TB Skin Test: 01/09/14 Completed, 09/14/12 Patient Declines, 10/18/10 Completed, 03/05/08 Completed    Hyperlipidemia     Hypertension     still taking meds    Impingement of right ulnar nerve     Measles     Non-ST elevated myocardial infarction (non-STEMI) 01/12/2022    Osteoarthritis involving multiple joints on both sides of body     Osteoarthritis involving multiple joints on both sides of body     Persistent cough     Pulse irregularity     Irregular pulse PER CARDIO    Sexual problems     Sexual problems / enjoyment ED    Sleep apnea     has c-pap    Thrombocythemia 06/15/2021    Wellness examination 03/10/2015    Annual Wellness Visit       Social History     Socioeconomic History    Marital status:     Number of children: 2   Tobacco Use    Smoking status: Never     Passive exposure: Never    Smokeless tobacco: Never   Vaping Use    Vaping status: Never Used   Substance and Sexual Activity    Alcohol use: No     Comment: none in 5 yrs, Nov 2011 - last drink    Drug use: No    Sexual activity: Yes     Partners: Female       Family History   Problem Relation Age of Onset    Dementia Mother     Migraines Mother     Osteoporosis Mother     Stroke Mother 85        early 80s    Heart disease Mother         tachycardia and had ppm    Alzheimer's disease Mother     Supraventricular tachycardia Mother     Alcohol abuse Father     Depression Father     Migraines Father     Tuberculosis Father     Cirrhosis Father         41 YO    Cancer Paternal Uncle 67        mets but unsure of type of ca  BACK CANCER    Stroke Brother     Colon cancer Neg Hx     Prostate cancer Neg Hx     Heart attack Neg Hx        Review of Systems   Constitutional: Negative for fever and night sweats.   HENT:  Negative for ear pain and stridor.    Eyes:  Negative for discharge and visual halos.   Cardiovascular:  Negative for cyanosis.   Respiratory:  Negative for hemoptysis  "and sputum production.    Hematologic/Lymphatic: Negative for adenopathy.   Skin:  Negative for nail changes and unusual hair distribution.   Musculoskeletal:  Positive for arthritis and joint pain. Negative for gout and joint swelling.   Gastrointestinal:  Negative for bowel incontinence and flatus.   Genitourinary:  Negative for dysuria and flank pain.   Neurological:  Negative for seizures and tremors.   Psychiatric/Behavioral:  Negative for altered mental status. The patient is not nervous/anxious.             Objective:     Vitals:    02/14/25 0927   BP: 132/86   BP Location: Left arm   Patient Position: Sitting   Pulse: 62   Weight: 88.9 kg (196 lb)   Height: 175.3 cm (69.02\")         Eyes:      General:         Right eye: No discharge.         Left eye: No discharge.   HENT:      Head: Normocephalic and atraumatic.   Neck:      Thyroid: No thyromegaly.      Vascular: No JVD.   Pulmonary:      Effort: Pulmonary effort is normal.      Breath sounds: Normal breath sounds. No rales.   Cardiovascular:      Normal rate. Regular rhythm.      No gallop.    Edema:     Peripheral edema absent.   Abdominal:      General: Bowel sounds are normal.      Palpations: Abdomen is soft.      Tenderness: There is no abdominal tenderness.   Musculoskeletal: Normal range of motion.         General: No deformity. Skin:     General: Skin is warm and dry.      Findings: No erythema.   Neurological:      Mental Status: Alert and oriented to person, place, and time.      Motor: Normal muscle tone.   Psychiatric:         Behavior: Behavior normal.         Thought Content: Thought content normal.           ECG 12 Lead    Date/Time: 2/14/2025 10:35 AM  Performed by: Mk Moreno MD    Authorized by: Mk Moreno MD  Comparison: compared with previous ECG   Similar to previous ECG  Rhythm: sinus rhythm  Other findings: non-specific ST-T wave changes  Comments: QT interval is normal           Lab Review:       Assessment:          " Diagnosis Plan   1. H/O cardiac radiofrequency ablation--5/2023 PVI        2. Asymmetric septal hypertrophy        3. Primary hypertension               Plan:     His QT is fine but I want to empirically decrease from 375 bid to 250 BID     On a-ban.

## 2025-02-19 ENCOUNTER — PATIENT MESSAGE (OUTPATIENT)
Age: 75
End: 2025-02-19
Payer: MEDICARE

## 2025-02-19 DIAGNOSIS — I48.0 PAROXYSMAL ATRIAL FIBRILLATION: ICD-10-CM

## 2025-02-19 RX ORDER — METOPROLOL SUCCINATE 25 MG/1
12.5 TABLET, EXTENDED RELEASE ORAL DAILY
Qty: 45 TABLET | Refills: 1 | Status: SHIPPED | OUTPATIENT
Start: 2025-02-19

## 2025-02-26 RX ORDER — ATORVASTATIN CALCIUM 40 MG/1
TABLET, FILM COATED ORAL
Qty: 90 TABLET | Refills: 3 | Status: SHIPPED | OUTPATIENT
Start: 2025-02-26

## 2025-04-09 ENCOUNTER — OFFICE VISIT (OUTPATIENT)
Dept: FAMILY MEDICINE CLINIC | Facility: CLINIC | Age: 75
End: 2025-04-09
Payer: MEDICARE

## 2025-04-09 VITALS
OXYGEN SATURATION: 98 % | WEIGHT: 194 LBS | DIASTOLIC BLOOD PRESSURE: 84 MMHG | SYSTOLIC BLOOD PRESSURE: 160 MMHG | HEART RATE: 62 BPM | RESPIRATION RATE: 16 BRPM | HEIGHT: 69 IN | TEMPERATURE: 97.6 F | BODY MASS INDEX: 28.73 KG/M2

## 2025-04-09 DIAGNOSIS — I10 PRIMARY HYPERTENSION: Primary | ICD-10-CM

## 2025-04-09 DIAGNOSIS — I48.0 PAROXYSMAL ATRIAL FIBRILLATION: ICD-10-CM

## 2025-04-09 NOTE — PROGRESS NOTES
Subjective     Zheng Lopez is a 74 y.o. male.     Chief Complaint   Patient presents with    Hypertension     Elevated, was lightheaded yesterday, felt pressure in head 170/102 no soa or chest pain.        History of Present Illness     Pt of Dr Sanchez.  Pt c/o elevated BP.  He stated that his blood pressure has been up. He measured it the last three days and the top number has been around 170 and the bottom number has been 's.  He has Afib as well   On Metop 12.5 mg BID  Today his /84  Felt lightlessness yesterday, today feels better.    Follow with cardiol      Labs and documentation from PCP / consulting physician has been reviewed          The following portions of the patient's history were reviewed and updated as appropriate: allergies, current medications, past family history, past medical history, past social history, past surgical history, and problem list.        Review of Systems   Respiratory:  Negative for shortness of breath.    Cardiovascular:  Negative for chest pain.       Vitals:    04/09/25 1057   BP: 160/84   Pulse: 62   Resp: 16   Temp: 97.6 °F (36.4 °C)   SpO2: 98%           04/09/25  1057   Weight: 88 kg (194 lb)         Body mass index is 28.64 kg/m².      Current Outpatient Medications   Medication Sig Dispense Refill    atorvastatin (LIPITOR) 40 MG tablet TAKE 1 TABLET DAILY 90 tablet 3    calcium carbonate (OS-TOMAS) 600 MG tablet Take 1 tablet by mouth Daily.      Cholecalciferol 25 MCG (1000 UT) tablet Take 1 tablet by mouth Daily.      Cyanocobalamin (VITAMIN B-12 PO) Take 1 tablet by mouth Daily.      dofetilide (TIKOSYN) 125 MCG capsule Take 2 capsules by mouth 2 (Two) Times a Day.      Eliquis 5 MG tablet tablet TAKE 1 TABLET EVERY 12 HOURS (Patient taking differently: 2 (Two) Times a Day.) 180 tablet 3    esomeprazole (nexIUM) 40 MG capsule TAKE 1 CAPSULE EVERY MORNING BEFORE BREAKFAST 90 capsule 3    Magnesium 400 MG capsule Take 500 mg by mouth Daily.       metoprolol succinate XL (TOPROL-XL) 25 MG 24 hr tablet Take 0.5 tablets by mouth Daily. 45 tablet 1    Multiple Vitamin tablet Take 1 tablet by mouth Daily.      sildenafil (REVATIO) 20 MG tablet TAKE TWO TABLETS BY MOUTH AS NEEDED FOR ERECTILE DYSFUNCTION 20 tablet 0     No current facility-administered medications for this visit.                Objective   Physical Exam  Vitals and nursing note reviewed.   Constitutional:       General: He is not in acute distress.     Appearance: He is not toxic-appearing.   Cardiovascular:      Rate and Rhythm: Normal rate and regular rhythm.      Heart sounds: Normal heart sounds. No murmur heard.  Pulmonary:      Effort: Pulmonary effort is normal. No respiratory distress.      Breath sounds: Normal breath sounds. No stridor. No wheezing or rhonchi.   Neurological:      Mental Status: He is alert and oriented to person, place, and time.   Psychiatric:         Mood and Affect: Mood normal.         Behavior: Behavior normal.           Assessment & Plan   Diagnoses and all orders for this visit:    1. Primary hypertension (Primary)  Comments:  runs high  advised to atke 12.5 mg to 25 mg with close BP and HR monitoring  BP login form provided ,discussed correct way to position self for BP accuracy    2. Paroxysmal atrial fibrillation  Comments:  as above             I spent 33 minutes caring for this patient on this date of service. This time includes time spent by me in the following activities:preparing for the visit,reviewing previous medical records,  performing a medically appropriate examination and/or evaluation, counseling and educating the patient/family/caregiver, and documenting information in the medical record.       Patient was given instructions and counseling regarding her condition or for health maintenance advice. Please see specific information pulled into the AVS if appropriate.       I have fully discussed the nature of the medical condition(s) risks,  complications, management, safe and proper use of medications.   Pt stated no allergy to the above prescribed medication.  I have discussed the SIDE EFFECT OF MEDICATION and importance TO report any side effect , the patient expressed good understanding.  Encouraged medication compliance and the importance of keeping scheduled follow up appointments with me and any other providers.    Patient instructed to follow up with our office for results on any labs/imaging ordered during this visit.    Home care discussed  All questions answered  Patient verbalizes understanding and agrees to treatment plan.     Follow up: Return in about 13 days (around 4/22/2025) for follow up on current illness.

## 2025-04-18 ENCOUNTER — OFFICE VISIT (OUTPATIENT)
Dept: FAMILY MEDICINE CLINIC | Facility: CLINIC | Age: 75
End: 2025-04-18
Payer: MEDICARE

## 2025-04-18 VITALS
HEIGHT: 69 IN | DIASTOLIC BLOOD PRESSURE: 94 MMHG | HEART RATE: 76 BPM | WEIGHT: 191 LBS | BODY MASS INDEX: 28.29 KG/M2 | SYSTOLIC BLOOD PRESSURE: 160 MMHG | TEMPERATURE: 97.8 F | OXYGEN SATURATION: 98 %

## 2025-04-18 DIAGNOSIS — I48.0 PAROXYSMAL ATRIAL FIBRILLATION: ICD-10-CM

## 2025-04-18 DIAGNOSIS — I10 PRIMARY HYPERTENSION: Primary | ICD-10-CM

## 2025-04-18 RX ORDER — METOPROLOL SUCCINATE 25 MG/1
25 TABLET, EXTENDED RELEASE ORAL DAILY
Qty: 90 TABLET | Refills: 1 | Status: SHIPPED | OUTPATIENT
Start: 2025-04-18

## 2025-04-18 RX ORDER — LISINOPRIL 5 MG/1
5 TABLET ORAL DAILY
Qty: 90 TABLET | Refills: 1 | Status: SHIPPED | OUTPATIENT
Start: 2025-04-18

## 2025-04-18 NOTE — PROGRESS NOTES
"Chief Complaint  Hypertension (Pt taking 25mg metoprolol lately  had a prior visit   has some bp logs from  last week )    Subjective        Zheng Lopez presents to [facility]  History of Present Illness    History of Present Illness  The patient presents for a follow-up of blood pressure management.    An episode of lightheadedness was experienced after returning from the gym on Tuesday of last week. Blood pressure monitoring had been neglected while focusing on atrial fibrillation using [employer]. Upon checking with a wrist device, a reading of 200/100 was noted. Consultation with Dr. Marquez led to the recommendation of using an arm cuff for more accurate readings. Subsequent measurements at [facility] and [facility] confirmed elevated blood pressure. The last five readings have been recorded for reference. Metoprolol 25 mg is currently being taken, primarily for atrial fibrillation. Lisinopril has been taken in the past without any adverse effects. Knee surgery was performed in mid-December, followed by a largely sedentary period of three months, except for physical therapy sessions.    PAST SURGICAL HISTORY:  Knee surgery in 12/2024.       Objective   Vital Signs:  /94   Pulse 76   Temp 97.8 °F (36.6 °C)   Ht 175.3 cm (69\")   Wt 86.6 kg (191 lb)   SpO2 98%   BMI 28.21 kg/m²   Estimated body mass index is 28.21 kg/m² as calculated from the following:    Height as of this encounter: 175.3 cm (69\").    Weight as of this encounter: 86.6 kg (191 lb).            Physical Exam  Vitals and nursing note reviewed.   Constitutional:       General: He is not in acute distress.     Appearance: He is well-developed.   HENT:      Head: Normocephalic.      Nose: Nose normal.   Cardiovascular:      Heart sounds: No murmur heard.  Pulmonary:      Effort: Pulmonary effort is normal. No respiratory distress.   Musculoskeletal:         General: Normal range of motion.   Skin:     " General: Skin is warm and dry.      Findings: No rash.   Neurological:      Mental Status: He is alert and oriented to person, place, and time.   Psychiatric:         Behavior: Behavior normal.         Thought Content: Thought content normal.         Judgment: Judgment normal.            Result Review :                  Assessment and Plan   Diagnoses and all orders for this visit:    1. Primary hypertension (Primary)  -     lisinopril (PRINIVIL,ZESTRIL) 5 MG tablet; Take 1 tablet by mouth Daily.  Dispense: 90 tablet; Refill: 1    2. Paroxysmal atrial fibrillation  -     metoprolol succinate XL (TOPROL-XL) 25 MG 24 hr tablet; Take 1 tablet by mouth Daily.  Dispense: 90 tablet; Refill: 1           Assessment & Plan  1. Hypertension.  Chronic problem not well-controlled.  - Blood pressure has been elevated, with home numbers in the 160s over 90s, rarely in the 200s over 100s.  Goal blood pressure to be less than 140/90.  - Currently on metoprolol 25 mg, which also helps manage atrial fibrillation.  - Advised to bring blood pressure cuff to the next appointment in May 2025 for accuracy verification.  - Prescription for lisinopril 5 mg issued through [facility]. Start with a low dose and increase weekly if blood pressure remains above target range. Communicate blood pressure readings via [employer] for adjustments. If blood pressure reaches target range, wait until the next appointment; otherwise, contact the office for further management.  -Follow-up as previously scheduled in May.      Follow Up   Return if symptoms worsen or fail to improve, for Fu as scheduled .  Patient was given instructions and counseling regarding his condition or for health maintenance advice. Please see specific information pulled into the AVS if appropriate.         Zoya Sanchez MD      Patient or patient representative verbalized consent for the use of Ambient Listening during the visit with  Zoya Sanchez MD for chart documentation.  4/18/2025  14:58 EDT

## 2025-04-29 ENCOUNTER — PATIENT MESSAGE (OUTPATIENT)
Dept: FAMILY MEDICINE CLINIC | Facility: CLINIC | Age: 75
End: 2025-04-29
Payer: MEDICARE

## 2025-04-29 DIAGNOSIS — I10 PRIMARY HYPERTENSION: ICD-10-CM

## 2025-05-05 RX ORDER — LISINOPRIL 20 MG/1
20 TABLET ORAL DAILY
Qty: 90 TABLET | Refills: 1 | Status: SHIPPED | OUTPATIENT
Start: 2025-05-05

## 2025-05-19 RX ORDER — ESOMEPRAZOLE MAGNESIUM 40 MG/1
40 CAPSULE, DELAYED RELEASE ORAL
Qty: 90 CAPSULE | Refills: 3 | Status: SHIPPED | OUTPATIENT
Start: 2025-05-19

## 2025-05-29 ENCOUNTER — OFFICE VISIT (OUTPATIENT)
Dept: FAMILY MEDICINE CLINIC | Facility: CLINIC | Age: 75
End: 2025-05-29
Payer: MEDICARE

## 2025-05-29 VITALS
OXYGEN SATURATION: 96 % | BODY MASS INDEX: 28.58 KG/M2 | TEMPERATURE: 97.6 F | SYSTOLIC BLOOD PRESSURE: 122 MMHG | HEART RATE: 68 BPM | HEIGHT: 69 IN | DIASTOLIC BLOOD PRESSURE: 80 MMHG | WEIGHT: 193 LBS

## 2025-05-29 DIAGNOSIS — Z00.00 MEDICARE ANNUAL WELLNESS VISIT, SUBSEQUENT: Primary | ICD-10-CM

## 2025-05-29 DIAGNOSIS — R73.9 HYPERGLYCEMIA: ICD-10-CM

## 2025-05-29 DIAGNOSIS — I10 PRIMARY HYPERTENSION: ICD-10-CM

## 2025-05-29 DIAGNOSIS — Z12.11 SCREEN FOR COLON CANCER: ICD-10-CM

## 2025-05-29 NOTE — PROGRESS NOTES
Subjective   The ABCs of the Annual Wellness Visit  Medicare Wellness Visit      Zheng Lopez is a 74 y.o. patient who presents for a Medicare Wellness Visit.    The following portions of the patient's history were reviewed and   updated as appropriate: allergies, current medications, past family history, past medical history, past social history, past surgical history, and problem list.    Compared to one year ago, the patient's physical   health is the same.  Compared to one year ago, the patient's mental   health is the same.    Recent Hospitalizations:  He was not admitted to the hospital during the last year.     Current Medical Providers:  Patient Care Team:  Zoya Sanchez MD as PCP - General (Family Medicine)  Zoya Sanchez MD as Referring Physician (Family Medicine)  Vivek Trujillo MD as Consulting Physician (Hematology and Oncology)    Outpatient Medications Prior to Visit   Medication Sig Dispense Refill    atorvastatin (LIPITOR) 40 MG tablet TAKE 1 TABLET DAILY 90 tablet 3    calcium carbonate (OS-TOMAS) 600 MG tablet Take 1 tablet by mouth Daily.      Cholecalciferol 25 MCG (1000 UT) tablet Take 1 tablet by mouth Daily.      Cyanocobalamin (VITAMIN B-12 PO) Take 1 tablet by mouth Daily.      dofetilide (TIKOSYN) 125 MCG capsule Take 2 capsules by mouth 2 (Two) Times a Day.      Eliquis 5 MG tablet tablet TAKE 1 TABLET EVERY 12 HOURS (Patient taking differently: 2 (Two) Times a Day.) 180 tablet 3    esomeprazole (nexIUM) 40 MG capsule TAKE 1 CAPSULE EVERY MORNING BEFORE BREAKFAST 90 capsule 3    lisinopril (PRINIVIL,ZESTRIL) 20 MG tablet Take 1 tablet by mouth Daily. 90 tablet 1    Magnesium 400 MG capsule Take 500 mg by mouth Daily.      metoprolol succinate XL (TOPROL-XL) 25 MG 24 hr tablet Take 1 tablet by mouth Daily. 90 tablet 1    Multiple Vitamin tablet Take 1 tablet by mouth Daily.      sildenafil (REVATIO) 20 MG tablet TAKE TWO TABLETS BY MOUTH AS NEEDED FOR ERECTILE DYSFUNCTION 20  "tablet 0     No facility-administered medications prior to visit.     No opioid medication identified on active medication list. I have reviewed chart for other potential  high risk medication/s and harmful drug interactions in the elderly.      Aspirin is not on active medication list.  Aspirin use is contraindicated for this patient due to: current use of Eliquis.  .    Patient Active Problem List   Diagnosis    Hyperlipidemia    Primary hypertension    SYLWIA (obstructive sleep apnea)    Osteoarthritis involving multiple joints on both sides of body    GERD (gastroesophageal reflux disease)    Diverticula of colon    ED (erectile dysfunction) of organic origin    Asymmetric septal hypertrophy    Non-sustained ventricular tachycardia    Chronic pain of left knee    Primary osteoarthritis of left knee    Left lumbar radiculopathy    Left thigh pain    Benign prostatic hyperplasia with urinary hesitancy    Arthritis of left hip    Primary localized osteoarthrosis of left hip    Arthritis of right wrist    Hyperglycemia    Medicare annual wellness visit, subsequent    Paroxysmal atrial fibrillation    Coronary-myocardial bridge    H/O cardiac radiofrequency ablation--5/2023 PVI    Atrial fibrillation, persistent    Gastroenteritis    Enteritis due to Norovirus    E coli enteritis    Encounter for monitoring dofetilide therapy    Thrombocytopenia     Advance Care Planning Advance Directive is on file.  ACP discussion was held with the patient during this visit. Patient has an advance directive in EMR which is still valid.             Objective   Vitals:    05/29/25 0751   BP: 122/80   Pulse: 68   Temp: 97.6 °F (36.4 °C)   SpO2: 96%   Weight: 87.5 kg (193 lb)   Height: 175.3 cm (69\")   PainSc: 0-No pain       Estimated body mass index is 28.5 kg/m² as calculated from the following:    Height as of this encounter: 175.3 cm (69\").    Weight as of this encounter: 87.5 kg (193 lb).    BMI is >= 25 and <30. (Overweight) The " following options were offered after discussion;: exercise counseling/recommendations and nutrition counseling/recommendations           Does the patient have evidence of cognitive impairment? No  Lab Results   Component Value Date    CHLPL 142 2025    TRIG 68 2025    HDL 63 (H) 2025    LDL 65 2025    VLDL 14 2025    HGBA1C 5.80 (H) 2025                                                                                                Health  Risk Assessment    Smoking Status:  Social History     Tobacco Use   Smoking Status Never    Passive exposure: Never   Smokeless Tobacco Never     Alcohol Consumption:  Social History     Substance and Sexual Activity   Alcohol Use No    Comment: none in 5 yrs, 2011 - last drink       Fall Risk Screen  STEADI Fall Risk Assessment was completed, and patient is at LOW risk for falls.Assessment completed on:2025    Depression Screening   Little interest or pleasure in doing things? Not at all   Feeling down, depressed, or hopeless? Not at all   PHQ-2 Total Score 0      Health Habits and Functional and Cognitive Screenin/29/2025     7:55 AM   Functional & Cognitive Status   Do you have difficulty preparing food and eating? No   Do you have difficulty bathing yourself, getting dressed or grooming yourself? No   Do you have difficulty using the toilet? No   Do you have difficulty moving around from place to place? No   Do you have trouble with steps or getting out of a bed or a chair? No   Current Diet Well Balanced Diet   Dental Exam Up to date   Eye Exam Up to date   Exercise (times per week) 4 times per week   Current Exercises Include Treadmill;Stationary Bicycling/Spin Class;Light Weights   Do you need help using the phone?  No   Are you deaf or do you have serious difficulty hearing?  No   Do you need help to go to places out of walking distance? No   Do you need help shopping? No   Do you need help preparing meals?  No   Do you  need help with housework?  No   Do you need help with laundry? No   Do you need help taking your medications? No   Do you need help managing money? No   Do you ever drive or ride in a car without wearing a seat belt? No   Have you felt unusual stress, anger or loneliness in the last month? No   Who do you live with? Spouse   If you need help, do you have trouble finding someone available to you? No   Have you been bothered in the last four weeks by sexual problems? No   Do you have difficulty concentrating, remembering or making decisions? No           Age-appropriate Screening Schedule:  Refer to the list below for future screening recommendations based on patient's age, sex and/or medical conditions. Orders for these recommended tests are listed in the plan section. The patient has been provided with a written plan.    Health Maintenance List  Health Maintenance   Topic Date Due    COLORECTAL CANCER SCREENING  04/16/2025    INFLUENZA VACCINE  07/01/2025    LIPID PANEL  05/23/2026    ANNUAL WELLNESS VISIT  05/29/2026    TDAP/TD VACCINES (4 - Td or Tdap) 06/22/2031    HEPATITIS C SCREENING  Completed    COVID-19 Vaccine  Completed    RSV Vaccine - Adults  Completed    Pneumococcal Vaccine 50+  Completed    ZOSTER VACCINE  Completed                                                                                                                                                CMS Preventative Services Quick Reference  Risk Factors Identified During Encounter  None Identified    The above risks/problems have been discussed with the patient.  Pertinent information has been shared with the patient in the After Visit Summary.  An After Visit Summary and PPPS were made available to the patient.    Follow Up:   Next Medicare Wellness visit to be scheduled in 1 year.         Additional E&M Note during same encounter follows:  Patient has additional, significant, and separately identifiable condition(s)/problem(s) that require  work above and beyond the Medicare Wellness Visit     Chief Complaint  Medicare Wellness-subsequent (Doing well in general has his bp  machine to compare here with our reading ,no complains )    Subjective    HPI  CAMPOS is also being seen today for an annual adult preventative physical exam.  and CAMPOS is also being seen today for additional medical problem/s.       74-year-old male presents for Medicare wellness visit.    Elevated Blood Pressure Readings  Home blood pressure monitor shows elevated readings, but office reading was 122/80. Considering purchasing a new monitor, previously used Omron wrist cuff.  - Location: Home blood pressure monitor.  - Character: Elevated readings at home, normal reading in office.  - Alleviating/Aggravating Factors: Considering purchasing a new monitor.    Weight Gain Post-Knee Surgery  Post-knee surgery inactivity led to weight gain. Resumed gym routine post-physical therapy in March 2025, gradually increasing activity. Weight increased since last visit.  - Onset: Post-knee surgery.  - Duration: Since last visit.  - Character: Weight gain.  - Alleviating/Aggravating Factors: Resumed gym routine post-physical therapy in March 2025, gradually increasing activity.    Colonoscopy Preference  Prefers another colonoscopy, last one 10 years ago.  - Timing: Last colonoscopy 10 years ago.    Memory Lapses  Occasional memory lapses, continues usual activities at a slower pace.  - Character: Occasional memory lapses.  - Severity: Continues usual activities at a slower pace.    Viagra Effectiveness  Currently taking Viagra 100 mg, uncertain about effectiveness, not taken recently due to surgeries.  - Character: Uncertain about effectiveness.  - Timing: Not taken recently due to surgeries.    Dizziness  Experienced dizziness while looking up from floor position, possibly related to lisinopril.  - Onset: While looking up from floor position.  - Character: Dizziness.  - Alleviating/Aggravating  "Factors: Possibly related to lisinopril.    PSA Test Request  Requests PSA test in October 2025, last one in November 2024.  - Timing: Last PSA test in November 2024; requests next test in October 2025.    MEDICATIONS  Current: atorvastatin, lisinopril, Viagra    IMMUNIZATIONS  He is up to date on all his immunizations.          Objective   Vital Signs:  /80   Pulse 68   Temp 97.6 °F (36.4 °C)   Ht 175.3 cm (69\")   Wt 87.5 kg (193 lb)   SpO2 96%   BMI 28.50 kg/m²   Physical Exam  Vitals and nursing note reviewed.   Constitutional:       General: He is not in acute distress.     Appearance: Normal appearance. He is well-developed.   HENT:      Head: Normocephalic.      Right Ear: Tympanic membrane and ear canal normal.      Left Ear: Tympanic membrane and ear canal normal.      Nose: Nose normal.   Cardiovascular:      Rate and Rhythm: Normal rate and regular rhythm.      Heart sounds: Normal heart sounds. No murmur heard.  Pulmonary:      Effort: Pulmonary effort is normal. No respiratory distress.      Breath sounds: Normal breath sounds.   Abdominal:      General: Abdomen is flat. There is no distension.      Palpations: Abdomen is soft.      Tenderness: There is no abdominal tenderness.   Musculoskeletal:         General: Normal range of motion.   Skin:     General: Skin is warm and dry.      Findings: No rash.   Neurological:      Mental Status: He is alert and oriented to person, place, and time.   Psychiatric:         Mood and Affect: Mood normal.         Behavior: Behavior normal.         Thought Content: Thought content normal.         Judgment: Judgment normal.           Carotid arteries examined. Lungs auscultated. No abdominal tenderness. No ankle swelling.    Vital Signs  Blood pressure normal today.    The following data was reviewed by: Zoya Sanchez MD on 05/29/2025:      TSH Rfx On Abnormal To Free T4 (05/23/2025 08:11)  Hemoglobin A1c (05/23/2025 08:11)  Lipid Panel (05/23/2025 " 08:11)  CBC & Differential (05/23/2025 08:11)  Comprehensive Metabolic Panel (05/23/2025 08:11)  Results  Laboratory Studies  Slightly elevated blood sugar. Normal kidney and liver function. Normal electrolytes and calcium. Slightly elevated cholesterol, LDL below 70. Normal thyroid function. Normal blood counts, no anemia. Normal platelet and white blood cell counts.           Assessment and Plan   Diagnoses and all orders for this visit:    1. Medicare annual wellness visit, subsequent (Primary)    2. Primary hypertension  -     TSH Rfx On Abnormal To Free T4; Future  -     Lipid Panel; Future  -     CBC & Differential; Future  -     Comprehensive Metabolic Panel; Future    3. Hyperglycemia  -     Hemoglobin A1c; Future    4. Screen for colon cancer  -     Cancel: Ambulatory Referral to Gastroenterology  -     Ambulatory Referral For Screening Colonoscopy           1. Hypertension: Chronic problem well-controlled based on today's reading.  By [employer]  - Home readings inconsistent  - Advised to purchase new monitor, preferably bicep cuff  - Schedule nurse appointment to compare readings  - No medication changes    2. Prediabetes: Chronic problem that is worsening and worsening diet  - Likely due to post-surgery inactivity and improving his diet which she has already done  - Advised to resume exercise and reduce starch intake  - Expected improvement with regular activity    3. Hypercholesterolemia: Technically well-controlled with LDL goal below 70.  But slight increase  - Cholesterol increased by 20 points, remains within target  - Continue atorvastatin 40 mg  - Maintain healthy diet and exercise    4. Erectile Dysfunction:  - Viagra 100 mg not as effective  - Advised to increase dose if needed    5. Dizziness:  - Experienced dizziness, possibly related to blood pressure or inner ear  - Continue allergy medication  - Consider nasal saline, follow-up if this becomes persistent it was just a single episode.      6.  Health Maintenance:  - Up-to-date on immunizations and regular check-ups  - Referral for colonoscopy to [facility]  - PSA test ordered for November or December 2025    Follow-up:  - Follow up in October 2025    PROCEDURE  Underwent knee surgery, completed physical therapy in March 2025.         Follow Up   Return in 5 months (on 10/13/2025), or if symptoms worsen or fail to improve, for Recheck hyperglycemia with labs prior.  Patient was given instructions and counseling regarding his condition or for health maintenance advice. Please see specific information pulled into the AVS if appropriate.  Patient or patient representative verbalized consent for the use of Ambient Listening during the visit with  Zoya Sanchez MD for chart documentation. 5/29/2025  08:28 EDT

## 2025-06-04 ENCOUNTER — TELEPHONE (OUTPATIENT)
Dept: GASTROENTEROLOGY | Facility: CLINIC | Age: 75
End: 2025-06-04
Payer: MEDICARE

## 2025-06-04 DIAGNOSIS — Z12.11 ENCOUNTER FOR SCREENING FOR MALIGNANT NEOPLASM OF COLON: Primary | ICD-10-CM

## 2025-06-04 NOTE — TELEPHONE ENCOUNTER
This patient is due for a repeat colonoscopy. May he hold Eliquis 48 hrs prior to procedure?  Thank you

## 2025-06-04 NOTE — TELEPHONE ENCOUNTER
Last colonoscopy 4/16/15    No personal hx polyps  No family hx polyps or cx    Asa or blood thinners:  Eliquis    List medications:  Esomeprazole  Lisinopril  Metoprolol  Lipitor  Tikosyn  Sildenafil    OA form scanned in media

## 2025-06-06 ENCOUNTER — TELEPHONE (OUTPATIENT)
Dept: GASTROENTEROLOGY | Facility: CLINIC | Age: 75
End: 2025-06-06
Payer: MEDICARE

## 2025-06-06 NOTE — TELEPHONE ENCOUNTER
"Hub staff attempted to follow warm transfer process and was unsuccessful     Caller: Zheng Lopez \"CAMPOS\"    Relationship to patient: Self    Best call back number: [phone]    Patient is needing: PT RETURNING MISSED CALL TO SCHEDULE HIS COLONOSCOPY, UNABLE TO WT. PLEASE CALL BACK.    "

## 2025-06-13 ENCOUNTER — TELEPHONE (OUTPATIENT)
Dept: GASTROENTEROLOGY | Facility: CLINIC | Age: 75
End: 2025-06-13
Payer: MEDICARE

## 2025-06-13 NOTE — TELEPHONE ENCOUNTER
CAROL ANN murcia  for COLONOSCOPY on  09/24 arrive at  630am  . mailed prep instructions [facility] file[facility]

## 2025-06-23 ENCOUNTER — TELEPHONE (OUTPATIENT)
Dept: GASTROENTEROLOGY | Facility: CLINIC | Age: 75
End: 2025-06-23
Payer: MEDICARE

## 2025-06-23 NOTE — TELEPHONE ENCOUNTER
----- Message from Pati Khan sent at 6/23/2025  2:05 PM EDT -----  Pls update pt with cards recs  ----- Message -----  From: Cuca Kumar APRN  Sent: 6/6/2025  10:56 AM EDT  To: Pati Khan MD

## 2025-06-23 NOTE — TELEPHONE ENCOUNTER
Called pt and advised the his cardiologist has approved him to have his procedure on 09/25/2025 and also approved him holding his eliquis for 2 days. Advised pt that the last day he is to take his eliquis would be 09/21/2025.  Pt verb understanding.

## 2025-07-11 DIAGNOSIS — N52.9 ED (ERECTILE DYSFUNCTION) OF ORGANIC ORIGIN: ICD-10-CM

## 2025-07-17 ENCOUNTER — OFFICE VISIT (OUTPATIENT)
Dept: FAMILY MEDICINE CLINIC | Facility: CLINIC | Age: 75
End: 2025-07-17
Payer: MEDICARE

## 2025-07-17 VITALS
SYSTOLIC BLOOD PRESSURE: 112 MMHG | TEMPERATURE: 97.9 F | HEIGHT: 69 IN | BODY MASS INDEX: 28.73 KG/M2 | OXYGEN SATURATION: 96 % | WEIGHT: 194 LBS | HEART RATE: 55 BPM | DIASTOLIC BLOOD PRESSURE: 66 MMHG

## 2025-07-17 DIAGNOSIS — N52.9 ED (ERECTILE DYSFUNCTION) OF ORGANIC ORIGIN: ICD-10-CM

## 2025-07-17 RX ORDER — SILDENAFIL CITRATE 20 MG/1
100 TABLET ORAL AS NEEDED
Qty: 20 TABLET | Refills: 3 | Status: SHIPPED | OUTPATIENT
Start: 2025-07-17

## 2025-07-17 RX ORDER — SILDENAFIL CITRATE 20 MG/1
TABLET ORAL
Qty: 20 TABLET | Refills: 0 | OUTPATIENT
Start: 2025-07-17

## 2025-07-17 RX ORDER — SILDENAFIL CITRATE 20 MG/1
20 TABLET ORAL AS NEEDED
Qty: 20 TABLET | Refills: 0 | Status: SHIPPED | OUTPATIENT
Start: 2025-07-17 | End: 2025-07-17 | Stop reason: SDUPTHER

## 2025-07-17 NOTE — PROGRESS NOTES
"Chief Complaint  Hypertension (No complains doing well he want to make sure he can take sidenafil ,med already  in [facility] just want to double check the dr is ok  taking this )    Subjective        Zheng Lopez presents to [facility]  Hypertension    History of Present Illness  The patient presents for follow-up of erectile dysfunction.    Erectile Dysfunction  He seeks a refill of sildenafil, which has been effective but causes mild lightheadedness. He uses an older supply, possibly less potent, typically once a month. Previously, Viagra was less effective.  - Character: Erectile dysfunction.  - Alleviating/Aggravating Factors: Sildenafil has been effective but causes mild lightheadedness; older supply possibly less potent.  - Timing: Uses sildenafil typically once a month.  - Severity: Mild lightheadedness as a side effect; Viagra was less effective.    MEDICATIONS  Current: Sildenafil, metoprolol.  Past: Viagra.       Objective   Vital Signs:  /66   Pulse 55   Temp 97.9 °F (36.6 °C)   Ht 175.3 cm (69\")   Wt 88 kg (194 lb)   SpO2 96%   BMI 28.65 kg/m²   Estimated body mass index is 28.65 kg/m² as calculated from the following:    Height as of this encounter: 175.3 cm (69\").    Weight as of this encounter: 88 kg (194 lb).          Physical Exam  Vitals and nursing note reviewed.   Constitutional:       General: He is not in acute distress.     Appearance: He is well-developed.   HENT:      Head: Normocephalic.      Nose: Nose normal.   Cardiovascular:      Heart sounds: No murmur heard.  Pulmonary:      Effort: Pulmonary effort is normal. No respiratory distress.   Musculoskeletal:         General: Normal range of motion.   Skin:     General: Skin is warm and dry.      Findings: No rash.   Neurological:      Mental Status: He is alert and oriented to person, place, and time.   Psychiatric:         Behavior: Behavior normal.         Thought Content: Thought content " normal.         Judgment: Judgment normal.            Result Review :                  Assessment and Plan   Diagnoses and all orders for this visit:    1. ED (erectile dysfunction) of organic origin  -     sildenafil (REVATIO) 20 MG tablet; Take 5 tablets by mouth As Needed (erectile dysfunction).  Dispense: 20 tablet; Refill: 3           Assessment & Plan  1. Erectile dysfunction chronic problem well-controlled  - Erectile dysfunction may be exacerbated by vascular causes, combination of PAD/blood pressure meds/age, which can reduce blood flow to extremities, including sexual organs  - Continue sildenafil 100 mg regimen, taking 5 tablets as needed  - Provided prescription for 20 tablets of sildenafil 20 mg, with instructions to take 5 tablets as needed  - Prescription sent to "[facility]  - If ineffective, consider switching back to Viagra  - Follow-up with next appointment in September    Follow-up  - Follow-up in September 2025      Follow Up   Return if symptoms worsen or fail to improve.  Patient was given instructions and counseling regarding his condition or for health maintenance advice. Please see specific information pulled into the AVS if appropriate.         Zoya Sanchez MD      Patient or patient representative verbalized consent for the use of Ambient Listening during the visit with  Zoya Sanchez MD for chart documentation. 7/17/2025  13:32 EDT

## 2025-08-11 RX ORDER — DOFETILIDE 0.12 MG/1
250 CAPSULE ORAL 2 TIMES DAILY
Qty: 360 CAPSULE | Refills: 1 | Status: SHIPPED | OUTPATIENT
Start: 2025-08-11 | End: 2025-08-12 | Stop reason: SDUPTHER

## 2025-08-12 RX ORDER — DOFETILIDE 0.25 MG/1
250 CAPSULE ORAL 2 TIMES DAILY
Qty: 180 CAPSULE | Refills: 1 | Status: SHIPPED | OUTPATIENT
Start: 2025-08-12

## 2025-08-15 ENCOUNTER — OFFICE VISIT (OUTPATIENT)
Age: 75
End: 2025-08-15
Payer: MEDICARE

## 2025-08-15 VITALS
HEIGHT: 69 IN | DIASTOLIC BLOOD PRESSURE: 68 MMHG | HEART RATE: 57 BPM | BODY MASS INDEX: 29.33 KG/M2 | SYSTOLIC BLOOD PRESSURE: 120 MMHG | WEIGHT: 198 LBS

## 2025-08-15 DIAGNOSIS — I10 PRIMARY HYPERTENSION: ICD-10-CM

## 2025-08-15 DIAGNOSIS — I48.19 ATRIAL FIBRILLATION, PERSISTENT: Primary | ICD-10-CM

## 2025-08-15 DIAGNOSIS — Z98.890 H/O CARDIAC RADIOFREQUENCY ABLATION: ICD-10-CM

## 2025-08-15 DIAGNOSIS — E78.01 FAMILIAL HYPERCHOLESTEROLEMIA: ICD-10-CM

## 2025-08-15 DIAGNOSIS — Q24.5 CORONARY-MYOCARDIAL BRIDGE: ICD-10-CM

## 2025-08-15 PROBLEM — I48.0 PAROXYSMAL ATRIAL FIBRILLATION: Status: RESOLVED | Noted: 2022-02-02 | Resolved: 2025-08-15

## 2025-08-15 PROBLEM — M79.652 LEFT THIGH PAIN: Status: RESOLVED | Noted: 2018-11-08 | Resolved: 2025-08-15

## 2025-08-15 PROBLEM — M19.031 ARTHRITIS OF RIGHT WRIST: Status: RESOLVED | Noted: 2021-06-15 | Resolved: 2025-08-15

## 2025-08-15 PROCEDURE — 1159F MED LIST DOCD IN RCRD: CPT | Performed by: PHYSICIAN ASSISTANT

## 2025-08-15 PROCEDURE — 3074F SYST BP LT 130 MM HG: CPT | Performed by: PHYSICIAN ASSISTANT

## 2025-08-15 PROCEDURE — 1160F RVW MEDS BY RX/DR IN RCRD: CPT | Performed by: PHYSICIAN ASSISTANT

## 2025-08-15 PROCEDURE — 93000 ELECTROCARDIOGRAM COMPLETE: CPT | Performed by: PHYSICIAN ASSISTANT

## 2025-08-15 PROCEDURE — 99214 OFFICE O/P EST MOD 30 MIN: CPT | Performed by: PHYSICIAN ASSISTANT

## 2025-08-15 PROCEDURE — 3078F DIAST BP <80 MM HG: CPT | Performed by: PHYSICIAN ASSISTANT

## 2025-08-25 ENCOUNTER — PATIENT MESSAGE (OUTPATIENT)
Dept: FAMILY MEDICINE CLINIC | Facility: CLINIC | Age: 75
End: 2025-08-25
Payer: MEDICARE

## 2025-08-25 DIAGNOSIS — N52.9 ED (ERECTILE DYSFUNCTION) OF ORGANIC ORIGIN: Primary | ICD-10-CM

## 2025-08-26 RX ORDER — TADALAFIL 5 MG/1
5 TABLET ORAL DAILY PRN
Qty: 6 TABLET | Refills: 6 | Status: SHIPPED | OUTPATIENT
Start: 2025-08-26

## (undated) DEVICE — Device: Brand: REFERENCE PATCH CARTO 3

## (undated) DEVICE — Device: Brand: SOUNDSTAR

## (undated) DEVICE — OCTA,PERSEID,2-2-2-2-2,F-CURVE: Brand: OCTARAY MAPPING CATHETER

## (undated) DEVICE — GW INQWIRE FC PTFE J/3MM .035 180

## (undated) DEVICE — CATH DIAG IMPULSE FL3.5 5F 100CM

## (undated) DEVICE — GLIDESHEATH SLENDER STAINLESS STEEL KIT: Brand: GLIDESHEATH SLENDER

## (undated) DEVICE — KT MANIFLD CARDIAC

## (undated) DEVICE — CATH DIAG IMPULSE FR4 5F 100CM

## (undated) DEVICE — CATH VENT MIV RADL PIG ST TIP 5F 110CM

## (undated) DEVICE — BLANKT WARM UNDER/BDY FUL/ACC A/ 90X206CM

## (undated) DEVICE — PINNACLE INTRODUCER SHEATH: Brand: PINNACLE

## (undated) DEVICE — CLEARSIGHT FINGER CUFF MEDIUM MULTI PACK: Brand: CLEARSIGHT

## (undated) DEVICE — Device: Brand: SMARTABLATE

## (undated) DEVICE — LOU EP: Brand: MEDLINE INDUSTRIES, INC.

## (undated) DEVICE — SYS TRNSEP ACC ACROSS ADLT BRK1 71CM

## (undated) DEVICE — Device: Brand: WEBSTER CS

## (undated) DEVICE — GW EMR FIX EXCHG J STD .035 3MM 260CM

## (undated) DEVICE — PK CATH CARD 40

## (undated) DEVICE — PREF.GUIDING SHEATH W/MULT.CRV: Brand: PREFACE

## (undated) DEVICE — Device: Brand: THERMOCOOL SMARTTOUCH SF

## (undated) DEVICE — INTRO STEER AGILIS NXT MED/CURL 8.5F

## (undated) DEVICE — Device